# Patient Record
Sex: FEMALE | Race: OTHER | HISPANIC OR LATINO
[De-identification: names, ages, dates, MRNs, and addresses within clinical notes are randomized per-mention and may not be internally consistent; named-entity substitution may affect disease eponyms.]

---

## 2017-01-12 ENCOUNTER — OTHER (OUTPATIENT)
Age: 82
End: 2017-01-12

## 2017-01-26 ENCOUNTER — APPOINTMENT (OUTPATIENT)
Dept: NEPHROLOGY | Facility: CLINIC | Age: 82
End: 2017-01-26

## 2017-01-26 VITALS
BODY MASS INDEX: 26.16 KG/M2 | SYSTOLIC BLOOD PRESSURE: 130 MMHG | TEMPERATURE: 98 F | WEIGHT: 167 LBS | DIASTOLIC BLOOD PRESSURE: 60 MMHG

## 2017-02-13 ENCOUNTER — APPOINTMENT (OUTPATIENT)
Dept: PULMONOLOGY | Facility: CLINIC | Age: 82
End: 2017-02-13

## 2017-03-09 ENCOUNTER — APPOINTMENT (OUTPATIENT)
Dept: NEPHROLOGY | Facility: CLINIC | Age: 82
End: 2017-03-09

## 2017-03-09 VITALS
BODY MASS INDEX: 25.84 KG/M2 | DIASTOLIC BLOOD PRESSURE: 65 MMHG | TEMPERATURE: 98 F | SYSTOLIC BLOOD PRESSURE: 120 MMHG | WEIGHT: 165 LBS

## 2017-03-27 ENCOUNTER — APPOINTMENT (OUTPATIENT)
Dept: INTERNAL MEDICINE | Facility: CLINIC | Age: 82
End: 2017-03-27

## 2017-04-05 ENCOUNTER — RX RENEWAL (OUTPATIENT)
Age: 82
End: 2017-04-05

## 2017-04-24 ENCOUNTER — APPOINTMENT (OUTPATIENT)
Dept: INTERNAL MEDICINE | Facility: CLINIC | Age: 82
End: 2017-04-24

## 2017-04-24 VITALS
HEIGHT: 67 IN | SYSTOLIC BLOOD PRESSURE: 133 MMHG | OXYGEN SATURATION: 98 % | TEMPERATURE: 98.1 F | RESPIRATION RATE: 16 BRPM | DIASTOLIC BLOOD PRESSURE: 75 MMHG | HEART RATE: 70 BPM

## 2017-05-22 ENCOUNTER — FORM ENCOUNTER (OUTPATIENT)
Age: 82
End: 2017-05-22

## 2017-05-23 ENCOUNTER — OUTPATIENT (OUTPATIENT)
Dept: OUTPATIENT SERVICES | Facility: HOSPITAL | Age: 82
LOS: 1 days | End: 2017-05-23
Payer: MEDICARE

## 2017-05-23 DIAGNOSIS — R06.02 SHORTNESS OF BREATH: ICD-10-CM

## 2017-05-23 PROCEDURE — 93306 TTE W/DOPPLER COMPLETE: CPT | Mod: 26

## 2017-05-23 PROCEDURE — 93306 TTE W/DOPPLER COMPLETE: CPT

## 2017-06-05 ENCOUNTER — OTHER (OUTPATIENT)
Age: 82
End: 2017-06-05

## 2017-06-05 ENCOUNTER — RX RENEWAL (OUTPATIENT)
Age: 82
End: 2017-06-05

## 2017-06-13 ENCOUNTER — FORM ENCOUNTER (OUTPATIENT)
Age: 82
End: 2017-06-13

## 2017-06-14 ENCOUNTER — OUTPATIENT (OUTPATIENT)
Dept: OUTPATIENT SERVICES | Facility: HOSPITAL | Age: 82
LOS: 1 days | End: 2017-06-14
Payer: MEDICARE

## 2017-06-14 PROCEDURE — 71046 X-RAY EXAM CHEST 2 VIEWS: CPT

## 2017-06-14 PROCEDURE — 71020: CPT | Mod: 26

## 2017-06-19 ENCOUNTER — APPOINTMENT (OUTPATIENT)
Dept: NEPHROLOGY | Facility: CLINIC | Age: 82
End: 2017-06-19

## 2017-06-19 VITALS
RESPIRATION RATE: 16 BRPM | BODY MASS INDEX: 2.51 KG/M2 | DIASTOLIC BLOOD PRESSURE: 60 MMHG | HEART RATE: 80 BPM | TEMPERATURE: 98 F | SYSTOLIC BLOOD PRESSURE: 120 MMHG | WEIGHT: 16 LBS

## 2017-06-27 ENCOUNTER — OTHER (OUTPATIENT)
Age: 82
End: 2017-06-27

## 2017-08-01 ENCOUNTER — OTHER (OUTPATIENT)
Age: 82
End: 2017-08-01

## 2017-08-03 ENCOUNTER — OTHER (OUTPATIENT)
Age: 82
End: 2017-08-03

## 2017-08-08 ENCOUNTER — EMERGENCY (EMERGENCY)
Facility: HOSPITAL | Age: 82
LOS: 1 days | Discharge: HOME | End: 2017-08-08
Admitting: EMERGENCY MEDICINE
Payer: MEDICARE

## 2017-08-08 VITALS
WEIGHT: 160.06 LBS | HEART RATE: 70 BPM | SYSTOLIC BLOOD PRESSURE: 107 MMHG | DIASTOLIC BLOOD PRESSURE: 70 MMHG | RESPIRATION RATE: 17 BRPM | TEMPERATURE: 209 F | OXYGEN SATURATION: 98 %

## 2017-08-08 DIAGNOSIS — Z79.2 LONG TERM (CURRENT) USE OF ANTIBIOTICS: ICD-10-CM

## 2017-08-08 DIAGNOSIS — J44.9 CHRONIC OBSTRUCTIVE PULMONARY DISEASE, UNSPECIFIED: ICD-10-CM
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
## 2017-08-08 DIAGNOSIS — Z79.899 OTHER LONG TERM (CURRENT) DRUG THERAPY: ICD-10-CM

## 2017-08-08 DIAGNOSIS — S70.361A INSECT BITE (NONVENOMOUS), RIGHT THIGH, INITIAL ENCOUNTER: ICD-10-CM
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
## 2017-08-08 DIAGNOSIS — I10 ESSENTIAL (PRIMARY) HYPERTENSION: ICD-10-CM
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
## 2017-08-08 PROCEDURE — 99283 EMERGENCY DEPT VISIT LOW MDM: CPT

## 2017-08-08 RX ORDER — CEPHALEXIN 500 MG
1 CAPSULE ORAL
Qty: 21 | Refills: 0 | OUTPATIENT
Start: 2017-08-08 | End: 2017-08-15

## 2017-08-08 NOTE — ED PROVIDER NOTE - PHYSICAL EXAMINATION
CONSTITUTIONAL: WD,WN. NAD.    SKIN: Normal color and turgor. Nickel-sized unroofed vesicle to posterior right thigh; rim of very mild surrounding erythema about 6 cm in diameter.  No mass or fluctuence.  No streaking up leg. No inguinal INO.  HEAD: NC/AT.  EYES: Conjunctiva clear. EOMI. PERRL.    ENT: Airway patent, OP clear. Nasal mucosa clear, no rhinorrhea.   RESPIRATORY:  Breathing non-labored. No retractions or accessory muscle use.  Lungs CTA bilat.  CARDIOVASCULAR:  RRR, S1S2. No M/R/G.      GI:  Abdomen soft, nontender.    MSK: Neck supple with painless ROM.  No extremity edema or tenderness.  No joint swelling or ROM limitation.  NEURO: Alert and oriented; HAUSER with normal strength.  Normal speech and gait.

## 2017-08-08 NOTE — ED ADULT TRIAGE NOTE - OTHER COMPLAINTS
pt c.o being bit by an unknown animal on sunday. presents with quarter sized bite to back of R thigh. reports fever yesterday with discharge/pus.

## 2017-08-08 NOTE — ED PROVIDER NOTE - PMH
Chronic obstructive pulmonary disease  COPD (chronic obstructive pulmonary disease)  Essential hypertension  HTN (hypertension)  Glaucoma  Glaucoma  History of pneumonia

## 2017-08-08 NOTE — ED PROVIDER NOTE - OBJECTIVE STATEMENT
pt was walking her dog 2 days ago when she felt an insect bite the back of her right thigh.  she looked down and swatted a large black flying insect away.  it is painful and developed a blister at the bite site yesterday.  daughter says she felt warm yesterday and had an oral temp 101.  today no longer feels warm and not having fever; they came to the ER to have the bite evaluated.  Last tetanus shot less than 10 yrs ago.

## 2017-08-08 NOTE — ED ADULT NURSE NOTE - CHPI ED SYMPTOMS NEG
no vomiting/no fever/no chills/no numbness/no tingling/no dizziness/no nausea/no decreased eating/drinking/no weakness

## 2017-08-08 NOTE — ED PROVIDER NOTE - MEDICAL DECISION MAKING DETAILS
Insect bite with possible early, mild localized infection.  AVSS appears well.  No other s/s to suggest another source of fever yesterday.   Will start abx and have pt get wound check in 48 hrs.

## 2017-08-08 NOTE — ED PROVIDER NOTE - NS ED ROS FT
CONST:  fever yesterday  NEURO: no headache or dizziness, no weakness  CARDIO: no chest pain  PULM: no dyspnea   GI: no abdominal pain, nausea or vomiting  : no urinary frequency, dysuria, or hematuria  ENDO: no hx of diabetes

## 2017-08-15 ENCOUNTER — OTHER (OUTPATIENT)
Age: 82
End: 2017-08-15

## 2017-08-24 ENCOUNTER — OTHER (OUTPATIENT)
Age: 82
End: 2017-08-24

## 2017-09-01 ENCOUNTER — APPOINTMENT (OUTPATIENT)
Dept: PULMONOLOGY | Facility: CLINIC | Age: 82
End: 2017-09-01
Payer: MEDICARE

## 2017-09-01 PROCEDURE — 99214 OFFICE O/P EST MOD 30 MIN: CPT

## 2017-09-07 ENCOUNTER — APPOINTMENT (OUTPATIENT)
Dept: NEPHROLOGY | Facility: CLINIC | Age: 82
End: 2017-09-07
Payer: MEDICARE

## 2017-09-07 VITALS
SYSTOLIC BLOOD PRESSURE: 120 MMHG | WEIGHT: 156 LBS | TEMPERATURE: 98 F | HEART RATE: 82 BPM | BODY MASS INDEX: 24.43 KG/M2 | DIASTOLIC BLOOD PRESSURE: 70 MMHG

## 2017-09-07 PROCEDURE — 99214 OFFICE O/P EST MOD 30 MIN: CPT

## 2017-09-07 RX ORDER — IBUPROFEN 400 MG/1
400 TABLET, FILM COATED ORAL
Qty: 20 | Refills: 0 | Status: DISCONTINUED | COMMUNITY
Start: 2017-01-12 | End: 2017-09-07

## 2017-09-20 ENCOUNTER — OTHER (OUTPATIENT)
Age: 82
End: 2017-09-20

## 2017-09-27 ENCOUNTER — APPOINTMENT (OUTPATIENT)
Dept: INTERNAL MEDICINE | Facility: CLINIC | Age: 82
End: 2017-09-27
Payer: MEDICARE

## 2017-09-27 VITALS
HEIGHT: 67 IN | WEIGHT: 165 LBS | OXYGEN SATURATION: 99 % | TEMPERATURE: 98 F | DIASTOLIC BLOOD PRESSURE: 82 MMHG | SYSTOLIC BLOOD PRESSURE: 140 MMHG | HEART RATE: 71 BPM | BODY MASS INDEX: 25.9 KG/M2

## 2017-09-27 PROCEDURE — G0008: CPT

## 2017-09-27 PROCEDURE — 90662 IIV NO PRSV INCREASED AG IM: CPT

## 2017-09-27 PROCEDURE — 99214 OFFICE O/P EST MOD 30 MIN: CPT | Mod: 25

## 2017-10-30 ENCOUNTER — OTHER (OUTPATIENT)
Age: 82
End: 2017-10-30

## 2017-12-11 ENCOUNTER — APPOINTMENT (OUTPATIENT)
Dept: NEPHROLOGY | Facility: CLINIC | Age: 82
End: 2017-12-11
Payer: MEDICARE

## 2017-12-11 VITALS
SYSTOLIC BLOOD PRESSURE: 130 MMHG | DIASTOLIC BLOOD PRESSURE: 70 MMHG | WEIGHT: 168 LBS | TEMPERATURE: 98 F | BODY MASS INDEX: 26.31 KG/M2

## 2017-12-11 PROCEDURE — 99214 OFFICE O/P EST MOD 30 MIN: CPT

## 2017-12-21 ENCOUNTER — RX RENEWAL (OUTPATIENT)
Age: 82
End: 2017-12-21

## 2018-01-05 ENCOUNTER — OTHER (OUTPATIENT)
Age: 83
End: 2018-01-05

## 2018-02-06 ENCOUNTER — OTHER (OUTPATIENT)
Age: 83
End: 2018-02-06

## 2018-03-01 ENCOUNTER — APPOINTMENT (OUTPATIENT)
Dept: PULMONOLOGY | Facility: CLINIC | Age: 83
End: 2018-03-01
Payer: MEDICARE

## 2018-03-01 PROCEDURE — 99214 OFFICE O/P EST MOD 30 MIN: CPT | Mod: 25

## 2018-03-12 ENCOUNTER — APPOINTMENT (OUTPATIENT)
Dept: NEPHROLOGY | Facility: CLINIC | Age: 83
End: 2018-03-12
Payer: MEDICARE

## 2018-03-12 ENCOUNTER — EMERGENCY (EMERGENCY)
Facility: HOSPITAL | Age: 83
LOS: 1 days | Discharge: HOME | End: 2018-03-12
Attending: EMERGENCY MEDICINE
Payer: MEDICARE

## 2018-03-12 VITALS
DIASTOLIC BLOOD PRESSURE: 60 MMHG | BODY MASS INDEX: 25.53 KG/M2 | WEIGHT: 163 LBS | HEART RATE: 76 BPM | RESPIRATION RATE: 18 BRPM | SYSTOLIC BLOOD PRESSURE: 100 MMHG

## 2018-03-12 VITALS
HEART RATE: 72 BPM | OXYGEN SATURATION: 98 % | DIASTOLIC BLOOD PRESSURE: 76 MMHG | RESPIRATION RATE: 18 BRPM | SYSTOLIC BLOOD PRESSURE: 118 MMHG

## 2018-03-12 VITALS
DIASTOLIC BLOOD PRESSURE: 69 MMHG | HEART RATE: 73 BPM | SYSTOLIC BLOOD PRESSURE: 119 MMHG | TEMPERATURE: 98 F | OXYGEN SATURATION: 98 % | RESPIRATION RATE: 18 BRPM

## 2018-03-12 DIAGNOSIS — J45.909 UNSPECIFIED ASTHMA, UNCOMPLICATED: ICD-10-CM

## 2018-03-12 DIAGNOSIS — Z95.0 PRESENCE OF CARDIAC PACEMAKER: Chronic | ICD-10-CM

## 2018-03-12 DIAGNOSIS — I12.9 HYPERTENSIVE CHRONIC KIDNEY DISEASE WITH STAGE 1 THROUGH STAGE 4 CHRONIC KIDNEY DISEASE, OR UNSPECIFIED CHRONIC KIDNEY DISEASE: ICD-10-CM

## 2018-03-12 DIAGNOSIS — Z98.890 OTHER SPECIFIED POSTPROCEDURAL STATES: Chronic | ICD-10-CM

## 2018-03-12 DIAGNOSIS — Z79.899 OTHER LONG TERM (CURRENT) DRUG THERAPY: ICD-10-CM

## 2018-03-12 DIAGNOSIS — N18.9 CHRONIC KIDNEY DISEASE, UNSPECIFIED: ICD-10-CM

## 2018-03-12 DIAGNOSIS — Z79.2 LONG TERM (CURRENT) USE OF ANTIBIOTICS: ICD-10-CM

## 2018-03-12 DIAGNOSIS — R42 DIZZINESS AND GIDDINESS: ICD-10-CM

## 2018-03-12 DIAGNOSIS — R41.82 ALTERED MENTAL STATUS, UNSPECIFIED: ICD-10-CM

## 2018-03-12 DIAGNOSIS — E78.5 HYPERLIPIDEMIA, UNSPECIFIED: ICD-10-CM

## 2018-03-12 LAB
APPEARANCE UR: CLEAR — SIGNIFICANT CHANGE UP
BILIRUB UR-MCNC: NEGATIVE — SIGNIFICANT CHANGE UP
COLOR SPEC: YELLOW — SIGNIFICANT CHANGE UP
DIFF PNL FLD: NEGATIVE — SIGNIFICANT CHANGE UP
GLUCOSE UR QL: NEGATIVE — SIGNIFICANT CHANGE UP
KETONES UR-MCNC: NEGATIVE — SIGNIFICANT CHANGE UP
LEUKOCYTE ESTERASE UR-ACNC: NEGATIVE — SIGNIFICANT CHANGE UP
MAGNESIUM SERPL-MCNC: 2.2 MG/DL — SIGNIFICANT CHANGE UP (ref 1.6–2.6)
NITRITE UR-MCNC: NEGATIVE — SIGNIFICANT CHANGE UP
PH UR: 6 — SIGNIFICANT CHANGE UP (ref 5–8)
PROT UR-MCNC: NEGATIVE MG/DL — SIGNIFICANT CHANGE UP
SP GR SPEC: <=1.005 — SIGNIFICANT CHANGE UP (ref 1–1.03)
TSH SERPL-MCNC: 1.07 UIU/ML — SIGNIFICANT CHANGE UP (ref 0.35–4.94)
UROBILINOGEN FLD QL: 0.2 E.U./DL — SIGNIFICANT CHANGE UP

## 2018-03-12 PROCEDURE — 99284 EMERGENCY DEPT VISIT MOD MDM: CPT | Mod: 25,GC

## 2018-03-12 PROCEDURE — 71045 X-RAY EXAM CHEST 1 VIEW: CPT

## 2018-03-12 PROCEDURE — 93010 ELECTROCARDIOGRAM REPORT: CPT

## 2018-03-12 PROCEDURE — 86593 SYPHILIS TEST NON-TREP QUANT: CPT

## 2018-03-12 PROCEDURE — 70450 CT HEAD/BRAIN W/O DYE: CPT

## 2018-03-12 PROCEDURE — 70450 CT HEAD/BRAIN W/O DYE: CPT | Mod: 26

## 2018-03-12 PROCEDURE — 36415 COLL VENOUS BLD VENIPUNCTURE: CPT

## 2018-03-12 PROCEDURE — 83735 ASSAY OF MAGNESIUM: CPT

## 2018-03-12 PROCEDURE — 85025 COMPLETE CBC W/AUTO DIFF WBC: CPT

## 2018-03-12 PROCEDURE — 82607 VITAMIN B-12: CPT

## 2018-03-12 PROCEDURE — 71045 X-RAY EXAM CHEST 1 VIEW: CPT | Mod: 26

## 2018-03-12 PROCEDURE — 84443 ASSAY THYROID STIM HORMONE: CPT

## 2018-03-12 PROCEDURE — 86780 TREPONEMA PALLIDUM: CPT

## 2018-03-12 PROCEDURE — 80053 COMPREHEN METABOLIC PANEL: CPT

## 2018-03-12 PROCEDURE — 86592 SYPHILIS TEST NON-TREP QUAL: CPT

## 2018-03-12 PROCEDURE — 99214 OFFICE O/P EST MOD 30 MIN: CPT

## 2018-03-12 PROCEDURE — 81003 URINALYSIS AUTO W/O SCOPE: CPT

## 2018-03-12 PROCEDURE — 93005 ELECTROCARDIOGRAM TRACING: CPT

## 2018-03-12 PROCEDURE — 99284 EMERGENCY DEPT VISIT MOD MDM: CPT | Mod: 25

## 2018-03-12 NOTE — ED PROVIDER NOTE - LAB INTERPRETATION
Elevated BUN/Cr; unremarkable u/a; TSH WNL; hypercalcemia; no other significant electrolyte abnormalities

## 2018-03-12 NOTE — ED PROVIDER NOTE - ATTENDING CONTRIBUTION TO CARE
CKD, HTN, HLD -- uptrending Cr, feeling globally fatigued, lightheaded, and mentally slowed for past several days, sent to ED by PMD for workup. No URI sx, pain of any kind, SOB, n/v/c/d or urinary sx. No documented fevers. -- r/o occult infection, missed CVA, electrolyte disturbance, anemia.

## 2018-03-12 NOTE — ED PROVIDER NOTE - PMH
Chronic obstructive pulmonary disease  COPD (chronic obstructive pulmonary disease)  Essential hypertension  HTN (hypertension)  Glaucoma  Glaucoma  History of pneumonia Asthma, unspecified asthma severity, unspecified whether complicated, unspecified whether persistent    Cerebrovascular accident (CVA), unspecified mechanism    Chronic kidney disease, unspecified CKD stage    Chronic obstructive pulmonary disease  COPD (chronic obstructive pulmonary disease)  Coronary artery disease, angina presence unspecified, unspecified vessel or lesion type, unspecified whether native or transplanted heart    Dizziness    Essential hypertension  HTN (hypertension)  Glaucoma  Glaucoma  History of pneumonia    Hypercalcemia    Hyperlipidemia, unspecified hyperlipidemia type    Malignant neoplasm of lung, unspecified laterality, unspecified part of lung    Pacemaker

## 2018-03-12 NOTE — ED PROVIDER NOTE - MEDICAL DECISION MAKING DETAILS
Patient is an 81yo F w/ PMH CVA, COPD/asthma, lung CA, CAD, CKD, HTN, HLD, hypercalcemia, dizziness (mild BPPV as per Allscripts), s/p PPM presenting w/ slowed responsiveness and decreased PO for 1wk. CT w/o e/o acute/subacute infarct or hemorrhage; no ischemic changes on ECG; CXR clear; no significant electrolyte/metabolic derangements. U/a Patient is an 81yo F w/ PMH CVA, COPD/asthma, lung CA, CAD, CKD, HTN, HLD, hypercalcemia, dizziness (mild BPPV as per Allscripts), s/p PPM presenting w/ slowed responsiveness and decreased PO for 1wk. CT w/o e/o acute/subacute infarct or hemorrhage; no ischemic changes on ECG; CXR clear; no significant electrolyte/metabolic derangements. U/a unremarkable. Patient advised to continue to f/u w/ nephrologist and outpatient PCP for further care.

## 2018-03-12 NOTE — ED PROVIDER NOTE - OBJECTIVE STATEMENT
Patient is an 81yo F w/ PMH CVA, COPD/asthma, lung CA, CAD, CKD, HTN, HLD, hypercalcemia, dizziness (mild BPPV as per [employer]scripts), s/p PPM presenting w/ slowed responsiveness and decreased PO for 1wk. Patient went for f/u renal appointment in the AM (Dr. Haines) where her sBP was noted to be in the low 100's and patient noted to have slowed responsiveness. Yancy recommended pt to not take PM lasix and norvasc and to present to the ED for further evaluation. Patient reports feeling feverish for the past few nights, 4 episodes of diarrhea last Friday (none since), chronic L knee pain; denies dysuria, dyspnea, CP, ab pain, n/v/d/c,  confusion, HA, head trauma, slurred speech, immobility, fall, recent travel, sick contacts.

## 2018-03-12 NOTE — ED ADULT TRIAGE NOTE - CHIEF COMPLAINT QUOTE
Pt directed to ED from outpatient visit for [facility].  HHA states "She's been Dizzy and Her blood pressure was low so he sent us here."  PT denies N/V/D, SOB, Fevers and CP.

## 2018-03-12 NOTE — ED PROVIDER NOTE - CARE PLAN
Principal Discharge DX:	Altered mental status, unspecified altered mental status type  Goal:	Improved  Assessment and plan of treatment:	CT w/o e/o acute/subacute infarct or hemorrhage; no ischemic changes on ECG; CXR clear; no significant electrolyte/metabolic derangements

## 2018-03-13 LAB
T PALLIDUM AB TITR SER: POSITIVE
VIT B12 SERPL-MCNC: 1645 PG/ML — HIGH (ref 232–1245)

## 2018-03-14 ENCOUNTER — EMERGENCY (EMERGENCY)
Facility: HOSPITAL | Age: 83
LOS: 1 days | Discharge: HOME | End: 2018-03-14
Admitting: EMERGENCY MEDICINE
Payer: MEDICARE

## 2018-03-14 VITALS
TEMPERATURE: 98 F | RESPIRATION RATE: 18 BRPM | SYSTOLIC BLOOD PRESSURE: 131 MMHG | OXYGEN SATURATION: 98 % | DIASTOLIC BLOOD PRESSURE: 74 MMHG | HEART RATE: 74 BPM

## 2018-03-14 VITALS
TEMPERATURE: 98 F | RESPIRATION RATE: 18 BRPM | SYSTOLIC BLOOD PRESSURE: 124 MMHG | HEART RATE: 68 BPM | DIASTOLIC BLOOD PRESSURE: 75 MMHG | OXYGEN SATURATION: 97 %

## 2018-03-14 DIAGNOSIS — Z79.899 OTHER LONG TERM (CURRENT) DRUG THERAPY: ICD-10-CM

## 2018-03-14 DIAGNOSIS — Z98.890 OTHER SPECIFIED POSTPROCEDURAL STATES: Chronic | ICD-10-CM

## 2018-03-14 DIAGNOSIS — Z95.0 PRESENCE OF CARDIAC PACEMAKER: Chronic | ICD-10-CM

## 2018-03-14 DIAGNOSIS — A53.9 SYPHILIS, UNSPECIFIED: ICD-10-CM

## 2018-03-14 PROCEDURE — 99283 EMERGENCY DEPT VISIT LOW MDM: CPT | Mod: 25

## 2018-03-14 PROCEDURE — 99283 EMERGENCY DEPT VISIT LOW MDM: CPT

## 2018-03-14 PROCEDURE — 96372 THER/PROPH/DIAG INJ SC/IM: CPT

## 2018-03-14 RX ORDER — PENICILLIN G BENZATHINE 1200000 [IU]/2ML
2.4 INJECTION, SUSPENSION INTRAMUSCULAR ONCE
Qty: 0 | Refills: 0 | Status: DISCONTINUED | OUTPATIENT
Start: 2018-03-14 | End: 2018-03-14

## 2018-03-14 RX ORDER — PENICILLIN G BENZATHINE 1200000 [IU]/2ML
2.4 INJECTION, SUSPENSION INTRAMUSCULAR ONCE
Qty: 0 | Refills: 0 | Status: COMPLETED | OUTPATIENT
Start: 2018-03-14 | End: 2018-03-14

## 2018-03-14 RX ADMIN — PENICILLIN G BENZATHINE 2.4 MILLION UNIT(S): 1200000 INJECTION, SUSPENSION INTRAMUSCULAR at 12:41

## 2018-03-14 NOTE — ED PROVIDER NOTE - MEDICAL DECISION MAKING DETAILS
81 y/o female was in the ED two days ago for weakness. Post-dc results with + syphilis. Pt here for 1st treatment. Will return next week for 2nd treatment and 3rd treatment will be with Dr. Croft. Earliest appointment available is March 27th at 11 am, therefore 2nd treatment in ED as well. Pt currently asymptomatic. Understands she needs 3 injections. Aid is present with pt. Will continue to evaluate any post injection reaction. 81 y/o female was in the ED two days ago for weakness. Post-dc results with + syphilis. Pt here for 1st treatment. Will return next week for 2nd treatment due to Dr. Rodríguez not available and 3rd treatment will be with Dr. Croft. Earliest appointment available is March 27th at 11 am, therefore 2nd treatment in ED as well. Pt currently asymptomatic. Understands she needs 3 injections. Aid is present with pt. Discussed treatment plan with daughter of pt. Will continue to evaluate any post injection reaction. Advised strict return precautions. pt understands diagnosis and will return as advised.

## 2018-03-14 NOTE — ED ADULT NURSE NOTE - OBJECTIVE STATEMENT
Pt walked into Ed, called back from the ED in regards to positive syphilis results. She reports lower back pain only and a fever/dizziness only on monday. The dizziness and fever have resolved ever since monday. Pt denies present fever, urinary s/s, hematuria, visual changes, numbness, tingling, HA, confusion. Pt is currently being observed for her kidney with hx of HTN. Has one female partner and denies sexual intercourse.

## 2018-03-14 NOTE — ED ADULT TRIAGE NOTE - OTHER COMPLAINTS
Daughter called and states that patient tested positive for syphilis and needs to be treated. Pt was here two days ago and states there are no new symptoms at this time.

## 2018-03-14 NOTE — ED ADULT NURSE NOTE - CHPI ED SYMPTOMS NEG
no chills/no nausea/no fever/no hematuria/no vomiting/no blood in stool/no diarrhea/no abdominal distension/no burning urination/no dysuria

## 2018-03-14 NOTE — ED PROVIDER NOTE - OBJECTIVE STATEMENT
81 y/o female present due to abnormal labs. Pt was positive for syphilis. Currently pt is asymptomatic with no complaints. Denies the following: fever, chills, rash, urinary symptoms.

## 2018-03-21 ENCOUNTER — OTHER (OUTPATIENT)
Age: 83
End: 2018-03-21

## 2018-03-21 ENCOUNTER — RX RENEWAL (OUTPATIENT)
Age: 83
End: 2018-03-21

## 2018-03-21 ENCOUNTER — EMERGENCY (EMERGENCY)
Facility: HOSPITAL | Age: 83
LOS: 1 days | Discharge: HOME | End: 2018-03-21
Admitting: EMERGENCY MEDICINE
Payer: MEDICARE

## 2018-03-21 VITALS
SYSTOLIC BLOOD PRESSURE: 127 MMHG | TEMPERATURE: 98 F | DIASTOLIC BLOOD PRESSURE: 77 MMHG | WEIGHT: 162.04 LBS | OXYGEN SATURATION: 98 % | RESPIRATION RATE: 16 BRPM | HEART RATE: 79 BPM

## 2018-03-21 DIAGNOSIS — Z98.890 OTHER SPECIFIED POSTPROCEDURAL STATES: Chronic | ICD-10-CM

## 2018-03-21 DIAGNOSIS — Z79.2 LONG TERM (CURRENT) USE OF ANTIBIOTICS: ICD-10-CM

## 2018-03-21 DIAGNOSIS — Z95.0 PRESENCE OF CARDIAC PACEMAKER: Chronic | ICD-10-CM

## 2018-03-21 DIAGNOSIS — A53.9 SYPHILIS, UNSPECIFIED: ICD-10-CM

## 2018-03-21 DIAGNOSIS — I12.9 HYPERTENSIVE CHRONIC KIDNEY DISEASE WITH STAGE 1 THROUGH STAGE 4 CHRONIC KIDNEY DISEASE, OR UNSPECIFIED CHRONIC KIDNEY DISEASE: ICD-10-CM

## 2018-03-21 DIAGNOSIS — Z79.899 OTHER LONG TERM (CURRENT) DRUG THERAPY: ICD-10-CM

## 2018-03-21 DIAGNOSIS — E78.5 HYPERLIPIDEMIA, UNSPECIFIED: ICD-10-CM

## 2018-03-21 DIAGNOSIS — N18.9 CHRONIC KIDNEY DISEASE, UNSPECIFIED: ICD-10-CM

## 2018-03-21 DIAGNOSIS — J44.9 CHRONIC OBSTRUCTIVE PULMONARY DISEASE, UNSPECIFIED: ICD-10-CM

## 2018-03-21 PROCEDURE — 96372 THER/PROPH/DIAG INJ SC/IM: CPT

## 2018-03-21 PROCEDURE — 99283 EMERGENCY DEPT VISIT LOW MDM: CPT

## 2018-03-21 PROCEDURE — 99283 EMERGENCY DEPT VISIT LOW MDM: CPT | Mod: 25

## 2018-03-21 RX ORDER — PENICILLIN G BENZATHINE 1200000 [IU]/2ML
2.4 INJECTION, SUSPENSION INTRAMUSCULAR ONCE
Qty: 0 | Refills: 0 | Status: COMPLETED | OUTPATIENT
Start: 2018-03-21 | End: 2018-03-21

## 2018-03-21 RX ADMIN — PENICILLIN G BENZATHINE 2.4 MILLION UNIT(S): 1200000 INJECTION, SUSPENSION INTRAMUSCULAR at 11:04

## 2018-03-21 NOTE — ED PROVIDER NOTE - PMH
Asthma, unspecified asthma severity, unspecified whether complicated, unspecified whether persistent    Cerebrovascular accident (CVA), unspecified mechanism    Chronic kidney disease, unspecified CKD stage    Chronic obstructive pulmonary disease  COPD (chronic obstructive pulmonary disease)  Coronary artery disease, angina presence unspecified, unspecified vessel or lesion type, unspecified whether native or transplanted heart    Dizziness    Essential hypertension  HTN (hypertension)  Glaucoma  Glaucoma  History of pneumonia    Hypercalcemia    Hyperlipidemia, unspecified hyperlipidemia type    Malignant neoplasm of lung, unspecified laterality, unspecified part of lung    Pacemaker

## 2018-03-21 NOTE — ED ADULT NURSE NOTE - OBJECTIVE STATEMENT
presents to ED for continuing treatment of asymptomatic syphilis. pt was seen in ED recently and received first dose of Bicillin. Reports no adverse reaction.  denies fever, chills.

## 2018-03-21 NOTE — ED PROVIDER NOTE - OBJECTIVE STATEMENT
81 y/o female present for 2nd syphilis injection. Pt reports no reactions to last treatment. She denies the following: fever, chills, rash, pain.

## 2018-03-26 ENCOUNTER — APPOINTMENT (OUTPATIENT)
Dept: INTERNAL MEDICINE | Facility: CLINIC | Age: 83
End: 2018-03-26
Payer: MEDICARE

## 2018-03-26 VITALS
SYSTOLIC BLOOD PRESSURE: 136 MMHG | HEIGHT: 67 IN | OXYGEN SATURATION: 97 % | DIASTOLIC BLOOD PRESSURE: 80 MMHG | WEIGHT: 163 LBS | TEMPERATURE: 98.4 F | BODY MASS INDEX: 25.58 KG/M2 | HEART RATE: 69 BPM

## 2018-03-26 PROCEDURE — 99214 OFFICE O/P EST MOD 30 MIN: CPT

## 2018-03-27 ENCOUNTER — APPOINTMENT (OUTPATIENT)
Dept: INFECTIOUS DISEASE | Facility: CLINIC | Age: 83
End: 2018-03-27
Payer: MEDICARE

## 2018-03-27 ENCOUNTER — OUTPATIENT (OUTPATIENT)
Dept: OUTPATIENT SERVICES | Facility: HOSPITAL | Age: 83
LOS: 1 days | End: 2018-03-27

## 2018-03-27 VITALS — SYSTOLIC BLOOD PRESSURE: 130 MMHG | DIASTOLIC BLOOD PRESSURE: 80 MMHG | RESPIRATION RATE: 12 BRPM | HEART RATE: 80 BPM

## 2018-03-27 DIAGNOSIS — Z98.890 OTHER SPECIFIED POSTPROCEDURAL STATES: Chronic | ICD-10-CM

## 2018-03-27 DIAGNOSIS — Z95.0 PRESENCE OF CARDIAC PACEMAKER: Chronic | ICD-10-CM

## 2018-03-27 LAB
ALBUMIN SERPL ELPH-MCNC: 4 G/DL — SIGNIFICANT CHANGE UP (ref 3.3–5)
ALP SERPL-CCNC: 120 U/L — SIGNIFICANT CHANGE UP (ref 40–120)
ALT FLD-CCNC: 16 U/L — SIGNIFICANT CHANGE UP (ref 10–45)
ANION GAP SERPL CALC-SCNC: 12 MMOL/L — SIGNIFICANT CHANGE UP (ref 5–17)
AST SERPL-CCNC: 23 U/L — SIGNIFICANT CHANGE UP (ref 10–40)
BILIRUB SERPL-MCNC: 0.5 MG/DL — SIGNIFICANT CHANGE UP (ref 0.2–1.2)
BUN SERPL-MCNC: 31 MG/DL — HIGH (ref 7–23)
CALCIUM SERPL-MCNC: 10.4 MG/DL — SIGNIFICANT CHANGE UP (ref 8.4–10.5)
CHLORIDE SERPL-SCNC: 102 MMOL/L — SIGNIFICANT CHANGE UP (ref 96–108)
CO2 SERPL-SCNC: 26 MMOL/L — SIGNIFICANT CHANGE UP (ref 22–31)
CREAT SERPL-MCNC: 1.08 MG/DL — SIGNIFICANT CHANGE UP (ref 0.5–1.3)
GLUCOSE SERPL-MCNC: 91 MG/DL — SIGNIFICANT CHANGE UP (ref 70–99)
POTASSIUM SERPL-MCNC: 4.5 MMOL/L — SIGNIFICANT CHANGE UP (ref 3.5–5.3)
POTASSIUM SERPL-SCNC: 4.5 MMOL/L — SIGNIFICANT CHANGE UP (ref 3.5–5.3)
PROT SERPL-MCNC: 7.6 G/DL — SIGNIFICANT CHANGE UP (ref 6–8.3)
SODIUM SERPL-SCNC: 140 MMOL/L — SIGNIFICANT CHANGE UP (ref 135–145)

## 2018-03-27 PROCEDURE — 99214 OFFICE O/P EST MOD 30 MIN: CPT | Mod: GC

## 2018-03-28 ENCOUNTER — RX RENEWAL (OUTPATIENT)
Age: 83
End: 2018-03-28

## 2018-03-28 LAB
CONTROL LINE: PRESENT
HIV 1+2 AB+HIV1P24 AG SERPLBLD IA.RAPID: NEGATIVE
HIV-1 / HIV-2 ANTIBODY: NORMAL
HIV1 P24 AG SERPL IA-MCNC: NORMAL

## 2018-04-09 DIAGNOSIS — Z86.19 PERSONAL HISTORY OF OTHER INFECTIOUS AND PARASITIC DISEASES: ICD-10-CM

## 2018-04-09 DIAGNOSIS — Z11.4 ENCOUNTER FOR SCREENING FOR HUMAN IMMUNODEFICIENCY VIRUS [HIV]: ICD-10-CM

## 2018-05-07 ENCOUNTER — RX RENEWAL (OUTPATIENT)
Age: 83
End: 2018-05-07

## 2018-05-07 ENCOUNTER — OTHER (OUTPATIENT)
Age: 83
End: 2018-05-07

## 2018-06-01 ENCOUNTER — OTHER (OUTPATIENT)
Age: 83
End: 2018-06-01

## 2018-06-04 ENCOUNTER — APPOINTMENT (OUTPATIENT)
Dept: INTERNAL MEDICINE | Facility: CLINIC | Age: 83
End: 2018-06-04
Payer: MEDICARE

## 2018-06-04 VITALS
WEIGHT: 169.13 LBS | TEMPERATURE: 98.6 F | OXYGEN SATURATION: 96 % | BODY MASS INDEX: 26.55 KG/M2 | SYSTOLIC BLOOD PRESSURE: 143 MMHG | DIASTOLIC BLOOD PRESSURE: 81 MMHG | HEART RATE: 70 BPM | HEIGHT: 67 IN

## 2018-06-04 PROCEDURE — 36415 COLL VENOUS BLD VENIPUNCTURE: CPT

## 2018-06-04 PROCEDURE — 99214 OFFICE O/P EST MOD 30 MIN: CPT | Mod: 25

## 2018-06-07 LAB
ALBUMIN SERPL ELPH-MCNC: 4.2 G/DL
ALP BLD-CCNC: 120 U/L
ALT SERPL-CCNC: 15 U/L
ANION GAP SERPL CALC-SCNC: 12 MMOL/L
AST SERPL-CCNC: 22 U/L
BILIRUB SERPL-MCNC: 0.4 MG/DL
BUN SERPL-MCNC: 20 MG/DL
CALCIUM SERPL-MCNC: 10.3 MG/DL
CHLORIDE SERPL-SCNC: 106 MMOL/L
CO2 SERPL-SCNC: 25 MMOL/L
CREAT SERPL-MCNC: 1.04 MG/DL
GLUCOSE SERPL-MCNC: 95 MG/DL
HBA1C MFR BLD HPLC: 5.8 %
POTASSIUM SERPL-SCNC: 5.2 MMOL/L
PROT SERPL-MCNC: 7.1 G/DL
SODIUM SERPL-SCNC: 143 MMOL/L

## 2018-06-27 ENCOUNTER — APPOINTMENT (OUTPATIENT)
Dept: NEPHROLOGY | Facility: CLINIC | Age: 83
End: 2018-06-27

## 2018-07-09 ENCOUNTER — RX RENEWAL (OUTPATIENT)
Age: 83
End: 2018-07-09

## 2018-07-09 ENCOUNTER — OTHER (OUTPATIENT)
Age: 83
End: 2018-07-09

## 2018-07-16 ENCOUNTER — APPOINTMENT (OUTPATIENT)
Dept: NEPHROLOGY | Facility: CLINIC | Age: 83
End: 2018-07-16
Payer: MEDICARE

## 2018-07-16 VITALS — HEART RATE: 64 BPM | SYSTOLIC BLOOD PRESSURE: 136 MMHG | OXYGEN SATURATION: 99 % | DIASTOLIC BLOOD PRESSURE: 75 MMHG

## 2018-07-16 VITALS — WEIGHT: 173 LBS | BODY MASS INDEX: 27.1 KG/M2

## 2018-07-16 PROCEDURE — 99214 OFFICE O/P EST MOD 30 MIN: CPT

## 2018-07-25 ENCOUNTER — APPOINTMENT (OUTPATIENT)
Dept: PULMONOLOGY | Facility: CLINIC | Age: 83
End: 2018-07-25
Payer: MEDICARE

## 2018-07-25 VITALS
HEIGHT: 68.4 IN | OXYGEN SATURATION: 96 % | BODY MASS INDEX: 25.62 KG/M2 | WEIGHT: 171 LBS | TEMPERATURE: 98.5 F | DIASTOLIC BLOOD PRESSURE: 90 MMHG | SYSTOLIC BLOOD PRESSURE: 150 MMHG | HEART RATE: 72 BPM

## 2018-07-25 PROCEDURE — 99214 OFFICE O/P EST MOD 30 MIN: CPT

## 2018-07-26 ENCOUNTER — OTHER (OUTPATIENT)
Age: 83
End: 2018-07-26

## 2018-07-26 PROBLEM — J45.909 UNSPECIFIED ASTHMA, UNCOMPLICATED: Chronic | Status: ACTIVE | Noted: 2018-03-12

## 2018-07-26 PROBLEM — E78.5 HYPERLIPIDEMIA, UNSPECIFIED: Chronic | Status: ACTIVE | Noted: 2018-03-12

## 2018-07-31 NOTE — ED PROVIDER NOTE - PLAN OF CARE
[facility]:  New patients can reach our intake coordinators at [phone]. Patient Education        Childhood Depression: Care Instructions  Your Care Instructions  Depression is a mood disorder that causes a child or teen to feel sad or irritable for a long period of time. A young person who is depressed may not enjoy school, play, or friends. He or she may also sleep more or less than usual, lose or gain weight, and be withdrawn. Depression may run in families. It is linked to a chemical problem in the brain. The chemical problem can be caused by medicines, illness, or stress. Events that cause great stress, such as moving or the loss of a loved one, can trigger it. Depression can last for a long time. It may come in cycles of feeling down and feeling normal. It is important to know that all forms of depression can be treated. Follow-up care is a key part of your child's treatment and safety. Be sure to make and go to all appointments, and call your doctor if your child is having problems. It's also a good idea to know your child's test results and keep a list of the medicines your child takes. How can you care for your child at home? · Offer your child support and understanding. This is one of the most important things you can do to help your child cope with being depressed. · Be safe with medicines. Have your child take medicines exactly as prescribed. Call your doctor if your child has any problems with his or her medicine. It is important for your child to keep taking medicine for depression even after symptoms go away, so that it does not come back. Your child may need to try several medicines before finding the one that works best. Many side effects of the medicines go away after a while. Talk to your doctor about any side effects or other concerns. · Make sure your child gets enough sleep. There are things you can do if he or she has problems sleeping.   ¨ Have your child go to bed Improved CT w/o e/o acute/subacute infarct or hemorrhage; no ischemic changes on ECG; CXR clear; no significant electrolyte/metabolic derangements

## 2018-08-01 ENCOUNTER — OUTPATIENT (OUTPATIENT)
Dept: OUTPATIENT SERVICES | Facility: HOSPITAL | Age: 83
LOS: 1 days | End: 2018-08-01
Payer: MEDICARE

## 2018-08-01 ENCOUNTER — APPOINTMENT (OUTPATIENT)
Dept: CT IMAGING | Facility: HOSPITAL | Age: 83
End: 2018-08-01
Payer: MEDICARE

## 2018-08-01 DIAGNOSIS — Z95.0 PRESENCE OF CARDIAC PACEMAKER: Chronic | ICD-10-CM

## 2018-08-01 DIAGNOSIS — Z98.890 OTHER SPECIFIED POSTPROCEDURAL STATES: Chronic | ICD-10-CM

## 2018-08-01 PROCEDURE — 71250 CT THORAX DX C-: CPT | Mod: 26

## 2018-08-01 PROCEDURE — 71250 CT THORAX DX C-: CPT

## 2018-08-27 ENCOUNTER — RX RENEWAL (OUTPATIENT)
Age: 83
End: 2018-08-27

## 2018-09-08 ENCOUNTER — EMERGENCY (EMERGENCY)
Facility: HOSPITAL | Age: 83
LOS: 1 days | Discharge: HOME | End: 2018-09-08
Attending: EMERGENCY MEDICINE | Admitting: EMERGENCY MEDICINE
Payer: MEDICARE

## 2018-09-08 VITALS
SYSTOLIC BLOOD PRESSURE: 192 MMHG | DIASTOLIC BLOOD PRESSURE: 103 MMHG | WEIGHT: 190.04 LBS | RESPIRATION RATE: 16 BRPM | OXYGEN SATURATION: 96 % | TEMPERATURE: 99 F | HEART RATE: 93 BPM | HEIGHT: 62 IN

## 2018-09-08 VITALS
HEART RATE: 86 BPM | DIASTOLIC BLOOD PRESSURE: 64 MMHG | SYSTOLIC BLOOD PRESSURE: 145 MMHG | OXYGEN SATURATION: 96 % | TEMPERATURE: 98 F | RESPIRATION RATE: 18 BRPM

## 2018-09-08 DIAGNOSIS — Z95.0 PRESENCE OF CARDIAC PACEMAKER: Chronic | ICD-10-CM

## 2018-09-08 DIAGNOSIS — Z98.890 OTHER SPECIFIED POSTPROCEDURAL STATES: Chronic | ICD-10-CM

## 2018-09-08 LAB
ALBUMIN SERPL ELPH-MCNC: 4.5 G/DL — SIGNIFICANT CHANGE UP (ref 3.3–5)
ALP SERPL-CCNC: 136 U/L — HIGH (ref 40–120)
ALT FLD-CCNC: 19 U/L — SIGNIFICANT CHANGE UP (ref 10–45)
ANION GAP SERPL CALC-SCNC: 13 MMOL/L — SIGNIFICANT CHANGE UP (ref 5–17)
APTT BLD: 26.1 SEC — LOW (ref 27.5–37.4)
AST SERPL-CCNC: 26 U/L — SIGNIFICANT CHANGE UP (ref 10–40)
BASOPHILS NFR BLD AUTO: 0.2 % — SIGNIFICANT CHANGE UP (ref 0–2)
BILIRUB SERPL-MCNC: 0.6 MG/DL — SIGNIFICANT CHANGE UP (ref 0.2–1.2)
BUN SERPL-MCNC: 18 MG/DL — SIGNIFICANT CHANGE UP (ref 7–23)
CALCIUM SERPL-MCNC: 10.4 MG/DL — SIGNIFICANT CHANGE UP (ref 8.4–10.5)
CHLORIDE SERPL-SCNC: 103 MMOL/L — SIGNIFICANT CHANGE UP (ref 96–108)
CO2 SERPL-SCNC: 27 MMOL/L — SIGNIFICANT CHANGE UP (ref 22–31)
CREAT SERPL-MCNC: 0.93 MG/DL — SIGNIFICANT CHANGE UP (ref 0.5–1.3)
EOSINOPHIL NFR BLD AUTO: 1.2 % — SIGNIFICANT CHANGE UP (ref 0–6)
GLUCOSE SERPL-MCNC: 112 MG/DL — HIGH (ref 70–99)
HCT VFR BLD CALC: 41.8 % — SIGNIFICANT CHANGE UP (ref 34.5–45)
HGB BLD-MCNC: 13.9 G/DL — SIGNIFICANT CHANGE UP (ref 11.5–15.5)
INR BLD: 1.11 — SIGNIFICANT CHANGE UP (ref 0.88–1.16)
LYMPHOCYTES # BLD AUTO: 21.5 % — SIGNIFICANT CHANGE UP (ref 13–44)
MAGNESIUM SERPL-MCNC: 2.2 MG/DL — SIGNIFICANT CHANGE UP (ref 1.6–2.6)
MCHC RBC-ENTMCNC: 31 PG — SIGNIFICANT CHANGE UP (ref 27–34)
MCHC RBC-ENTMCNC: 33.3 G/DL — SIGNIFICANT CHANGE UP (ref 32–36)
MCV RBC AUTO: 93.3 FL — SIGNIFICANT CHANGE UP (ref 80–100)
MONOCYTES NFR BLD AUTO: 7.1 % — SIGNIFICANT CHANGE UP (ref 2–14)
NEUTROPHILS NFR BLD AUTO: 70 % — SIGNIFICANT CHANGE UP (ref 43–77)
NT-PROBNP SERPL-SCNC: 269 PG/ML — SIGNIFICANT CHANGE UP (ref 0–300)
PLATELET # BLD AUTO: 207 K/UL — SIGNIFICANT CHANGE UP (ref 150–400)
POTASSIUM SERPL-MCNC: 4.1 MMOL/L — SIGNIFICANT CHANGE UP (ref 3.5–5.3)
POTASSIUM SERPL-SCNC: 4.1 MMOL/L — SIGNIFICANT CHANGE UP (ref 3.5–5.3)
PROT SERPL-MCNC: 7.9 G/DL — SIGNIFICANT CHANGE UP (ref 6–8.3)
PROTHROM AB SERPL-ACNC: 12.3 SEC — SIGNIFICANT CHANGE UP (ref 9.8–12.7)
RBC # BLD: 4.48 M/UL — SIGNIFICANT CHANGE UP (ref 3.8–5.2)
RBC # FLD: 14.5 % — SIGNIFICANT CHANGE UP (ref 10.3–16.9)
SODIUM SERPL-SCNC: 143 MMOL/L — SIGNIFICANT CHANGE UP (ref 135–145)
TROPONIN T SERPL-MCNC: <0.01 NG/ML — SIGNIFICANT CHANGE UP (ref 0–0.01)
WBC # BLD: 5.2 K/UL — SIGNIFICANT CHANGE UP (ref 3.8–10.5)
WBC # FLD AUTO: 5.2 K/UL — SIGNIFICANT CHANGE UP (ref 3.8–10.5)

## 2018-09-08 PROCEDURE — 85730 THROMBOPLASTIN TIME PARTIAL: CPT

## 2018-09-08 PROCEDURE — 85610 PROTHROMBIN TIME: CPT

## 2018-09-08 PROCEDURE — 99284 EMERGENCY DEPT VISIT MOD MDM: CPT | Mod: 25

## 2018-09-08 PROCEDURE — 71045 X-RAY EXAM CHEST 1 VIEW: CPT | Mod: 26

## 2018-09-08 PROCEDURE — 71275 CT ANGIOGRAPHY CHEST: CPT

## 2018-09-08 PROCEDURE — 83880 ASSAY OF NATRIURETIC PEPTIDE: CPT

## 2018-09-08 PROCEDURE — 71275 CT ANGIOGRAPHY CHEST: CPT | Mod: 26

## 2018-09-08 PROCEDURE — 99285 EMERGENCY DEPT VISIT HI MDM: CPT

## 2018-09-08 PROCEDURE — 85025 COMPLETE CBC W/AUTO DIFF WBC: CPT

## 2018-09-08 PROCEDURE — 84484 ASSAY OF TROPONIN QUANT: CPT

## 2018-09-08 PROCEDURE — 80053 COMPREHEN METABOLIC PANEL: CPT

## 2018-09-08 PROCEDURE — 83735 ASSAY OF MAGNESIUM: CPT

## 2018-09-08 PROCEDURE — 71045 X-RAY EXAM CHEST 1 VIEW: CPT

## 2018-09-08 RX ORDER — CYCLOBENZAPRINE HYDROCHLORIDE 10 MG/1
10 TABLET, FILM COATED ORAL ONCE
Qty: 0 | Refills: 0 | Status: COMPLETED | OUTPATIENT
Start: 2018-09-08 | End: 2018-09-08

## 2018-09-08 RX ORDER — NICARDIPINE HYDROCHLORIDE 30 MG/1
30 CAPSULE, EXTENDED RELEASE ORAL ONCE
Qty: 0 | Refills: 0 | Status: COMPLETED | OUTPATIENT
Start: 2018-09-08 | End: 2018-09-08

## 2018-09-08 RX ORDER — CYCLOBENZAPRINE HYDROCHLORIDE 10 MG/1
1 TABLET, FILM COATED ORAL
Qty: 15 | Refills: 0 | OUTPATIENT
Start: 2018-09-08 | End: 2018-09-12

## 2018-09-08 RX ORDER — IBUPROFEN 200 MG
1 TABLET ORAL
Qty: 20 | Refills: 0 | OUTPATIENT
Start: 2018-09-08 | End: 2018-09-12

## 2018-09-08 RX ORDER — ACETAMINOPHEN 500 MG
2 TABLET ORAL
Qty: 60 | Refills: 0 | OUTPATIENT
Start: 2018-09-08 | End: 2018-09-12

## 2018-09-08 RX ORDER — ASPIRIN/CALCIUM CARB/MAGNESIUM 324 MG
325 TABLET ORAL ONCE
Qty: 0 | Refills: 0 | Status: COMPLETED | OUTPATIENT
Start: 2018-09-08 | End: 2018-09-08

## 2018-09-08 RX ORDER — ACETAMINOPHEN 500 MG
975 TABLET ORAL ONCE
Qty: 0 | Refills: 0 | Status: COMPLETED | OUTPATIENT
Start: 2018-09-08 | End: 2018-09-08

## 2018-09-08 RX ADMIN — NICARDIPINE HYDROCHLORIDE 30 MILLIGRAM(S): 30 CAPSULE, EXTENDED RELEASE ORAL at 11:50

## 2018-09-08 RX ADMIN — Medication 975 MILLIGRAM(S): at 11:49

## 2018-09-08 RX ADMIN — CYCLOBENZAPRINE HYDROCHLORIDE 10 MILLIGRAM(S): 10 TABLET, FILM COATED ORAL at 11:50

## 2018-09-08 RX ADMIN — Medication 325 MILLIGRAM(S): at 11:50

## 2018-09-08 NOTE — ED PROVIDER NOTE - PHYSICAL EXAMINATION
VITAL SIGNS: I have reviewed nursing notes and confirm.  CONSTITUTIONAL: Well-developed; well-nourished; in no acute distress.  SKIN: Agree with RN documentation regarding decubitus evaluation. Remainder of skin exam is warm and dry, no acute rash.  HEAD: Normocephalic; atraumatic.  EYES: PERRL, EOM intact; conjunctiva and sclera clear.  ENT: No nasal discharge; airway clear.  NECK: Supple; non tender.  CARD: S1, S2 normal; no murmurs, gallops, or rubs. Regular rate and rhythm.  RESP: No wheezes, rales or rhonchi.  ABD: Normal bowel sounds; soft; non-distended; non-tender  BACK: + thoracic L-sided paraspinal TTP w/out midline TTP or overlying skin changes/swelling/erythema/breakdown/crepitus  EXT: Normal ROM. No clubbing, cyanosis or edema.  LYMPH: No acute cervical adenopathy.  NEURO: Alert, oriented. Grossly unremarkable. stable gait with cane (baseline)  PSYCH: Cooperative, appropriate.

## 2018-09-08 NOTE — ED ADULT NURSE NOTE - NSIMPLEMENTINTERV_GEN_ALL_ED
Implemented All Universal Safety Interventions:  [place] to call system. Call bell, personal items and telephone within reach. Instruct patient to call for assistance. Room bathroom lighting operational. Non-slip footwear when patient is off stretcher. Physically safe environment: no spills, clutter or unnecessary equipment. Stretcher in lowest position, wheels locked, appropriate side rails in place.

## 2018-09-08 NOTE — ED ADULT NURSE NOTE - OBJECTIVE STATEMENT
82 y/o pt arrived to the ED c/o having left shoulder pain x 3 days. Pt denies suffering any falls, chest pain, and S.O.B and states that it started all of a sudden. Pt's daughter states that she walks her dog everyday.

## 2018-09-08 NOTE — ED PROVIDER NOTE - MEDICAL DECISION MAKING DETAILS
Negative workup for ACS, TAA, harrison pathology. Stable lung abnormalities w/out SOB/cough. Pain well controlled in ED, likely MSK. Feeling well and safe for discharge. Discussed return precautions and anticipatory guidance. c/w tylenol/motrin/flexaril.

## 2018-09-08 NOTE — ED ADULT TRIAGE NOTE - OTHER COMPLAINTS
pt c.o cp with radiation to L neck and upper back x 3 days. denies recent fall or injury. no sob. hx pacemaker. ekg in progress.

## 2018-09-08 NOTE — ED PROVIDER NOTE - OBJECTIVE STATEMENT
84 y/o F w/ PMH CVA, COPD/asthma, lung CA (in remission), CAD, CKD, HTN, HLD, hypercalcemia, dizziness (mild BPPV as per Allscripts), s/p PPM 84 y/o F w/ PMH CVA, COPD/asthma, lung CA (in remission), CAD, CKD, HTN, HLD, cardiomyopathy w/PPM, vertigo, p/w L upper back pain for 2 days radiating to front, + positional (worse with bending forward or manipulating L shoulder) w/out hx trauma. No numbness/tingling/weakness 84 y/o F w/ PMH CVA, COPD/asthma, lung CA (in remission), CAD, CKD, HTN, HLD, cardiomyopathy w/PPM, vertigo, p/w L upper back pain for 2 days radiating to front, + positional (worse with bending forward or manipulating L shoulder) w/out hx trauma. No numbness/tingling/weakness in any ext. No SOB or palpitations. No neck pain or HA. No n/v or abd pain. Denies fever/chills/recent illness. No LE pain/swelling.

## 2018-09-12 DIAGNOSIS — Z79.899 OTHER LONG TERM (CURRENT) DRUG THERAPY: ICD-10-CM

## 2018-09-12 DIAGNOSIS — J44.9 CHRONIC OBSTRUCTIVE PULMONARY DISEASE, UNSPECIFIED: ICD-10-CM

## 2018-09-12 DIAGNOSIS — I12.0 HYPERTENSIVE CHRONIC KIDNEY DISEASE WITH STAGE 5 CHRONIC KIDNEY DISEASE OR END STAGE RENAL DISEASE: ICD-10-CM

## 2018-09-12 DIAGNOSIS — N18.9 CHRONIC KIDNEY DISEASE, UNSPECIFIED: ICD-10-CM

## 2018-09-12 DIAGNOSIS — E78.5 HYPERLIPIDEMIA, UNSPECIFIED: ICD-10-CM

## 2018-09-12 DIAGNOSIS — Z79.2 LONG TERM (CURRENT) USE OF ANTIBIOTICS: ICD-10-CM

## 2018-09-12 DIAGNOSIS — M54.6 PAIN IN THORACIC SPINE: ICD-10-CM

## 2018-09-12 DIAGNOSIS — I25.10 ATHEROSCLEROTIC HEART DISEASE OF NATIVE CORONARY ARTERY WITHOUT ANGINA PECTORIS: ICD-10-CM

## 2018-09-12 DIAGNOSIS — R07.9 CHEST PAIN, UNSPECIFIED: ICD-10-CM

## 2018-09-24 ENCOUNTER — APPOINTMENT (OUTPATIENT)
Dept: INTERNAL MEDICINE | Facility: CLINIC | Age: 83
End: 2018-09-24
Payer: MEDICARE

## 2018-09-24 VITALS
HEART RATE: 71 BPM | SYSTOLIC BLOOD PRESSURE: 170 MMHG | OXYGEN SATURATION: 99 % | HEIGHT: 68.4 IN | DIASTOLIC BLOOD PRESSURE: 90 MMHG | WEIGHT: 172 LBS | BODY MASS INDEX: 25.77 KG/M2 | TEMPERATURE: 98.6 F

## 2018-09-24 VITALS — SYSTOLIC BLOOD PRESSURE: 152 MMHG | DIASTOLIC BLOOD PRESSURE: 88 MMHG

## 2018-09-24 PROCEDURE — 99214 OFFICE O/P EST MOD 30 MIN: CPT

## 2018-09-25 ENCOUNTER — RX RENEWAL (OUTPATIENT)
Age: 83
End: 2018-09-25

## 2018-09-28 ENCOUNTER — RX RENEWAL (OUTPATIENT)
Age: 83
End: 2018-09-28

## 2018-10-02 ENCOUNTER — RX RENEWAL (OUTPATIENT)
Age: 83
End: 2018-10-02

## 2018-10-21 ENCOUNTER — FORM ENCOUNTER (OUTPATIENT)
Age: 83
End: 2018-10-21

## 2018-10-22 ENCOUNTER — OUTPATIENT (OUTPATIENT)
Dept: OUTPATIENT SERVICES | Facility: HOSPITAL | Age: 83
LOS: 1 days | End: 2018-10-22
Payer: MEDICARE

## 2018-10-22 DIAGNOSIS — Z95.0 PRESENCE OF CARDIAC PACEMAKER: Chronic | ICD-10-CM

## 2018-10-22 DIAGNOSIS — Z98.890 OTHER SPECIFIED POSTPROCEDURAL STATES: Chronic | ICD-10-CM

## 2018-10-22 PROCEDURE — 73562 X-RAY EXAM OF KNEE 3: CPT

## 2018-10-22 PROCEDURE — 73562 X-RAY EXAM OF KNEE 3: CPT | Mod: 26,LT

## 2018-10-29 ENCOUNTER — OTHER (OUTPATIENT)
Age: 83
End: 2018-10-29

## 2018-12-06 ENCOUNTER — RX RENEWAL (OUTPATIENT)
Age: 83
End: 2018-12-06

## 2018-12-17 ENCOUNTER — APPOINTMENT (OUTPATIENT)
Dept: INTERNAL MEDICINE | Facility: CLINIC | Age: 83
End: 2018-12-17
Payer: MEDICARE

## 2018-12-17 VITALS
TEMPERATURE: 98 F | HEART RATE: 72 BPM | OXYGEN SATURATION: 98 % | WEIGHT: 170 LBS | HEIGHT: 68.4 IN | DIASTOLIC BLOOD PRESSURE: 81 MMHG | BODY MASS INDEX: 25.47 KG/M2 | SYSTOLIC BLOOD PRESSURE: 159 MMHG

## 2018-12-17 VITALS — SYSTOLIC BLOOD PRESSURE: 142 MMHG | DIASTOLIC BLOOD PRESSURE: 80 MMHG

## 2018-12-17 DIAGNOSIS — R73.09 OTHER ABNORMAL GLUCOSE: ICD-10-CM

## 2018-12-17 PROCEDURE — 99214 OFFICE O/P EST MOD 30 MIN: CPT | Mod: 25

## 2018-12-17 PROCEDURE — 36415 COLL VENOUS BLD VENIPUNCTURE: CPT

## 2018-12-18 LAB
ALBUMIN SERPL ELPH-MCNC: 4.4 G/DL
ALP BLD-CCNC: 124 U/L
ALT SERPL-CCNC: 20 U/L
ANION GAP SERPL CALC-SCNC: 13 MMOL/L
AST SERPL-CCNC: 26 U/L
BILIRUB SERPL-MCNC: 0.5 MG/DL
BUN SERPL-MCNC: 19 MG/DL
CALCIUM SERPL-MCNC: 10.4 MG/DL
CHLORIDE SERPL-SCNC: 105 MMOL/L
CHOLEST SERPL-MCNC: 183 MG/DL
CHOLEST/HDLC SERPL: 2.1 RATIO
CO2 SERPL-SCNC: 27 MMOL/L
CREAT SERPL-MCNC: 1 MG/DL
GLUCOSE SERPL-MCNC: 89 MG/DL
HBA1C MFR BLD HPLC: 6.1 %
HDLC SERPL-MCNC: 87 MG/DL
LDLC SERPL CALC-MCNC: 86 MG/DL
POTASSIUM SERPL-SCNC: 5.1 MMOL/L
PROT SERPL-MCNC: 7.6 G/DL
SODIUM SERPL-SCNC: 145 MMOL/L
TRIGL SERPL-MCNC: 52 MG/DL

## 2018-12-31 ENCOUNTER — EMERGENCY (EMERGENCY)
Facility: HOSPITAL | Age: 83
LOS: 1 days | Discharge: HOME | End: 2018-12-31
Attending: EMERGENCY MEDICINE | Admitting: EMERGENCY MEDICINE
Payer: MEDICARE

## 2018-12-31 VITALS
OXYGEN SATURATION: 96 % | TEMPERATURE: 98 F | SYSTOLIC BLOOD PRESSURE: 158 MMHG | DIASTOLIC BLOOD PRESSURE: 93 MMHG | HEART RATE: 81 BPM | RESPIRATION RATE: 18 BRPM

## 2018-12-31 VITALS
HEART RATE: 80 BPM | DIASTOLIC BLOOD PRESSURE: 80 MMHG | SYSTOLIC BLOOD PRESSURE: 161 MMHG | RESPIRATION RATE: 18 BRPM | TEMPERATURE: 99 F

## 2018-12-31 DIAGNOSIS — E78.5 HYPERLIPIDEMIA, UNSPECIFIED: ICD-10-CM

## 2018-12-31 DIAGNOSIS — Z79.899 OTHER LONG TERM (CURRENT) DRUG THERAPY: ICD-10-CM

## 2018-12-31 DIAGNOSIS — R05 COUGH: ICD-10-CM

## 2018-12-31 DIAGNOSIS — Z79.2 LONG TERM (CURRENT) USE OF ANTIBIOTICS: ICD-10-CM

## 2018-12-31 DIAGNOSIS — B97.4 RESPIRATORY SYNCYTIAL VIRUS AS THE CAUSE OF DISEASES CLASSIFIED ELSEWHERE: ICD-10-CM

## 2018-12-31 DIAGNOSIS — Z95.0 PRESENCE OF CARDIAC PACEMAKER: Chronic | ICD-10-CM

## 2018-12-31 DIAGNOSIS — I12.9 HYPERTENSIVE CHRONIC KIDNEY DISEASE WITH STAGE 1 THROUGH STAGE 4 CHRONIC KIDNEY DISEASE, OR UNSPECIFIED CHRONIC KIDNEY DISEASE: ICD-10-CM

## 2018-12-31 DIAGNOSIS — I25.10 ATHEROSCLEROTIC HEART DISEASE OF NATIVE CORONARY ARTERY WITHOUT ANGINA PECTORIS: ICD-10-CM

## 2018-12-31 DIAGNOSIS — Z98.890 OTHER SPECIFIED POSTPROCEDURAL STATES: Chronic | ICD-10-CM

## 2018-12-31 DIAGNOSIS — N18.9 CHRONIC KIDNEY DISEASE, UNSPECIFIED: ICD-10-CM

## 2018-12-31 DIAGNOSIS — Z87.891 PERSONAL HISTORY OF NICOTINE DEPENDENCE: ICD-10-CM

## 2018-12-31 DIAGNOSIS — Z79.1 LONG TERM (CURRENT) USE OF NON-STEROIDAL ANTI-INFLAMMATORIES (NSAID): ICD-10-CM

## 2018-12-31 DIAGNOSIS — J44.0 CHRONIC OBSTRUCTIVE PULMONARY DISEASE WITH (ACUTE) LOWER RESPIRATORY INFECTION: ICD-10-CM

## 2018-12-31 LAB
ALBUMIN SERPL ELPH-MCNC: 4.2 G/DL — SIGNIFICANT CHANGE UP (ref 3.3–5)
ALP SERPL-CCNC: SIGNIFICANT CHANGE UP U/L (ref 40–120)
ALT FLD-CCNC: SIGNIFICANT CHANGE UP U/L (ref 10–45)
ANION GAP SERPL CALC-SCNC: 11 MMOL/L — SIGNIFICANT CHANGE UP (ref 5–17)
AST SERPL-CCNC: SIGNIFICANT CHANGE UP U/L (ref 10–40)
BASOPHILS NFR BLD AUTO: 0.4 % — SIGNIFICANT CHANGE UP (ref 0–2)
BILIRUB SERPL-MCNC: 0.4 MG/DL — SIGNIFICANT CHANGE UP (ref 0.2–1.2)
BUN SERPL-MCNC: 20 MG/DL — SIGNIFICANT CHANGE UP (ref 7–23)
CALCIUM SERPL-MCNC: 10.2 MG/DL — SIGNIFICANT CHANGE UP (ref 8.4–10.5)
CHLORIDE SERPL-SCNC: 102 MMOL/L — SIGNIFICANT CHANGE UP (ref 96–108)
CK MB CFR SERPL CALC: 2.2 NG/ML — SIGNIFICANT CHANGE UP (ref 0–6.7)
CK SERPL-CCNC: 159 U/L — SIGNIFICANT CHANGE UP (ref 25–170)
CO2 SERPL-SCNC: 27 MMOL/L — SIGNIFICANT CHANGE UP (ref 22–31)
CREAT SERPL-MCNC: 1 MG/DL — SIGNIFICANT CHANGE UP (ref 0.5–1.3)
EOSINOPHIL NFR BLD AUTO: 1.9 % — SIGNIFICANT CHANGE UP (ref 0–6)
EXTRA BLUE TOP TUBE: SIGNIFICANT CHANGE UP
GLUCOSE SERPL-MCNC: 104 MG/DL — HIGH (ref 70–99)
HCT VFR BLD CALC: 39.7 % — SIGNIFICANT CHANGE UP (ref 34.5–45)
HGB BLD-MCNC: 12.9 G/DL — SIGNIFICANT CHANGE UP (ref 11.5–15.5)
LYMPHOCYTES # BLD AUTO: 23.5 % — SIGNIFICANT CHANGE UP (ref 13–44)
MCHC RBC-ENTMCNC: 30.4 PG — SIGNIFICANT CHANGE UP (ref 27–34)
MCHC RBC-ENTMCNC: 32.5 G/DL — SIGNIFICANT CHANGE UP (ref 32–36)
MCV RBC AUTO: 93.4 FL — SIGNIFICANT CHANGE UP (ref 80–100)
MONOCYTES NFR BLD AUTO: 14.1 % — HIGH (ref 2–14)
NEUTROPHILS NFR BLD AUTO: 60.1 % — SIGNIFICANT CHANGE UP (ref 43–77)
PLATELET # BLD AUTO: 210 K/UL — SIGNIFICANT CHANGE UP (ref 150–400)
POTASSIUM SERPL-MCNC: SIGNIFICANT CHANGE UP MMOL/L (ref 3.5–5.3)
POTASSIUM SERPL-SCNC: SIGNIFICANT CHANGE UP MMOL/L (ref 3.5–5.3)
PROT SERPL-MCNC: 8.3 G/DL — SIGNIFICANT CHANGE UP (ref 6–8.3)
RAPID RVP RESULT: DETECTED
RBC # BLD: 4.25 M/UL — SIGNIFICANT CHANGE UP (ref 3.8–5.2)
RBC # FLD: 14.6 % — SIGNIFICANT CHANGE UP (ref 10.3–16.9)
RSV RNA SPEC QL NAA+PROBE: DETECTED
SODIUM SERPL-SCNC: 140 MMOL/L — SIGNIFICANT CHANGE UP (ref 135–145)
TROPONIN T SERPL-MCNC: <0.01 NG/ML — SIGNIFICANT CHANGE UP (ref 0–0.01)
WBC # BLD: 4.7 K/UL — SIGNIFICANT CHANGE UP (ref 3.8–10.5)
WBC # FLD AUTO: 4.7 K/UL — SIGNIFICANT CHANGE UP (ref 3.8–10.5)

## 2018-12-31 PROCEDURE — 36415 COLL VENOUS BLD VENIPUNCTURE: CPT

## 2018-12-31 PROCEDURE — 82550 ASSAY OF CK (CPK): CPT

## 2018-12-31 PROCEDURE — 87633 RESP VIRUS 12-25 TARGETS: CPT

## 2018-12-31 PROCEDURE — 99285 EMERGENCY DEPT VISIT HI MDM: CPT | Mod: 25

## 2018-12-31 PROCEDURE — 87486 CHLMYD PNEUM DNA AMP PROBE: CPT

## 2018-12-31 PROCEDURE — 94640 AIRWAY INHALATION TREATMENT: CPT

## 2018-12-31 PROCEDURE — 93010 ELECTROCARDIOGRAM REPORT: CPT

## 2018-12-31 PROCEDURE — 87581 M.PNEUMON DNA AMP PROBE: CPT

## 2018-12-31 PROCEDURE — 80053 COMPREHEN METABOLIC PANEL: CPT

## 2018-12-31 PROCEDURE — 71046 X-RAY EXAM CHEST 2 VIEWS: CPT

## 2018-12-31 PROCEDURE — 87798 DETECT AGENT NOS DNA AMP: CPT

## 2018-12-31 PROCEDURE — 85025 COMPLETE CBC W/AUTO DIFF WBC: CPT

## 2018-12-31 PROCEDURE — 71046 X-RAY EXAM CHEST 2 VIEWS: CPT | Mod: 26

## 2018-12-31 PROCEDURE — 93005 ELECTROCARDIOGRAM TRACING: CPT

## 2018-12-31 PROCEDURE — 82553 CREATINE MB FRACTION: CPT

## 2018-12-31 PROCEDURE — 84484 ASSAY OF TROPONIN QUANT: CPT

## 2018-12-31 PROCEDURE — 99283 EMERGENCY DEPT VISIT LOW MDM: CPT | Mod: 25

## 2018-12-31 RX ORDER — ALBUTEROL 90 UG/1
2 AEROSOL, METERED ORAL
Qty: 1 | Refills: 0 | OUTPATIENT
Start: 2018-12-31 | End: 2019-01-06

## 2018-12-31 RX ORDER — ALBUTEROL 90 UG/1
2 AEROSOL, METERED ORAL
Qty: 1 | Refills: 0
Start: 2018-12-31 | End: 2019-01-06

## 2018-12-31 RX ORDER — IPRATROPIUM/ALBUTEROL SULFATE 18-103MCG
3 AEROSOL WITH ADAPTER (GRAM) INHALATION ONCE
Qty: 0 | Refills: 0 | Status: COMPLETED | OUTPATIENT
Start: 2018-12-31 | End: 2018-12-31

## 2018-12-31 RX ADMIN — Medication 3 MILLILITER(S): at 16:20

## 2018-12-31 RX ADMIN — Medication 60 MILLIGRAM(S): at 18:03

## 2018-12-31 NOTE — ED ADULT NURSE NOTE - NSIMPLEMENTINTERV_GEN_ALL_ED
Implemented All Fall Risk Interventions:  [place] to call system. Call bell, personal items and telephone within reach. Instruct patient to call for assistance. Room bathroom lighting operational. Non-slip footwear when patient is off stretcher. Physically safe environment: no spills, clutter or unnecessary equipment. Stretcher in lowest position, wheels locked, appropriate side rails in place. Provide visual cue, wrist band, yellow gown, etc. Monitor gait and stability. Monitor for mental status changes and reorient to person, place, and time. Review medications for side effects contributing to fall risk. Reinforce activity limits and safety measures with patient and family.

## 2018-12-31 NOTE — ED PROVIDER NOTE - OBJECTIVE STATEMENT
2 days of cough with yellow sputum.  some int cp.  felt feverish  has been using her inhaler  got flu vaccine this year  no sick contacts 2 days of cough with yellow sputum.  some int cp.  felt feverish  has been using her inhaler  got flu vaccine this year  no sick contacts  pt [place] speaking but would like her daughter and other relatives to translate

## 2018-12-31 NOTE — ED ADULT NURSE NOTE - OBJECTIVE STATEMENT
84 y/o female c/o productive cough, intermittent chest pain, dizziness, for 4 days. NAD, VSS, EKG completed and mask applied in triage. denies fever/chills, n/v/d.

## 2018-12-31 NOTE — ED ADULT NURSE NOTE - CHIEF COMPLAINT QUOTE
Patient complaining of productive cough with yellow sputum, dizziness and CP for four days.  Patient denies any SOB, fevers, chills, N/V/D, abdominal pain or any other complaints at this time.  EKG in progress.

## 2018-12-31 NOTE — ED PROVIDER NOTE - CARE PLAN
Principal Discharge DX:	Chronic obstructive pulmonary disease with acute lower resp infection  Secondary Diagnosis:	RSV (respiratory syncytial virus infection)

## 2019-01-03 NOTE — ED POST DISCHARGE NOTE - DETAILS
pt diagnosed with rsv and asthma exacerbation, pt states she is feeling better and coughing less. No fevers. Pt will go to her pmd for repeat cxr

## 2019-01-08 ENCOUNTER — APPOINTMENT (OUTPATIENT)
Dept: INTERNAL MEDICINE | Facility: CLINIC | Age: 84
End: 2019-01-08
Payer: MEDICARE

## 2019-01-08 VITALS
SYSTOLIC BLOOD PRESSURE: 158 MMHG | OXYGEN SATURATION: 97 % | DIASTOLIC BLOOD PRESSURE: 80 MMHG | TEMPERATURE: 98 F | BODY MASS INDEX: 25.81 KG/M2 | WEIGHT: 172.25 LBS | HEIGHT: 68.4 IN | HEART RATE: 76 BPM

## 2019-01-08 VITALS — SYSTOLIC BLOOD PRESSURE: 144 MMHG | DIASTOLIC BLOOD PRESSURE: 86 MMHG

## 2019-01-08 DIAGNOSIS — B97.4 RESPIRATORY SYNCYTIAL VIRUS AS THE CAUSE OF DISEASES CLASSIFIED ELSEWHERE: ICD-10-CM

## 2019-01-08 PROCEDURE — 99214 OFFICE O/P EST MOD 30 MIN: CPT

## 2019-01-08 NOTE — DATA REVIEWED
[FreeTextEntry1] : 12/31/18 hospital ER results\par cbc - normal, WBCs 4.7\par RPV with + RSV\par CXR - patchy b/l lower lobe opacities, r/o PNA

## 2019-01-08 NOTE — ASSESSMENT
[FreeTextEntry1] : 82 yo female here for ER f/u\par \par 1) RSV - now clinically much improved.  Advised decrease prednisone to 10mg x 4 days then D/C.  counseled c/w supportive care.  contact me if symptoms return.  reviewed/communicated results labs with pt and HHA - + RPV, normal WBCs. reviewed CXR - now clinically much improved, no indication to repeat imaging at this time.\par \par 2) L knee pain - chronic, stable, 2/2 OA. counseled pt to see ortho and PT.\par \par 3) HTN -c hronic, stable, improved on repeat. c/w current regimen.

## 2019-01-08 NOTE — PHYSICAL EXAM
[No Acute Distress] : no acute distress [Well-Appearing] : well-appearing [Normal Sclera/Conjunctiva] : normal sclera/conjunctiva [EOMI] : extraocular movements intact [Normal Outer Ear/Nose] : the outer ears and nose were normal in appearance [No JVD] : no jugular venous distention [Supple] : supple [No Respiratory Distress] : no respiratory distress  [Clear to Auscultation] : lungs were clear to auscultation bilaterally [No Accessory Muscle Use] : no accessory muscle use [Normal Rate] : normal rate  [Regular Rhythm] : with a regular rhythm [Normal S1, S2] : normal S1 and S2 [Soft] : abdomen soft [Non Tender] : non-tender [Coordination Grossly Intact] : coordination grossly intact [No Focal Deficits] : no focal deficits [Normal Affect] : the affect was normal [Alert and Oriented x3] : oriented to person, place, and time [Normal Insight/Judgement] : insight and judgment were intact

## 2019-01-08 NOTE — REVIEW OF SYSTEMS
[Negative] : Constitutional [Fever] : no fever [Chest Pain] : no chest pain [Shortness Of Breath] : no shortness of breath [Cough] : no cough

## 2019-01-08 NOTE — HISTORY OF PRESENT ILLNESS
[de-identified] : 82 yo female here for hospital DC f/u for cough, dx RSV.  Reports symptoms have now resolved with course prednisone.  States she was given 30 tabs prednisone 20mg and told to take 1 per day, however bottle reads 2 per day.  Pt has been taking only 1 per day for past 10 days.  Denies cough or SOB. Denies fevers, sore throat, congestion.\par \par States she continues to have L knee pain with movement and walking, no acute changes.  has not yet seen ortho.

## 2019-01-14 ENCOUNTER — APPOINTMENT (OUTPATIENT)
Dept: INTERNAL MEDICINE | Facility: CLINIC | Age: 84
End: 2019-01-14

## 2019-01-17 ENCOUNTER — OTHER (OUTPATIENT)
Age: 84
End: 2019-01-17

## 2019-01-17 ENCOUNTER — RX RENEWAL (OUTPATIENT)
Age: 84
End: 2019-01-17

## 2019-01-24 ENCOUNTER — APPOINTMENT (OUTPATIENT)
Dept: NEPHROLOGY | Facility: CLINIC | Age: 84
End: 2019-01-24
Payer: MEDICARE

## 2019-01-24 VITALS — RESPIRATION RATE: 14 BRPM | SYSTOLIC BLOOD PRESSURE: 130 MMHG | HEART RATE: 70 BPM | DIASTOLIC BLOOD PRESSURE: 72 MMHG

## 2019-01-24 PROCEDURE — 99214 OFFICE O/P EST MOD 30 MIN: CPT

## 2019-01-24 RX ORDER — AZITHROMYCIN 250 MG/1
250 TABLET, FILM COATED ORAL
Qty: 1 | Refills: 0 | Status: DISCONTINUED | COMMUNITY
Start: 2018-07-25 | End: 2019-01-24

## 2019-01-24 NOTE — HISTORY OF PRESENT ILLNESS
[FreeTextEntry1] : 82yo F accompanied by home aid with CKD, HTN, preDM, hypercalcemia and hyperkalemia here for f/u, prior pt of Dr. Haines:\par \par Lives by herself in her home w home aid.\par Stable SHANKS. Gained a few pounds from improved appetite per aid. No orthopnea. Occ LE edema. \par Still taking prednisone even though she was supposed to stop >1 week ago, she doesn't have a good explanation for why. Aide had told her to stop. \par SOB/cough have completely resolved after hospitalization for RSV 3 weeks ago. \par No LE swelling. No fevers.\par Chronic R knee pain persists, takes one ibuprofen daily for the pain. \par Weight/appetite are stable.\par \par All other ROS negative

## 2019-01-24 NOTE — PHYSICAL EXAM
[General Appearance - Alert] : alert [General Appearance - In No Acute Distress] : in no acute distress [Sclera] : the sclera and conjunctiva were normal [PERRL With Normal Accommodation] : pupils were equal in size, round, and reactive to light [Extraocular Movements] : extraocular movements were intact [Outer Ear] : the ears and nose were normal in appearance [Oropharynx] : the oropharynx was normal [Neck Appearance] : the appearance of the neck was normal [Jugular Venous Distention Increased] : there was no jugular-venous distention [Heart Rate And Rhythm] : heart rate was normal and rhythm regular [Heart Sounds] : normal S1 and S2 [Full Pulse] : the pedal pulses are present [Bowel Sounds] : normal bowel sounds [Abdomen Soft] : soft [Abdomen Tenderness] : non-tender [Involuntary Movements] : no involuntary movements were seen [Musculoskeletal - Swelling] : no joint swelling seen [Skin Color & Pigmentation] : normal skin color and pigmentation [Skin Turgor] : normal skin turgor [] : no rash [Cranial Nerves] : cranial nerves 2-12 were intact [No Focal Deficits] : no focal deficits [Oriented To Time, Place, And Person] : oriented to person, place, and time [Respiration, Rhythm And Depth] : normal respiratory rhythm and effort [Exaggerated Use Of Accessory Muscles For Inspiration] : no accessory muscle use [FreeTextEntry1] : flat affect

## 2019-01-24 NOTE — ASSESSMENT
[FreeTextEntry1] : 82yo F accompanied by home aid with CKD, HTN, preDM, hypercalcemia and hyperkalemia here for f/u, prior pt of Dr. Haines:\par \par # CKD II/III - Cr stable, advised to minimize NSAID use and control carb/sugar intake as diabetes has further effects on renal fx, Cr 1\par \par # HTN - well controlled\par \par # LE edema - trace, stable on lasix 20mg daily\par \par # Hyperkalemia - high-normal and controlled\par \par # Hypercalcemia - resolved\par \par RTC in 6 months for f/u

## 2019-01-24 NOTE — REVIEW OF SYSTEMS
[SOB on Exertion] : shortness of breath during exertion [Negative] : Heme/Lymph [As Noted in HPI] : as noted in HPI [Joint Pain] : joint pain

## 2019-01-29 ENCOUNTER — APPOINTMENT (OUTPATIENT)
Dept: PULMONOLOGY | Facility: CLINIC | Age: 84
End: 2019-01-29
Payer: MEDICARE

## 2019-01-29 VITALS
WEIGHT: 170 LBS | OXYGEN SATURATION: 97 % | TEMPERATURE: 97.8 F | HEART RATE: 72 BPM | HEIGHT: 68.4 IN | BODY MASS INDEX: 25.47 KG/M2 | DIASTOLIC BLOOD PRESSURE: 70 MMHG | SYSTOLIC BLOOD PRESSURE: 130 MMHG

## 2019-01-29 PROCEDURE — 99214 OFFICE O/P EST MOD 30 MIN: CPT

## 2019-01-29 NOTE — PHYSICAL EXAM
[General Appearance - Well Developed] : well developed [Normal Appearance] : normal appearance [Well Groomed] : well groomed [General Appearance - Well Nourished] : well nourished [No Deformities] : no deformities [General Appearance - In No Acute Distress] : no acute distress [Normal Conjunctiva] : the conjunctiva exhibited no abnormalities [Eyelids - No Xanthelasma] : the eyelids demonstrated no xanthelasmas [Normal Oropharynx] : normal oropharynx [Neck Appearance] : the appearance of the neck was normal [Neck Cervical Mass (___cm)] : no neck mass was observed [Jugular Venous Distention Increased] : there was no jugular-venous distention [Thyroid Diffuse Enlargement] : the thyroid was not enlarged [Thyroid Nodule] : there were no palpable thyroid nodules [Heart Rate And Rhythm] : heart rate and rhythm were normal [Heart Sounds] : normal S1 and S2 [Murmurs] : no murmurs present [Respiration, Rhythm And Depth] : normal respiratory rhythm and effort [Exaggerated Use Of Accessory Muscles For Inspiration] : no accessory muscle use [Auscultation Breath Sounds / Voice Sounds] : lungs were clear to auscultation bilaterally [Abdomen Soft] : soft [Abdomen Tenderness] : non-tender [Abdomen Mass (___ Cm)] : no abdominal mass palpated [Abnormal Walk] : normal gait [Gait - Sufficient For Exercise Testing] : the gait was sufficient for exercise testing [Skin Color & Pigmentation] : normal skin color and pigmentation [] : no rash [No Venous Stasis] : no venous stasis [Skin Lesions] : no skin lesions [No Skin Ulcers] : no skin ulcer [No Xanthoma] : no  xanthoma was observed [Deep Tendon Reflexes (DTR)] : deep tendon reflexes were 2+ and symmetric [Sensation] : the sensory exam was normal to light touch and pinprick [No Focal Deficits] : no focal deficits [Oriented To Time, Place, And Person] : oriented to person, place, and time [Impaired Insight] : insight and judgment were intact [Affect] : the affect was normal

## 2019-01-29 NOTE — ASSESSMENT
[FreeTextEntry1] : Bronchial asthma\par \par The asthma is controlled. She did not required any rescue dose short acting beta agonists nor steroids.  She was in the ER once and was given course of steroids.   She is to continue on the Advair. 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 No clinical evidence of metastatic disease.  I will follow on repeat CT scan to follow on the new 5 mm RML nodule\par \par Bronchiectasis\par \par The exam revealed right lower lobe respiratory arousal improved with coughing. I started patient on azithromycin. Patient had repeated episode of pneumonia.

## 2019-01-29 NOTE — HISTORY OF PRESENT ILLNESS
[Stable] : are stable [Difficulty Breathing During Exertion] : improved dyspnea on exertion [Feelings Of Weakness On Exertion] : improved exercise intolerance [Cough] : improved coughing [Wheezing] : denies wheezing [Regional Soft Tissue Swelling Both Lower Extremities] : denies lower extremity edema [Chest Pain Or Discomfort] : denies chest pain [Fever] : denies fever [2  -  Slight] : 2, slight [Class II - Mild Symptoms and Slight Limitations] : II [___ Times a Week] : [unfilled] time(s) a week [None] : None [Adherent] : the patient is adherent with ~his/her~ medication regimen [Wt Gain ___ Lbs] : no recent weight gain [Wt Loss ___ Lbs] : no recent weight loss [Oxygen] : the patient uses no supplemental oxygen [Side Effects] : ~He/She~ denies medication side effects [FreeTextEntry1] : The patient is doing very well. She is not coughing. She was in ER in December and was given prednsione but now she is OK.

## 2019-02-26 ENCOUNTER — INPATIENT (INPATIENT)
Facility: HOSPITAL | Age: 84
LOS: 0 days | Discharge: HOME | DRG: 202 | End: 2019-02-27
Payer: COMMERCIAL

## 2019-02-26 VITALS
TEMPERATURE: 99 F | DIASTOLIC BLOOD PRESSURE: 83 MMHG | OXYGEN SATURATION: 96 % | HEART RATE: 92 BPM | WEIGHT: 171.52 LBS | SYSTOLIC BLOOD PRESSURE: 145 MMHG | RESPIRATION RATE: 18 BRPM

## 2019-02-26 DIAGNOSIS — Z29.9 ENCOUNTER FOR PROPHYLACTIC MEASURES, UNSPECIFIED: ICD-10-CM

## 2019-02-26 DIAGNOSIS — Z98.890 OTHER SPECIFIED POSTPROCEDURAL STATES: Chronic | ICD-10-CM

## 2019-02-26 DIAGNOSIS — I10 ESSENTIAL (PRIMARY) HYPERTENSION: ICD-10-CM

## 2019-02-26 DIAGNOSIS — J47.0 BRONCHIECTASIS WITH ACUTE LOWER RESPIRATORY INFECTION: ICD-10-CM

## 2019-02-26 DIAGNOSIS — J44.1 CHRONIC OBSTRUCTIVE PULMONARY DISEASE WITH (ACUTE) EXACERBATION: ICD-10-CM

## 2019-02-26 DIAGNOSIS — Z95.0 PRESENCE OF CARDIAC PACEMAKER: Chronic | ICD-10-CM

## 2019-02-26 DIAGNOSIS — C34.90 MALIGNANT NEOPLASM OF UNSPECIFIED PART OF UNSPECIFIED BRONCHUS OR LUNG: ICD-10-CM

## 2019-02-26 DIAGNOSIS — H40.9 UNSPECIFIED GLAUCOMA: ICD-10-CM

## 2019-02-26 DIAGNOSIS — R63.8 OTHER SYMPTOMS AND SIGNS CONCERNING FOOD AND FLUID INTAKE: ICD-10-CM

## 2019-02-26 DIAGNOSIS — Z87.442 PERSONAL HISTORY OF URINARY CALCULI: Chronic | ICD-10-CM

## 2019-02-26 DIAGNOSIS — I63.9 CEREBRAL INFARCTION, UNSPECIFIED: ICD-10-CM

## 2019-02-26 DIAGNOSIS — I25.10 ATHEROSCLEROTIC HEART DISEASE OF NATIVE CORONARY ARTERY WITHOUT ANGINA PECTORIS: ICD-10-CM

## 2019-02-26 DIAGNOSIS — B34.8 OTHER VIRAL INFECTIONS OF UNSPECIFIED SITE: ICD-10-CM

## 2019-02-26 DIAGNOSIS — Z91.89 OTHER SPECIFIED PERSONAL RISK FACTORS, NOT ELSEWHERE CLASSIFIED: ICD-10-CM

## 2019-02-26 DIAGNOSIS — J98.11 ATELECTASIS: ICD-10-CM

## 2019-02-26 DIAGNOSIS — C34.90 MALIGNANT NEOPLASM OF UNSPECIFIED PART OF UNSPECIFIED BRONCHUS OR LUNG: Chronic | ICD-10-CM

## 2019-02-26 DIAGNOSIS — J45.41 MODERATE PERSISTENT ASTHMA WITH (ACUTE) EXACERBATION: ICD-10-CM

## 2019-02-26 DIAGNOSIS — H26.9 UNSPECIFIED CATARACT: Chronic | ICD-10-CM

## 2019-02-26 DIAGNOSIS — J45.909 UNSPECIFIED ASTHMA, UNCOMPLICATED: ICD-10-CM

## 2019-02-26 LAB
ALBUMIN SERPL ELPH-MCNC: 4.5 G/DL — SIGNIFICANT CHANGE UP (ref 3.3–5)
ALP SERPL-CCNC: 134 U/L — HIGH (ref 40–120)
ALT FLD-CCNC: 14 U/L — SIGNIFICANT CHANGE UP (ref 10–45)
ANION GAP SERPL CALC-SCNC: 13 MMOL/L — SIGNIFICANT CHANGE UP (ref 5–17)
APTT BLD: 30.6 SEC — SIGNIFICANT CHANGE UP (ref 27.5–36.3)
AST SERPL-CCNC: 20 U/L — SIGNIFICANT CHANGE UP (ref 10–40)
BASOPHILS # BLD AUTO: 0.03 K/UL — SIGNIFICANT CHANGE UP (ref 0–0.2)
BASOPHILS NFR BLD AUTO: 0.3 % — SIGNIFICANT CHANGE UP (ref 0–2)
BILIRUB SERPL-MCNC: 0.7 MG/DL — SIGNIFICANT CHANGE UP (ref 0.2–1.2)
BUN SERPL-MCNC: 19 MG/DL — SIGNIFICANT CHANGE UP (ref 7–23)
CALCIUM SERPL-MCNC: 10.4 MG/DL — SIGNIFICANT CHANGE UP (ref 8.4–10.5)
CHLORIDE SERPL-SCNC: 104 MMOL/L — SIGNIFICANT CHANGE UP (ref 96–108)
CK MB CFR SERPL CALC: 1.6 NG/ML — SIGNIFICANT CHANGE UP (ref 0–6.7)
CO2 SERPL-SCNC: 23 MMOL/L — SIGNIFICANT CHANGE UP (ref 22–31)
CREAT SERPL-MCNC: 1.05 MG/DL — SIGNIFICANT CHANGE UP (ref 0.5–1.3)
EOSINOPHIL # BLD AUTO: 0.02 K/UL — SIGNIFICANT CHANGE UP (ref 0–0.5)
EOSINOPHIL NFR BLD AUTO: 0.2 % — SIGNIFICANT CHANGE UP (ref 0–6)
FLU A RESULT: SIGNIFICANT CHANGE UP
FLU A RESULT: SIGNIFICANT CHANGE UP
FLUAV AG NPH QL: SIGNIFICANT CHANGE UP
FLUBV AG NPH QL: SIGNIFICANT CHANGE UP
GLUCOSE SERPL-MCNC: 115 MG/DL — HIGH (ref 70–99)
HCT VFR BLD CALC: 43.9 % — SIGNIFICANT CHANGE UP (ref 34.5–45)
HGB BLD-MCNC: 14 G/DL — SIGNIFICANT CHANGE UP (ref 11.5–15.5)
IMM GRANULOCYTES NFR BLD AUTO: 0.3 % — SIGNIFICANT CHANGE UP (ref 0–1.5)
INR BLD: 1.23 — HIGH (ref 0.88–1.16)
LYMPHOCYTES # BLD AUTO: 1.17 K/UL — SIGNIFICANT CHANGE UP (ref 1–3.3)
LYMPHOCYTES # BLD AUTO: 10.6 % — LOW (ref 13–44)
MCHC RBC-ENTMCNC: 30.6 PG — SIGNIFICANT CHANGE UP (ref 27–34)
MCHC RBC-ENTMCNC: 31.9 GM/DL — LOW (ref 32–36)
MCV RBC AUTO: 95.9 FL — SIGNIFICANT CHANGE UP (ref 80–100)
MONOCYTES # BLD AUTO: 0.91 K/UL — HIGH (ref 0–0.9)
MONOCYTES NFR BLD AUTO: 8.3 % — SIGNIFICANT CHANGE UP (ref 2–14)
NEUTROPHILS # BLD AUTO: 8.83 K/UL — HIGH (ref 1.8–7.4)
NEUTROPHILS NFR BLD AUTO: 80.3 % — HIGH (ref 43–77)
NRBC # BLD: 0 /100 WBCS — SIGNIFICANT CHANGE UP (ref 0–0)
NT-PROBNP SERPL-SCNC: 400 PG/ML — HIGH (ref 0–300)
PLATELET # BLD AUTO: 231 K/UL — SIGNIFICANT CHANGE UP (ref 150–400)
POTASSIUM SERPL-MCNC: 4.4 MMOL/L — SIGNIFICANT CHANGE UP (ref 3.5–5.3)
POTASSIUM SERPL-SCNC: 4.4 MMOL/L — SIGNIFICANT CHANGE UP (ref 3.5–5.3)
PROT SERPL-MCNC: 8.2 G/DL — SIGNIFICANT CHANGE UP (ref 6–8.3)
PROTHROM AB SERPL-ACNC: 14 SEC — HIGH (ref 10–12.9)
RAPID RVP RESULT: DETECTED
RBC # BLD: 4.58 M/UL — SIGNIFICANT CHANGE UP (ref 3.8–5.2)
RBC # FLD: 14.7 % — HIGH (ref 10.3–14.5)
RSV RESULT: SIGNIFICANT CHANGE UP
RSV RNA RESP QL NAA+PROBE: SIGNIFICANT CHANGE UP
RV+EV RNA SPEC QL NAA+PROBE: DETECTED
SODIUM SERPL-SCNC: 140 MMOL/L — SIGNIFICANT CHANGE UP (ref 135–145)
TROPONIN T SERPL-MCNC: <0.01 NG/ML — SIGNIFICANT CHANGE UP (ref 0–0.01)
WBC # BLD: 10.99 K/UL — HIGH (ref 3.8–10.5)
WBC # FLD AUTO: 10.99 K/UL — HIGH (ref 3.8–10.5)

## 2019-02-26 PROCEDURE — 71046 X-RAY EXAM CHEST 2 VIEWS: CPT | Mod: 26

## 2019-02-26 PROCEDURE — 99223 1ST HOSP IP/OBS HIGH 75: CPT | Mod: GC

## 2019-02-26 PROCEDURE — 99285 EMERGENCY DEPT VISIT HI MDM: CPT | Mod: 25

## 2019-02-26 RX ORDER — AMLODIPINE BESYLATE 2.5 MG/1
5 TABLET ORAL DAILY
Qty: 0 | Refills: 0 | Status: DISCONTINUED | OUTPATIENT
Start: 2019-02-26 | End: 2019-02-27

## 2019-02-26 RX ORDER — ATENOLOL 25 MG/1
25 TABLET ORAL DAILY
Qty: 0 | Refills: 0 | Status: DISCONTINUED | OUTPATIENT
Start: 2019-02-26 | End: 2019-02-27

## 2019-02-26 RX ORDER — LATANOPROST 0.05 MG/ML
1 SOLUTION/ DROPS OPHTHALMIC; TOPICAL
Qty: 0 | Refills: 0 | Status: DISCONTINUED | OUTPATIENT
Start: 2019-02-26 | End: 2019-02-27

## 2019-02-26 RX ORDER — AZITHROMYCIN 500 MG/1
500 TABLET, FILM COATED ORAL ONCE
Qty: 0 | Refills: 0 | Status: COMPLETED | OUTPATIENT
Start: 2019-02-26 | End: 2019-02-26

## 2019-02-26 RX ORDER — FUROSEMIDE 40 MG
20 TABLET ORAL DAILY
Qty: 0 | Refills: 0 | Status: DISCONTINUED | OUTPATIENT
Start: 2019-02-26 | End: 2019-02-27

## 2019-02-26 RX ORDER — IPRATROPIUM/ALBUTEROL SULFATE 18-103MCG
3 AEROSOL WITH ADAPTER (GRAM) INHALATION
Qty: 0 | Refills: 0 | Status: COMPLETED | OUTPATIENT
Start: 2019-02-26 | End: 2019-02-26

## 2019-02-26 RX ORDER — HEPARIN SODIUM 5000 [USP'U]/ML
5000 INJECTION INTRAVENOUS; SUBCUTANEOUS EVERY 8 HOURS
Qty: 0 | Refills: 0 | Status: DISCONTINUED | OUTPATIENT
Start: 2019-02-26 | End: 2019-02-27

## 2019-02-26 RX ORDER — CEFTRIAXONE 500 MG/1
1 INJECTION, POWDER, FOR SOLUTION INTRAMUSCULAR; INTRAVENOUS ONCE
Qty: 0 | Refills: 0 | Status: COMPLETED | OUTPATIENT
Start: 2019-02-26 | End: 2019-02-26

## 2019-02-26 RX ORDER — IPRATROPIUM/ALBUTEROL SULFATE 18-103MCG
3 AEROSOL WITH ADAPTER (GRAM) INHALATION EVERY 6 HOURS
Qty: 0 | Refills: 0 | Status: DISCONTINUED | OUTPATIENT
Start: 2019-02-26 | End: 2019-02-27

## 2019-02-26 RX ORDER — DORZOLAMIDE HYDROCHLORIDE 20 MG/ML
1 SOLUTION/ DROPS OPHTHALMIC THREE TIMES A DAY
Qty: 0 | Refills: 0 | Status: DISCONTINUED | OUTPATIENT
Start: 2019-02-26 | End: 2019-02-27

## 2019-02-26 RX ORDER — BRIMONIDINE TARTRATE 2 MG/MG
1 SOLUTION/ DROPS OPHTHALMIC
Qty: 0 | Refills: 0 | Status: DISCONTINUED | OUTPATIENT
Start: 2019-02-26 | End: 2019-02-27

## 2019-02-26 RX ORDER — ALBUTEROL 90 UG/1
2 AEROSOL, METERED ORAL EVERY 6 HOURS
Qty: 0 | Refills: 0 | Status: DISCONTINUED | OUTPATIENT
Start: 2019-02-26 | End: 2019-02-27

## 2019-02-26 RX ORDER — ATORVASTATIN CALCIUM 80 MG/1
20 TABLET, FILM COATED ORAL AT BEDTIME
Qty: 0 | Refills: 0 | Status: DISCONTINUED | OUTPATIENT
Start: 2019-02-26 | End: 2019-02-27

## 2019-02-26 RX ADMIN — Medication 40 MILLIGRAM(S): at 18:20

## 2019-02-26 RX ADMIN — Medication 3 MILLILITER(S): at 11:50

## 2019-02-26 RX ADMIN — Medication 125 MILLIGRAM(S): at 11:40

## 2019-02-26 RX ADMIN — Medication 3 MILLILITER(S): at 11:27

## 2019-02-26 RX ADMIN — LATANOPROST 1 DROP(S): 0.05 SOLUTION/ DROPS OPHTHALMIC; TOPICAL at 22:24

## 2019-02-26 RX ADMIN — HEPARIN SODIUM 5000 UNIT(S): 5000 INJECTION INTRAVENOUS; SUBCUTANEOUS at 22:24

## 2019-02-26 RX ADMIN — BRIMONIDINE TARTRATE 1 DROP(S): 2 SOLUTION/ DROPS OPHTHALMIC at 19:25

## 2019-02-26 RX ADMIN — Medication 3 MILLILITER(S): at 11:33

## 2019-02-26 RX ADMIN — CEFTRIAXONE 100 GRAM(S): 500 INJECTION, POWDER, FOR SOLUTION INTRAMUSCULAR; INTRAVENOUS at 12:12

## 2019-02-26 RX ADMIN — AZITHROMYCIN 255 MILLIGRAM(S): 500 TABLET, FILM COATED ORAL at 12:37

## 2019-02-26 RX ADMIN — Medication 3 MILLILITER(S): at 22:23

## 2019-02-26 RX ADMIN — Medication 3 MILLILITER(S): at 16:40

## 2019-02-26 RX ADMIN — DORZOLAMIDE HYDROCHLORIDE 1 DROP(S): 20 SOLUTION/ DROPS OPHTHALMIC at 22:24

## 2019-02-26 RX ADMIN — ATORVASTATIN CALCIUM 20 MILLIGRAM(S): 80 TABLET, FILM COATED ORAL at 22:23

## 2019-02-26 NOTE — H&P ADULT - PROBLEM SELECTOR PLAN 1
Pt with a hx of COPD (not on home O2). Pt with cough and yellow sputum production, SOB with ability to only walk 3 blocks (down from 5 blocks). +wheezing on exam. No notable leukocytosis, fevers on admission, or infiltrate on CXR. Unlikely infectious etiology however possible viral cause of exacerbation (flu, RSV negative, pending RVP). s/p cef/azithro, duoneb, and solumedrol 125mg in ED  -f/u RVP, urine legionella, sputum cx, blood cx  -prednisone 40mg BID  -duoneb q6 hr  -f/u AM ECHO  -albuterol PRN

## 2019-02-26 NOTE — ED ADULT NURSE NOTE - CHPI ED NUR SYMPTOMS NEG
no diaphoresis/no edema/no headache/no hemoptysis/no body aches/no wheezing/no shortness of breath/no chills/no fever

## 2019-02-26 NOTE — CONSULT NOTE ADULT - PROBLEM SELECTOR RECOMMENDATION 3
Patient has bronchiectasis involving the right middle lobe seen on previous CT scan's as a result of his repeated right middle lobe infection

## 2019-02-26 NOTE — ED PROVIDER NOTE - PHYSICAL EXAMINATION
CONSTITUTIONAL: Well-appearing; well-nourished; in no apparent distress.   HEAD: Normocephalic; atraumatic.   EYES: PERRL; EOM intact; conjunctiva and sclera clear  ENT: normal nose; no rhinorrhea; normal pharynx with no erythema or lesions.   NECK: Supple; non-tender; no LAD  CARDIOVASCULAR: Normal S1, S2; no murmurs, rubs, or gallops. Regular rate and rhythm.   RESPIRATORY: Breathing easily; + wheezing/rhonchi at R base   GI: Soft; non-distended; non-tender; no palpable organomegaly.   MSK: FROM at all extremities, normal tone   EXT: No cyanosis or edema; N/V intact  SKIN: Normal for age and race; warm; dry; good turgor; no apparent lesions or rash.   NEURO: A & O x 3; face symmetric; grossly unremarkable.   PSYCHOLOGICAL: The patient’s mood and manner are appropriate.

## 2019-02-26 NOTE — ED PROVIDER NOTE - OBJECTIVE STATEMENT
84 yo F with pmh of asthma, COPD, CAD, HTN, HLD, CVA, CKD, lung ca (in remission), vertigo, cardiomyopathy s/p PPM c/o subjective fever since last night. Associated with cough, sob and L sided chest burning x 2 days. Pain is intermittent. Pt used her pump this morning with some relief. Denies sore throat, bodyaches, n/v, sick contacts, recent travel or smoking.

## 2019-02-26 NOTE — H&P ADULT - NSHPSOCIALHISTORY_GEN_ALL_CORE
Pt lives on her own, has HHA 5 days a week for 5 hours each day. Is able to ambulate on her own.  Denies tobacco, alcohol, or recreational drug use.

## 2019-02-26 NOTE — H&P ADULT - PROBLEM SELECTOR PLAN 3
Pt with a hx of HTN, on amlodipine 5mg, lasix 20mg QD and atenolol 25mg at home  -c/w home amlodipine, lasix and atenolol    #HLD  pt with a hx of HLD, on lipitor 20 qhs at home  -c/w lipitor.

## 2019-02-26 NOTE — CONSULT NOTE ADULT - PROBLEM SELECTOR RECOMMENDATION 9
The patient has history bronchial asthma. Patient was recently seen in my office in January 29. The exacerbation is most likely related to her viral infection. Patient is to continue on systemic steroids and bronchodilators.

## 2019-02-26 NOTE — ED ADULT NURSE NOTE - CHIEF COMPLAINT QUOTE
Brought in by aide, patient Nepali speaking, as per aide patient has been having fever for two days, with epigastric and chest pain

## 2019-02-26 NOTE — ED PROVIDER NOTE - DIAGNOSTIC INTERPRETATION
ER PA: Tessy Arroyo, PAC  CHEST XRAY INTERPRETATION: +RLL infiltrate, heart shadow normal, bony structures intact

## 2019-02-26 NOTE — ED ADULT TRIAGE NOTE - CHIEF COMPLAINT QUOTE
Brought in by aide, patient Occitan speaking, as per aide patient has been having fever for two days, with epigastric and chest pain

## 2019-02-26 NOTE — H&P ADULT - PSH
Cardiac pacemaker    H/O abdominal hysterectomy    History of cholecystectomy Cardiac pacemaker    Cataract of right eye    H/O abdominal hysterectomy    History of cholecystectomy    Lung cancer  s/p resection Cardiac pacemaker    Cataract of right eye    H/O abdominal hysterectomy    History of cholecystectomy    History of nephrolithiasis  s/p removal  Lung cancer  s/p resection

## 2019-02-26 NOTE — CONSULT NOTE ADULT - PROBLEM SELECTOR RECOMMENDATION 5
Patient has no evidence of metastatic disease on the CT scan's. The patient status post lung resection

## 2019-02-26 NOTE — H&P ADULT - PROBLEM SELECTOR PLAN 7
Pt with a hx of CVA, no obvious residual deficits, CTM.    #Vertigo  pt with a hx of vertigo, NTD, CTM    #syphilis  Pt with a hx of syphilis, s/p treatment, NTD.

## 2019-02-26 NOTE — H&P ADULT - PMH
Asthma, unspecified asthma severity, unspecified whether complicated, unspecified whether persistent    Cerebrovascular accident (CVA), unspecified mechanism    Chronic kidney disease, unspecified CKD stage    Chronic obstructive pulmonary disease  COPD (chronic obstructive pulmonary disease)  Coronary artery disease, angina presence unspecified, unspecified vessel or lesion type, unspecified whether native or transplanted heart    Dizziness    Essential hypertension  HTN (hypertension)  Glaucoma  Glaucoma  History of pneumonia    Hypercalcemia    Hyperlipidemia, unspecified hyperlipidemia type    Malignant neoplasm of lung, unspecified laterality, unspecified part of lung    Pacemaker    Syphilis

## 2019-02-26 NOTE — H&P ADULT - PROBLEM SELECTOR PLAN 10
Transition of Care   1) PCP Contacted on Admission: (Y/N) --> Name & Phone #: Dr. Naidu PCP [phone], Dr. Sinclair (pulmonlogist) [phone]. YES  2) Date of Contact with PCP: 2/26/19  3) PCP Contacted at Discharge: (Y/N)  4) Summary of Handoff Given to PCP:   5) Post-Discharge Appointment Date and Location:

## 2019-02-26 NOTE — CONSULT NOTE ADULT - ATTENDING COMMENTS
Patient seen and examined with house-staff during bedside rounds.  Resident note read, including vitals, physical findings, laboratory data, and radiological reports.   Revisions included below.  Direct personal management at bed side and extensive interpretation of the data.  Plan was outlined and discussed in details with the housestaff.  Decision making of high complexity  Action taken for acute disease activity to reflect the level of care provided:  - medication reconciliation  - review laboratory data    PATIENT HAS HO HISTORY OF COPD AND THIS IS NOT ACUTE COPD EXACERBATION

## 2019-02-26 NOTE — H&P ADULT - PROBLEM SELECTOR PLAN 4
Pt with a hx of glaucoma, takes methazolamide 25 BID, as well as xalatan/brimonidine/dorzolamide TID OU.  -c/w home gtts  -no methazolamide available at [facility]. Will need to request that pt brings it from home.    #CKD   Pt with a hx of CKD, stage 3  -avoid nephrotoxic meds

## 2019-02-26 NOTE — H&P ADULT - HISTORY OF PRESENT ILLNESS
Pt is  84 y/o F w/ PMH CVA, COPD/asthma, lung CA (in remission), CAD, CKD, HTN, HLD, cardiomyopathy w/PPM, vertigo, syphilis who presents with 2 days of fever. Pt felt subjective fevers at home, Tmax unchecked, associated with SOB, pleuritic chest pain, cough productive of yellow sputum, sweats, dizziness, headache, and myalgias. Pt took tylenol with minimal relief. Denies any recent travel or sick contacts. Also denies chills, nausea, vomiting, abdominal pain, hematuria, dysuria, hematochezia. Pt is  82 y/o F w/ PMH CVA, COPD/asthma, lung CA (in remission s/p resection), CAD, CKD, HTN, HLD, cardiomyopathy w/PPM, vertigo, syphilis who presents with 2 days of fever. Pt felt subjective fevers at home, Tmax unchecked, associated with SOB, pleuritic chest pain, cough productive of yellow sputum, sweats, dizziness, headache, and myalgias. Pt took tylenol with minimal relief. Denies any recent travel or sick contacts. Also denies chills, nausea, vomiting, abdominal pain, hematuria, dysuria, hematochezia.     Vital Signs Last 24 Hrs  T(F): 98.5 - 98.7  HR: 88 - 92  BP: 134/71 - 145/83  RR: 18   SpO2: 96% - 97% on room air    Labs notable for WBC 10.99 with neutrophilic predominance, alk phos 134, BNP. Cardiac enzymes negative, Flu and RSV negative, CXR without infiltrate.     Pt given 1 dose CTX/azithromycin, duoneb x1, and solumedrol 125mg IVP. Admitted to [facility] for further workup and management. Pt is  84 y/o F w/ PMH CVA, COPD/asthma, lung CA (in remission, s/p ressection), CAD, CKD, HTN, HLD, cardiomyopathy w/PPM, vertigo, syphilis, and glaucoma who presents with 2 days of subjective fever. Pt felt subjective fevers at home, Tmax unchecked, associated with SOB (previously could walk 5 blocks, now only 3 blocks), pleuritic chest pain, cough productive of yellow sputum, sweats, dizziness, headache, and myalgias. Pt took tylenol with minimal relief. Denies any recent travel or sick contacts. Also denies chills, nausea, vomiting, abdominal pain, hematuria, dysuria, hematochezia.     Vital Signs Last 24 Hrs  T(F): 98.5 - 98.7  HR: 88 - 92  BP: 134/71 - 145/83  RR: 18   SpO2: 96% - 97% on room air    Labs notable for WBC 10.99 with neutrophilic predominance, alk phos 134, BNP. Cardiac enzymes negative, Flu and RSV negative, CXR without infiltrate.     Pt given 1 dose CTX/azithromycin, duoneb x1, and solumedrol 125mg IVP. Admitted to [facility] for further workup and management.

## 2019-02-26 NOTE — H&P ADULT - NSHPPHYSICALEXAM_GEN_ALL_CORE
Constitutional: elderly female, WDWN resting comfortably in bed; NAD  Head: NC/AT  Eyes: clear conjunctiva  ENT: no nasal discharge; no oropharyngeal erythema or exudates; MMM  Respiratory: R basilar inspiratory wheezing, no retractions  Cardiac: +S1/S2; RRR; no M/R/G  Gastrointestinal: soft, NT/ND; no rebound or guarding; normoactive BS  Back: spine midline  Extremities: WWP, no clubbing or cyanosis; no peripheral edema  Dermatologic: skin warm, dry and intact; no rashes, wounds, or scars  Psychiatric: affect and characteristics of appearance, verbalizations, behaviors are appropriate

## 2019-02-26 NOTE — H&P ADULT - FAMILY HISTORY
No pertinent family history in first degree relatives Father  Still living? Unknown  Family history of prostate cancer, Age at diagnosis: Age Unknown     Mother  Still living? Unknown  Family history of varicose veins, Age at diagnosis: Age Unknown

## 2019-02-26 NOTE — ED PROVIDER NOTE - CLINICAL SUMMARY MEDICAL DECISION MAKING FREE TEXT BOX
84 yo F with pmh of asthma, COPD, CAD, HTN, HLD, CVA, CKD, lung ca (in remission), vertigo, cardiomyopathy s/p PPM c/o subjective fever since last night. Associated with cough, sob and L sided chest burning x 2 days. + wheezing/rhonchi at R base. r/o pna r/o influenza r/o copd/asthma exacerbation

## 2019-02-26 NOTE — H&P ADULT - NSHPLABSRESULTS_GEN_ALL_CORE
14.0   10.99 )-----------( 231      ( 26 Feb 2019 11:30 )             43.9     02-26    140  |  104  |  19  ----------------------------<  115<H>  4.4   |  23  |  1.05    Ca    10.4      26 Feb 2019 11:30    TPro  8.2  /  Alb  4.5  /  TBili  0.7  /  DBili  x   /  AST  20  /  ALT  14  /  AlkPhos  134<H>  02-26    PT/INR - ( 26 Feb 2019 11:30 )   PT: 14.0 sec;   INR: 1.23          PTT - ( 26 Feb 2019 11:30 )  PTT:30.6 sec    CARDIAC MARKERS ( 26 Feb 2019 11:30 )  x     / <0.01 ng/mL / 109 U/L / x     / 1.6 ng/mL      Serum Pro-Brain Natriuretic Peptide: 400 pg/mL (02-26 @ 11:30)        RADIOLOGY, EKG & ADDITIONAL TESTS: Reviewed.

## 2019-02-26 NOTE — ED ADULT NURSE NOTE - NSIMPLEMENTINTERV_GEN_ALL_ED
Implemented All Universal Safety Interventions:  [place] to call system. Call bell, personal items and telephone within reach. Instruct patient to call for assistance. Room bathroom lighting operational. Non-slip footwear when patient is off stretcher. Physically safe environment: no spills, clutter or unnecessary equipment. Stretcher in lowest position, wheels locked, appropriate side rails in place.

## 2019-02-26 NOTE — ED PROVIDER NOTE - ATTENDING CONTRIBUTION TO CARE
82 yo f w pmhx RAD, HTN, HLD, CAD presents to ED w concern for cough, subjective fevers, SOB and chest wall discomfort.  No chills, no n/v.  Notes wheezing.  On my face to face ED eval, patient is non toxic in appearance.  HRRR, lungs with exp wheezes.  No peripheral edema.  Abd soft.  CXR concerning for LLL PNA.  Given patient's symptoms and these findings, will plan for admission with CAP coverage and close monitoring.  Patient also given nebs in ED.  Will admit at this time.

## 2019-02-26 NOTE — H&P ADULT - ASSESSMENT
Pt is  82 y/o F w/ PMH CVA, COPD/asthma, lung CA (in remission, s/p ressection), CAD, CKD, HTN, HLD, cardiomyopathy w/PPM, vertigo, syphilis, and glaucoma who presents with 2 days of subjective fever, found to be in COPD exacerbation, and will be admitted to [facility] for further management.

## 2019-02-26 NOTE — CONSULT NOTE ADULT - SUBJECTIVE AND OBJECTIVE BOX
Patient is a 83y old  Female who presents with a chief complaint of subjective fevers (26 Feb 2019 14:39)      HPI:  Pt is  82 y/o F w/ PMH CVA, COPD/asthma, lung CA (in remission, s/p ressection), CAD, CKD, HTN, HLD, cardiomyopathy w/PPM, vertigo, syphilis, and glaucoma who presents with 2 days of subjective fever. Pt felt subjective fevers at home, Tmax unchecked, associated with SOB (previously could walk 5 blocks, now only 3 blocks), pleuritic chest pain, cough productive of yellow sputum, sweats, dizziness, headache, and myalgias. Pt took tylenol with minimal relief. Denies any recent travel or sick contacts. Also denies chills, nausea, vomiting, abdominal pain, hematuria, dysuria, hematochezia.     Vital Signs Last 24 Hrs  T(F): 98.5 - 98.7  HR: 88 - 92  BP: 134/71 - 145/83  RR: 18   SpO2: 96% - 97% on room air    Labs notable for WBC 10.99 with neutrophilic predominance, alk phos 134, BNP. Cardiac enzymes negative, Flu and RSV negative, CXR without infiltrate.     Pt given 1 dose CTX/azithromycin, duoneb x1, and solumedrol 125mg IVP. Admitted to [facility] for further workup and management. (26 Feb 2019 14:39)      PAST MEDICAL & SURGICAL HISTORY:  Syphilis  Pacemaker  Malignant neoplasm of lung, unspecified laterality, unspecified part of lung  Hyperlipidemia, unspecified hyperlipidemia type  Hypercalcemia  Chronic kidney disease, unspecified CKD stage  Cerebrovascular accident (CVA), unspecified mechanism  Coronary artery disease, angina presence unspecified, unspecified vessel or lesion type, unspecified whether native or transplanted heart  Dizziness  Asthma, unspecified asthma severity, unspecified whether complicated, unspecified whether persistent  History of pneumonia  Chronic obstructive pulmonary disease: COPD (chronic obstructive pulmonary disease)  Essential hypertension: HTN (hypertension)  Glaucoma: Glaucoma  History of nephrolithiasis: s/p removal  Cataract of right eye  Lung cancer: s/p resection  H/O abdominal hysterectomy  Cardiac pacemaker  History of cholecystectomy      FAMILY HISTORY:  Family history of varicose veins (Mother)  Family history of prostate cancer (Father)      SOCIAL HISTORY:  Smoking Status: [ ] Current, [ ] Former, [ ] Never  Pack Years:    MEDICATIONS:  Pulmonary:  ALBUTerol    90 MICROgram(s) HFA Inhaler 2 Puff(s) Inhalation every 6 hours PRN  ALBUTerol/ipratropium for Nebulization 3 milliLiter(s) Nebulizer every 6 hours    Antimicrobials:    Anticoagulants:  heparin  Injectable 5000 Unit(s) SubCutaneous every 8 hours    Onc:    GI/:    Endocrine:  atorvastatin 20 milliGRAM(s) Oral at bedtime  predniSONE   Tablet 40 milliGRAM(s) Oral every 12 hours    Cardiac:  amLODIPine   Tablet 5 milliGRAM(s) Oral daily  ATENolol  Tablet 25 milliGRAM(s) Oral daily  furosemide    Tablet 20 milliGRAM(s) Oral daily    Other Medications:  brimonidine 0.2% Ophthalmic Solution 1 Drop(s) Both EYES two times a day  dorzolamide 2% Ophthalmic Solution 1 Drop(s) Both EYES three times a day  latanoprost 0.005% Ophthalmic Solution 1 Drop(s) Both EYES <User Schedule>      Allergies    No Known Allergies    Intolerances        Vital Signs Last 24 Hrs  T(C): 36.6 (26 Feb 2019 20:30), Max: 37.1 (26 Feb 2019 10:44)  T(F): 97.9 (26 Feb 2019 20:30), Max: 98.7 (26 Feb 2019 10:44)  HR: 91 (26 Feb 2019 20:30) (88 - 95)  BP: 113/73 (26 Feb 2019 20:30) (113/73 - 145/83)  BP(mean): --  RR: 20 (26 Feb 2019 20:30) (18 - 20)  SpO2: 96% (26 Feb 2019 20:30) (96% - 97%)        LABS:      CBC Full  -  ( 26 Feb 2019 11:30 )  WBC Count : 10.99 K/uL  Hemoglobin : 14.0 g/dL  Hematocrit : 43.9 %  Platelet Count - Automated : 231 K/uL  Mean Cell Volume : 95.9 fl  Mean Cell Hemoglobin : 30.6 pg  Mean Cell Hemoglobin Concentration : 31.9 gm/dL  Auto Neutrophil # : 8.83 K/uL  Auto Lymphocyte # : 1.17 K/uL  Auto Monocyte # : 0.91 K/uL  Auto Eosinophil # : 0.02 K/uL  Auto Basophil # : 0.03 K/uL  Auto Neutrophil % : 80.3 %  Auto Lymphocyte % : 10.6 %  Auto Monocyte % : 8.3 %  Auto Eosinophil % : 0.2 %  Auto Basophil % : 0.3 %    02-26    140  |  104  |  19  ----------------------------<  115<H>  4.4   |  23  |  1.05    Ca    10.4      26 Feb 2019 11:30    TPro  8.2  /  Alb  4.5  /  TBili  0.7  /  DBili  x   /  AST  20  /  ALT  14  /  AlkPhos  134<H>  02-26    PT/INR - ( 26 Feb 2019 11:30 )   PT: 14.0 sec;   INR: 1.23          PTT - ( 26 Feb 2019 11:30 )  PTT:30.6 sec    CXR RmL atelectasis                RADIOLOGY & ADDITIONAL STUDIES (The following images were personally reviewed):

## 2019-02-26 NOTE — ED ADULT NURSE NOTE - OBJECTIVE STATEMENT
Pt w/ PMH of asthma, COPD, CAD w/ pacemaker in place and lung CA in remission presents to ED today in company of HHA c/o x2 days of 4/10 epigastric pain and cough w/o fever.  Pt denies radiation of pain, dizziness, fever/chills, N/V/D or LOC.  Pt is on CCM pending CXR and labs.  Will continue to monitor.  ECG shows paced NSR.

## 2019-02-27 ENCOUNTER — TRANSCRIPTION ENCOUNTER (OUTPATIENT)
Age: 84
End: 2019-02-27

## 2019-02-27 VITALS
SYSTOLIC BLOOD PRESSURE: 133 MMHG | HEART RATE: 63 BPM | OXYGEN SATURATION: 96 % | RESPIRATION RATE: 18 BRPM | TEMPERATURE: 98 F | DIASTOLIC BLOOD PRESSURE: 80 MMHG

## 2019-02-27 DIAGNOSIS — J45.901 UNSPECIFIED ASTHMA WITH (ACUTE) EXACERBATION: ICD-10-CM

## 2019-02-27 DIAGNOSIS — J45.41 MODERATE PERSISTENT ASTHMA WITH (ACUTE) EXACERBATION: ICD-10-CM

## 2019-02-27 LAB
ANION GAP SERPL CALC-SCNC: 13 MMOL/L — SIGNIFICANT CHANGE UP (ref 5–17)
BUN SERPL-MCNC: 25 MG/DL — HIGH (ref 7–23)
CALCIUM SERPL-MCNC: 9.9 MG/DL — SIGNIFICANT CHANGE UP (ref 8.4–10.5)
CHLORIDE SERPL-SCNC: 106 MMOL/L — SIGNIFICANT CHANGE UP (ref 96–108)
CO2 SERPL-SCNC: 20 MMOL/L — LOW (ref 22–31)
CREAT SERPL-MCNC: 0.96 MG/DL — SIGNIFICANT CHANGE UP (ref 0.5–1.3)
GLUCOSE BLDC GLUCOMTR-MCNC: 109 MG/DL — HIGH (ref 70–99)
GLUCOSE SERPL-MCNC: 156 MG/DL — HIGH (ref 70–99)
HCT VFR BLD CALC: 39.9 % — SIGNIFICANT CHANGE UP (ref 34.5–45)
HGB BLD-MCNC: 13.1 G/DL — SIGNIFICANT CHANGE UP (ref 11.5–15.5)
LEGIONELLA AG UR QL: NEGATIVE — SIGNIFICANT CHANGE UP
MAGNESIUM SERPL-MCNC: 2.2 MG/DL — SIGNIFICANT CHANGE UP (ref 1.6–2.6)
MCHC RBC-ENTMCNC: 30.9 PG — SIGNIFICANT CHANGE UP (ref 27–34)
MCHC RBC-ENTMCNC: 32.8 GM/DL — SIGNIFICANT CHANGE UP (ref 32–36)
MCV RBC AUTO: 94.1 FL — SIGNIFICANT CHANGE UP (ref 80–100)
NRBC # BLD: 0 /100 WBCS — SIGNIFICANT CHANGE UP (ref 0–0)
PLATELET # BLD AUTO: 226 K/UL — SIGNIFICANT CHANGE UP (ref 150–400)
POTASSIUM SERPL-MCNC: 4 MMOL/L — SIGNIFICANT CHANGE UP (ref 3.5–5.3)
POTASSIUM SERPL-SCNC: 4 MMOL/L — SIGNIFICANT CHANGE UP (ref 3.5–5.3)
RBC # BLD: 4.24 M/UL — SIGNIFICANT CHANGE UP (ref 3.8–5.2)
RBC # FLD: 14.5 % — SIGNIFICANT CHANGE UP (ref 10.3–14.5)
SODIUM SERPL-SCNC: 139 MMOL/L — SIGNIFICANT CHANGE UP (ref 135–145)
WBC # BLD: 11.63 K/UL — HIGH (ref 3.8–10.5)
WBC # FLD AUTO: 11.63 K/UL — HIGH (ref 3.8–10.5)

## 2019-02-27 PROCEDURE — 71046 X-RAY EXAM CHEST 2 VIEWS: CPT

## 2019-02-27 PROCEDURE — 36415 COLL VENOUS BLD VENIPUNCTURE: CPT

## 2019-02-27 PROCEDURE — 87798 DETECT AGENT NOS DNA AMP: CPT

## 2019-02-27 PROCEDURE — 96375 TX/PRO/DX INJ NEW DRUG ADDON: CPT

## 2019-02-27 PROCEDURE — 87633 RESP VIRUS 12-25 TARGETS: CPT

## 2019-02-27 PROCEDURE — 85730 THROMBOPLASTIN TIME PARTIAL: CPT

## 2019-02-27 PROCEDURE — 80048 BASIC METABOLIC PNL TOTAL CA: CPT

## 2019-02-27 PROCEDURE — 85610 PROTHROMBIN TIME: CPT

## 2019-02-27 PROCEDURE — 87040 BLOOD CULTURE FOR BACTERIA: CPT

## 2019-02-27 PROCEDURE — 94640 AIRWAY INHALATION TREATMENT: CPT

## 2019-02-27 PROCEDURE — 82962 GLUCOSE BLOOD TEST: CPT

## 2019-02-27 PROCEDURE — 96374 THER/PROPH/DIAG INJ IV PUSH: CPT

## 2019-02-27 PROCEDURE — 82553 CREATINE MB FRACTION: CPT

## 2019-02-27 PROCEDURE — 83880 ASSAY OF NATRIURETIC PEPTIDE: CPT

## 2019-02-27 PROCEDURE — 93306 TTE W/DOPPLER COMPLETE: CPT

## 2019-02-27 PROCEDURE — 84484 ASSAY OF TROPONIN QUANT: CPT

## 2019-02-27 PROCEDURE — 99239 HOSP IP/OBS DSCHRG MGMT >30: CPT

## 2019-02-27 PROCEDURE — 87631 RESP VIRUS 3-5 TARGETS: CPT

## 2019-02-27 PROCEDURE — 93306 TTE W/DOPPLER COMPLETE: CPT | Mod: 26

## 2019-02-27 PROCEDURE — 87486 CHLMYD PNEUM DNA AMP PROBE: CPT

## 2019-02-27 PROCEDURE — 82550 ASSAY OF CK (CPK): CPT

## 2019-02-27 PROCEDURE — 85025 COMPLETE CBC W/AUTO DIFF WBC: CPT

## 2019-02-27 PROCEDURE — 83735 ASSAY OF MAGNESIUM: CPT

## 2019-02-27 PROCEDURE — 87581 M.PNEUMON DNA AMP PROBE: CPT

## 2019-02-27 PROCEDURE — 80053 COMPREHEN METABOLIC PANEL: CPT

## 2019-02-27 PROCEDURE — 85027 COMPLETE CBC AUTOMATED: CPT

## 2019-02-27 PROCEDURE — 87449 NOS EACH ORGANISM AG IA: CPT

## 2019-02-27 PROCEDURE — 99285 EMERGENCY DEPT VISIT HI MDM: CPT | Mod: 25

## 2019-02-27 RX ORDER — IBUPROFEN 200 MG
1 TABLET ORAL
Qty: 0 | Refills: 0 | COMMUNITY

## 2019-02-27 RX ADMIN — Medication 3 MILLILITER(S): at 16:45

## 2019-02-27 RX ADMIN — HEPARIN SODIUM 5000 UNIT(S): 5000 INJECTION INTRAVENOUS; SUBCUTANEOUS at 13:17

## 2019-02-27 RX ADMIN — AMLODIPINE BESYLATE 5 MILLIGRAM(S): 2.5 TABLET ORAL at 06:52

## 2019-02-27 RX ADMIN — Medication 40 MILLIGRAM(S): at 06:51

## 2019-02-27 RX ADMIN — ATENOLOL 25 MILLIGRAM(S): 25 TABLET ORAL at 06:51

## 2019-02-27 RX ADMIN — BRIMONIDINE TARTRATE 1 DROP(S): 2 SOLUTION/ DROPS OPHTHALMIC at 06:50

## 2019-02-27 RX ADMIN — DORZOLAMIDE HYDROCHLORIDE 1 DROP(S): 20 SOLUTION/ DROPS OPHTHALMIC at 13:17

## 2019-02-27 RX ADMIN — BRIMONIDINE TARTRATE 1 DROP(S): 2 SOLUTION/ DROPS OPHTHALMIC at 18:45

## 2019-02-27 RX ADMIN — HEPARIN SODIUM 5000 UNIT(S): 5000 INJECTION INTRAVENOUS; SUBCUTANEOUS at 06:52

## 2019-02-27 RX ADMIN — Medication 3 MILLILITER(S): at 10:53

## 2019-02-27 RX ADMIN — Medication 20 MILLIGRAM(S): at 06:51

## 2019-02-27 RX ADMIN — DORZOLAMIDE HYDROCHLORIDE 1 DROP(S): 20 SOLUTION/ DROPS OPHTHALMIC at 06:50

## 2019-02-27 NOTE — PROGRESS NOTE ADULT - PROBLEM SELECTOR PLAN 9
Transition of Care   1) PCP Contacted on Admission: (Y/N) --> Name & Phone #: Dr. Naidu PCP [phone], Dr. Sinclair (pulmonlogist) [phone]. YES  2) Date of Contact with PCP: 2/26/19  3) PCP Contacted at Discharge: (Y/N)  4) Summary of Handoff Given to PCP:   5) Post-Discharge Appointment Date and Location:

## 2019-02-27 NOTE — PROGRESS NOTE ADULT - PROBLEM SELECTOR PLAN 3
Pt with a hx of glaucoma, takes methazolamide 25 BID, as well as xalatan/brimonidine/dorzolamide TID OU.  -c/w home gtts  -no methazolamide available at [facility]. Will need to request that pt brings it from home.    #CKD   Pt with a hx of CKD, stage 3  -avoid nephrotoxic meds

## 2019-02-27 NOTE — DISCHARGE NOTE NURSING/CASE MANAGEMENT/SOCIAL WORK - NSDCDPATPORTLINK_GEN_ALL_CORE
You can access the [email or web address] Patient Portal, offered by [facility], by registering with the following website: [email or web address]

## 2019-02-27 NOTE — DISCHARGE NOTE PROVIDER - CARE PROVIDERS DIRECT ADDRESSES
[email or web address]

## 2019-02-27 NOTE — DISCHARGE NOTE NURSING/CASE MANAGEMENT/SOCIAL WORK - NSDCPEPTSTRK_GEN_ALL_CORE
Need for follow up after discharge/Prescribed medications/Stroke education booklet/Call 911 for stroke/Risk factors for stroke/Stroke support groups for patients, families, and friends/Stroke warning signs and symptoms/Signs and symptoms of stroke

## 2019-02-27 NOTE — PROGRESS NOTE ADULT - SUBJECTIVE AND OBJECTIVE BOX
OVERNIGHT EVENTS: FORTUNATO     SUBJECTIVE / INTERVAL HPI: Patient seen and examined at bedside. She denies any fever, chills, chest tightness or SOB this morning. She is currently not bringing up any sputum.  VITAL SIGNS:  Vital Signs Last 24 Hrs  T(C): 36.6 (27 Feb 2019 09:29), Max: 37 (26 Feb 2019 16:23)  T(F): 97.8 (27 Feb 2019 09:29), Max: 98.6 (26 Feb 2019 16:23)  HR: 72 (27 Feb 2019 09:29) (72 - 95)  BP: 149/76 (27 Feb 2019 09:29) (113/73 - 149/76)  BP(mean): --  RR: 18 (27 Feb 2019 09:29) (18 - 20)  SpO2: 97% (27 Feb 2019 09:29) (96% - 97%)      PHYSICAL EXAM:    Constitutional: elderly female, WDWN resting comfortably in bed; NAD  	Head: NC/AT  	Eyes: clear conjunctiva  	ENT: no nasal discharge; no oropharyngeal erythema or exudates; MMM  	Respiratory:End expiratory wheezing with good air movement   	Cardiac: +S1/S2; RRR; no M/R/G  	Gastrointestinal: soft, NT/ND; no rebound or guarding; normoactive BS  	Back: spine midline  	Extremities: WWP, no clubbing or cyanosis; no peripheral edema  	Dermatologic: skin warm, dry and intact; no rashes, wounds, or scars  Psychiatric: affect and characteristics of appearance, verbalizations, behaviors are appropriate    MEDICATIONS:  MEDICATIONS  (STANDING):  ALBUTerol/ipratropium for Nebulization 3 milliLiter(s) Nebulizer every 6 hours  amLODIPine   Tablet 5 milliGRAM(s) Oral daily  ATENolol  Tablet 25 milliGRAM(s) Oral daily  atorvastatin 20 milliGRAM(s) Oral at bedtime  brimonidine 0.2% Ophthalmic Solution 1 Drop(s) Both EYES two times a day  dorzolamide 2% Ophthalmic Solution 1 Drop(s) Both EYES three times a day  furosemide    Tablet 20 milliGRAM(s) Oral daily  heparin  Injectable 5000 Unit(s) SubCutaneous every 8 hours  latanoprost 0.005% Ophthalmic Solution 1 Drop(s) Both EYES <User Schedule>  predniSONE   Tablet 40 milliGRAM(s) Oral every 12 hours    MEDICATIONS  (PRN):  ALBUTerol    90 MICROgram(s) HFA Inhaler 2 Puff(s) Inhalation every 6 hours PRN Shortness of Breath and/or Wheezing      ALLERGIES:  Allergies    No Known Allergies    Intolerances        LABS:                        13.1   11.63 )-----------( 226      ( 27 Feb 2019 06:33 )             39.9     02-27    139  |  106  |  25<H>  ----------------------------<  156<H>  4.0   |  20<L>  |  0.96    Ca    9.9      27 Feb 2019 06:32  Mg     2.2     02-27    TPro  8.2  /  Alb  4.5  /  TBili  0.7  /  DBili  x   /  AST  20  /  ALT  14  /  AlkPhos  134<H>  02-26    PT/INR - ( 26 Feb 2019 11:30 )   PT: 14.0 sec;   INR: 1.23          PTT - ( 26 Feb 2019 11:30 )  PTT:30.6 sec    CAPILLARY BLOOD GLUCOSE    < from: Echocardiogram (02.27.19 @ 09:01) >  There is mild concentric left ventricular hypertrophy.The left   ventricular wall   motion is normal.The left ventricular ejection fraction is estimated to   be   50-55%The left atrial size is normal.Right atrial size isnormal.The   right   ventricle is normal in size and function.Structurally normal aortic   valve.No   aortic regurgitation noted.Structurally normal mitral valve.There is   trace to   mild mitral regurgitation.Structurally normal tricuspid valve.There is no   echocardiographic evidence for pulmonary hypertension.Structurally normal   pulmonic valve.No aortic root dilatation.There is no pericardial   effusion.    < end of copied text >

## 2019-02-27 NOTE — PROGRESS NOTE ADULT - PROBLEM SELECTOR PLAN 1
Pt with a hx of asthma who presented with cough and yellow sputum production, SOB with ability to only walk 3 blocks (down from 5 blocks). +wheezing on exam. No notable leukocytosis, fevers on admission, or infiltrate on CXR. Echo unremarkable. Legionella negative. Positive for Entero/rhinovirus   -Continue prednisone 40mg qd. Medrol dos delia on discharge   -duoneb q6 hr  -albuterol PRN

## 2019-02-27 NOTE — DISCHARGE NOTE PROVIDER - CARE PROVIDER_API CALL
Florida Sinclair)  Critical Care Medicine; Pulmonary Disease  [address] [place] [address]  Phone: [phone]  Fax: [phone]  Follow Up Time:

## 2019-02-27 NOTE — DISCHARGE NOTE NURSING/CASE MANAGEMENT/SOCIAL WORK - NURSING SECTION COMPLETE
yes/mom has been supplementing with formula, advised pumping 15-20min if supplementing to ensure full supply
Patient/Caregiver provided printed discharge information.

## 2019-02-27 NOTE — DISCHARGE NOTE PROVIDER - HOSPITAL COURSE
This is a 82 y/o F w/ PMH CVA, COPD/asthma, lung CA (in remission, s/p resection), CAD, CKD, HTN, HLD, cardiomyopathy w/PPM, vertigo, syphilis, and glaucoma who presented with 2 days of subjective fevers, found to be in COPD/ asthma exacerbation. She was started on prednisone and bronchodilators for exacerbation. She had an echo done that showed EF 55%. She is now medically cleared for discharge to home. This is a 84 y/o F w/ PMH CVA, COPD/asthma, lung CA (in remission, s/p resection), CAD, CKD, HTN, HLD, cardiomyopathy w/PPM, vertigo, syphilis, and glaucoma who presented with 2 days of subjective fevers, found to be in asthma exacerbation. She was started on prednisone and bronchodilators for exacerbation. She had an echo done that showed EF 55%. She is now medically cleared for discharge to home. This is a 84 y/o F w/ PMH CVA, asthma, lung CA (in remission, s/p resection), CAD, CKD, HTN, HLD, cardiomyopathy w/PPM, vertigo, syphilis, and glaucoma who presented with 2 days of subjective fevers, found to be in asthma exacerbation. She was started on prednisone and bronchodilators for exacerbation. She had an echo done that showed EF 55%. She is now medically cleared for discharge to home.

## 2019-02-27 NOTE — DISCHARGE NOTE PROVIDER - NSDCCPCAREPLAN_GEN_ALL_CORE_FT
PRINCIPAL DISCHARGE DIAGNOSIS  Problem: COPD exacerbation  Assessment and Plan of Treatment: You came to the hospital for cough and fevers. You were found to have a viral infection that worsened the COPD you have. You will need to take prednisone at home. You do not need any antibiotics at this time since this is coming from a viral infection. Please follow up with Dr. Sinclair after discharge. PRINCIPAL DISCHARGE DIAGNOSIS  Problem: Acute asthma exacerbation  Assessment and Plan of Treatment: You came to the hospital for cough and fevers. You were found to have a viral infection that worsened the asthma you have. You will need to take Medrol dose delia at home. You do not need any antibiotics at this time since this is coming from a viral infection. Please follow up with Dr. Sinclair after discharge.

## 2019-02-27 NOTE — DISCHARGE NOTE NURSING/CASE MANAGEMENT/SOCIAL WORK - NSDCFUADDAPPT_GEN_ALL_CORE_FT
You have an appointment with Dr. Sinclair on April 30th at 1:15 PM  His office will call you for an appointment sooner

## 2019-02-27 NOTE — PROGRESS NOTE ADULT - ATTENDING COMMENTS
Patient seen and examined with house-staff during bedside rounds.  Resident note read, including vitals, physical findings, laboratory data, and radiological reports.   Revisions included below.  Direct personal management at bed side and extensive interpretation of the data.  Plan was outlined and discussed in details with the housestaff.  Decision making of high complexity  Action taken for acute disease activity to reflect the level of care provided:  - medication reconciliation  - review laboratory data  Acute asthma exacerbations secondary to viral a illness. The patient is clinically improving. Patient is to be changed on bronchodilators and PO prednisone. Patient is stable to be discharge. No evidence of pneumonia. No evidence of recurrence of malignancy

## 2019-02-27 NOTE — DISCHARGE NOTE PROVIDER - NSDCFUADDAPPT_GEN_ALL_CORE_FT
Please follow up with Dr. Sinclair on You have an appointment with Dr. Sinclair on April 30th at 1:15 PM  His office will call you for an appointment sooner

## 2019-03-03 LAB
CULTURE RESULTS: SIGNIFICANT CHANGE UP
CULTURE RESULTS: SIGNIFICANT CHANGE UP
SPECIMEN SOURCE: SIGNIFICANT CHANGE UP
SPECIMEN SOURCE: SIGNIFICANT CHANGE UP

## 2019-03-05 ENCOUNTER — APPOINTMENT (OUTPATIENT)
Dept: INTERNAL MEDICINE | Facility: CLINIC | Age: 84
End: 2019-03-05

## 2019-03-06 DIAGNOSIS — E78.5 HYPERLIPIDEMIA, UNSPECIFIED: ICD-10-CM

## 2019-03-06 DIAGNOSIS — B97.89 OTHER VIRAL AGENTS AS THE CAUSE OF DISEASES CLASSIFIED ELSEWHERE: ICD-10-CM

## 2019-03-06 DIAGNOSIS — Z95.0 PRESENCE OF CARDIAC PACEMAKER: ICD-10-CM

## 2019-03-06 DIAGNOSIS — J44.9 CHRONIC OBSTRUCTIVE PULMONARY DISEASE, UNSPECIFIED: ICD-10-CM

## 2019-03-06 DIAGNOSIS — I25.10 ATHEROSCLEROTIC HEART DISEASE OF NATIVE CORONARY ARTERY WITHOUT ANGINA PECTORIS: ICD-10-CM

## 2019-03-06 DIAGNOSIS — J45.909 UNSPECIFIED ASTHMA, UNCOMPLICATED: ICD-10-CM

## 2019-03-06 DIAGNOSIS — J47.0 BRONCHIECTASIS WITH ACUTE LOWER RESPIRATORY INFECTION: ICD-10-CM

## 2019-03-06 DIAGNOSIS — H40.9 UNSPECIFIED GLAUCOMA: ICD-10-CM

## 2019-03-06 DIAGNOSIS — N18.3 CHRONIC KIDNEY DISEASE, STAGE 3 (MODERATE): ICD-10-CM

## 2019-03-06 DIAGNOSIS — Z86.73 PERSONAL HISTORY OF TRANSIENT ISCHEMIC ATTACK (TIA), AND CEREBRAL INFARCTION WITHOUT RESIDUAL DEFICITS: ICD-10-CM

## 2019-03-06 DIAGNOSIS — Z85.118 PERSONAL HISTORY OF OTHER MALIGNANT NEOPLASM OF BRONCHUS AND LUNG: ICD-10-CM

## 2019-03-06 DIAGNOSIS — I42.9 CARDIOMYOPATHY, UNSPECIFIED: ICD-10-CM

## 2019-03-06 DIAGNOSIS — I12.9 HYPERTENSIVE CHRONIC KIDNEY DISEASE WITH STAGE 1 THROUGH STAGE 4 CHRONIC KIDNEY DISEASE, OR UNSPECIFIED CHRONIC KIDNEY DISEASE: ICD-10-CM

## 2019-03-06 DIAGNOSIS — J98.11 ATELECTASIS: ICD-10-CM

## 2019-03-06 DIAGNOSIS — R42 DIZZINESS AND GIDDINESS: ICD-10-CM

## 2019-03-06 DIAGNOSIS — R07.9 CHEST PAIN, UNSPECIFIED: ICD-10-CM

## 2019-03-06 DIAGNOSIS — J45.41 MODERATE PERSISTENT ASTHMA WITH (ACUTE) EXACERBATION: ICD-10-CM

## 2019-03-25 ENCOUNTER — EMERGENCY (EMERGENCY)
Facility: HOSPITAL | Age: 84
LOS: 1 days | Discharge: HOME | End: 2019-03-25
Attending: EMERGENCY MEDICINE | Admitting: EMERGENCY MEDICINE
Payer: MEDICARE

## 2019-03-25 VITALS
SYSTOLIC BLOOD PRESSURE: 162 MMHG | HEART RATE: 83 BPM | OXYGEN SATURATION: 98 % | WEIGHT: 173.94 LBS | TEMPERATURE: 98 F | RESPIRATION RATE: 18 BRPM | DIASTOLIC BLOOD PRESSURE: 84 MMHG

## 2019-03-25 DIAGNOSIS — Z79.899 OTHER LONG TERM (CURRENT) DRUG THERAPY: ICD-10-CM

## 2019-03-25 DIAGNOSIS — R11.2 NAUSEA WITH VOMITING, UNSPECIFIED: ICD-10-CM

## 2019-03-25 DIAGNOSIS — Z87.891 PERSONAL HISTORY OF NICOTINE DEPENDENCE: ICD-10-CM

## 2019-03-25 DIAGNOSIS — I12.0 HYPERTENSIVE CHRONIC KIDNEY DISEASE WITH STAGE 5 CHRONIC KIDNEY DISEASE OR END STAGE RENAL DISEASE: ICD-10-CM

## 2019-03-25 DIAGNOSIS — Z98.890 OTHER SPECIFIED POSTPROCEDURAL STATES: Chronic | ICD-10-CM

## 2019-03-25 DIAGNOSIS — Z87.442 PERSONAL HISTORY OF URINARY CALCULI: Chronic | ICD-10-CM

## 2019-03-25 DIAGNOSIS — Z95.0 PRESENCE OF CARDIAC PACEMAKER: Chronic | ICD-10-CM

## 2019-03-25 DIAGNOSIS — R10.9 UNSPECIFIED ABDOMINAL PAIN: ICD-10-CM

## 2019-03-25 DIAGNOSIS — R10.84 GENERALIZED ABDOMINAL PAIN: ICD-10-CM

## 2019-03-25 DIAGNOSIS — N18.3 CHRONIC KIDNEY DISEASE, STAGE 3 (MODERATE): ICD-10-CM

## 2019-03-25 DIAGNOSIS — C34.90 MALIGNANT NEOPLASM OF UNSPECIFIED PART OF UNSPECIFIED BRONCHUS OR LUNG: Chronic | ICD-10-CM

## 2019-03-25 DIAGNOSIS — H26.9 UNSPECIFIED CATARACT: Chronic | ICD-10-CM

## 2019-03-25 DIAGNOSIS — J45.909 UNSPECIFIED ASTHMA, UNCOMPLICATED: ICD-10-CM

## 2019-03-25 DIAGNOSIS — Z79.52 LONG TERM (CURRENT) USE OF SYSTEMIC STEROIDS: ICD-10-CM

## 2019-03-25 PROCEDURE — 99285 EMERGENCY DEPT VISIT HI MDM: CPT | Mod: 25

## 2019-03-25 PROCEDURE — 93010 ELECTROCARDIOGRAM REPORT: CPT

## 2019-03-25 NOTE — ED ADULT NURSE NOTE - OBJECTIVE STATEMENT
82 y/o female with hx of CVA, HTN, asthma was BIBA to [facility] ER for abdominal pain accompanied with SOB, nausea, vomiting, and diarrhea with onset of symptoms since last Wednesday. Upon assessment, abdomen soft, lung fields WNL, breathing is equal and unlabored, pulses palpable, no visible acute injuries, pacemaker noted to left upper chest. Family verbalized that patient has been reporting feeling weakness. EKG performed. Care in progress.

## 2019-03-25 NOTE — ED ADULT NURSE NOTE - NSIMPLEMENTINTERV_GEN_ALL_ED
Implemented All Fall Risk Interventions:  Circle to call system. Call bell, personal items and telephone within reach. Instruct patient to call for assistance. Room bathroom lighting operational. Non-slip footwear when patient is off stretcher. Physically safe environment: no spills, clutter or unnecessary equipment. Stretcher in lowest position, wheels locked, appropriate side rails in place. Provide visual cue, wrist band, yellow gown, etc. Monitor gait and stability. Monitor for mental status changes and reorient to person, place, and time. Review medications for side effects contributing to fall risk. Reinforce activity limits and safety measures with patient and family.

## 2019-03-26 VITALS
HEART RATE: 87 BPM | OXYGEN SATURATION: 97 % | SYSTOLIC BLOOD PRESSURE: 151 MMHG | TEMPERATURE: 98 F | DIASTOLIC BLOOD PRESSURE: 85 MMHG | RESPIRATION RATE: 18 BRPM

## 2019-03-26 LAB — LACTATE SERPL-SCNC: 1 MMOL/L — SIGNIFICANT CHANGE UP (ref 0.5–2)

## 2019-03-26 PROCEDURE — 87086 URINE CULTURE/COLONY COUNT: CPT

## 2019-03-26 PROCEDURE — 74177 CT ABD & PELVIS W/CONTRAST: CPT | Mod: 26

## 2019-03-26 PROCEDURE — 82550 ASSAY OF CK (CPK): CPT

## 2019-03-26 PROCEDURE — 84484 ASSAY OF TROPONIN QUANT: CPT

## 2019-03-26 PROCEDURE — 80053 COMPREHEN METABOLIC PANEL: CPT

## 2019-03-26 PROCEDURE — 71045 X-RAY EXAM CHEST 1 VIEW: CPT | Mod: 26

## 2019-03-26 PROCEDURE — 99284 EMERGENCY DEPT VISIT MOD MDM: CPT | Mod: 25

## 2019-03-26 PROCEDURE — 81003 URINALYSIS AUTO W/O SCOPE: CPT

## 2019-03-26 PROCEDURE — 83735 ASSAY OF MAGNESIUM: CPT

## 2019-03-26 PROCEDURE — 71045 X-RAY EXAM CHEST 1 VIEW: CPT

## 2019-03-26 PROCEDURE — 74177 CT ABD & PELVIS W/CONTRAST: CPT

## 2019-03-26 PROCEDURE — 93005 ELECTROCARDIOGRAM TRACING: CPT

## 2019-03-26 PROCEDURE — 83605 ASSAY OF LACTIC ACID: CPT

## 2019-03-26 PROCEDURE — 83690 ASSAY OF LIPASE: CPT

## 2019-03-26 PROCEDURE — 96374 THER/PROPH/DIAG INJ IV PUSH: CPT | Mod: XU

## 2019-03-26 PROCEDURE — 85025 COMPLETE CBC W/AUTO DIFF WBC: CPT

## 2019-03-26 PROCEDURE — 36415 COLL VENOUS BLD VENIPUNCTURE: CPT

## 2019-03-26 PROCEDURE — 82553 CREATINE MB FRACTION: CPT

## 2019-03-26 RX ORDER — SODIUM CHLORIDE 9 MG/ML
500 INJECTION INTRAMUSCULAR; INTRAVENOUS; SUBCUTANEOUS ONCE
Qty: 0 | Refills: 0 | Status: COMPLETED | OUTPATIENT
Start: 2019-03-26 | End: 2019-03-26

## 2019-03-26 RX ORDER — PANTOPRAZOLE SODIUM 20 MG/1
40 TABLET, DELAYED RELEASE ORAL ONCE
Qty: 0 | Refills: 0 | Status: COMPLETED | OUTPATIENT
Start: 2019-03-26 | End: 2019-03-26

## 2019-03-26 RX ADMIN — SODIUM CHLORIDE 1000 MILLILITER(S): 9 INJECTION INTRAMUSCULAR; INTRAVENOUS; SUBCUTANEOUS at 01:31

## 2019-03-26 RX ADMIN — PANTOPRAZOLE SODIUM 40 MILLIGRAM(S): 20 TABLET, DELAYED RELEASE ORAL at 00:40

## 2019-03-26 RX ADMIN — SODIUM CHLORIDE 1000 MILLILITER(S): 9 INJECTION INTRAMUSCULAR; INTRAVENOUS; SUBCUTANEOUS at 00:38

## 2019-03-26 NOTE — ED PROVIDER NOTE - ATTENDING CONTRIBUTION TO CARE
Attending Statement: I have personally performed a face to face diagnostic evaluation on this patient. I have reviewed the resident note and agree with the history, exam and plan of care, except as noted.     Attending Contribution to Care:  Patient with PMH of glaucoma, asthma , former smoker, asthma, Left lower lobe cancer , hx of s bradycardia s/p PPM, Hx of cad ? cva?, CKD stage 3, follows dr Burns, hx of syphilis in past, recent admission for asthma exacerbation discharged on steroids and bronchodilators presenting with complaints of abdominal pain, nausea vomiting and loose stools since last Wednesday.  Workup reveals LABS consistent with AL, showing no other gross abnormalities  CT abdomen showing no gross abnormalities, extensive diverticulosis noted in colon however no evidence of colitis. pt was not able to produce a sample for stool studies. She feels much better, tolerated PO, ambulatory, no neuro deficits. The patient is stable for DC and is feeling much better.  Symptoms have improved and after discussion regarding discharge, patient feels comfortable going home.  Answers to all questions provided.  Patient feeling satisfactory with care and follow up plan discussed. They were advised to call their PMD for prompt outpatient follow up. Return precautions were discussed. The patient was advised to return to the ER for any concerning or worsening symptoms.

## 2019-03-26 NOTE — ED PROVIDER NOTE - CARE PROVIDER_API CALL
Caryl Burns)  Nephrology  [address] [place] [address]  Phone: [phone]  Fax: [phone]  Follow Up Time:     Suzy Naidu (DO)  Internal Medicine  [address] [address]  Phone: [phone]  Fax: [phone]  Follow Up Time:     Florida Sinclair)  Critical Care Medicine; Pulmonary Disease  [address] [place] [address], [address] [place] [address]  Phone: [phone]  Fax: [phone]  Follow Up Time:

## 2019-03-26 NOTE — ED PROVIDER NOTE - PSH
Cardiac pacemaker    Cataract of right eye    H/O abdominal hysterectomy    History of cholecystectomy    History of nephrolithiasis  s/p removal  Lung cancer  s/p resection

## 2019-03-26 NOTE — ED PROVIDER NOTE - CLINICAL SUMMARY MEDICAL DECISION MAKING FREE TEXT BOX
Patient with PMH of glaucoma, asthma , former smoker, asthma, Left lower lobe cancer , hx of s bradycardia s/p PPM, Hx of cad ? cva?, CKD stage 3, follows dr Burns, hx of syphilis in past, recent admission for asthma exacerbation discharged on steroids and bronchodilators presenting with complaints of abdominal pain, nausea vomiting and loose stools since last Wednesday. Pt denies fever chills rigors, sick contacts, denies recent abx use. Pt says she developed vague abdominal pain starting last Wednesday. It is worsened on eating and drinking. Associated with nausea and vomiting up to 5 times a day primarily consisting of food contents. Pt also says her stool is watery since last Wednesday, passes stool 2 times a day . Denies all other ros  Vitals on admission: 162/84, HR 83, afebrile, satting well RA  Plan: CBC, CMP, TROPONIN, CXR, ABDOMINAL CT SCAN WITH AND WITHOUT CONTRAST, GI PCR, C DIFF TOXIN STOOL, EKG, LACTATE , UA  ED MANAGEMENT : 500 CC NACL BOLUS, IV ZOFRAN, IV PROTONIX Patient with PMH of glaucoma, asthma , former smoker, asthma, Left lower lobe cancer , hx of s bradycardia s/p PPM, Hx of cad ? cva?, CKD stage 3, follows dr Burns, hx of syphilis in past, recent admission for asthma exacerbation discharged on steroids and bronchodilators presenting with complaints of abdominal pain, nausea vomiting and loose stools since last Wednesday. Pt denies fever chills rigors, sick contacts, denies recent abx use. Pt says she developed vague abdominal pain starting last Wednesday. It is worsened on eating and drinking. Associated with nausea and vomiting up to 5 times a day primarily consisting of food contents. Pt also says her stool is watery since last Wednesday, passes stool 2 times a day . Denies all other ros  Vitals on admission: 162/84, HR 83, afebrile, satting well RA  Plan: CBC, CMP, TROPONIN, CXR, ABDOMINAL CT SCAN WITH AND WITHOUT CONTRAST, GI PCR, C DIFF TOXIN STOOL, EKG, LACTATE , UA  ED MANAGEMENT : 500 CC NACL BOLUS, IV ZOFRAN, IV PROTONIX  LABS consistent with AL, showing no other gross abnormalities  CT abdomen showing no gross abnormalities  Pt given additional 500 cc bolus in ED-Total 1L  Pt  instructed to improve po intake and stay hydrated. Patient with PMH of glaucoma, asthma , former smoker, asthma, Left lower lobe cancer , hx of s bradycardia s/p PPM, Hx of cad ? cva?, CKD stage 3, follows dr Burns, hx of syphilis in past, recent admission for asthma exacerbation discharged on steroids and bronchodilators presenting with complaints of abdominal pain, nausea vomiting and loose stools since last Wednesday. Pt denies fever chills rigors, sick contacts, denies recent abx use. Pt says she developed vague abdominal pain starting last Wednesday. It is worsened on eating and drinking. Associated with nausea and vomiting up to 5 times a day primarily consisting of food contents. Pt also says her stool is watery since last Wednesday, passes stool 2 times a day . Denies all other ros  Vitals on admission: 162/84, HR 83, afebrile, satting well RA  Plan: CBC, CMP, TROPONIN, CXR, ABDOMINAL CT SCAN WITH AND WITHOUT CONTRAST, GI PCR, C DIFF TOXIN STOOL, EKG, LACTATE , UA  ED MANAGEMENT : 500 CC NACL BOLUS, IV ZOFRAN, IV PROTONIX  LABS consistent with AL, showing no other gross abnormalities  CT abdomen showing no gross abnormalities, extensive diverticulosis noted in colon however no evidence of colitis   Pt given additional 500 cc bolus in ED-Total 1L   Can discharge with instructions to f/u with PMD as patient feels better after iv hydration with labs showing AL and imaging showing no gross abnormalities. Pt  instructed to improve po intake and stay hydrated. Patient with PMH of glaucoma, asthma , former smoker, asthma, Left lower lobe cancer , hx of s bradycardia s/p PPM, Hx of cad ? cva?, CKD stage 3, follows dr Burns, hx of syphilis in past, recent admission for asthma exacerbation discharged on steroids and bronchodilators presenting with complaints of abdominal pain, nausea vomiting and loose stools since last Wednesday. Pt denies fever chills rigors, sick contacts, denies recent abx use. Pt says she developed vague abdominal pain starting last Wednesday. It is worsened on eating and drinking. Associated with nausea and vomiting up to 5 times a day primarily consisting of food contents. Pt also says her stool is watery since last Wednesday, passes stool 2 times a day . Denies all other ros  Vitals on admission: 162/84, HR 83, afebrile, satting well RA  Plan: CBC, CMP, TROPONIN, CXR, ABDOMINAL CT SCAN WITH AND WITHOUT CONTRAST, GI PCR, C DIFF TOXIN STOOL, EKG, LACTATE , UA  ED MANAGEMENT : 500 CC NACL BOLUS, IV ZOFRAN, IV PROTONIX  LABS consistent with AL, showing no other gross abnormalities  CT abdomen showing no gross abnormalities, extensive diverticulosis noted in colon however no evidence of colitis   Pt given additional 500 cc bolus in ED-Total 1L   Can discharge with instructions to f/u with PMD as patient feels better after iv hydration , no longer complaining of abdominal pain /nausea or weakness.  labs showing AL and imaging showing no gross abnormalities. Pt  instructed to improve po intake and stay hydrated.

## 2019-03-26 NOTE — ED PROVIDER NOTE - PROVIDER TOKENS
PROVIDER:[TOKEN:[[id number]]],PROVIDER:[TOKEN:[[id number]]],PROVIDER:[TOKEN:[[id number]]]

## 2019-03-26 NOTE — ED PROVIDER NOTE - PHYSICAL EXAMINATION
Constitutional: patient appears comfortable   Head: NC/AT  Eyes: PERRL, EOMI, anicteric sclera  ENT: dry mucous membranes   Neck: supple; no JVD or thyromegaly  Respiratory :no wheezing no crackles   Cardiac: +S1/S2;   Gastrointestinal: abdomen soft ,no guarding, diffuse mild tenderness in all quadrants  Back: spine midline, no bony tenderness or step-offs; no CVAT B/L  Extremities: WWP, no clubbing or cyanosis; no peripheral edema  Musculoskeletal: NROM x4; no joint swelling, tenderness or erythema  Vascular: 2+ radial, femoral, DP/PT pulses B/L  Neurologic: AAOx3; CNII-XII grossly intact; no focal deficits

## 2019-03-26 NOTE — ED PROVIDER NOTE - CARE PROVIDERS DIRECT ADDRESSES
[email or web address]

## 2019-03-26 NOTE — ED PROVIDER NOTE - FAMILY HISTORY
Father  Still living? Unknown  Family history of prostate cancer, Age at diagnosis: Age Unknown     Mother  Still living? Unknown  Family history of varicose veins, Age at diagnosis: Age Unknown

## 2019-03-27 ENCOUNTER — INBOUND DOCUMENT (OUTPATIENT)
Age: 84
End: 2019-03-27

## 2019-04-15 ENCOUNTER — APPOINTMENT (OUTPATIENT)
Dept: INTERNAL MEDICINE | Facility: CLINIC | Age: 84
End: 2019-04-15

## 2019-04-19 ENCOUNTER — RX RENEWAL (OUTPATIENT)
Age: 84
End: 2019-04-19

## 2019-04-29 ENCOUNTER — APPOINTMENT (OUTPATIENT)
Dept: INTERNAL MEDICINE | Facility: CLINIC | Age: 84
End: 2019-04-29

## 2019-04-30 ENCOUNTER — APPOINTMENT (OUTPATIENT)
Dept: PULMONOLOGY | Facility: CLINIC | Age: 84
End: 2019-04-30

## 2019-05-08 ENCOUNTER — EMERGENCY (EMERGENCY)
Facility: HOSPITAL | Age: 84
LOS: 1 days | Discharge: HOME | End: 2019-05-08
Attending: EMERGENCY MEDICINE | Admitting: EMERGENCY MEDICINE
Payer: MEDICARE

## 2019-05-08 VITALS
OXYGEN SATURATION: 98 % | TEMPERATURE: 98 F | DIASTOLIC BLOOD PRESSURE: 74 MMHG | RESPIRATION RATE: 18 BRPM | HEART RATE: 76 BPM | SYSTOLIC BLOOD PRESSURE: 148 MMHG

## 2019-05-08 VITALS
SYSTOLIC BLOOD PRESSURE: 161 MMHG | WEIGHT: 173.94 LBS | TEMPERATURE: 98 F | OXYGEN SATURATION: 97 % | HEART RATE: 83 BPM | HEIGHT: 66 IN | DIASTOLIC BLOOD PRESSURE: 90 MMHG | RESPIRATION RATE: 18 BRPM

## 2019-05-08 DIAGNOSIS — Z95.0 PRESENCE OF CARDIAC PACEMAKER: Chronic | ICD-10-CM

## 2019-05-08 DIAGNOSIS — I25.10 ATHEROSCLEROTIC HEART DISEASE OF NATIVE CORONARY ARTERY WITHOUT ANGINA PECTORIS: ICD-10-CM

## 2019-05-08 DIAGNOSIS — C34.90 MALIGNANT NEOPLASM OF UNSPECIFIED PART OF UNSPECIFIED BRONCHUS OR LUNG: Chronic | ICD-10-CM

## 2019-05-08 DIAGNOSIS — Z98.890 OTHER SPECIFIED POSTPROCEDURAL STATES: Chronic | ICD-10-CM

## 2019-05-08 DIAGNOSIS — Z90.710 ACQUIRED ABSENCE OF BOTH CERVIX AND UTERUS: ICD-10-CM

## 2019-05-08 DIAGNOSIS — E78.5 HYPERLIPIDEMIA, UNSPECIFIED: ICD-10-CM

## 2019-05-08 DIAGNOSIS — Z79.01 LONG TERM (CURRENT) USE OF ANTICOAGULANTS: ICD-10-CM

## 2019-05-08 DIAGNOSIS — J44.9 CHRONIC OBSTRUCTIVE PULMONARY DISEASE, UNSPECIFIED: ICD-10-CM

## 2019-05-08 DIAGNOSIS — Z79.51 LONG TERM (CURRENT) USE OF INHALED STEROIDS: ICD-10-CM

## 2019-05-08 DIAGNOSIS — A09 INFECTIOUS GASTROENTERITIS AND COLITIS, UNSPECIFIED: ICD-10-CM

## 2019-05-08 DIAGNOSIS — I12.9 HYPERTENSIVE CHRONIC KIDNEY DISEASE WITH STAGE 1 THROUGH STAGE 4 CHRONIC KIDNEY DISEASE, OR UNSPECIFIED CHRONIC KIDNEY DISEASE: ICD-10-CM

## 2019-05-08 DIAGNOSIS — R50.9 FEVER, UNSPECIFIED: ICD-10-CM

## 2019-05-08 DIAGNOSIS — N18.9 CHRONIC KIDNEY DISEASE, UNSPECIFIED: ICD-10-CM

## 2019-05-08 DIAGNOSIS — Z87.442 PERSONAL HISTORY OF URINARY CALCULI: Chronic | ICD-10-CM

## 2019-05-08 DIAGNOSIS — H26.9 UNSPECIFIED CATARACT: Chronic | ICD-10-CM

## 2019-05-08 DIAGNOSIS — Z90.49 ACQUIRED ABSENCE OF OTHER SPECIFIED PARTS OF DIGESTIVE TRACT: ICD-10-CM

## 2019-05-08 DIAGNOSIS — Z79.899 OTHER LONG TERM (CURRENT) DRUG THERAPY: ICD-10-CM

## 2019-05-08 LAB
ALBUMIN SERPL ELPH-MCNC: 4 G/DL — SIGNIFICANT CHANGE UP (ref 3.3–5)
ALP SERPL-CCNC: 110 U/L — SIGNIFICANT CHANGE UP (ref 40–120)
ALT FLD-CCNC: 13 U/L — SIGNIFICANT CHANGE UP (ref 10–45)
ANION GAP SERPL CALC-SCNC: 10 MMOL/L — SIGNIFICANT CHANGE UP (ref 5–17)
APPEARANCE UR: CLEAR — SIGNIFICANT CHANGE UP
AST SERPL-CCNC: 21 U/L — SIGNIFICANT CHANGE UP (ref 10–40)
BASOPHILS # BLD AUTO: 0.02 K/UL — SIGNIFICANT CHANGE UP (ref 0–0.2)
BASOPHILS NFR BLD AUTO: 0.3 % — SIGNIFICANT CHANGE UP (ref 0–2)
BILIRUB SERPL-MCNC: 0.5 MG/DL — SIGNIFICANT CHANGE UP (ref 0.2–1.2)
BILIRUB UR-MCNC: NEGATIVE — SIGNIFICANT CHANGE UP
BUN SERPL-MCNC: 22 MG/DL — SIGNIFICANT CHANGE UP (ref 7–23)
CALCIUM SERPL-MCNC: 10.2 MG/DL — SIGNIFICANT CHANGE UP (ref 8.4–10.5)
CHLORIDE SERPL-SCNC: 107 MMOL/L — SIGNIFICANT CHANGE UP (ref 96–108)
CO2 SERPL-SCNC: 24 MMOL/L — SIGNIFICANT CHANGE UP (ref 22–31)
COLOR SPEC: YELLOW — SIGNIFICANT CHANGE UP
CREAT SERPL-MCNC: 1.24 MG/DL — SIGNIFICANT CHANGE UP (ref 0.5–1.3)
DIFF PNL FLD: NEGATIVE — SIGNIFICANT CHANGE UP
EOSINOPHIL # BLD AUTO: 0.11 K/UL — SIGNIFICANT CHANGE UP (ref 0–0.5)
EOSINOPHIL NFR BLD AUTO: 1.4 % — SIGNIFICANT CHANGE UP (ref 0–6)
GLUCOSE SERPL-MCNC: 100 MG/DL — HIGH (ref 70–99)
GLUCOSE UR QL: NEGATIVE — SIGNIFICANT CHANGE UP
HCT VFR BLD CALC: 41.1 % — SIGNIFICANT CHANGE UP (ref 34.5–45)
HGB BLD-MCNC: 12.8 G/DL — SIGNIFICANT CHANGE UP (ref 11.5–15.5)
IMM GRANULOCYTES NFR BLD AUTO: 0.3 % — SIGNIFICANT CHANGE UP (ref 0–1.5)
KETONES UR-MCNC: NEGATIVE — SIGNIFICANT CHANGE UP
LEUKOCYTE ESTERASE UR-ACNC: ABNORMAL
LIDOCAIN IGE QN: 30 U/L — SIGNIFICANT CHANGE UP (ref 7–60)
LYMPHOCYTES # BLD AUTO: 1.58 K/UL — SIGNIFICANT CHANGE UP (ref 1–3.3)
LYMPHOCYTES # BLD AUTO: 20.4 % — SIGNIFICANT CHANGE UP (ref 13–44)
MCHC RBC-ENTMCNC: 29.8 PG — SIGNIFICANT CHANGE UP (ref 27–34)
MCHC RBC-ENTMCNC: 31.1 GM/DL — LOW (ref 32–36)
MCV RBC AUTO: 95.6 FL — SIGNIFICANT CHANGE UP (ref 80–100)
MONOCYTES # BLD AUTO: 0.72 K/UL — SIGNIFICANT CHANGE UP (ref 0–0.9)
MONOCYTES NFR BLD AUTO: 9.3 % — SIGNIFICANT CHANGE UP (ref 2–14)
NEUTROPHILS # BLD AUTO: 5.28 K/UL — SIGNIFICANT CHANGE UP (ref 1.8–7.4)
NEUTROPHILS NFR BLD AUTO: 68.3 % — SIGNIFICANT CHANGE UP (ref 43–77)
NITRITE UR-MCNC: NEGATIVE — SIGNIFICANT CHANGE UP
NRBC # BLD: 0 /100 WBCS — SIGNIFICANT CHANGE UP (ref 0–0)
PH UR: 6 — SIGNIFICANT CHANGE UP (ref 5–8)
PLATELET # BLD AUTO: 251 K/UL — SIGNIFICANT CHANGE UP (ref 150–400)
POTASSIUM SERPL-MCNC: 4.6 MMOL/L — SIGNIFICANT CHANGE UP (ref 3.5–5.3)
POTASSIUM SERPL-SCNC: 4.6 MMOL/L — SIGNIFICANT CHANGE UP (ref 3.5–5.3)
PROT SERPL-MCNC: 7.9 G/DL — SIGNIFICANT CHANGE UP (ref 6–8.3)
PROT UR-MCNC: NEGATIVE MG/DL — SIGNIFICANT CHANGE UP
RBC # BLD: 4.3 M/UL — SIGNIFICANT CHANGE UP (ref 3.8–5.2)
RBC # FLD: 14.7 % — HIGH (ref 10.3–14.5)
SODIUM SERPL-SCNC: 141 MMOL/L — SIGNIFICANT CHANGE UP (ref 135–145)
SP GR SPEC: 1.01 — SIGNIFICANT CHANGE UP (ref 1–1.03)
UROBILINOGEN FLD QL: 0.2 E.U./DL — SIGNIFICANT CHANGE UP
WBC # BLD: 7.73 K/UL — SIGNIFICANT CHANGE UP (ref 3.8–10.5)
WBC # FLD AUTO: 7.73 K/UL — SIGNIFICANT CHANGE UP (ref 3.8–10.5)

## 2019-05-08 PROCEDURE — 74177 CT ABD & PELVIS W/CONTRAST: CPT

## 2019-05-08 PROCEDURE — 99284 EMERGENCY DEPT VISIT MOD MDM: CPT | Mod: 25

## 2019-05-08 PROCEDURE — 80053 COMPREHEN METABOLIC PANEL: CPT

## 2019-05-08 PROCEDURE — 99284 EMERGENCY DEPT VISIT MOD MDM: CPT

## 2019-05-08 PROCEDURE — 74177 CT ABD & PELVIS W/CONTRAST: CPT | Mod: 26

## 2019-05-08 PROCEDURE — 36415 COLL VENOUS BLD VENIPUNCTURE: CPT

## 2019-05-08 PROCEDURE — 85025 COMPLETE CBC W/AUTO DIFF WBC: CPT

## 2019-05-08 PROCEDURE — 81001 URINALYSIS AUTO W/SCOPE: CPT

## 2019-05-08 PROCEDURE — 83690 ASSAY OF LIPASE: CPT

## 2019-05-08 PROCEDURE — 87086 URINE CULTURE/COLONY COUNT: CPT

## 2019-05-08 PROCEDURE — 96360 HYDRATION IV INFUSION INIT: CPT | Mod: XU

## 2019-05-08 RX ORDER — IOHEXOL 300 MG/ML
30 INJECTION, SOLUTION INTRAVENOUS ONCE
Qty: 0 | Refills: 0 | Status: COMPLETED | OUTPATIENT
Start: 2019-05-08 | End: 2019-05-08

## 2019-05-08 RX ORDER — SODIUM CHLORIDE 9 MG/ML
1000 INJECTION INTRAMUSCULAR; INTRAVENOUS; SUBCUTANEOUS ONCE
Qty: 0 | Refills: 0 | Status: COMPLETED | OUTPATIENT
Start: 2019-05-08 | End: 2019-05-08

## 2019-05-08 RX ORDER — AZITHROMYCIN 500 MG/1
500 TABLET, FILM COATED ORAL ONCE
Qty: 0 | Refills: 0 | Status: COMPLETED | OUTPATIENT
Start: 2019-05-08 | End: 2019-05-08

## 2019-05-08 RX ORDER — AZITHROMYCIN 500 MG/1
1 TABLET, FILM COATED ORAL
Qty: 3 | Refills: 0
Start: 2019-05-08 | End: 2019-05-10

## 2019-05-08 RX ADMIN — SODIUM CHLORIDE 1000 MILLILITER(S): 9 INJECTION INTRAMUSCULAR; INTRAVENOUS; SUBCUTANEOUS at 18:27

## 2019-05-08 RX ADMIN — AZITHROMYCIN 500 MILLIGRAM(S): 500 TABLET, FILM COATED ORAL at 21:35

## 2019-05-08 RX ADMIN — IOHEXOL 30 MILLILITER(S): 300 INJECTION, SOLUTION INTRAVENOUS at 18:27

## 2019-05-08 RX ADMIN — SODIUM CHLORIDE 1000 MILLILITER(S): 9 INJECTION INTRAMUSCULAR; INTRAVENOUS; SUBCUTANEOUS at 19:30

## 2019-05-08 NOTE — ED PROVIDER NOTE - NSFOLLOWUPINSTRUCTIONS_ED_ALL_ED_FT
Colitis    WHAT YOU NEED TO KNOW:    Colitis is swelling and irritation of your colon. Colitis may be caused by ulcers or a problem with your immune system. Bacteria, a virus, or a parasite may also cause colitis. The cause may not be known. You may have diarrhea, abdominal pain, fever, or blood or mucus in your bowel movement.    DISCHARGE INSTRUCTIONS:    Return to the emergency department if:     You have sudden trouble breathing.       Your bowel movements are black or have blood in them.      You have blood in your vomit.      You have severe abdominal pain or your abdomen is swollen and feels hard.      You have any of the following signs of dehydration:   Dizziness or weakness      Dry mouth, cracked lips, or severe thirst      Fast heartbeat or breathing      Urinating very little or not at all    Contact your healthcare provider if:     Your symptoms get worse or do not go away.      You have a fever, chills, cough, or feel weak and achy.      You suddenly lose weight without trying.       You have questions or concerns about your condition or care.     Medicines:     Medicines may be given to decrease inflammation in your colon and treat diarrhea.      Take your medicine as directed. Contact your healthcare provider if you think your medicine is not helping or if you have side effects. Tell him of her if you are allergic to any medicine. Keep a list of the medicines, vitamins, and herbs you take. Include the amounts, and when and why you take them. Bring the list or the pill bottles to follow-up visits. Carry your medicine list with you in case of an emergency.    Manage your symptoms:     Drink liquids as directed to help prevent dehydration. Good liquids to drink include water, juice, and broth. Ask how much liquid to drink each day. You may need to drink an oral rehydration solution (ORS). An ORS contains a balance of water, salt, and sugar to replace body fluids lost during diarrhea.       Eat a variety of healthy foods. Healthy foods include fruits, vegetables, whole-grain breads, beans, low-fat dairy products, lean meats, and fish. You may need to eat several small meals throughout the day instead of large meals. Avoid spicy foods, caffeine, chocolate, and foods high in fat.      Talk to your healthcare provider before you take NSAIDs. NSAIDs can cause worsen your symptoms if ulcers are causing your colitis.       Start to exercise when you feel better. Regular exercise helps your bowels work normally. Ask about the best exercise plan for you.    Follow up with your healthcare provider as directed: You may need to return for a colonoscopy or other tests. Write down how often you have a bowel movements and what they look like. Bring this to your follow-up visits. Write down your questions so you remember to ask them during your visits.       © Copyright [employer] 2019 All illustrations and images included in CareNotes are the copyrighted property of [employer]. or [employer].      back to top                      © Copyright [employer] 2019

## 2019-05-08 NOTE — ED ADULT NURSE NOTE - CHPI ED NUR DURATION
Dr Estrella to bedside and to assess patient.  Patient continues to maintain airway and oxygenation.  HOB.  Sleeps between cares.  OK to transfer to 6B.  Right radial art line removed, pressure held for 10 minutes, hemostasis achieved, tegaderm dressing applied.  Report has been called to 6B and notified of meds due and need to restart blood thinners due to stents.  Will transfer on monitor.    3/day(s)

## 2019-05-08 NOTE — ED PROVIDER NOTE - CARE PROVIDER_API CALL
Florida Sinclair)  Critical Care Medicine; Pulmonary Disease  [address] [place] [address]  Phone: [phone]  Fax: [phone]  Follow Up Time: 1-3 Days

## 2019-05-08 NOTE — ED ADULT NURSE NOTE - NSIMPLEMENTINTERV_GEN_ALL_ED
Implemented All Fall Risk Interventions:  Sheldon to call system. Call bell, personal items and telephone within reach. Instruct patient to call for assistance. Room bathroom lighting operational. Non-slip footwear when patient is off stretcher. Physically safe environment: no spills, clutter or unnecessary equipment. Stretcher in lowest position, wheels locked, appropriate side rails in place. Provide visual cue, wrist band, yellow gown, etc. Monitor gait and stability. Monitor for mental status changes and reorient to person, place, and time. Review medications for side effects contributing to fall risk. Reinforce activity limits and safety measures with patient and family.

## 2019-05-08 NOTE — ED ADULT NURSE REASSESSMENT NOTE - NS ED NURSE REASSESS COMMENT FT1
nad, family remains at bedside, awaiting final CT results, pt. and family aware, with understanding verbalized

## 2019-05-08 NOTE — ED ADULT NURSE NOTE - CHPI ED NUR SYMPTOMS NEG
no blood in stool/no burning urination/no vomiting/no abdominal distension/no dysuria/no diarrhea/no hematuria

## 2019-05-08 NOTE — ED PROVIDER NOTE - OBJECTIVE STATEMENT
fever to 103 since yesterday and left sided abd pain, diarrhea  no vomiting  had something similar a few months ago, ct abd reviewed, no acute pathology found and pt was dc  pmd DR Sinclair

## 2019-05-08 NOTE — ED PROVIDER NOTE - CLINICAL SUMMARY MEDICAL DECISION MAKING FREE TEXT BOX
82 yo female with fever, diarrhea and left sided abd pain since yesterday.  vitals normal here.  will check labs, ua, ct abd call Dr Sinclair 84 yo female with fever, diarrhea and left sided abd pain since yesterday.  vitals normal here.  will check labs, ua, ct abd call Dr Sinclair.  Dr Sinclair saw pt in dept.  if ct scan and labs normal can dc and he will fu.  ct shows mild colitis.  pt marilee po, no pain here, well appearing.  will dc with abx

## 2019-05-08 NOTE — ED ADULT TRIAGE NOTE - CHIEF COMPLAINT QUOTE
pt c/o LLQ pain, diarrhea and fever for 3 days. per daughter, fever was 103 at home last night. pt last took tylenol today at 1pm.

## 2019-05-08 NOTE — ED ADULT NURSE NOTE - OBJECTIVE STATEMENT
Pt presents with LLQ abd pain, fever and nausea x 3 days. Per daughter, fever was 103 last night. Pt afebrile in triage- took tylenol today at 1pm. Pt denies any CP, SOB, vomiting/diarrhea, cough or urinary symptoms.

## 2019-05-10 LAB
CULTURE RESULTS: SIGNIFICANT CHANGE UP
SPECIMEN SOURCE: SIGNIFICANT CHANGE UP

## 2019-05-16 ENCOUNTER — APPOINTMENT (OUTPATIENT)
Dept: INTERNAL MEDICINE | Facility: CLINIC | Age: 84
End: 2019-05-16
Payer: MEDICARE

## 2019-05-16 VITALS
DIASTOLIC BLOOD PRESSURE: 78 MMHG | TEMPERATURE: 98.6 F | HEART RATE: 82 BPM | OXYGEN SATURATION: 97 % | HEIGHT: 68.4 IN | SYSTOLIC BLOOD PRESSURE: 151 MMHG | WEIGHT: 170 LBS | BODY MASS INDEX: 25.47 KG/M2

## 2019-05-16 DIAGNOSIS — Z95.0 PRESENCE OF CARDIAC PACEMAKER: ICD-10-CM

## 2019-05-16 PROCEDURE — G0439: CPT

## 2019-05-16 NOTE — ASSESSMENT
[FreeTextEntry1] : Appears to have stable examination; will have patient see Dr. Rivera regarding knees; Not a good candidate for knee replacement with multiple co morbidities \par

## 2019-05-16 NOTE — HISTORY OF PRESENT ILLNESS
[FreeTextEntry1] : All notes reviewed; Severe pain in knees;  No replacement;  [de-identified] : In addition at visit with home attendant. Has the attendant 5 days per week. Recent ED visit stool positive for C Diff. Improved now;  Atorvastatin stopped;

## 2019-05-16 NOTE — HEALTH RISK ASSESSMENT
[No falls in past year] : Patient reported no falls in the past year [0] : 1) Little interest or pleasure doing things: Not at all (0) [Patient declined mammogram] : Patient declined mammogram [Patient declined PAP Smear] : Patient declined PAP Smear [Patient declined bone density test] : Patient declined bone density test [Patient declined colonoscopy] : Patient declined colonoscopy [] : No [[id number]] : 0

## 2019-05-16 NOTE — PHYSICAL EXAM
[No Acute Distress] : no acute distress [Well Nourished] : well nourished [Well Developed] : well developed [Well-Appearing] : well-appearing [Normal Sclera/Conjunctiva] : normal sclera/conjunctiva [PERRL] : pupils equal round and reactive to light [EOMI] : extraocular movements intact [Normal Outer Ear/Nose] : the outer ears and nose were normal in appearance [No JVD] : no jugular venous distention [Normal Oropharynx] : the oropharynx was normal [Supple] : supple [Thyroid Normal, No Nodules] : the thyroid was normal and there were no nodules present [No Lymphadenopathy] : no lymphadenopathy [No Respiratory Distress] : no respiratory distress  [No Accessory Muscle Use] : no accessory muscle use [Clear to Auscultation] : lungs were clear to auscultation bilaterally [Normal S1, S2] : normal S1 and S2 [Normal Rate] : normal rate  [Regular Rhythm] : with a regular rhythm [No Murmur] : no murmur heard [No Abdominal Bruit] : a ~M bruit was not heard ~T in the abdomen [No Varicosities] : no varicosities [No Carotid Bruits] : no carotid bruits [No Edema] : there was no peripheral edema [Pedal Pulses Present] : the pedal pulses are present [No Extremity Clubbing/Cyanosis] : no extremity clubbing/cyanosis [No Palpable Aorta] : no palpable aorta [Non Tender] : non-tender [Soft] : abdomen soft [No Masses] : no abdominal mass palpated [No HSM] : no HSM [Non-distended] : non-distended [Normal Posterior Cervical Nodes] : no posterior cervical lymphadenopathy [Normal Bowel Sounds] : normal bowel sounds [Normal Anterior Cervical Nodes] : no anterior cervical lymphadenopathy [No CVA Tenderness] : no CVA  tenderness [No Spinal Tenderness] : no spinal tenderness [No Joint Swelling] : no joint swelling [Grossly Normal Strength/Tone] : grossly normal strength/tone [No Focal Deficits] : no focal deficits [Coordination Grossly Intact] : coordination grossly intact [No Rash] : no rash [Normal Gait] : normal gait [Deep Tendon Reflexes (DTR)] : deep tendon reflexes were 2+ and symmetric [Normal Affect] : the affect was normal [Normal Insight/Judgement] : insight and judgment were intact

## 2019-05-24 ENCOUNTER — APPOINTMENT (OUTPATIENT)
Dept: RHEUMATOLOGY | Facility: CLINIC | Age: 84
End: 2019-05-24
Payer: MEDICARE

## 2019-05-24 VITALS
OXYGEN SATURATION: 99 % | HEART RATE: 84 BPM | WEIGHT: 170 LBS | TEMPERATURE: 98.81 F | HEIGHT: 68.4 IN | BODY MASS INDEX: 25.47 KG/M2

## 2019-05-24 PROCEDURE — 99205 OFFICE O/P NEW HI 60 MIN: CPT | Mod: 25

## 2019-05-24 PROCEDURE — 20610 DRAIN/INJ JOINT/BURSA W/O US: CPT

## 2019-05-29 RX ORDER — LIDOCAINE HYDROCHLORIDE 10 MG/ML
1 INJECTION, SOLUTION INFILTRATION; PERINEURAL
Qty: 0 | Refills: 0 | Status: COMPLETED | OUTPATIENT
Start: 2019-05-29

## 2019-05-29 RX ADMIN — METHYLPREDNISOLONE ACETATE MG/ML: 40 INJECTION, SUSPENSION INTRA-ARTICULAR; INTRALESIONAL; INTRAMUSCULAR; SOFT TISSUE at 00:00

## 2019-05-29 RX ADMIN — LIDOCAINE HYDROCHLORIDE %: 10 INJECTION, SOLUTION INFILTRATION; PERINEURAL at 00:00

## 2019-05-29 NOTE — ASSESSMENT
[FreeTextEntry1] : 83 year old female with L knee pain presents for evaluation. \par L knee OA on exam today. Attempted to drain this without any return of fluid, mostly mild soft tissue swelling and no palpable effusion. \par See procedure note. \par Voltaren gel and PT.

## 2019-05-29 NOTE — PROCEDURE
[Soft Tissue Injection] : soft tissue injection was performed [Today's Date:] : Date: [unfilled] [Arthrocentesis] : arthrocentesis was performed [Risks] : risks [Patient] : the patient [Benefits] : benefits [Therapeutic] : therapeutic [#1 Site: ______] : #1 site identified in the [unfilled] [Alternatives] : alternatives [Without Epi] : without epinephrine [___ ml Inj] : [unfilled] ~Uml [Alcohol] : alcohol [22 gauge 1.5 inch] : A 22 gauge 1.5 inch needle was used [Chlorhexidine] : chlorhexidine [Tolerated Well] : the patient tolerated the procedure well [Depomedrol ___ mg] : Depomedrol [unfilled] mg [Reports Improvement in Symptoms] : reports improvement in symptoms [No Complications] : there were no complications

## 2019-05-29 NOTE — HISTORY OF PRESENT ILLNESS
[FreeTextEntry1] : 83 year old female with L knee pain presents for evaluation. 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 XRs reviewed. \par Sometimes swells but rarely. \par Takes Tylonel and Ibuprofen for pain which usually helps \par Walks with a cane \par \par  [Anorexia] : no anorexia [Weight Loss] : no weight loss [Fever] : no fever [Malaise] : no malaise [Fatigue] : no fatigue [Chills] : no chills [Depression] : no depression [Malar Facial Rash] : no malar facial rash [Skin Lesions] : no lesions [Skin Nodules] : no skin nodules [Oral Ulcers] : no oral ulcers [Shortness of Breath] : no shortness of breath [Dry Mouth] : no dry mouth [Chest Pain] : no chest pain [Falls] : no falls [Difficulty Standing] : no difficulty standing [Myalgias] : no myalgias [Difficulty Walking] : no difficulty walking [Muscle Weakness] : no muscle weakness [Muscle Cramping] : no muscle cramping [Muscle Spasms] : no muscle spasms [Eye Redness] : no eye redness [Visual Changes] : no visual changes [Eye Pain] : no eye pain [Dry Eyes] : no dry eyes

## 2019-05-29 NOTE — PHYSICAL EXAM
[General Appearance - Alert] : alert [General Appearance - In No Acute Distress] : in no acute distress [General Appearance - Well Developed] : well developed [General Appearance - Well Nourished] : well nourished [General Appearance - Well-Appearing] : healthy appearing [Sclera] : the sclera and conjunctiva were normal [Outer Ear] : the ears and nose were normal in appearance [Examination Of The Oral Cavity] : the lips and gums were normal [Nasal Cavity] : the nasal mucosa and septum were normal [Respiration, Rhythm And Depth] : normal respiratory rhythm and effort [Auscultation Breath Sounds / Voice Sounds] : lungs were clear to auscultation bilaterally [Heart Sounds] : normal S1 and S2 [Heart Rate And Rhythm] : heart rate was normal and rhythm regular [Edema] : there was no peripheral edema [Abdomen Soft] : soft [Abdomen Tenderness] : non-tender [Bowel Sounds] : normal bowel sounds [Abnormal Walk] : normal gait [No Spinal Tenderness] : no spinal tenderness [Musculoskeletal - Swelling] : no joint swelling seen [Motor Tone] : muscle strength and tone were normal [] : no rash [Oriented To Time, Place, And Person] : oriented to person, place, and time [FreeTextEntry1] : bilateral knee crepitus L>R. L knee with limitation in flexion > extension.

## 2019-05-30 RX ORDER — METHYLPRED ACET/NACL,ISO-OS/PF 80 MG/ML
80 VIAL (ML) INJECTION
Qty: 1 | Refills: 0 | Status: COMPLETED | OUTPATIENT
Start: 2019-05-29

## 2019-06-10 ENCOUNTER — RX RENEWAL (OUTPATIENT)
Age: 84
End: 2019-06-10

## 2019-06-17 ENCOUNTER — RX RENEWAL (OUTPATIENT)
Age: 84
End: 2019-06-17

## 2019-07-18 ENCOUNTER — APPOINTMENT (OUTPATIENT)
Dept: INTERNAL MEDICINE | Facility: CLINIC | Age: 84
End: 2019-07-18

## 2019-07-22 ENCOUNTER — APPOINTMENT (OUTPATIENT)
Dept: NEPHROLOGY | Facility: CLINIC | Age: 84
End: 2019-07-22

## 2019-07-31 ENCOUNTER — APPOINTMENT (OUTPATIENT)
Dept: INTERNAL MEDICINE | Facility: CLINIC | Age: 84
End: 2019-07-31
Payer: MEDICARE

## 2019-07-31 VITALS
HEIGHT: 68.4 IN | BODY MASS INDEX: 23.82 KG/M2 | SYSTOLIC BLOOD PRESSURE: 147 MMHG | WEIGHT: 159 LBS | DIASTOLIC BLOOD PRESSURE: 80 MMHG | HEART RATE: 72 BPM | TEMPERATURE: 98.7 F | OXYGEN SATURATION: 98 %

## 2019-07-31 PROCEDURE — 99213 OFFICE O/P EST LOW 20 MIN: CPT | Mod: 25

## 2019-07-31 PROCEDURE — 36415 COLL VENOUS BLD VENIPUNCTURE: CPT

## 2019-07-31 NOTE — HEALTH RISK ASSESSMENT
[No] : No [No falls in past year] : Patient reported no falls in the past year [0] : 2) Feeling down, depressed, or hopeless: Not at all (0) [] : No [[id number]] : 0

## 2019-07-31 NOTE — HISTORY OF PRESENT ILLNESS
[FreeTextEntry1] : Upset because of death in family; Not eating well;  Knee improved; Getting physical therapy [de-identified] : Secondary to above not eating; Had diarrhea;  Taking same medication;  At visit with home attendant; Translating also for her:\par Getting physical therapy;

## 2019-07-31 NOTE — ASSESSMENT
[FreeTextEntry1] : Appears to have a stable examination; Will repeat labs;  Will need repeat CT Scan of Chest as outlined by Dr. Sinclair\par RTC to 2 months

## 2019-07-31 NOTE — PHYSICAL EXAM
[No Acute Distress] : no acute distress [Well Nourished] : well nourished [Well Developed] : well developed [Well-Appearing] : well-appearing [Normal Sclera/Conjunctiva] : normal sclera/conjunctiva [PERRL] : pupils equal round and reactive to light [EOMI] : extraocular movements intact [Normal Outer Ear/Nose] : the outer ears and nose were normal in appearance [Normal Oropharynx] : the oropharynx was normal [No JVD] : no jugular venous distention [Supple] : supple [No Lymphadenopathy] : no lymphadenopathy [Thyroid Normal, No Nodules] : the thyroid was normal and there were no nodules present [No Respiratory Distress] : no respiratory distress  [No Accessory Muscle Use] : no accessory muscle use [Clear to Auscultation] : lungs were clear to auscultation bilaterally [Normal Rate] : normal rate  [Regular Rhythm] : with a regular rhythm [Normal S1, S2] : normal S1 and S2 [No Murmur] : no murmur heard [No Carotid Bruits] : no carotid bruits [No Abdominal Bruit] : a ~M bruit was not heard ~T in the abdomen [No Varicosities] : no varicosities [Pedal Pulses Present] : the pedal pulses are present [No Edema] : there was no peripheral edema [No Palpable Aorta] : no palpable aorta [No Extremity Clubbing/Cyanosis] : no extremity clubbing/cyanosis [Soft] : abdomen soft [Non Tender] : non-tender [Non-distended] : non-distended [No Masses] : no abdominal mass palpated [No HSM] : no HSM [Normal Posterior Cervical Nodes] : no posterior cervical lymphadenopathy [Normal Bowel Sounds] : normal bowel sounds [Normal Anterior Cervical Nodes] : no anterior cervical lymphadenopathy [No CVA Tenderness] : no CVA  tenderness [No Spinal Tenderness] : no spinal tenderness [No Joint Swelling] : no joint swelling [Grossly Normal Strength/Tone] : grossly normal strength/tone [No Rash] : no rash [Coordination Grossly Intact] : coordination grossly intact [Normal Gait] : normal gait [No Focal Deficits] : no focal deficits [Deep Tendon Reflexes (DTR)] : deep tendon reflexes were 2+ and symmetric [Normal Affect] : the affect was normal [Normal Insight/Judgement] : insight and judgment were intact [de-identified] : Has alopecia for years

## 2019-08-01 LAB
ALBUMIN SERPL ELPH-MCNC: 4.3 G/DL
ALP BLD-CCNC: 128 U/L
ALT SERPL-CCNC: 14 U/L
ANION GAP SERPL CALC-SCNC: 13 MMOL/L
AST SERPL-CCNC: 25 U/L
BASOPHILS # BLD AUTO: 0.02 K/UL
BASOPHILS NFR BLD AUTO: 0.4 %
BILIRUB SERPL-MCNC: 0.5 MG/DL
BUN SERPL-MCNC: 24 MG/DL
CALCIUM SERPL-MCNC: 10.5 MG/DL
CHLORIDE SERPL-SCNC: 105 MMOL/L
CHOLEST SERPL-MCNC: 168 MG/DL
CHOLEST/HDLC SERPL: 2.1 RATIO
CO2 SERPL-SCNC: 26 MMOL/L
CREAT SERPL-MCNC: 1.1 MG/DL
EOSINOPHIL # BLD AUTO: 0.07 K/UL
EOSINOPHIL NFR BLD AUTO: 1.2 %
GLUCOSE SERPL-MCNC: 98 MG/DL
HCT VFR BLD CALC: 42 %
HDLC SERPL-MCNC: 81 MG/DL
HGB BLD-MCNC: 12.8 G/DL
IMM GRANULOCYTES NFR BLD AUTO: 0.2 %
LDLC SERPL CALC-MCNC: 77 MG/DL
LYMPHOCYTES # BLD AUTO: 1.49 K/UL
LYMPHOCYTES NFR BLD AUTO: 26.5 %
MAN DIFF?: NORMAL
MCHC RBC-ENTMCNC: 29.8 PG
MCHC RBC-ENTMCNC: 30.5 GM/DL
MCV RBC AUTO: 97.9 FL
MONOCYTES # BLD AUTO: 0.43 K/UL
MONOCYTES NFR BLD AUTO: 7.6 %
NEUTROPHILS # BLD AUTO: 3.61 K/UL
NEUTROPHILS NFR BLD AUTO: 64.1 %
PLATELET # BLD AUTO: 231 K/UL
POTASSIUM SERPL-SCNC: 4.8 MMOL/L
PROT SERPL-MCNC: 7.4 G/DL
RBC # BLD: 4.29 M/UL
RBC # FLD: 15 %
SODIUM SERPL-SCNC: 143 MMOL/L
TRIGL SERPL-MCNC: 51 MG/DL
WBC # FLD AUTO: 5.63 K/UL

## 2019-08-02 LAB
ESTIMATED AVERAGE GLUCOSE: 134 MG/DL
HBA1C MFR BLD HPLC: 6.3 %

## 2019-08-15 NOTE — ED PROVIDER NOTE - ENMT, MLM
156.9 Airway patent, Nasal mucosa clear. Mouth with normal mucosa. Throat has no vesicles, no oropharyngeal exudates and uvula is midline.

## 2019-08-26 ENCOUNTER — APPOINTMENT (OUTPATIENT)
Dept: RHEUMATOLOGY | Facility: CLINIC | Age: 84
End: 2019-08-26
Payer: MEDICARE

## 2019-08-26 VITALS
OXYGEN SATURATION: 98 % | HEIGHT: 68.4 IN | HEART RATE: 72 BPM | DIASTOLIC BLOOD PRESSURE: 76 MMHG | TEMPERATURE: 98.7 F | WEIGHT: 164 LBS | SYSTOLIC BLOOD PRESSURE: 135 MMHG | BODY MASS INDEX: 24.57 KG/M2

## 2019-08-26 PROCEDURE — 99214 OFFICE O/P EST MOD 30 MIN: CPT

## 2019-08-27 ENCOUNTER — APPOINTMENT (OUTPATIENT)
Dept: PULMONOLOGY | Facility: CLINIC | Age: 84
End: 2019-08-27
Payer: MEDICARE

## 2019-08-27 VITALS
WEIGHT: 165 LBS | DIASTOLIC BLOOD PRESSURE: 70 MMHG | OXYGEN SATURATION: 98 % | TEMPERATURE: 99.3 F | HEART RATE: 69 BPM | BODY MASS INDEX: 25.01 KG/M2 | RESPIRATION RATE: 12 BRPM | HEIGHT: 68 IN | SYSTOLIC BLOOD PRESSURE: 120 MMHG

## 2019-08-27 PROCEDURE — 99214 OFFICE O/P EST MOD 30 MIN: CPT

## 2019-08-27 NOTE — HISTORY OF PRESENT ILLNESS
[Stable] : are stable [Difficulty Breathing During Exertion] : improved dyspnea on exertion [Feelings Of Weakness On Exertion] : improved exercise intolerance [Cough] : improved coughing [Wheezing] : denies wheezing [Regional Soft Tissue Swelling Both Lower Extremities] : denies lower extremity edema [Chest Pain Or Discomfort] : denies chest pain [Fever] : denies fever [2  -  Slight] : 2, slight [Class II - Mild Symptoms and Slight Limitations] : II [___ Times a Week] : [unfilled] time(s) a week [None] : None [Adherent] : the patient is adherent with ~his/her~ medication regimen [Wt Gain ___ Lbs] : no recent weight gain [Wt Loss ___ Lbs] : no recent weight loss [Oxygen] : the patient uses no supplemental oxygen [Side Effects] : ~He/She~ denies medication side effects [FreeTextEntry1] : 84 yr old female with PMH of lung CA, bronchiectasis, sarcoidosis and asthma \par \par [id number] \par Pt presents today without any changed in her breathing and doing well.  She has complaints of SOB after 4 block which is the same since last visit.  She has occasional coughing with yellow sputum, which does not prevent her from sleeping.   With occasional wheezing. Her appetite and not loosing any weight.  She denies coughing up any blood. \par

## 2019-08-27 NOTE — ASSESSMENT
[FreeTextEntry1] : 83 year old female with L knee pain presents for evaluation. \par Doing well, improvement of L knee pain and ROM. Gait improved also with PT. \par Continue PT and PRN Voltaren gel. \par

## 2019-08-27 NOTE — PHYSICAL EXAM
[Normal Appearance] : normal appearance [General Appearance - Well Developed] : well developed [Well Groomed] : well groomed [General Appearance - Well Nourished] : well nourished [No Deformities] : no deformities [General Appearance - In No Acute Distress] : no acute distress [Normal Conjunctiva] : the conjunctiva exhibited no abnormalities [Normal Oropharynx] : normal oropharynx [Eyelids - No Xanthelasma] : the eyelids demonstrated no xanthelasmas [Neck Appearance] : the appearance of the neck was normal [Neck Cervical Mass (___cm)] : no neck mass was observed [Jugular Venous Distention Increased] : there was no jugular-venous distention [Thyroid Diffuse Enlargement] : the thyroid was not enlarged [Thyroid Nodule] : there were no palpable thyroid nodules [Heart Rate And Rhythm] : heart rate and rhythm were normal [Heart Sounds] : normal S1 and S2 [Murmurs] : no murmurs present [Respiration, Rhythm And Depth] : normal respiratory rhythm and effort [Exaggerated Use Of Accessory Muscles For Inspiration] : no accessory muscle use [Auscultation Breath Sounds / Voice Sounds] : lungs were clear to auscultation bilaterally [Gait - Sufficient For Exercise Testing] : the gait was sufficient for exercise testing [Abnormal Walk] : normal gait [Nail Clubbing] : no clubbing of the fingernails [Cyanosis, Localized] : no localized cyanosis [Petechial Hemorrhages (___cm)] : no petechial hemorrhages [] : no ischemic changes [Deep Tendon Reflexes (DTR)] : deep tendon reflexes were 2+ and symmetric [Sensation] : the sensory exam was normal to light touch and pinprick [No Focal Deficits] : no focal deficits

## 2019-08-27 NOTE — ASSESSMENT
[FreeTextEntry1] : Bronchial asthma\par \par The asthma is controlled. She did not required any rescue dose short acting beta agonists nor steroids   She is to continue on the Advair. Baseline oxygen saturation was normal.\par \par - Pt is currently taking Advair once a day with Singulair.  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 No clinical evidence of metastatic disease.  I will follow on repeat CT scan to follow on the new 5 mm RML nodule.  Ordered repeat CT scan and script given to pt health aid.\par \par Bronchiectasis\par \par Stable.  Exam unremarkable and without any complaints of worsening condition.

## 2019-08-27 NOTE — PHYSICAL EXAM
[General Appearance - Alert] : alert [General Appearance - In No Acute Distress] : in no acute distress [General Appearance - Well Nourished] : well nourished [General Appearance - Well Developed] : well developed [General Appearance - Well-Appearing] : healthy appearing [Sclera] : the sclera and conjunctiva were normal [Outer Ear] : the ears and nose were normal in appearance [Examination Of The Oral Cavity] : the lips and gums were normal [Nasal Cavity] : the nasal mucosa and septum were normal [Respiration, Rhythm And Depth] : normal respiratory rhythm and effort [Auscultation Breath Sounds / Voice Sounds] : lungs were clear to auscultation bilaterally [Heart Rate And Rhythm] : heart rate was normal and rhythm regular [Heart Sounds] : normal S1 and S2 [Edema] : there was no peripheral edema [Bowel Sounds] : normal bowel sounds [Abdomen Soft] : soft [Abdomen Tenderness] : non-tender [No Spinal Tenderness] : no spinal tenderness [Abnormal Walk] : normal gait [Musculoskeletal - Swelling] : no joint swelling seen [Motor Tone] : muscle strength and tone were normal [] : no rash [Oriented To Time, Place, And Person] : oriented to person, place, and time [FreeTextEntry1] : bilateral knee crepitus L>R. improved ROM of L knee.

## 2019-08-27 NOTE — DISCUSSION/SUMMARY
[FreeTextEntry1] : EXAM: CT ANGIO CHEST (W)AW IC \par \par PROCEDURE DATE: 09/08/2018 \par \par \par \par INTERPRETATION: CLINICAL INDICATION: 83-year-old with chest pain for 3 days \par radiating to the left arm; history of hypertension. \par \par \par \par FINDINGS: CT angiography of the chest was performed with the administration \par of intravenous contrast.. Multiplanar and maximum intensity projection 3D \par reconstructions were performed in the sagittal and coronal planes. \par Comparison is made to prior studies dated 8/1/2018, 7/11/2011 and 11/2/2010. \par \par Evaluation of the chest demonstrates that the visualized thyroid gland is \par normal in appearance. There is no pleural or pericardial effusion. 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 \par Evaluation of the lung parenchyma demonstrates interval development of \par moderate focal bronchiolitis and patchy groundglass opacity within the right \par upper lobe, right middle lobe and right lower lobe with unchanged \par consolidation/atelectasis within the right lower lobe with intervening \par bronchiectasis, stable since 7/2011. There is parenchymal bandlike within \par the left lower lobe and lingula. There is no ramon central endobronchial \par lesion. Evaluation of the upper abdomen is unremarkable. Evaluation of the \par heart and vasculature demonstrates that heart is moderately enlarged with \par enlargement of the left ventricle and left atrium. Negative for intraluminal \par thrombus within the left atrial appendage. Negative for mitral annular \par calcification. Mild left ventricular hypertrophy. Mild aortic valvular \par calcification, which can correlate with degree of aortic stenosis. Negative \par for stenosis of the proximal aspects of the great vessels. The superior \par mesenteric artery, celiac artery and bilateral renal arteries are widely \par patent despite ostial calcification. There is enlargement of main pulmonary \par artery measuring 3.2 cm, consistent with a component of pulmonary arterial \par hypertension. There is no ramon central or segmental pulmonary embolism. The \par ascending thoracic aorta measures maximally 3.3 cm just distal to the \par sinuses of Valsalva, which is within the range of normal. Negative for \par thoracic aortic aneurysm, dissection or stenosis. Evaluation of the osseous \par structures demonstrates no destructive lesion. \par \par \par \par \par IMPRESSION: \par \par Negative for thoracic aortic aneurysm, dissection or stenosis. \par \par Interval development of early infectious/inflammatory pneumonitis and \par bronchiolitis in the right upper lobe, right middle lobe and right lower \par lobe with stable fibrosis and bronchiectasis within the right lower lobe. \par \par \par \par

## 2019-08-27 NOTE — HISTORY OF PRESENT ILLNESS
[FreeTextEntry1] : Initial Visit: \par Has known knee OA of L knee - tricompartmental. XRs reviewed. \par Sometimes swells but rarely. \par Takes Tylonel and Ibuprofen for pain which usually helps \par Walks with a cane \par Plan: L knee OA on exam today. Attempted to drain this without any return of fluid, mostly mild soft tissue swelling and no palpable effusion. \par See procedure note. \par Voltaren gel and PT. \par  [Anorexia] : no anorexia [Weight Loss] : no weight loss [Malaise] : no malaise [Fever] : no fever [Chills] : no chills [Fatigue] : no fatigue [Depression] : no depression [Malar Facial Rash] : no malar facial rash [Skin Lesions] : no lesions [Skin Nodules] : no skin nodules [Oral Ulcers] : no oral ulcers [Dry Mouth] : no dry mouth [Shortness of Breath] : no shortness of breath [Chest Pain] : no chest pain [Falls] : no falls [Difficulty Standing] : no difficulty standing [Difficulty Walking] : no difficulty walking [Myalgias] : no myalgias [Muscle Weakness] : no muscle weakness [Muscle Spasms] : no muscle spasms [Muscle Cramping] : no muscle cramping [Visual Changes] : no visual changes [Eye Pain] : no eye pain [Eye Redness] : no eye redness [Dry Eyes] : no dry eyes

## 2019-09-10 ENCOUNTER — FORM ENCOUNTER (OUTPATIENT)
Age: 84
End: 2019-09-10

## 2019-09-11 ENCOUNTER — OUTPATIENT (OUTPATIENT)
Dept: OUTPATIENT SERVICES | Facility: HOSPITAL | Age: 84
LOS: 1 days | End: 2019-09-11
Payer: MEDICARE

## 2019-09-11 ENCOUNTER — RX RENEWAL (OUTPATIENT)
Age: 84
End: 2019-09-11

## 2019-09-11 ENCOUNTER — APPOINTMENT (OUTPATIENT)
Dept: CT IMAGING | Facility: HOSPITAL | Age: 84
End: 2019-09-11
Payer: MEDICARE

## 2019-09-11 DIAGNOSIS — Z98.890 OTHER SPECIFIED POSTPROCEDURAL STATES: Chronic | ICD-10-CM

## 2019-09-11 DIAGNOSIS — Z87.442 PERSONAL HISTORY OF URINARY CALCULI: Chronic | ICD-10-CM

## 2019-09-11 DIAGNOSIS — Z95.0 PRESENCE OF CARDIAC PACEMAKER: Chronic | ICD-10-CM

## 2019-09-11 DIAGNOSIS — C34.90 MALIGNANT NEOPLASM OF UNSPECIFIED PART OF UNSPECIFIED BRONCHUS OR LUNG: Chronic | ICD-10-CM

## 2019-09-11 DIAGNOSIS — H26.9 UNSPECIFIED CATARACT: Chronic | ICD-10-CM

## 2019-09-11 PROCEDURE — 71250 CT THORAX DX C-: CPT | Mod: 26

## 2019-09-11 PROCEDURE — 71250 CT THORAX DX C-: CPT

## 2019-09-25 ENCOUNTER — APPOINTMENT (OUTPATIENT)
Dept: INTERNAL MEDICINE | Facility: CLINIC | Age: 84
End: 2019-09-25
Payer: MEDICARE

## 2019-09-25 ENCOUNTER — APPOINTMENT (OUTPATIENT)
Dept: PULMONOLOGY | Facility: CLINIC | Age: 84
End: 2019-09-25
Payer: MEDICARE

## 2019-09-25 VITALS
BODY MASS INDEX: 24.55 KG/M2 | DIASTOLIC BLOOD PRESSURE: 72 MMHG | TEMPERATURE: 98.8 F | HEIGHT: 68 IN | SYSTOLIC BLOOD PRESSURE: 128 MMHG | OXYGEN SATURATION: 100 % | WEIGHT: 162 LBS | HEART RATE: 73 BPM

## 2019-09-25 VITALS
BODY MASS INDEX: 25.01 KG/M2 | OXYGEN SATURATION: 97 % | WEIGHT: 165 LBS | SYSTOLIC BLOOD PRESSURE: 140 MMHG | TEMPERATURE: 98.2 F | DIASTOLIC BLOOD PRESSURE: 80 MMHG | HEIGHT: 68 IN | HEART RATE: 74 BPM

## 2019-09-25 PROCEDURE — G0008: CPT

## 2019-09-25 PROCEDURE — 36415 COLL VENOUS BLD VENIPUNCTURE: CPT

## 2019-09-25 PROCEDURE — 90662 IIV NO PRSV INCREASED AG IM: CPT

## 2019-09-25 PROCEDURE — 99213 OFFICE O/P EST LOW 20 MIN: CPT | Mod: 25

## 2019-09-25 PROCEDURE — 99214 OFFICE O/P EST MOD 30 MIN: CPT

## 2019-09-25 NOTE — ASSESSMENT
[FreeTextEntry1] : Bronchial asthma\par \par The asthma is controlled. She did not required any rescue dose short acting beta agonists nor steroids   She is to continue on the Advair. Baseline oxygen saturation was normal.\par \par - Pt is currently taking Advair once a day with Singulair.  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 No clinical evidence of metastatic disease. Reviewed CT scan \par interval resolution of 6 mm nodule in the RUL. However waking and waning tree-in-bud micronodule.  There is some progression in the RLL consolidation/atelectasis as well as traction bronchiectasis.  Differential dx includes aspiration PNA vs pulmonary JOSE.  Discussed our option of bronchoscopy vs treatment with AntibiOtic and repeat CT scan in 2 months.  After discussion consensus was to follow with treatment with AntibiOtic and repeat CT scan in 2 months.  Started on Levaquin. \par \par

## 2019-09-25 NOTE — HEALTH RISK ASSESSMENT
[No] : No [No falls in past year] : Patient reported no falls in the past year [0] : 2) Feeling down, depressed, or hopeless: Not at all (0) [] : No [[id number]] : 0

## 2019-09-25 NOTE — HISTORY OF PRESENT ILLNESS
[Stable] : are stable [Difficulty Breathing During Exertion] : improved dyspnea on exertion [Feelings Of Weakness On Exertion] : improved exercise intolerance [Cough] : improved coughing [Wheezing] : denies wheezing [Regional Soft Tissue Swelling Both Lower Extremities] : denies lower extremity edema [Chest Pain Or Discomfort] : denies chest pain [Fever] : denies fever [2  -  Slight] : 2, slight [Class II - Mild Symptoms and Slight Limitations] : II [___ Times a Week] : [unfilled] time(s) a week [None] : None [Adherent] : the patient is adherent with ~his/her~ medication regimen [Wt Gain ___ Lbs] : no recent weight gain [Oxygen] : the patient uses no supplemental oxygen [Wt Loss ___ Lbs] : no recent weight loss [Side Effects] : ~He/She~ denies medication side effects [FreeTextEntry1] : 84 yr old female with PMH of lung CA, bronchiectasis, sarcoidosis and asthma \par \par  [id number]\par \par Pt presenting today doing well.  She has a little bit of coughing with clear sputum.  Without any worsening SOB, wheezing, fever or hemoptysis.  Denies any aspiration or coughing with food. She has a recent CT scan and here for review.  \par \par

## 2019-09-25 NOTE — HISTORY OF PRESENT ILLNESS
[FreeTextEntry1] : Recently seen by [facility] and medicine given; Eliquis given and patient stopped it because she had side affect;  [de-identified] : In addition otherwise without chief complaint\par Need to get report for the cardiologist; Patient likely has atrial fib

## 2019-09-25 NOTE — PHYSICAL EXAM
[General Appearance - Well Developed] : well developed [Well Groomed] : well groomed [Normal Appearance] : normal appearance [General Appearance - Well Nourished] : well nourished [No Deformities] : no deformities [General Appearance - In No Acute Distress] : no acute distress [Normal Conjunctiva] : the conjunctiva exhibited no abnormalities [Eyelids - No Xanthelasma] : the eyelids demonstrated no xanthelasmas [Normal Oropharynx] : normal oropharynx [Neck Appearance] : the appearance of the neck was normal [Neck Cervical Mass (___cm)] : no neck mass was observed [Jugular Venous Distention Increased] : there was no jugular-venous distention [Thyroid Diffuse Enlargement] : the thyroid was not enlarged [Thyroid Nodule] : there were no palpable thyroid nodules [Heart Rate And Rhythm] : heart rate and rhythm were normal [Heart Sounds] : normal S1 and S2 [Murmurs] : no murmurs present [Respiration, Rhythm And Depth] : normal respiratory rhythm and effort [Exaggerated Use Of Accessory Muscles For Inspiration] : no accessory muscle use [Abnormal Walk] : normal gait [Nail Clubbing] : no clubbing of the fingernails [Gait - Sufficient For Exercise Testing] : the gait was sufficient for exercise testing [] : no ischemic changes [Petechial Hemorrhages (___cm)] : no petechial hemorrhages [Cyanosis, Localized] : no localized cyanosis [Sensation] : the sensory exam was normal to light touch and pinprick [Deep Tendon Reflexes (DTR)] : deep tendon reflexes were 2+ and symmetric [No Focal Deficits] : no focal deficits [FreeTextEntry1] : Bilateral crackles at the bases

## 2019-09-27 ENCOUNTER — RX RENEWAL (OUTPATIENT)
Age: 84
End: 2019-09-27

## 2019-10-08 ENCOUNTER — RX RENEWAL (OUTPATIENT)
Age: 84
End: 2019-10-08

## 2019-10-21 ENCOUNTER — APPOINTMENT (OUTPATIENT)
Dept: NEPHROLOGY | Facility: CLINIC | Age: 84
End: 2019-10-21

## 2019-10-22 ENCOUNTER — RX RENEWAL (OUTPATIENT)
Age: 84
End: 2019-10-22

## 2019-10-31 ENCOUNTER — EMERGENCY (EMERGENCY)
Facility: HOSPITAL | Age: 84
LOS: 1 days | Discharge: HOME | End: 2019-10-31
Attending: EMERGENCY MEDICINE | Admitting: EMERGENCY MEDICINE
Payer: MEDICARE

## 2019-10-31 VITALS
OXYGEN SATURATION: 97 % | HEART RATE: 84 BPM | DIASTOLIC BLOOD PRESSURE: 87 MMHG | RESPIRATION RATE: 16 BRPM | SYSTOLIC BLOOD PRESSURE: 167 MMHG | TEMPERATURE: 98 F

## 2019-10-31 VITALS
TEMPERATURE: 98 F | HEART RATE: 70 BPM | RESPIRATION RATE: 16 BRPM | WEIGHT: 162.04 LBS | SYSTOLIC BLOOD PRESSURE: 167 MMHG | OXYGEN SATURATION: 98 % | HEIGHT: 64 IN | DIASTOLIC BLOOD PRESSURE: 90 MMHG

## 2019-10-31 DIAGNOSIS — Z98.890 OTHER SPECIFIED POSTPROCEDURAL STATES: Chronic | ICD-10-CM

## 2019-10-31 DIAGNOSIS — Z79.899 OTHER LONG TERM (CURRENT) DRUG THERAPY: ICD-10-CM

## 2019-10-31 DIAGNOSIS — C34.90 MALIGNANT NEOPLASM OF UNSPECIFIED PART OF UNSPECIFIED BRONCHUS OR LUNG: Chronic | ICD-10-CM

## 2019-10-31 DIAGNOSIS — Z79.01 LONG TERM (CURRENT) USE OF ANTICOAGULANTS: ICD-10-CM

## 2019-10-31 DIAGNOSIS — Z95.0 PRESENCE OF CARDIAC PACEMAKER: Chronic | ICD-10-CM

## 2019-10-31 DIAGNOSIS — I25.10 ATHEROSCLEROTIC HEART DISEASE OF NATIVE CORONARY ARTERY WITHOUT ANGINA PECTORIS: ICD-10-CM

## 2019-10-31 DIAGNOSIS — R07.9 CHEST PAIN, UNSPECIFIED: ICD-10-CM

## 2019-10-31 DIAGNOSIS — I12.9 HYPERTENSIVE CHRONIC KIDNEY DISEASE WITH STAGE 1 THROUGH STAGE 4 CHRONIC KIDNEY DISEASE, OR UNSPECIFIED CHRONIC KIDNEY DISEASE: ICD-10-CM

## 2019-10-31 DIAGNOSIS — H26.9 UNSPECIFIED CATARACT: Chronic | ICD-10-CM

## 2019-10-31 DIAGNOSIS — J44.9 CHRONIC OBSTRUCTIVE PULMONARY DISEASE, UNSPECIFIED: ICD-10-CM

## 2019-10-31 DIAGNOSIS — R07.82 INTERCOSTAL PAIN: ICD-10-CM

## 2019-10-31 DIAGNOSIS — N18.9 CHRONIC KIDNEY DISEASE, UNSPECIFIED: ICD-10-CM

## 2019-10-31 DIAGNOSIS — Z87.442 PERSONAL HISTORY OF URINARY CALCULI: Chronic | ICD-10-CM

## 2019-10-31 LAB
ALBUMIN SERPL ELPH-MCNC: 4.3 G/DL — SIGNIFICANT CHANGE UP (ref 3.3–5)
ALP SERPL-CCNC: 146 U/L — HIGH (ref 40–120)
ALT FLD-CCNC: 25 U/L — SIGNIFICANT CHANGE UP (ref 10–45)
ANION GAP SERPL CALC-SCNC: 8 MMOL/L — SIGNIFICANT CHANGE UP (ref 5–17)
APTT BLD: 31.4 SEC — SIGNIFICANT CHANGE UP (ref 27.5–36.3)
AST SERPL-CCNC: 32 U/L — SIGNIFICANT CHANGE UP (ref 10–40)
BASOPHILS # BLD AUTO: 0.03 K/UL — SIGNIFICANT CHANGE UP (ref 0–0.2)
BASOPHILS NFR BLD AUTO: 0.6 % — SIGNIFICANT CHANGE UP (ref 0–2)
BILIRUB SERPL-MCNC: 0.6 MG/DL — SIGNIFICANT CHANGE UP (ref 0.2–1.2)
BUN SERPL-MCNC: 18 MG/DL — SIGNIFICANT CHANGE UP (ref 7–23)
CALCIUM SERPL-MCNC: 10.8 MG/DL — HIGH (ref 8.4–10.5)
CHLORIDE SERPL-SCNC: 102 MMOL/L — SIGNIFICANT CHANGE UP (ref 96–108)
CK MB CFR SERPL CALC: 1.8 NG/ML — SIGNIFICANT CHANGE UP (ref 0–6.7)
CO2 SERPL-SCNC: 30 MMOL/L — SIGNIFICANT CHANGE UP (ref 22–31)
CREAT SERPL-MCNC: 0.98 MG/DL — SIGNIFICANT CHANGE UP (ref 0.5–1.3)
EOSINOPHIL # BLD AUTO: 0.03 K/UL — SIGNIFICANT CHANGE UP (ref 0–0.5)
EOSINOPHIL NFR BLD AUTO: 0.6 % — SIGNIFICANT CHANGE UP (ref 0–6)
GLUCOSE SERPL-MCNC: 102 MG/DL — HIGH (ref 70–99)
HCT VFR BLD CALC: 43.1 % — SIGNIFICANT CHANGE UP (ref 34.5–45)
HGB BLD-MCNC: 13.1 G/DL — SIGNIFICANT CHANGE UP (ref 11.5–15.5)
IMM GRANULOCYTES NFR BLD AUTO: 0.2 % — SIGNIFICANT CHANGE UP (ref 0–1.5)
INR BLD: 1.15 — SIGNIFICANT CHANGE UP (ref 0.88–1.16)
LYMPHOCYTES # BLD AUTO: 1.54 K/UL — SIGNIFICANT CHANGE UP (ref 1–3.3)
LYMPHOCYTES # BLD AUTO: 28.6 % — SIGNIFICANT CHANGE UP (ref 13–44)
MCHC RBC-ENTMCNC: 29.4 PG — SIGNIFICANT CHANGE UP (ref 27–34)
MCHC RBC-ENTMCNC: 30.4 GM/DL — LOW (ref 32–36)
MCV RBC AUTO: 96.6 FL — SIGNIFICANT CHANGE UP (ref 80–100)
MONOCYTES # BLD AUTO: 0.57 K/UL — SIGNIFICANT CHANGE UP (ref 0–0.9)
MONOCYTES NFR BLD AUTO: 10.6 % — SIGNIFICANT CHANGE UP (ref 2–14)
NEUTROPHILS # BLD AUTO: 3.21 K/UL — SIGNIFICANT CHANGE UP (ref 1.8–7.4)
NEUTROPHILS NFR BLD AUTO: 59.4 % — SIGNIFICANT CHANGE UP (ref 43–77)
NRBC # BLD: 0 /100 WBCS — SIGNIFICANT CHANGE UP (ref 0–0)
NT-PROBNP SERPL-SCNC: 178 PG/ML — SIGNIFICANT CHANGE UP (ref 0–300)
PLATELET # BLD AUTO: 238 K/UL — SIGNIFICANT CHANGE UP (ref 150–400)
POTASSIUM SERPL-MCNC: 5.6 MMOL/L — HIGH (ref 3.5–5.3)
POTASSIUM SERPL-SCNC: 5.6 MMOL/L — HIGH (ref 3.5–5.3)
PROT SERPL-MCNC: 8.6 G/DL — HIGH (ref 6–8.3)
PROTHROM AB SERPL-ACNC: 13 SEC — HIGH (ref 10–12.9)
RBC # BLD: 4.46 M/UL — SIGNIFICANT CHANGE UP (ref 3.8–5.2)
RBC # FLD: 14.4 % — SIGNIFICANT CHANGE UP (ref 10.3–14.5)
SODIUM SERPL-SCNC: 140 MMOL/L — SIGNIFICANT CHANGE UP (ref 135–145)
TROPONIN T SERPL-MCNC: <0.01 NG/ML — SIGNIFICANT CHANGE UP (ref 0–0.01)
WBC # BLD: 5.39 K/UL — SIGNIFICANT CHANGE UP (ref 3.8–10.5)
WBC # FLD AUTO: 5.39 K/UL — SIGNIFICANT CHANGE UP (ref 3.8–10.5)

## 2019-10-31 PROCEDURE — 99285 EMERGENCY DEPT VISIT HI MDM: CPT

## 2019-10-31 PROCEDURE — 80053 COMPREHEN METABOLIC PANEL: CPT

## 2019-10-31 PROCEDURE — 36415 COLL VENOUS BLD VENIPUNCTURE: CPT

## 2019-10-31 PROCEDURE — 82550 ASSAY OF CK (CPK): CPT

## 2019-10-31 PROCEDURE — 85730 THROMBOPLASTIN TIME PARTIAL: CPT

## 2019-10-31 PROCEDURE — 96374 THER/PROPH/DIAG INJ IV PUSH: CPT

## 2019-10-31 PROCEDURE — 85610 PROTHROMBIN TIME: CPT

## 2019-10-31 PROCEDURE — 99284 EMERGENCY DEPT VISIT MOD MDM: CPT | Mod: 25

## 2019-10-31 PROCEDURE — 85025 COMPLETE CBC W/AUTO DIFF WBC: CPT

## 2019-10-31 PROCEDURE — 84484 ASSAY OF TROPONIN QUANT: CPT

## 2019-10-31 PROCEDURE — 71046 X-RAY EXAM CHEST 2 VIEWS: CPT

## 2019-10-31 PROCEDURE — 83880 ASSAY OF NATRIURETIC PEPTIDE: CPT

## 2019-10-31 PROCEDURE — 94640 AIRWAY INHALATION TREATMENT: CPT

## 2019-10-31 PROCEDURE — 96375 TX/PRO/DX INJ NEW DRUG ADDON: CPT

## 2019-10-31 PROCEDURE — 82553 CREATINE MB FRACTION: CPT

## 2019-10-31 PROCEDURE — 71046 X-RAY EXAM CHEST 2 VIEWS: CPT | Mod: 26

## 2019-10-31 PROCEDURE — 82962 GLUCOSE BLOOD TEST: CPT

## 2019-10-31 RX ORDER — DEXTROSE 50 % IN WATER 50 %
50 SYRINGE (ML) INTRAVENOUS ONCE
Refills: 0 | Status: COMPLETED | OUTPATIENT
Start: 2019-10-31 | End: 2019-10-31

## 2019-10-31 RX ORDER — DEXAMETHASONE 0.5 MG/5ML
10 ELIXIR ORAL ONCE
Refills: 0 | Status: DISCONTINUED | OUTPATIENT
Start: 2019-10-31 | End: 2019-10-31

## 2019-10-31 RX ORDER — ALBUTEROL 90 UG/1
5 AEROSOL, METERED ORAL ONCE
Refills: 0 | Status: COMPLETED | OUTPATIENT
Start: 2019-10-31 | End: 2019-10-31

## 2019-10-31 RX ORDER — INSULIN HUMAN 100 [IU]/ML
7 INJECTION, SOLUTION SUBCUTANEOUS ONCE
Refills: 0 | Status: COMPLETED | OUTPATIENT
Start: 2019-10-31 | End: 2019-10-31

## 2019-10-31 RX ORDER — IPRATROPIUM/ALBUTEROL SULFATE 18-103MCG
3 AEROSOL WITH ADAPTER (GRAM) INHALATION
Refills: 0 | Status: DISCONTINUED | OUTPATIENT
Start: 2019-10-31 | End: 2019-10-31

## 2019-10-31 RX ADMIN — ALBUTEROL 5 MILLIGRAM(S): 90 AEROSOL, METERED ORAL at 15:50

## 2019-10-31 RX ADMIN — INSULIN HUMAN 7 UNIT(S): 100 INJECTION, SOLUTION SUBCUTANEOUS at 16:19

## 2019-10-31 RX ADMIN — Medication 50 MILLILITER(S): at 16:19

## 2019-10-31 NOTE — ED PROVIDER NOTE - OBJECTIVE STATEMENT
85 y/o F with PMHx COPD, pacemaker presents to the ED with L sided chest pain radiating into her shoulder after walking her dog 2 days ago. Pt was sent in by Dr. Sinclair for further evaluation. 85 y/o F with PMHx COPD, pacemaker presents to the ED with L sided chest pain radiating into her shoulder after walking her dog 2 days ago. Pt was sent in by Dr. Sinclair for further evaluation. Denies nausea, vomiting, diarrhea, headache or any other acute symptoms at this time. 85 y/o F with PMHx COPD, pacemaker presents to the ED with L sided chest pain radiating into her shoulder after walking her dog 2 days ago and her L hand was pulled too much by the dog on leash. Pt was sent in by Dr. Sinclair for further evaluation. Denies nausea, vomiting, diarrhea, headache or any other acute symptoms at this time. 83 y/o F with PMHx COPD, pacemaker presents to the ED with L sided chest pain radiating into her shoulder after walking her dog 2 days ago and her L hand was pulled too much by the dog on leash. Pt was sent in by Dr. Sinclair for further evaluation. Denies any other trauma, falls, nausea, vomiting, diarrhea, headache or any other acute symptoms at this time.

## 2019-10-31 NOTE — ED PROVIDER NOTE - PATIENT PORTAL LINK FT
You can access the FollowMyHealth Patient Portal offered by [facility] by registering at the following website: [email or web address]. By joining [facility]’s FollowMyHealth portal, you will also be able to view your health information using other applications (apps) compatible with our system.

## 2019-10-31 NOTE — ED PROVIDER NOTE - CLINICAL SUMMARY MEDICAL DECISION MAKING FREE TEXT BOX
83 y/o F presents with L sided chest pain x 2 days, sent by Dr. Sinclair for evaluation. Plan for albuterol, decadron, CXR and labs to r/o MI. 83 y/o F presents with L sided chest pain x 2 days, sent by Dr. Sinclair for evaluation. ECG unchanged from prior and after discussion with Dr. Sinclair of patient's results, agreed with ED plan to discharge home after treating K of 5.6 Patient current asymptomatic and reports symptoms consistent with LUE pain after being dragged by dog on leash with no subsequent or other trauma.

## 2019-10-31 NOTE — ED ADULT NURSE NOTE - CHPI ED NUR SYMPTOMS NEG
no abrasion/no stiffness/no deformity/no weakness/no back pain/no numbness/no bruising/no difficulty bearing weight/no fever/no tingling

## 2019-10-31 NOTE — ED PROVIDER NOTE - NSFOLLOWUPINSTRUCTIONS_ED_ALL_ED_FT
Hyperkalemia  Hyperkalemia is when you have too much potassium in your blood. Potassium helps your body in many ways, but having too much can cause problems. If there is too much potassium in your blood, it can affect how your heart works.  Potassium is normally removed from your body by your kidneys. Many things can cause the amount in your blood to be high. Medicines and other treatments can be used to bring the amount to a normal level. Treatment may need to be done in the hospital.  Follow these instructions at home:  Image   Take over-the-counter and prescription medicines only as told by your doctor.Do not take any of the following unless your doctor says it is okay:  Supplements.Natural products.Herbs.Vitamins.Limit how much alcohol you drink as told by your doctor.Do not use drugs. If you need help quitting, ask your doctor.If you have kidney disease, you may need to follow a low-potassium diet. A  (dietitian) can help you.Keep all follow-up visits as told by your doctor. This is important.Contact a doctor if:  Your heartbeat is not regular or is very slow.You feel dizzy (light-headed).You feel weak.You feel sick to your stomach (nauseous).You have tingling in your hands or feet.You lose feeling (have numbness) in your hands or feet.Get help right away if:  You are short of breath.You have chest pain.You pass out (faint).You cannot move your muscles.Summary  Hyperkalemia is when you have too much potassium in your blood.Take over-the-counter and prescription medicines only as told by your doctor.Limit how much alcohol you drink as told by your doctor.Contact a doctor if your heartbeat is not regular.This information is not intended to replace advice given to you by your health care provider. Make sure you discuss any questions you have with your health care provider.    Document Released: 12/18/2006 Document Revised: 12/03/2018 Document Reviewed: 12/03/2018  [employer] Interactive Patient Education © 2019 [employer]

## 2019-10-31 NOTE — ED ADULT TRIAGE NOTE - OTHER COMPLAINTS
pt sent in by Dr Sinclair for eval of L side chest pain radiating to L shoulder, with associated SOB, x2 days, hx COPD; also reports cough and low grade temp at home

## 2019-10-31 NOTE — ED ADULT NURSE NOTE - NSIMPLEMENTINTERV_GEN_ALL_ED
Implemented All Fall Risk Interventions:  [place] to call system. Call bell, personal items and telephone within reach. Instruct patient to call for assistance. Room bathroom lighting operational. Non-slip footwear when patient is off stretcher. Physically safe environment: no spills, clutter or unnecessary equipment. Stretcher in lowest position, wheels locked, appropriate side rails in place. Provide visual cue, wrist band, yellow gown, etc. Monitor gait and stability. Monitor for mental status changes and reorient to person, place, and time. Review medications for side effects contributing to fall risk. Reinforce activity limits and safety measures with patient and family.

## 2019-10-31 NOTE — ED ADULT TRIAGE NOTE - INTERPRETER NAME
Plan of care goals met.  Home instructions reviewed with parents, follow up appointment on AVS.  Patient placed in car seat and carried by dad.  Infant discharged with parents and staff assist.    Marilu

## 2019-10-31 NOTE — ED ADULT NURSE NOTE - OBJECTIVE STATEMENT
Pt presents complaining of right shoulder pain. Pt reports  pain began after her dog pulled on its leash as she was walking it. Pt demonstrates full ROM to the right shoulder. Pt has hx of pacemaker, distant hx of CVA. PT reports no chest pain, no SOB, no lightheadedness.

## 2019-11-25 ENCOUNTER — APPOINTMENT (OUTPATIENT)
Dept: INTERNAL MEDICINE | Facility: CLINIC | Age: 84
End: 2019-11-25

## 2019-11-27 ENCOUNTER — INPATIENT (INPATIENT)
Facility: HOSPITAL | Age: 84
LOS: 2 days | Discharge: HOME HEALTH | DRG: 194 | End: 2019-11-30
Payer: MEDICARE

## 2019-11-27 VITALS
DIASTOLIC BLOOD PRESSURE: 82 MMHG | OXYGEN SATURATION: 94 % | TEMPERATURE: 99 F | HEART RATE: 89 BPM | HEIGHT: 65 IN | SYSTOLIC BLOOD PRESSURE: 145 MMHG | RESPIRATION RATE: 18 BRPM | WEIGHT: 163.14 LBS

## 2019-11-27 DIAGNOSIS — H26.9 UNSPECIFIED CATARACT: Chronic | ICD-10-CM

## 2019-11-27 DIAGNOSIS — Z98.890 OTHER SPECIFIED POSTPROCEDURAL STATES: Chronic | ICD-10-CM

## 2019-11-27 DIAGNOSIS — J44.9 CHRONIC OBSTRUCTIVE PULMONARY DISEASE, UNSPECIFIED: ICD-10-CM

## 2019-11-27 DIAGNOSIS — R19.7 DIARRHEA, UNSPECIFIED: ICD-10-CM

## 2019-11-27 DIAGNOSIS — H40.9 UNSPECIFIED GLAUCOMA: ICD-10-CM

## 2019-11-27 DIAGNOSIS — Z95.0 PRESENCE OF CARDIAC PACEMAKER: ICD-10-CM

## 2019-11-27 DIAGNOSIS — Z91.89 OTHER SPECIFIED PERSONAL RISK FACTORS, NOT ELSEWHERE CLASSIFIED: ICD-10-CM

## 2019-11-27 DIAGNOSIS — C34.90 MALIGNANT NEOPLASM OF UNSPECIFIED PART OF UNSPECIFIED BRONCHUS OR LUNG: Chronic | ICD-10-CM

## 2019-11-27 DIAGNOSIS — J18.9 PNEUMONIA, UNSPECIFIED ORGANISM: ICD-10-CM

## 2019-11-27 DIAGNOSIS — R63.8 OTHER SYMPTOMS AND SIGNS CONCERNING FOOD AND FLUID INTAKE: ICD-10-CM

## 2019-11-27 DIAGNOSIS — Z87.442 PERSONAL HISTORY OF URINARY CALCULI: Chronic | ICD-10-CM

## 2019-11-27 DIAGNOSIS — I63.9 CEREBRAL INFARCTION, UNSPECIFIED: ICD-10-CM

## 2019-11-27 DIAGNOSIS — Z95.0 PRESENCE OF CARDIAC PACEMAKER: Chronic | ICD-10-CM

## 2019-11-27 LAB
ALBUMIN SERPL ELPH-MCNC: 3.9 G/DL — SIGNIFICANT CHANGE UP (ref 3.3–5)
ALP SERPL-CCNC: 116 U/L — SIGNIFICANT CHANGE UP (ref 40–120)
ALT FLD-CCNC: 17 U/L — SIGNIFICANT CHANGE UP (ref 10–45)
ANION GAP SERPL CALC-SCNC: 13 MMOL/L — SIGNIFICANT CHANGE UP (ref 5–17)
APPEARANCE UR: ABNORMAL
AST SERPL-CCNC: 25 U/L — SIGNIFICANT CHANGE UP (ref 10–40)
BACTERIA # UR AUTO: PRESENT /HPF
BASOPHILS # BLD AUTO: 0.04 K/UL — SIGNIFICANT CHANGE UP (ref 0–0.2)
BASOPHILS NFR BLD AUTO: 0.3 % — SIGNIFICANT CHANGE UP (ref 0–2)
BILIRUB SERPL-MCNC: 1.7 MG/DL — HIGH (ref 0.2–1.2)
BILIRUB UR-MCNC: NEGATIVE — SIGNIFICANT CHANGE UP
BUN SERPL-MCNC: 22 MG/DL — SIGNIFICANT CHANGE UP (ref 7–23)
C DIFF BY PCR RESULT: NEGATIVE — SIGNIFICANT CHANGE UP
C DIFF GDH STL QL: SIGNIFICANT CHANGE UP
C DIFF GDH STL QL: SIGNIFICANT CHANGE UP
C DIFF TOX GENS STL QL NAA+PROBE: SIGNIFICANT CHANGE UP
CALCIUM SERPL-MCNC: 10.4 MG/DL — SIGNIFICANT CHANGE UP (ref 8.4–10.5)
CHLORIDE SERPL-SCNC: 104 MMOL/L — SIGNIFICANT CHANGE UP (ref 96–108)
CO2 SERPL-SCNC: 24 MMOL/L — SIGNIFICANT CHANGE UP (ref 22–31)
COLOR SPEC: YELLOW — SIGNIFICANT CHANGE UP
CREAT SERPL-MCNC: 1.1 MG/DL — SIGNIFICANT CHANGE UP (ref 0.5–1.3)
DIFF PNL FLD: NEGATIVE — SIGNIFICANT CHANGE UP
EOSINOPHIL # BLD AUTO: 0.09 K/UL — SIGNIFICANT CHANGE UP (ref 0–0.5)
EOSINOPHIL NFR BLD AUTO: 0.6 % — SIGNIFICANT CHANGE UP (ref 0–6)
EPI CELLS # UR: SIGNIFICANT CHANGE UP /HPF (ref 0–5)
EXTRA BLUE TOP TUBE: SIGNIFICANT CHANGE UP
EXTRA SST TUBE: SIGNIFICANT CHANGE UP
GLUCOSE SERPL-MCNC: 110 MG/DL — HIGH (ref 70–99)
GLUCOSE UR QL: NEGATIVE — SIGNIFICANT CHANGE UP
HCT VFR BLD CALC: 38.9 % — SIGNIFICANT CHANGE UP (ref 34.5–45)
HGB BLD-MCNC: 12.4 G/DL — SIGNIFICANT CHANGE UP (ref 11.5–15.5)
IMM GRANULOCYTES NFR BLD AUTO: 0.7 % — SIGNIFICANT CHANGE UP (ref 0–1.5)
KETONES UR-MCNC: NEGATIVE — SIGNIFICANT CHANGE UP
LEUKOCYTE ESTERASE UR-ACNC: ABNORMAL
LIDOCAIN IGE QN: 17 U/L — SIGNIFICANT CHANGE UP (ref 7–60)
LYMPHOCYTES # BLD AUTO: 0.87 K/UL — LOW (ref 1–3.3)
LYMPHOCYTES # BLD AUTO: 5.7 % — LOW (ref 13–44)
MCHC RBC-ENTMCNC: 30.4 PG — SIGNIFICANT CHANGE UP (ref 27–34)
MCHC RBC-ENTMCNC: 31.9 GM/DL — LOW (ref 32–36)
MCV RBC AUTO: 95.3 FL — SIGNIFICANT CHANGE UP (ref 80–100)
MONOCYTES # BLD AUTO: 0.7 K/UL — SIGNIFICANT CHANGE UP (ref 0–0.9)
MONOCYTES NFR BLD AUTO: 4.6 % — SIGNIFICANT CHANGE UP (ref 2–14)
NEUTROPHILS # BLD AUTO: 13.57 K/UL — HIGH (ref 1.8–7.4)
NEUTROPHILS NFR BLD AUTO: 88.1 % — HIGH (ref 43–77)
NITRITE UR-MCNC: NEGATIVE — SIGNIFICANT CHANGE UP
NRBC # BLD: 0 /100 WBCS — SIGNIFICANT CHANGE UP (ref 0–0)
PH UR: 6 — SIGNIFICANT CHANGE UP (ref 5–8)
PLATELET # BLD AUTO: 187 K/UL — SIGNIFICANT CHANGE UP (ref 150–400)
POTASSIUM SERPL-MCNC: 4.2 MMOL/L — SIGNIFICANT CHANGE UP (ref 3.5–5.3)
POTASSIUM SERPL-SCNC: 4.2 MMOL/L — SIGNIFICANT CHANGE UP (ref 3.5–5.3)
PROT SERPL-MCNC: 7.5 G/DL — SIGNIFICANT CHANGE UP (ref 6–8.3)
PROT UR-MCNC: NEGATIVE MG/DL — SIGNIFICANT CHANGE UP
RAPID RVP RESULT: SIGNIFICANT CHANGE UP
RBC # BLD: 4.08 M/UL — SIGNIFICANT CHANGE UP (ref 3.8–5.2)
RBC # FLD: 15.1 % — HIGH (ref 10.3–14.5)
SODIUM SERPL-SCNC: 141 MMOL/L — SIGNIFICANT CHANGE UP (ref 135–145)
SP GR SPEC: 1.01 — SIGNIFICANT CHANGE UP (ref 1–1.03)
UROBILINOGEN FLD QL: 0.2 E.U./DL — SIGNIFICANT CHANGE UP
WBC # BLD: 15.37 K/UL — HIGH (ref 3.8–10.5)
WBC # FLD AUTO: 15.37 K/UL — HIGH (ref 3.8–10.5)
WBC UR QL: > 10 /HPF

## 2019-11-27 PROCEDURE — 74177 CT ABD & PELVIS W/CONTRAST: CPT | Mod: 26

## 2019-11-27 PROCEDURE — 99285 EMERGENCY DEPT VISIT HI MDM: CPT | Mod: 25

## 2019-11-27 PROCEDURE — 99223 1ST HOSP IP/OBS HIGH 75: CPT | Mod: GC

## 2019-11-27 PROCEDURE — 71046 X-RAY EXAM CHEST 2 VIEWS: CPT | Mod: 26

## 2019-11-27 RX ORDER — ACETAMINOPHEN 500 MG
650 TABLET ORAL EVERY 6 HOURS
Refills: 0 | Status: DISCONTINUED | OUTPATIENT
Start: 2019-11-27 | End: 2019-11-30

## 2019-11-27 RX ORDER — ASPIRIN/CALCIUM CARB/MAGNESIUM 324 MG
81 TABLET ORAL DAILY
Refills: 0 | Status: DISCONTINUED | OUTPATIENT
Start: 2019-11-27 | End: 2019-11-30

## 2019-11-27 RX ORDER — ATENOLOL 25 MG/1
25 TABLET ORAL DAILY
Refills: 0 | Status: DISCONTINUED | OUTPATIENT
Start: 2019-11-27 | End: 2019-11-30

## 2019-11-27 RX ORDER — BUDESONIDE AND FORMOTEROL FUMARATE DIHYDRATE 160; 4.5 UG/1; UG/1
2 AEROSOL RESPIRATORY (INHALATION)
Refills: 0 | Status: DISCONTINUED | OUTPATIENT
Start: 2019-11-27 | End: 2019-11-30

## 2019-11-27 RX ORDER — ATORVASTATIN CALCIUM 80 MG/1
20 TABLET, FILM COATED ORAL AT BEDTIME
Refills: 0 | Status: DISCONTINUED | OUTPATIENT
Start: 2019-11-27 | End: 2019-11-30

## 2019-11-27 RX ORDER — DORZOLAMIDE HYDROCHLORIDE 20 MG/ML
1 SOLUTION/ DROPS OPHTHALMIC EVERY 8 HOURS
Refills: 0 | Status: DISCONTINUED | OUTPATIENT
Start: 2019-11-27 | End: 2019-11-30

## 2019-11-27 RX ORDER — CEFTRIAXONE 500 MG/1
1000 INJECTION, POWDER, FOR SOLUTION INTRAMUSCULAR; INTRAVENOUS ONCE
Refills: 0 | Status: COMPLETED | OUTPATIENT
Start: 2019-11-27 | End: 2019-11-27

## 2019-11-27 RX ORDER — LATANOPROST 0.05 MG/ML
1 SOLUTION/ DROPS OPHTHALMIC; TOPICAL AT BEDTIME
Refills: 0 | Status: DISCONTINUED | OUTPATIENT
Start: 2019-11-27 | End: 2019-11-30

## 2019-11-27 RX ORDER — IOHEXOL 300 MG/ML
30 INJECTION, SOLUTION INTRAVENOUS ONCE
Refills: 0 | Status: COMPLETED | OUTPATIENT
Start: 2019-11-27 | End: 2019-11-27

## 2019-11-27 RX ORDER — AZITHROMYCIN 500 MG/1
500 TABLET, FILM COATED ORAL ONCE
Refills: 0 | Status: COMPLETED | OUTPATIENT
Start: 2019-11-27 | End: 2019-11-27

## 2019-11-27 RX ORDER — CEFTRIAXONE 500 MG/1
1000 INJECTION, POWDER, FOR SOLUTION INTRAMUSCULAR; INTRAVENOUS EVERY 24 HOURS
Refills: 0 | Status: DISCONTINUED | OUTPATIENT
Start: 2019-11-28 | End: 2019-11-30

## 2019-11-27 RX ORDER — SODIUM CHLORIDE 9 MG/ML
1000 INJECTION INTRAMUSCULAR; INTRAVENOUS; SUBCUTANEOUS
Refills: 0 | Status: DISCONTINUED | OUTPATIENT
Start: 2019-11-27 | End: 2019-11-28

## 2019-11-27 RX ORDER — APIXABAN 2.5 MG/1
0 TABLET, FILM COATED ORAL
Qty: 0 | Refills: 0 | DISCHARGE

## 2019-11-27 RX ORDER — ALBUTEROL 90 UG/1
2 AEROSOL, METERED ORAL EVERY 6 HOURS
Refills: 0 | Status: DISCONTINUED | OUTPATIENT
Start: 2019-11-27 | End: 2019-11-30

## 2019-11-27 RX ORDER — AZITHROMYCIN 500 MG/1
500 TABLET, FILM COATED ORAL DAILY
Refills: 0 | Status: DISCONTINUED | OUTPATIENT
Start: 2019-11-28 | End: 2019-11-30

## 2019-11-27 RX ORDER — ONDANSETRON 8 MG/1
4 TABLET, FILM COATED ORAL ONCE
Refills: 0 | Status: DISCONTINUED | OUTPATIENT
Start: 2019-11-27 | End: 2019-11-30

## 2019-11-27 RX ORDER — MONTELUKAST 4 MG/1
10 TABLET, CHEWABLE ORAL DAILY
Refills: 0 | Status: DISCONTINUED | OUTPATIENT
Start: 2019-11-27 | End: 2019-11-30

## 2019-11-27 RX ADMIN — SODIUM CHLORIDE 125 MILLILITER(S): 9 INJECTION INTRAMUSCULAR; INTRAVENOUS; SUBCUTANEOUS at 18:12

## 2019-11-27 RX ADMIN — AZITHROMYCIN 255 MILLIGRAM(S): 500 TABLET, FILM COATED ORAL at 18:11

## 2019-11-27 RX ADMIN — DORZOLAMIDE HYDROCHLORIDE 1 DROP(S): 20 SOLUTION/ DROPS OPHTHALMIC at 22:45

## 2019-11-27 RX ADMIN — BUDESONIDE AND FORMOTEROL FUMARATE DIHYDRATE 2 PUFF(S): 160; 4.5 AEROSOL RESPIRATORY (INHALATION) at 22:46

## 2019-11-27 RX ADMIN — LATANOPROST 1 DROP(S): 0.05 SOLUTION/ DROPS OPHTHALMIC; TOPICAL at 22:46

## 2019-11-27 RX ADMIN — CEFTRIAXONE 100 MILLIGRAM(S): 500 INJECTION, POWDER, FOR SOLUTION INTRAMUSCULAR; INTRAVENOUS at 19:15

## 2019-11-27 RX ADMIN — SODIUM CHLORIDE 125 MILLILITER(S): 9 INJECTION INTRAMUSCULAR; INTRAVENOUS; SUBCUTANEOUS at 14:22

## 2019-11-27 RX ADMIN — ATORVASTATIN CALCIUM 20 MILLIGRAM(S): 80 TABLET, FILM COATED ORAL at 21:40

## 2019-11-27 RX ADMIN — IOHEXOL 30 MILLILITER(S): 300 INJECTION, SOLUTION INTRAVENOUS at 14:22

## 2019-11-27 NOTE — H&P ADULT - PROBLEM SELECTOR PLAN 4
-currently not wheezing, not requiring oxygen at home  -proair Q6 prn sob or wheezing  -symbicort therapeutic interchange for advair

## 2019-11-27 NOTE — H&P ADULT - PROBLEM SELECTOR PLAN 2
-Pt w/ 3 d hx of diarrhea, CT abdomen w/ oral contrast shows resolving colitis and diverticulosis present  -c diff panel negative  -f/u GI stool PCR -Pt w/ 3 d hx of diarrhea, CT abdomen w/ oral contrast shows resolving colitis and diverticulosis present  -c diff panel negative    -f/u GI stool PCR

## 2019-11-27 NOTE — ED PROVIDER NOTE - PMH
Asthma, unspecified asthma severity, unspecified whether complicated, unspecified whether persistent    Bronchiectasis    Cerebrovascular accident (CVA), unspecified mechanism    Chronic kidney disease, unspecified CKD stage    Chronic obstructive pulmonary disease  COPD (chronic obstructive pulmonary disease)  Coronary artery disease, angina presence unspecified, unspecified vessel or lesion type, unspecified whether native or transplanted heart    Dizziness    Essential hypertension  HTN (hypertension)  Glaucoma  Glaucoma  History of pneumonia    Hypercalcemia    Hyperlipidemia, unspecified hyperlipidemia type    Malignant neoplasm of lung, unspecified laterality, unspecified part of lung    Pacemaker    Syphilis

## 2019-11-27 NOTE — H&P ADULT - NSHPPHYSICALEXAM_GEN_ALL_CORE
PHYSICAL EXAM:  GENERAL: NAD, well-developed  HEAD:  Atraumatic, Normocephalic  EYES: EOMI, PERRLA, conjunctiva and sclera clear  NECK: Supple, No JVD  CHEST/LUNG: decreased breath sounds on left lower lobe, mild rhonchi present  HEART: Regular rate and rhythm; No murmurs, rubs, or gallops  ABDOMEN: Soft, tender to palpate left upper and lower quadrant  EXTREMITIES:  2+ Peripheral Pulses, No clubbing, cyanosis, or edema  PSYCH: AAOx3  NEUROLOGY: non-focal  SKIN: No rashes or lesions

## 2019-11-27 NOTE — ED PROVIDER NOTE - OBJECTIVE STATEMENT
83 yo female h/o copd, cva, cri, cad, s/p pacer, htn, glaucoma c/o 85 yo female h/o copd, lung ca, bronchiectasis, cva, cri, cad, s/p pacer, htn, glaucoma c/o diarrhea x 5 (nonbloody), n/v x 1 and upper abd pain since last pm w/o dysuria.  No sick contacts, travel, recent abx.  Pt also notes some increased sob from baseline w slight cough w brown sputum.  + subjective fever.  No uri sx.  No cp, palpitations.

## 2019-11-27 NOTE — H&P ADULT - NSICDXPASTSURGICALHX_GEN_ALL_CORE_FT
PAST SURGICAL HISTORY:  Cardiac pacemaker     Cataract of right eye     H/O abdominal hysterectomy     History of cholecystectomy     History of nephrolithiasis s/p removal    Lung cancer s/p resection

## 2019-11-27 NOTE — H&P ADULT - ATTENDING COMMENTS
Lung cancer  no evidence of recurrence  she had multiple episodes of RML pneumoia  continue antibiotic  Patient seen and examined with house-staff during bedside rounds.  Resident note read, including vitals, physical findings, laboratory data, and radiological reports.   Revisions included below.  Direct personal management at bed side and extensive interpretation of the data.  Plan was outlined and discussed in details with the housestaff.  Decision making of high complexity  Action taken for acute disease activity to reflect the level of care provided:  - medication reconciliation  - review laboratory data

## 2019-11-27 NOTE — ED PROVIDER NOTE - DIAGNOSTIC INTERPRETATION
ER Physician:  Alida Director  CHEST XRAY INTERPRETATION: lungs lll infiltrate, heart shadow normal, bony structures intact

## 2019-11-27 NOTE — ED PROVIDER NOTE - CARE PLAN
Principal Discharge DX:	CAP (community acquired pneumonia)  Secondary Diagnosis:	UTI (urinary tract infection)

## 2019-11-27 NOTE — H&P ADULT - PROBLEM SELECTOR PLAN 1
-Patient with cough, and increased left lower lobe consolidation on CT imaging  -no recent antibiotic use, can continue with ceftriaxone and azithromycin  -f/u RVP -Patient with cough, and increased left lower lobe consolidation on CT imaging  -no recent antibiotic use, can continue with ceftriaxone and azithromycin  -f/u RVP  follow on sputum culture  RVP negative  CXR consistent with LLL pneumonia

## 2019-11-27 NOTE — H&P ADULT - HISTORY OF PRESENT ILLNESS
Patient is an 84 y/o F w/ cva, copd/asthma, lung ca (s/p resection in remission), CAD, CKD, HTN, HLD, cardiomyopathy w/ PPM, vertigo, syphilis post treatment, and glaucoma who presents due to 3 days of abdominal pain and diarrhea. She states having roughly 4 loose bowel movements per day. She has abdominal pain that is worse on the left sided. She has a chronic cough, but has been worse over the past 5 days. She states no fevers or chills. Denies any chest pain and nausea. She states no requirements in oxygen at home.     ED vitals were 99.8 F, , /69, 18 RR at 95% RA  s/p ceftriaxone and azithromycin  CT abd w/ more diverticulosis but resolving colitis since prior, and CT chest w/ left lower sided consolidation

## 2019-11-27 NOTE — ED PROVIDER NOTE - PROGRESS NOTE DETAILS
CT + LLL infiltrate, o/w no intra-abd pathology.  UA + uti.  Pt ordered for ceftriaxone, azithro to cover cap as well as uti.  Pt gave stool sample - results pending.  Pt discussed w Dr Yahir peterson to him.

## 2019-11-27 NOTE — H&P ADULT - PROBLEM SELECTOR PLAN 8
1) PCP Contacted on Admission: (Y/N) --> Name & Phone #: Dr. Sinclair is following patient currently.  2) Date of Contact with PCP:  3) PCP Contacted at Discharge: (Y/N, N/A)  4) Summary of Handoff Given to PCP:   5) Post-Discharge Appointment Date and Location:

## 2019-11-27 NOTE — H&P ADULT - PROBLEM SELECTOR PLAN 7
F-none  E-replete K<4 and Mag <2  N- DASH TLC diet  lovenox for DVT prophylaxis   dispo to [facility]  FULL code

## 2019-11-27 NOTE — H&P ADULT - NSICDXPASTMEDICALHX_GEN_ALL_CORE_FT
PAST MEDICAL HISTORY:  Asthma, unspecified asthma severity, unspecified whether complicated, unspecified whether persistent     Bronchiectasis     Cerebrovascular accident (CVA), unspecified mechanism     Chronic kidney disease, unspecified CKD stage     Chronic obstructive pulmonary disease COPD (chronic obstructive pulmonary disease)    Coronary artery disease, angina presence unspecified, unspecified vessel or lesion type, unspecified whether native or transplanted heart     Dizziness     Essential hypertension HTN (hypertension)    Glaucoma Glaucoma    History of pneumonia     Hypercalcemia     Hyperlipidemia, unspecified hyperlipidemia type     Malignant neoplasm of lung, unspecified laterality, unspecified part of lung     Pacemaker     Syphilis

## 2019-11-27 NOTE — H&P ADULT - PROBLEM SELECTOR PLAN 6
-on home dorzolamide, latanoprost, and brimonidine. c/w brimonidine and latanoprost therapeutic interchange

## 2019-11-27 NOTE — H&P ADULT - PROBLEM SELECTOR PLAN 3
-pt w/ hx of CAD and sick sinus syndrome  -c/w aspirin 81 mg   -c/w atenolol 25 mg     #HTN  -holding amlodipine 5 mg and lasix 20 mg, can resume once diarrhea improves or if patient is htn

## 2019-11-27 NOTE — H&P ADULT - ASSESSMENT
Patient is an 82 y/o F w/ cva, copd/asthma, lung ca (s/p resection in remission), CAD, CKD, HTN, HLD, cardiomyopathy w/ PPM, vertigo, syphilis post treatment, and glaucoma who presents due to 3 days of abdominal pain and diarrhea.

## 2019-11-27 NOTE — PATIENT PROFILE ADULT - NSPROREFERSVCHOMERESP_GEN_A_NUR
no LE edema, normal equal distal pulses.  abd: BS+, soft, +RLQ ttp, no rebound/guarding, no CVAT, no overlying skin changes
no

## 2019-11-27 NOTE — ED ADULT NURSE NOTE - OBJECTIVE STATEMENT
c/o generalized abdominal pain accompanied with n/v/d. Pt reported vomited x 1 and had diarrhea ~ 5/day. Denies dysuria or melena. Pt states is able tolerate PO and reported usually requires medicine to move her bowels. PHX cholecystectomy.

## 2019-11-27 NOTE — H&P ADULT - NSICDXFAMILYHX_GEN_ALL_CORE_FT
FAMILY HISTORY:  Father  Still living? Unknown  Family history of prostate cancer, Age at diagnosis: Age Unknown    Mother  Still living? Unknown  Family history of varicose veins, Age at diagnosis: Age Unknown

## 2019-11-27 NOTE — H&P ADULT - NSHPLABSRESULTS_GEN_ALL_CORE
LABS:                         12.4   15.37 )-----------( 187      ( 2019 14:01 )             38.9         141  |  104  |  22  ----------------------------<  110<H>  4.2   |  24  |  1.10    Ca    10.4      2019 14:01    TPro  7.5  /  Alb  3.9  /  TBili  1.7<H>  /  DBili  x   /  AST  25  /  ALT  17  /  AlkPhos  116        Urinalysis Basic - ( 2019 16:48 )    Color: Yellow / Appearance: SL Cloudy / S.010 / pH: x  Gluc: x / Ketone: NEGATIVE  / Bili: Negative / Urobili: 0.2 E.U./dL   Blood: x / Protein: NEGATIVE mg/dL / Nitrite: NEGATIVE   Leuk Esterase: Small / RBC: x / WBC > 10 /HPF   Sq Epi: x / Non Sq Epi: 0-5 /HPF / Bacteria: Present /HPF    < from: CT Abdomen and Pelvis w/ Oral Cont and w/ IV Cont (19 @ 16:26) >    Findings:     Lower chest: New large area of consolidation left lower lobe and smaller   areas of consolidation and tree-in-bud nodularity in the right lower   lobe, right middle lobe, and lingula. Pacemaker leads present right   atrium and right ventricle.     Liver:  Normal.    Gallbladder: No radiopaque stones gallbladder.    Spleen:  Normal.    Pancreas:  Normal.    Adrenal glands:  Normal.    Kidneys: Subcentimeter cyst left kidney. Right kidney normal.    Adenopathy:  No lymphadenopathy in abdomen or pelvis.    Ascites: None.    Gastrointestinal tract: Small hiatal hernia. Limited evaluation of   stomach due to lack of distention with contrast. Normal appendix. There   are again multiple diverticula in the cecum, ascending colon, and sigmoid   colon. Previously seen wall thickening of right colon has resolved.    Vessels: Small calcified plaque.    Pelvic organs: There is again a 4.5 cm uterine fibroid. 2.1 cm cystic   lesion again present right ovary. Left ovary unremarkable. Bladder   distended.    Soft tissues: Small fat-containing inguinal hernias bilaterally.    Bones: Mild degenerative changes of spine.    Impression: 1. Since 2019, there has been development of a new large   area of consolidation in the left lower lobe and smaller areas of   consolidation and tree-in-bud nodularity in the lung bases bilaterally.   These abnormalities could be due to infection and/or aspiration.    2. Previously seen colitis involving right colon has resolved.    3. There are again multiple colonic diverticula.

## 2019-11-28 ENCOUNTER — TRANSCRIPTION ENCOUNTER (OUTPATIENT)
Age: 84
End: 2019-11-28

## 2019-11-28 LAB
ANION GAP SERPL CALC-SCNC: 13 MMOL/L — SIGNIFICANT CHANGE UP (ref 5–17)
BUN SERPL-MCNC: 15 MG/DL — SIGNIFICANT CHANGE UP (ref 7–23)
CALCIUM SERPL-MCNC: 9.5 MG/DL — SIGNIFICANT CHANGE UP (ref 8.4–10.5)
CHLORIDE SERPL-SCNC: 107 MMOL/L — SIGNIFICANT CHANGE UP (ref 96–108)
CO2 SERPL-SCNC: 21 MMOL/L — LOW (ref 22–31)
CREAT SERPL-MCNC: 0.9 MG/DL — SIGNIFICANT CHANGE UP (ref 0.5–1.3)
CULTURE RESULTS: SIGNIFICANT CHANGE UP
GLUCOSE SERPL-MCNC: 95 MG/DL — SIGNIFICANT CHANGE UP (ref 70–99)
HCT VFR BLD CALC: 36.9 % — SIGNIFICANT CHANGE UP (ref 34.5–45)
HGB BLD-MCNC: 11.7 G/DL — SIGNIFICANT CHANGE UP (ref 11.5–15.5)
MAGNESIUM SERPL-MCNC: 2 MG/DL — SIGNIFICANT CHANGE UP (ref 1.6–2.6)
MCHC RBC-ENTMCNC: 30.2 PG — SIGNIFICANT CHANGE UP (ref 27–34)
MCHC RBC-ENTMCNC: 31.7 GM/DL — LOW (ref 32–36)
MCV RBC AUTO: 95.3 FL — SIGNIFICANT CHANGE UP (ref 80–100)
NRBC # BLD: 0 /100 WBCS — SIGNIFICANT CHANGE UP (ref 0–0)
PLATELET # BLD AUTO: 171 K/UL — SIGNIFICANT CHANGE UP (ref 150–400)
POTASSIUM SERPL-MCNC: 3.8 MMOL/L — SIGNIFICANT CHANGE UP (ref 3.5–5.3)
POTASSIUM SERPL-SCNC: 3.8 MMOL/L — SIGNIFICANT CHANGE UP (ref 3.5–5.3)
RBC # BLD: 3.87 M/UL — SIGNIFICANT CHANGE UP (ref 3.8–5.2)
RBC # FLD: 14.9 % — HIGH (ref 10.3–14.5)
SODIUM SERPL-SCNC: 141 MMOL/L — SIGNIFICANT CHANGE UP (ref 135–145)
SPECIMEN SOURCE: SIGNIFICANT CHANGE UP
WBC # BLD: 12.22 K/UL — HIGH (ref 3.8–10.5)
WBC # FLD AUTO: 12.22 K/UL — HIGH (ref 3.8–10.5)

## 2019-11-28 PROCEDURE — 99232 SBSQ HOSP IP/OBS MODERATE 35: CPT | Mod: GC

## 2019-11-28 RX ORDER — POTASSIUM CHLORIDE 20 MEQ
20 PACKET (EA) ORAL ONCE
Refills: 0 | Status: COMPLETED | OUTPATIENT
Start: 2019-11-28 | End: 2019-11-28

## 2019-11-28 RX ORDER — AMLODIPINE BESYLATE 2.5 MG/1
5 TABLET ORAL DAILY
Refills: 0 | Status: DISCONTINUED | OUTPATIENT
Start: 2019-11-28 | End: 2019-11-30

## 2019-11-28 RX ORDER — FUROSEMIDE 40 MG
20 TABLET ORAL DAILY
Refills: 0 | Status: DISCONTINUED | OUTPATIENT
Start: 2019-11-28 | End: 2019-11-30

## 2019-11-28 RX ORDER — ENOXAPARIN SODIUM 100 MG/ML
40 INJECTION SUBCUTANEOUS EVERY 24 HOURS
Refills: 0 | Status: DISCONTINUED | OUTPATIENT
Start: 2019-11-28 | End: 2019-11-30

## 2019-11-28 RX ADMIN — BUDESONIDE AND FORMOTEROL FUMARATE DIHYDRATE 2 PUFF(S): 160; 4.5 AEROSOL RESPIRATORY (INHALATION) at 09:20

## 2019-11-28 RX ADMIN — ATENOLOL 25 MILLIGRAM(S): 25 TABLET ORAL at 05:44

## 2019-11-28 RX ADMIN — DORZOLAMIDE HYDROCHLORIDE 1 DROP(S): 20 SOLUTION/ DROPS OPHTHALMIC at 14:07

## 2019-11-28 RX ADMIN — DORZOLAMIDE HYDROCHLORIDE 1 DROP(S): 20 SOLUTION/ DROPS OPHTHALMIC at 05:45

## 2019-11-28 RX ADMIN — Medication 20 MILLIEQUIVALENT(S): at 08:29

## 2019-11-28 RX ADMIN — DORZOLAMIDE HYDROCHLORIDE 1 DROP(S): 20 SOLUTION/ DROPS OPHTHALMIC at 21:11

## 2019-11-28 RX ADMIN — ATORVASTATIN CALCIUM 20 MILLIGRAM(S): 80 TABLET, FILM COATED ORAL at 21:11

## 2019-11-28 RX ADMIN — AMLODIPINE BESYLATE 5 MILLIGRAM(S): 2.5 TABLET ORAL at 07:02

## 2019-11-28 RX ADMIN — Medication 81 MILLIGRAM(S): at 13:15

## 2019-11-28 RX ADMIN — ENOXAPARIN SODIUM 40 MILLIGRAM(S): 100 INJECTION SUBCUTANEOUS at 09:20

## 2019-11-28 RX ADMIN — MONTELUKAST 10 MILLIGRAM(S): 4 TABLET, CHEWABLE ORAL at 13:15

## 2019-11-28 RX ADMIN — BUDESONIDE AND FORMOTEROL FUMARATE DIHYDRATE 2 PUFF(S): 160; 4.5 AEROSOL RESPIRATORY (INHALATION) at 21:11

## 2019-11-28 RX ADMIN — Medication 20 MILLIGRAM(S): at 09:20

## 2019-11-28 RX ADMIN — CEFTRIAXONE 100 MILLIGRAM(S): 500 INJECTION, POWDER, FOR SOLUTION INTRAMUSCULAR; INTRAVENOUS at 17:00

## 2019-11-28 RX ADMIN — Medication 650 MILLIGRAM(S): at 01:15

## 2019-11-28 RX ADMIN — Medication 650 MILLIGRAM(S): at 00:34

## 2019-11-28 RX ADMIN — LATANOPROST 1 DROP(S): 0.05 SOLUTION/ DROPS OPHTHALMIC; TOPICAL at 21:12

## 2019-11-28 NOTE — DISCHARGE NOTE PROVIDER - NSDCMRMEDTOKEN_GEN_ALL_CORE_FT
Advair Diskus 250 mcg-50 mcg inhalation powder: 1 puff(s) inhaled 2 times a day  amLODIPine 5 mg oral tablet: 1 tab(s) orally once a day  atenolol 25 mg oral tablet: 1 tab(s) orally once a day  atorvastatin 20 mg oral tablet: 1 tab(s) orally once a day (at bedtime)  brimonidine 0.15% ophthalmic solution: 1 drop(s) to each affected eye 3 times a day, OU  dorzolamide 2% ophthalmic solution: 1 drop(s) to each affected eye 3 times a day, OU  Lasix 20 mg oral tablet: 1 tab(s) orally once a day  montelukast 10 mg oral tablet: 1 tab(s) orally once a day  Proventil HFA 90 mcg/inh inhalation aerosol: 2 puff(s) inhaled every 6 hours - for shortness of breath and/or wheezing    Xalatan 0.005% ophthalmic solution: 1 drop(s) to each affected eye 3 times a day, OU Advair Diskus 250 mcg-50 mcg inhalation powder: 1 puff(s) inhaled 2 times a day  amLODIPine 5 mg oral tablet: 1 tab(s) orally once a day  aspirin 81 mg oral delayed release tablet: 1 tab(s) orally once a day  atenolol 25 mg oral tablet: 1 tab(s) orally once a day  atorvastatin 20 mg oral tablet: 1 tab(s) orally once a day (at bedtime)  azithromycin 500 mg oral tablet: 1 tab(s) orally once a day  brimonidine 0.15% ophthalmic solution: 1 drop(s) to each affected eye 3 times a day, OU  cefpodoxime 200 mg oral tablet: 1 tab(s) orally every 12 hours   dorzolamide 2% ophthalmic solution: 1 drop(s) to each affected eye 3 times a day, OU  Lasix 20 mg oral tablet: 1 tab(s) orally once a day  montelukast 10 mg oral tablet: 1 tab(s) orally once a day  Proventil HFA 90 mcg/inh inhalation aerosol: 2 puff(s) inhaled every 6 hours - for shortness of breath and/or wheezing    Xalatan 0.005% ophthalmic solution: 1 drop(s) to each affected eye 3 times a day, OU Advair Diskus 250 mcg-50 mcg inhalation powder: 1 puff(s) inhaled 2 times a day  amLODIPine 5 mg oral tablet: 1 tab(s) orally once a day  aspirin 81 mg oral delayed release tablet: 1 tab(s) orally once a day  atenolol 25 mg oral tablet: 1 tab(s) orally once a day  atorvastatin 20 mg oral tablet: 1 tab(s) orally once a day (at bedtime)  brimonidine 0.15% ophthalmic solution: 1 drop(s) to each affected eye 3 times a day, OU  dorzolamide 2% ophthalmic solution: 1 drop(s) to each affected eye 3 times a day, OU  Lasix 20 mg oral tablet: 1 tab(s) orally once a day  Levaquin 750 mg oral tablet: 1 tab(s) orally every 24 hours   montelukast 10 mg oral tablet: 1 tab(s) orally once a day  Proventil HFA 90 mcg/inh inhalation aerosol: 2 puff(s) inhaled every 6 hours - for shortness of breath and/or wheezing    Xalatan 0.005% ophthalmic solution: 1 drop(s) to each affected eye 3 times a day, OU

## 2019-11-28 NOTE — DISCHARGE NOTE PROVIDER - HOSPITAL COURSE
Patient is 83 y/o F with past medical history of COPD/asthma, lung CA (s/p resection in remission) CAD, cardiomyopathy w/ PPM, CKD, HTN, CVA, HLD, vertigo, syphilis, glaucoma    Presented with 3 days of abdominal pain and diarrhea, found to have LLL consolidation, being treated for CAP.    Problem List/Main Diagnoses (system-based):         #Community acquired pneumonia - CT imaging revealed LLL consolidation. RVP negative. Pt treated with IV ceftriaxone 1g x 5 day course, and azithromycin 500mg PO x 3days.        #Diarrhea - pt with a history of 3 days of diarrhea. CT abdomen with oral contrast showed resolving colitis and diverticulosis. C. diff panel negative, GI stool PCR was negative. Diarrhea resolved.        Inpatient treatment course: IV ceftriaxone, PO azithromycin    New medications: cefpodoxime 200mg BID, azithromycin 500mg PO qd    Labs to be followed outpatient: none    Exam to be followed outpatient: none Patient is 83 y/o F with past medical history of COPD/asthma, lung CA (s/p resection in remission) CAD, cardiomyopathy w/ PPM, CKD stage 3A, HTN, CVA, HLD, vertigo, syphilis, glaucoma    Presented with 3 days of abdominal pain and diarrhea, found to have LLL consolidation, being treated for CAP.    Problem List/Main Diagnoses (system-based):         #Community acquired pneumonia - CT imaging revealed LLL consolidation. RVP negative. Pt treated with IV ceftriaxone 1g x 5 day course, and azithromycin 500mg PO x 3days.        #Diarrhea - pt with a history of 3 days of diarrhea. CT abdomen with oral contrast showed resolving colitis and diverticulosis. C. diff panel negative, GI stool PCR was negative. Diarrhea resolved.        Inpatient treatment course: IV ceftriaxone, PO azithromycin    New medications: cefpodoxime 200mg BID x3 more days, azithromycin 500mg PO qd x1 more day    Labs to be followed outpatient: none    Exam to be followed outpatient: none Patient is 85 y/o F with past medical history of COPD/asthma, lung CA (s/p resection in remission) CAD, cardiomyopathy w/ PPM, CKD stage 3A, HTN, CVA, HLD, vertigo, syphilis, glaucoma    Presented with 3 days of abdominal pain and diarrhea, found to have LLL consolidation, being treated for CAP.    Problem List/Main Diagnoses (system-based):         #Community acquired pneumonia - CT imaging revealed LLL consolidation. RVP negative. Pt treated with IV ceftriaxone 1g x 5 day course, and azithromycin 500mg PO x 3days.        #Diarrhea - pt with a history of 3 days of diarrhea. CT abdomen with oral contrast showed resolving colitis and diverticulosis. C. diff panel negative, GI stool PCR was negative. Diarrhea resolved.        Inpatient treatment course: IV ceftriaxone, PO azithromycin    New medications: Levaquin 750mg PO x 3 more days    Labs to be followed outpatient: none    Exam to be followed outpatient: none Patient is 85 y/o F with past medical history of COPD/asthma, lung CA (s/p resection in remission) CAD, cardiomyopathy w/ PPM, CKD stage 3A, HTN, CVA, HLD, vertigo, syphilis, glaucoma    Presented with 3 days of abdominal pain and diarrhea, found to have LLL consolidation, being treated for CAP.    Problem List/Main Diagnoses (system-based):         #Community acquired pneumonia - CT imaging revealed LLL consolidation. RVP negative. Pt treated with IV ceftriaxone 1g and azithromycin 500mg.        #Diarrhea - pt with a history of 3 days of diarrhea. CT abdomen with oral contrast showed resolving colitis and diverticulosis. C. diff panel negative, GI stool PCR was negative. Diarrhea resolved.        Inpatient treatment course: IV ceftriaxone, PO azithromycin    New medications: Levaquin 750mg PO x 3 more days    Labs to be followed outpatient: none    Exam to be followed outpatient: none Patient is 85 y/o F with past medical history of COPD/asthma, lung CA (s/p resection in remission) CAD, cardiomyopathy w/ PPM, CKD stage 3A, HTN, CVA, HLD, vertigo, syphilis, glaucoma    Presented with 3 days of abdominal pain and diarrhea, found to have LLL consolidation, being treated for CAP.    Problem List/Main Diagnoses (system-based):         #Community acquired pneumonia - CT imaging revealed LLL consolidation. RVP negative. Pt treated with IV ceftriaxone 1g and azithromycin 500mg.        #Diarrhea - pt with a history of 3 days of diarrhea. CT abdomen with oral contrast showed resolving colitis and diverticulosis. C. diff panel negative, GI stool PCR was negative. Diarrhea resolved.        Inpatient treatment course: IV ceftriaxone, PO azithromycin    New medications: Levaquin 750mg PO x 2 more days    Labs to be followed outpatient: none    Exam to be followed outpatient: none

## 2019-11-28 NOTE — DISCHARGE NOTE PROVIDER - NSDCCPCAREPLAN_GEN_ALL_CORE_FT
PRINCIPAL DISCHARGE DIAGNOSIS  Diagnosis: CAP (community acquired pneumonia)  Assessment and Plan of Treatment: You were diagnosed with pneumonia, or an infection of the lungs during your admission to [facility]. This was noted based on your symptom profile and findings on CT imaging. You were treated with antibiotics throughout your hospital course.   Please continue to take:  - cefpodoxime 200mg two times a day for _____  - azithromycin 500mg once a day for _____  Please follow-up with your primary care physician when you leave the hospital. Should you experience symptoms such as but not limited to: worsening shortness of breath, wheezing, severe cough, coughing bloody sputum, unrelenting/high fever (>103 F or lasting >3 days), and chest pain, please return to the emergency department for interval evaluation. PRINCIPAL DISCHARGE DIAGNOSIS  Diagnosis: CAP (community acquired pneumonia)  Assessment and Plan of Treatment: You were diagnosed with pneumonia, or an infection of the lungs during your admission to [facility]. This was noted based on your symptom profile and findings on CT imaging. You were treated with antibiotics throughout your hospital course.   Please continue to take:  - levaquin 750mg oral pill once a day for 3 days  Please follow-up with your primary care physician when you leave the hospital. Should you experience symptoms such as but not limited to: worsening shortness of breath, wheezing, severe cough, coughing bloody sputum, unrelenting/high fever (>103 F or lasting >3 days), and chest pain, please return to the emergency department for interval evaluation. PRINCIPAL DISCHARGE DIAGNOSIS  Diagnosis: CAP (community acquired pneumonia)  Assessment and Plan of Treatment: You were diagnosed with pneumonia, or an infection of the lungs during your admission to [facility]. This was noted based on your symptom profile and findings on CT imaging. You were treated with antibiotics throughout your hospital course.   Please continue to take:  - levaquin 750mg oral pill once a day for 2 days  Please follow-up with your primary care physician when you leave the hospital. Should you experience symptoms such as but not limited to: worsening shortness of breath, wheezing, severe cough, coughing bloody sputum, unrelenting/high fever (>103 F or lasting >3 days), and chest pain, please return to the emergency department for interval evaluation.

## 2019-11-28 NOTE — PROGRESS NOTE ADULT - PROBLEM SELECTOR PLAN 2
Pt w/ 3 d hx of diarrhea, CT abdomen w/ oral contrast shows resolving colitis and diverticulosis present. C diff panel negative. GI stool PCR negative.  - Pt without diarrhea today

## 2019-11-28 NOTE — DISCHARGE NOTE PROVIDER - NSDCFUSCHEDAPPT_GEN_ALL_CORE_FT
[facility] ; 12/03/2019 ; NPP PulmMed [address] ; 01/31/2020 ; NPP Nephro [address] ; 02/24/2020 ; NPP Rheum [address] [facility] ; 12/03/2019 ; NPP PulmMed [address] ; 01/31/2020 ; NPP Nephro [address] ; 02/24/2020 ; NPP Rheum [address] [facility] ; 12/03/2019 ; NPP PulmMed [address] ; 01/31/2020 ; NPP Nephro [address] ; 02/24/2020 ; NPP Rheum [address] [facility] ; 12/03/2019 ; NPP PulmMed [address] ; 01/31/2020 ; NPP Nephro [address] ; 02/24/2020 ; NPP Rheum [address] [facility] ; 12/03/2019 ; NPP PulmMed [address] ; 01/31/2020 ; NPP Nephro [address] ; 02/24/2020 ; NPP Rheum [address] [facility] ; 12/03/2019 ; NPP PulmMed [address] ; 01/31/2020 ; NPP Nephro [address] ; 02/24/2020 ; NPP Rheum [address] [facility] ; 12/03/2019 ; NPP PulmMed [address] ; 01/31/2020 ; NPP Nephro [address] ; 02/24/2020 ; NPP Rheum [address]

## 2019-11-28 NOTE — DISCHARGE NOTE PROVIDER - NSDCCAREPROVSEEN_GEN_ALL_CORE_FT
[place] Sinclair [place]  follow on sputum culture  afebrile and wbc normal  dc on Levaquin  Increase activity  follow in office in one week

## 2019-11-28 NOTE — PROGRESS NOTE ADULT - PROBLEM SELECTOR PLAN 7
F-none  E-replete K<4 and Mag <2  N- DASH TLC diet  lovenox for DVT prophylaxis   dispo to [facility]  FULL code

## 2019-11-28 NOTE — PROGRESS NOTE ADULT - PROBLEM SELECTOR PLAN 3
-pt w/ hx of CAD and sick sinus syndrome  -c/w aspirin 81 mg qd  -c/w atenolol 25 mg qd    #HTN  - sBP 160s this morning  - resuming home amlodipine 5mg qd and lasix 20mg qd (pt with bibasilar crackles this morning s/p IV NS overnight)

## 2019-11-28 NOTE — PROGRESS NOTE ADULT - SUBJECTIVE AND OBJECTIVE BOX
SUBJECTIVE/INTERVAL HPI: Patient was seen and examined at bedside this morning. Denies any fever, chills, CP, SOB, abdominal pain, diarrhea.    VITALS  Vital Signs Last 24 Hrs  T(C): 37.5 (2019 05:01), Max: 37.8 (2019 14:09)  T(F): 99.5 (2019 05:01), Max: 100 (2019 14:09)  HR: 90 (2019 05:01) (89 - 101)  BP: 160/81 (2019 05:01) (136/76 - 160/81)  BP(mean): --  RR: 18 (2019 05:01) (18 - 18)  SpO2: 95% (2019 05:) (94% - 100%)    I&O's Summary      CAPILLARY BLOOD GLUCOSE          PHYSICAL EXAM  General: In NAD. Resting comfortably in bed. AAOx3  HEENT: PERRL/ EOMI, no scleral icterus, no ptosis, MMM, no JVD  Respiratory: Bibasilar crackles. No wheezing.  Cardiovascular: RRR, +S1/S2. No murmurs, rubs, or gallops.  Abdomen: Soft, NT, ND, normoactive bowel sounds, no suprapubic tenderness  Extremities: WWP. No lower extremity edema, pulses equal, no calf tenderness; no cyanosis  Skin: No rashes      MEDICATIONS  (STANDING):  amLODIPine   Tablet 5 milliGRAM(s) Oral daily  aspirin enteric coated 81 milliGRAM(s) Oral daily  ATENolol  Tablet 25 milliGRAM(s) Oral daily  atorvastatin 20 milliGRAM(s) Oral at bedtime  azithromycin   Tablet 500 milliGRAM(s) Oral daily  budesonide 160 MICROgram(s)/formoterol 4.5 MICROgram(s) Inhaler 2 Puff(s) Inhalation two times a day  cefTRIAXone   IVPB 1000 milliGRAM(s) IV Intermittent every 24 hours  dorzolamide 2% Ophthalmic Solution 1 Drop(s) Both EYES every 8 hours  latanoprost 0.005% Ophthalmic Solution 1 Drop(s) Both EYES at bedtime  montelukast 10 milliGRAM(s) Oral daily  potassium chloride    Tablet ER 20 milliEquivalent(s) Oral once  sodium chloride 0.9%. 1000 milliLiter(s) (125 mL/Hr) IV Continuous <Continuous>    MEDICATIONS  (PRN):  acetaminophen   Tablet .. 650 milliGRAM(s) Oral every 6 hours PRN Temp greater or equal to 38C (100.4F), Mild Pain (1 - 3)  ALBUTerol    90 MICROgram(s) HFA Inhaler 2 Puff(s) Inhalation every 6 hours PRN Shortness of Breath and/or Wheezing  ondansetron Injectable 4 milliGRAM(s) IV Push once PRN Nausea and/or Vomiting      LABS                        11.7   12.22 )-----------( 171      ( 2019 07:04 )             36.9         141  |  107  |  15  ----------------------------<  95  3.8   |  21<L>  |  0.90    Ca    9.5      2019 07:04  Mg     2.0         TPro  7.5  /  Alb  3.9  /  TBili  1.7<H>  /  DBili  x   /  AST  25  /  ALT  17  /  AlkPhos  116      LIVER FUNCTIONS - ( 2019 14:01 )  Alb: 3.9 g/dL / Pro: 7.5 g/dL / ALK PHOS: 116 U/L / ALT: 17 U/L / AST: 25 U/L / GGT: x             Urinalysis Basic - ( 2019 16:48 )    Color: Yellow / Appearance: SL Cloudy / S.010 / pH: x  Gluc: x / Ketone: NEGATIVE  / Bili: Negative / Urobili: 0.2 E.U./dL   Blood: x / Protein: NEGATIVE mg/dL / Nitrite: NEGATIVE   Leuk Esterase: Small / RBC: x / WBC > 10 /HPF   Sq Epi: x / Non Sq Epi: 0-5 /HPF / Bacteria: Present /HPF      Radiology and other tests: Reviewed

## 2019-11-28 NOTE — PHYSICAL THERAPY INITIAL EVALUATION ADULT - PERTINENT HX OF CURRENT PROBLEM, REHAB EVAL
Pt. is an 83 y.o. female admitted with 3 days of abdominal pain and diarrhea, C-diff work-up - negative; + work-up for CAP.

## 2019-11-28 NOTE — DISCHARGE NOTE PROVIDER - NSDCFUADDAPPT_GEN_ALL_CORE_FT
You have an appointment scheduled with pulmonologist on 12/3/2019.    Please continue to follow-up with your primary care physician after your discharge from the hospital regarding resolution of your pneumonia. You have an appointment scheduled with pulmonologist on 12/3/2019.  You have an appointment scheduled with nephrology on 1/31/2020.  You have an appointment scheduled with rheumatology on 2/24/2020.      Please continue to follow-up with your primary care physician after your discharge from the hospital regarding resolution of your pneumonia.

## 2019-11-28 NOTE — DISCHARGE NOTE PROVIDER - CARE PROVIDER_API CALL
Florida Sinclair)  Critical Care Medicine; Pulmonary Disease  [address] [place] [address]  Phone: [phone]  Fax: [phone]  Follow Up Time:

## 2019-11-28 NOTE — DISCHARGE NOTE PROVIDER - CARE PROVIDERS DIRECT ADDRESSES
[email or web address]

## 2019-11-29 ENCOUNTER — TRANSCRIPTION ENCOUNTER (OUTPATIENT)
Age: 84
End: 2019-11-29

## 2019-11-29 LAB
ANION GAP SERPL CALC-SCNC: 14 MMOL/L — SIGNIFICANT CHANGE UP (ref 5–17)
BUN SERPL-MCNC: 15 MG/DL — SIGNIFICANT CHANGE UP (ref 7–23)
CALCIUM SERPL-MCNC: 9.3 MG/DL — SIGNIFICANT CHANGE UP (ref 8.4–10.5)
CHLORIDE SERPL-SCNC: 105 MMOL/L — SIGNIFICANT CHANGE UP (ref 96–108)
CO2 SERPL-SCNC: 20 MMOL/L — LOW (ref 22–31)
CREAT SERPL-MCNC: 0.91 MG/DL — SIGNIFICANT CHANGE UP (ref 0.5–1.3)
GLUCOSE SERPL-MCNC: 87 MG/DL — SIGNIFICANT CHANGE UP (ref 70–99)
HCT VFR BLD CALC: 38.3 % — SIGNIFICANT CHANGE UP (ref 34.5–45)
HGB BLD-MCNC: 12.3 G/DL — SIGNIFICANT CHANGE UP (ref 11.5–15.5)
MCHC RBC-ENTMCNC: 30.6 PG — SIGNIFICANT CHANGE UP (ref 27–34)
MCHC RBC-ENTMCNC: 32.1 GM/DL — SIGNIFICANT CHANGE UP (ref 32–36)
MCV RBC AUTO: 95.3 FL — SIGNIFICANT CHANGE UP (ref 80–100)
NRBC # BLD: 0 /100 WBCS — SIGNIFICANT CHANGE UP (ref 0–0)
PLATELET # BLD AUTO: 204 K/UL — SIGNIFICANT CHANGE UP (ref 150–400)
POTASSIUM SERPL-MCNC: 3.6 MMOL/L — SIGNIFICANT CHANGE UP (ref 3.5–5.3)
POTASSIUM SERPL-SCNC: 3.6 MMOL/L — SIGNIFICANT CHANGE UP (ref 3.5–5.3)
RBC # BLD: 4.02 M/UL — SIGNIFICANT CHANGE UP (ref 3.8–5.2)
RBC # FLD: 14.7 % — HIGH (ref 10.3–14.5)
SODIUM SERPL-SCNC: 139 MMOL/L — SIGNIFICANT CHANGE UP (ref 135–145)
WBC # BLD: 10.3 K/UL — SIGNIFICANT CHANGE UP (ref 3.8–10.5)
WBC # FLD AUTO: 10.3 K/UL — SIGNIFICANT CHANGE UP (ref 3.8–10.5)

## 2019-11-29 PROCEDURE — 99232 SBSQ HOSP IP/OBS MODERATE 35: CPT | Mod: GC

## 2019-11-29 RX ORDER — ASPIRIN/CALCIUM CARB/MAGNESIUM 324 MG
1 TABLET ORAL
Qty: 30 | Refills: 0
Start: 2019-11-29 | End: 2019-12-28

## 2019-11-29 RX ORDER — CEFPODOXIME PROXETIL 100 MG
1 TABLET ORAL
Qty: 6 | Refills: 0
Start: 2019-11-29 | End: 2019-12-01

## 2019-11-29 RX ORDER — AZITHROMYCIN 500 MG/1
1 TABLET, FILM COATED ORAL
Qty: 1 | Refills: 0
Start: 2019-11-29 | End: 2019-11-29

## 2019-11-29 RX ORDER — POTASSIUM CHLORIDE 20 MEQ
40 PACKET (EA) ORAL ONCE
Refills: 0 | Status: COMPLETED | OUTPATIENT
Start: 2019-11-29 | End: 2019-11-29

## 2019-11-29 RX ADMIN — Medication 20 MILLIGRAM(S): at 06:14

## 2019-11-29 RX ADMIN — Medication 100 MILLIGRAM(S): at 10:20

## 2019-11-29 RX ADMIN — DORZOLAMIDE HYDROCHLORIDE 1 DROP(S): 20 SOLUTION/ DROPS OPHTHALMIC at 21:14

## 2019-11-29 RX ADMIN — CEFTRIAXONE 100 MILLIGRAM(S): 500 INJECTION, POWDER, FOR SOLUTION INTRAMUSCULAR; INTRAVENOUS at 17:22

## 2019-11-29 RX ADMIN — ATORVASTATIN CALCIUM 20 MILLIGRAM(S): 80 TABLET, FILM COATED ORAL at 21:14

## 2019-11-29 RX ADMIN — AMLODIPINE BESYLATE 5 MILLIGRAM(S): 2.5 TABLET ORAL at 06:14

## 2019-11-29 RX ADMIN — ATENOLOL 25 MILLIGRAM(S): 25 TABLET ORAL at 06:15

## 2019-11-29 RX ADMIN — BUDESONIDE AND FORMOTEROL FUMARATE DIHYDRATE 2 PUFF(S): 160; 4.5 AEROSOL RESPIRATORY (INHALATION) at 09:15

## 2019-11-29 RX ADMIN — BUDESONIDE AND FORMOTEROL FUMARATE DIHYDRATE 2 PUFF(S): 160; 4.5 AEROSOL RESPIRATORY (INHALATION) at 21:15

## 2019-11-29 RX ADMIN — Medication 40 MILLIEQUIVALENT(S): at 08:19

## 2019-11-29 RX ADMIN — DORZOLAMIDE HYDROCHLORIDE 1 DROP(S): 20 SOLUTION/ DROPS OPHTHALMIC at 06:14

## 2019-11-29 RX ADMIN — Medication 81 MILLIGRAM(S): at 12:04

## 2019-11-29 RX ADMIN — LATANOPROST 1 DROP(S): 0.05 SOLUTION/ DROPS OPHTHALMIC; TOPICAL at 21:14

## 2019-11-29 RX ADMIN — AZITHROMYCIN 500 MILLIGRAM(S): 500 TABLET, FILM COATED ORAL at 12:05

## 2019-11-29 RX ADMIN — DORZOLAMIDE HYDROCHLORIDE 1 DROP(S): 20 SOLUTION/ DROPS OPHTHALMIC at 14:07

## 2019-11-29 RX ADMIN — ENOXAPARIN SODIUM 40 MILLIGRAM(S): 100 INJECTION SUBCUTANEOUS at 09:15

## 2019-11-29 RX ADMIN — MONTELUKAST 10 MILLIGRAM(S): 4 TABLET, CHEWABLE ORAL at 12:04

## 2019-11-29 NOTE — DISCHARGE NOTE NURSING/CASE MANAGEMENT/SOCIAL WORK - NSDCFUADDAPPT_GEN_ALL_CORE_FT
You have an appointment scheduled with pulmonologist on 12/3/2019.  You have an appointment scheduled with nephrology on 1/31/2020.  You have an appointment scheduled with rheumatology on 2/24/2020.      Please continue to follow-up with your primary care physician after your discharge from the hospital regarding resolution of your pneumonia.

## 2019-11-29 NOTE — DISCHARGE NOTE NURSING/CASE MANAGEMENT/SOCIAL WORK - PATIENT PORTAL LINK FT
You can access the FollowMyHealth Patient Portal offered by [facility] by registering at the following website: [email or web address]. By joining [employer]s FollowMyHealth portal, you will also be able to view your health information using other applications (apps) compatible with our system.

## 2019-11-30 VITALS
DIASTOLIC BLOOD PRESSURE: 83 MMHG | OXYGEN SATURATION: 97 % | TEMPERATURE: 99 F | RESPIRATION RATE: 18 BRPM | SYSTOLIC BLOOD PRESSURE: 163 MMHG | HEART RATE: 75 BPM

## 2019-11-30 PROCEDURE — 81001 URINALYSIS AUTO W/SCOPE: CPT

## 2019-11-30 PROCEDURE — 85027 COMPLETE CBC AUTOMATED: CPT

## 2019-11-30 PROCEDURE — 83690 ASSAY OF LIPASE: CPT

## 2019-11-30 PROCEDURE — 94640 AIRWAY INHALATION TREATMENT: CPT

## 2019-11-30 PROCEDURE — 80053 COMPREHEN METABOLIC PANEL: CPT

## 2019-11-30 PROCEDURE — 99232 SBSQ HOSP IP/OBS MODERATE 35: CPT | Mod: GC

## 2019-11-30 PROCEDURE — 87581 M.PNEUMON DNA AMP PROBE: CPT

## 2019-11-30 PROCEDURE — 87798 DETECT AGENT NOS DNA AMP: CPT

## 2019-11-30 PROCEDURE — 87486 CHLMYD PNEUM DNA AMP PROBE: CPT

## 2019-11-30 PROCEDURE — 87493 C DIFF AMPLIFIED PROBE: CPT

## 2019-11-30 PROCEDURE — 83735 ASSAY OF MAGNESIUM: CPT

## 2019-11-30 PROCEDURE — 74177 CT ABD & PELVIS W/CONTRAST: CPT

## 2019-11-30 PROCEDURE — 87633 RESP VIRUS 12-25 TARGETS: CPT

## 2019-11-30 PROCEDURE — 87507 IADNA-DNA/RNA PROBE TQ 12-25: CPT

## 2019-11-30 PROCEDURE — 80048 BASIC METABOLIC PNL TOTAL CA: CPT

## 2019-11-30 PROCEDURE — 71046 X-RAY EXAM CHEST 2 VIEWS: CPT

## 2019-11-30 PROCEDURE — 99285 EMERGENCY DEPT VISIT HI MDM: CPT | Mod: 25

## 2019-11-30 PROCEDURE — 97161 PT EVAL LOW COMPLEX 20 MIN: CPT

## 2019-11-30 PROCEDURE — 87449 NOS EACH ORGANISM AG IA: CPT

## 2019-11-30 PROCEDURE — 36415 COLL VENOUS BLD VENIPUNCTURE: CPT

## 2019-11-30 PROCEDURE — 85025 COMPLETE CBC W/AUTO DIFF WBC: CPT

## 2019-11-30 PROCEDURE — 87324 CLOSTRIDIUM AG IA: CPT

## 2019-11-30 RX ADMIN — Medication 20 MILLIGRAM(S): at 06:21

## 2019-11-30 RX ADMIN — MONTELUKAST 10 MILLIGRAM(S): 4 TABLET, CHEWABLE ORAL at 12:35

## 2019-11-30 RX ADMIN — ENOXAPARIN SODIUM 40 MILLIGRAM(S): 100 INJECTION SUBCUTANEOUS at 12:34

## 2019-11-30 RX ADMIN — BUDESONIDE AND FORMOTEROL FUMARATE DIHYDRATE 2 PUFF(S): 160; 4.5 AEROSOL RESPIRATORY (INHALATION) at 12:39

## 2019-11-30 RX ADMIN — Medication 81 MILLIGRAM(S): at 12:34

## 2019-11-30 RX ADMIN — DORZOLAMIDE HYDROCHLORIDE 1 DROP(S): 20 SOLUTION/ DROPS OPHTHALMIC at 06:21

## 2019-11-30 RX ADMIN — ATENOLOL 25 MILLIGRAM(S): 25 TABLET ORAL at 06:21

## 2019-11-30 RX ADMIN — AMLODIPINE BESYLATE 5 MILLIGRAM(S): 2.5 TABLET ORAL at 06:21

## 2019-11-30 NOTE — PROGRESS NOTE ADULT - ASSESSMENT
Patient is an 84 y/o F w/ CVA, copd/asthma, lung ca (s/p resection in remission), CAD, CKD, HTN, HLD, cardiomyopathy w/ PPM, vertigo, syphilis post treatment, and glaucoma who presents due to 3 days of abdominal pain and diarrhea, found to have LLL consolidation, being treated for CAP.
Patient is an 84 y/o F w/ CVA, copd/asthma, lung ca (s/p resection in remission), CAD, CKD, HTN, HLD, cardiomyopathy w/ PPM, vertigo, syphilis post treatment, and glaucoma who presents due to 3 days of abdominal pain and diarrhea, found to have LLL consolidation, being treated for CAP.

## 2019-11-30 NOTE — PROGRESS NOTE ADULT - SUBJECTIVE AND OBJECTIVE BOX
Interval HPI/overnight events: Patient was for discharge on 11/29 however reported R sided pleuritic chest pain with coughing and stayed for monitoring overnight. No acute events reported overnight. Patient seen and examined at bedside, no acute complaints. Still reports cough but isn't bring up any sputum. Denies fever, chills, chest pain, palpitations, abdominal pain, nausea/vomiting, diarrhea, constipation.    VITAL SIGNS:  Vital Signs Last 24 Hrs  T(C): 37.4 (30 Nov 2019 05:26), Max: 37.4 (30 Nov 2019 05:26)  T(F): 99.4 (30 Nov 2019 05:26), Max: 99.4 (30 Nov 2019 05:26)  HR: 75 (30 Nov 2019 05:26) (75 - 78)  BP: 163/83 (30 Nov 2019 05:26) (138/77 - 163/83)  BP(mean): --  RR: 18 (30 Nov 2019 05:26) (16 - 18)  SpO2: 97% (30 Nov 2019 05:26) (95% - 97%)    PHYSICAL EXAM:    General: in NAD, lying comfortably in bed  HEENT: normocephalic, atraumatic; PERRL, anicteric sclera; MMM  Neck: supple, no JVD, no thyromegaly, no lymphadenopathy  Cardiovascular: +S1/S2, RRR, no M/G/R  Respiratory: clear to auscultation B/L; no wheezing, no rales, no rhonchi  Gastrointestinal: soft, NT/ND; +BSx4, no organomegaly  Extremities: WWP; no edema, clubbing or cyanosis  Vascular: 2+ radial, DP/PT pulses B/L  Neurological: AAOx3; no focal deficits    MEDICATIONS:  MEDICATIONS  (STANDING):  amLODIPine   Tablet 5 milliGRAM(s) Oral daily  aspirin enteric coated 81 milliGRAM(s) Oral daily  ATENolol  Tablet 25 milliGRAM(s) Oral daily  atorvastatin 20 milliGRAM(s) Oral at bedtime  budesonide 160 MICROgram(s)/formoterol 4.5 MICROgram(s) Inhaler 2 Puff(s) Inhalation two times a day  dorzolamide 2% Ophthalmic Solution 1 Drop(s) Both EYES every 8 hours  enoxaparin Injectable 40 milliGRAM(s) SubCutaneous every 24 hours  furosemide    Tablet 20 milliGRAM(s) Oral daily  latanoprost 0.005% Ophthalmic Solution 1 Drop(s) Both EYES at bedtime  levoFLOXacin  Tablet 750 milliGRAM(s) Oral once  montelukast 10 milliGRAM(s) Oral daily    MEDICATIONS  (PRN):  acetaminophen   Tablet .. 650 milliGRAM(s) Oral every 6 hours PRN Temp greater or equal to 38C (100.4F), Mild Pain (1 - 3)  ALBUTerol    90 MICROgram(s) HFA Inhaler 2 Puff(s) Inhalation every 6 hours PRN Shortness of Breath and/or Wheezing  guaiFENesin   Syrup  (Sugar-Free) 100 milliGRAM(s) Oral every 6 hours PRN Cough  ondansetron Injectable 4 milliGRAM(s) IV Push once PRN Nausea and/or Vomiting      ALLERGIES:  Allergies    No Known Allergies    Intolerances        LABS:                        12.3   10.30 )-----------( 204      ( 29 Nov 2019 06:54 )             38.3     11-29    139  |  105  |  15  ----------------------------<  87  3.6   |  20<L>  |  0.91    Ca    9.3      29 Nov 2019 06:54          CAPILLARY BLOOD GLUCOSE          RADIOLOGY & ADDITIONAL TESTS: Reviewed. Interval HPI/overnight events: Patient was for discharge on 11/29 however reported R sided pleuritic chest pain with coughing and stayed for monitoring overnight. No acute events reported overnight. Patient seen and examined at bedside, no acute complaints. Still reports cough but isn't bring up any sputum. Denies fever, chills, chest pain, palpitations, abdominal pain, nausea/vomiting, diarrhea, constipation.    VITAL SIGNS:  Vital Signs Last 24 Hrs  T(C): 37.4 (30 Nov 2019 05:26), Max: 37.4 (30 Nov 2019 05:26)  T(F): 99.4 (30 Nov 2019 05:26), Max: 99.4 (30 Nov 2019 05:26)  HR: 75 (30 Nov 2019 05:26) (75 - 78)  BP: 163/83 (30 Nov 2019 05:26) (138/77 - 163/83)  BP(mean): --  RR: 18 (30 Nov 2019 05:26) (16 - 18)  SpO2: 97% (30 Nov 2019 05:26) (95% - 97%)    PHYSICAL EXAM:  General: elderly female, in NAD  HEENT: normocephalic, atraumatic; PERRL, anicteric sclera; MMM  Neck: supple, no JVD, no thyromegaly, no lymphadenopathy  Cardiovascular: +S1/S2, RRR, no M/G/R  Respiratory: bibasilar crackles L>R; no wheezing, no rales, no rhonchi  Gastrointestinal: soft, NT/ND; +BSx4, no organomegaly  Extremities: WWP; no edema, clubbing or cyanosis  Vascular: 2+ radial, DP/PT pulses B/L  Neurological: AAOx3; no focal deficits; patient ambulating to the bathroom, no gait abnormalities    MEDICATIONS:  MEDICATIONS  (STANDING):  amLODIPine   Tablet 5 milliGRAM(s) Oral daily  aspirin enteric coated 81 milliGRAM(s) Oral daily  ATENolol  Tablet 25 milliGRAM(s) Oral daily  atorvastatin 20 milliGRAM(s) Oral at bedtime  budesonide 160 MICROgram(s)/formoterol 4.5 MICROgram(s) Inhaler 2 Puff(s) Inhalation two times a day  dorzolamide 2% Ophthalmic Solution 1 Drop(s) Both EYES every 8 hours  enoxaparin Injectable 40 milliGRAM(s) SubCutaneous every 24 hours  furosemide    Tablet 20 milliGRAM(s) Oral daily  latanoprost 0.005% Ophthalmic Solution 1 Drop(s) Both EYES at bedtime  levoFLOXacin  Tablet 750 milliGRAM(s) Oral once  montelukast 10 milliGRAM(s) Oral daily    MEDICATIONS  (PRN):  acetaminophen   Tablet .. 650 milliGRAM(s) Oral every 6 hours PRN Temp greater or equal to 38C (100.4F), Mild Pain (1 - 3)  ALBUTerol    90 MICROgram(s) HFA Inhaler 2 Puff(s) Inhalation every 6 hours PRN Shortness of Breath and/or Wheezing  guaiFENesin   Syrup  (Sugar-Free) 100 milliGRAM(s) Oral every 6 hours PRN Cough  ondansetron Injectable 4 milliGRAM(s) IV Push once PRN Nausea and/or Vomiting      ALLERGIES:  Allergies    No Known Allergies    Intolerances        LABS:                        12.3   10.30 )-----------( 204      ( 29 Nov 2019 06:54 )             38.3     11-29    139  |  105  |  15  ----------------------------<  87  3.6   |  20<L>  |  0.91    Ca    9.3      29 Nov 2019 06:54    CAPILLARY BLOOD GLUCOSE    RADIOLOGY & ADDITIONAL TESTS: Reviewed.

## 2019-11-30 NOTE — PROGRESS NOTE ADULT - PROBLEM SELECTOR PLAN 2
RESOLVED. Pt w/ 3 d hx of diarrhea, CT abdomen w/ oral contrast shows resolving colitis and diverticulosis present. C diff panel negative. GI stool PCR negative. RESOLVED. Pt w/ 3 d hx of diarrhea, CT abdomen w/ oral contrast shows resolving colitis and diverticulosis present.   -C diff panel negative  -GI stool PCR negative

## 2019-11-30 NOTE — PROGRESS NOTE ADULT - PROBLEM SELECTOR PLAN 1
Patient with cough, and increased left lower lobe consolidation on CT imaging. Pt without recent antibiotic use within last 90 days. RVP negative  - s/p ceftriaxone 1g  (11/27-11/29) and azithromycin 500mg qd PO x3 day (11/27-11/29)  - switched to Levaquin 750mg PO qd x 3 days  - incentive spirometry
Patient with cough, and increased left lower lobe consolidation on CT imaging. Pt without recent antibiotic use within last 90 days. RVP negative  - c/w IV ceftriaxone 1g x 5 day course (11/27-12/1) and azithromycin 500mg qd PO x3 day (11/27-11/29)

## 2019-11-30 NOTE — PROGRESS NOTE ADULT - PROBLEM SELECTOR PLAN 4
Currently not wheezing, not requiring oxygen at home  - c/w proair Q6 prn sob or wheezing  - c/w symbicort therapeutic interchange for advair
Currently not wheezing, not requiring oxygen at home  - c/w proair Q6 prn sob or wheezing  - c/w symbicort therapeutic interchange for advair

## 2019-11-30 NOTE — PROGRESS NOTE ADULT - PROBLEM SELECTOR PLAN 3
-pt w/ hx of CAD and sick sinus syndrome  -c/w aspirin 81 mg qd  -c/w atenolol 25 mg qd    #HTN  - sBP 160s this morning  - resuming home amlodipine 5mg qd and lasix 20mg qd (pt with bibasilar crackles this morning s/p IV NS overnight) Hx of CAD and sick sinus syndrome  -c/w aspirin 81 mg qd  -c/w atenolol 25 mg qd    #HTN  - c/w home amlodipine 5mg qd and lasix 20mg qd

## 2019-11-30 NOTE — PROGRESS NOTE ADULT - PROBLEM SELECTOR PLAN 8
1) PCP Contacted on Admission: (Y/N) --> Name & Phone #: Dr. iSnclair is following patient currently.  2) Date of Contact with PCP:  3) PCP Contacted at Discharge: (Y/N, N/A)  4) Summary of Handoff Given to PCP:   5) Post-Discharge Appointment Date and Location:
1) PCP Contacted on Admission: (Y/N) --> Name & Phone #: Dr. Sinclair is following patient currently.  2) Date of Contact with PCP:  3) PCP Contacted at Discharge: (Y/N, N/A)  4) Summary of Handoff Given to PCP:   5) Post-Discharge Appointment Date and Location:

## 2019-11-30 NOTE — PROGRESS NOTE ADULT - PROBLEM SELECTOR PLAN 5
-no residual deficits  -c/w lipitor 20 mg daily Hx of CVA, no residual deficits  -c/w ASA 81mg qd  -c/w lipitor 20 mg daily

## 2019-11-30 NOTE — PROGRESS NOTE ADULT - ATTENDING COMMENTS
Patient seen and examined with house-staff during bedside rounds.  Resident note read, including vitals, physical findings, laboratory data, and radiological reports.   Revisions included below.  Direct personal management at bed side and extensive interpretation of the data.  Plan was outlined and discussed in details with the housestaff.  Decision making of high complexity  Action taken for acute disease activity to reflect the level of care provided:  - medication reconciliation  - review laboratory data  she improved from yesterday  follow on urine legionella antigen  for CT scan abdomen  temperature curve decraesed  continue antibiotics
Patient seen and examined; Will be discharged today;  Stable

## 2019-11-30 NOTE — PROGRESS NOTE ADULT - PROBLEM SELECTOR PLAN 6
-on home dorzolamide, latanoprost, and brimonidine. c/w brimonidine and latanoprost therapeutic interchange -on home dorzolamide, latanoprost, and brimonidine.   -c/w brimonidine and latanoprost therapeutic interchange

## 2019-11-30 NOTE — PROGRESS NOTE ADULT - PROBLEM SELECTOR PLAN 7
F-none  E-replete K<4 and Mag <2  N- DASH TLC diet  lovenox for DVT prophylaxis   dispo to [facility]  FULL code F-none  E-replete K<4 and Mag <2  N- DASH/TLC diet  lovenox for DVT prophylaxis     Dispo: home  FULL code

## 2019-12-02 RX ORDER — FLUTICASONE PROPIONATE AND SALMETEROL 250; 50 UG/1; UG/1
250-50 POWDER RESPIRATORY (INHALATION)
Qty: 1 | Refills: 1 | Status: DISCONTINUED | COMMUNITY
Start: 2018-07-25 | End: 2019-12-02

## 2019-12-02 RX ORDER — DICLOFENAC SODIUM 10 MG/G
1 GEL TOPICAL DAILY
Qty: 1 | Refills: 2 | Status: DISCONTINUED | COMMUNITY
Start: 2019-05-24 | End: 2019-12-02

## 2019-12-02 RX ORDER — IBUPROFEN 400 MG/1
400 TABLET ORAL
Qty: 30 | Refills: 0 | Status: DISCONTINUED | COMMUNITY
Start: 2018-07-09 | End: 2019-12-02

## 2019-12-04 ENCOUNTER — APPOINTMENT (OUTPATIENT)
Dept: CARE COORDINATION | Facility: HOME HEALTH | Age: 84
End: 2019-12-04
Payer: MEDICARE

## 2019-12-04 VITALS
BODY MASS INDEX: 24.73 KG/M2 | WEIGHT: 163.14 LBS | OXYGEN SATURATION: 97 % | RESPIRATION RATE: 16 BRPM | HEART RATE: 78 BPM | SYSTOLIC BLOOD PRESSURE: 130 MMHG | DIASTOLIC BLOOD PRESSURE: 70 MMHG | HEIGHT: 68 IN

## 2019-12-04 DIAGNOSIS — J18.9 PNEUMONIA, UNSPECIFIED ORGANISM: ICD-10-CM

## 2019-12-04 DIAGNOSIS — Z85.118 PERSONAL HISTORY OF OTHER MALIGNANT NEOPLASM OF BRONCHUS AND LUNG: ICD-10-CM

## 2019-12-04 DIAGNOSIS — Z86.73 PERSONAL HISTORY OF TRANSIENT ISCHEMIC ATTACK (TIA), AND CEREBRAL INFARCTION WITHOUT RESIDUAL DEFICITS: ICD-10-CM

## 2019-12-04 DIAGNOSIS — Z95.0 PRESENCE OF CARDIAC PACEMAKER: ICD-10-CM

## 2019-12-04 DIAGNOSIS — N18.3 CHRONIC KIDNEY DISEASE, STAGE 3 (MODERATE): ICD-10-CM

## 2019-12-04 DIAGNOSIS — Z86.19 PERSONAL HISTORY OF OTHER INFECTIOUS AND PARASITIC DISEASES: ICD-10-CM

## 2019-12-04 DIAGNOSIS — H40.9 UNSPECIFIED GLAUCOMA: ICD-10-CM

## 2019-12-04 DIAGNOSIS — Z90.2 ACQUIRED ABSENCE OF LUNG [PART OF]: ICD-10-CM

## 2019-12-04 DIAGNOSIS — Z90.710 ACQUIRED ABSENCE OF BOTH CERVIX AND UTERUS: ICD-10-CM

## 2019-12-04 DIAGNOSIS — I49.5 SICK SINUS SYNDROME: ICD-10-CM

## 2019-12-04 DIAGNOSIS — I42.9 CARDIOMYOPATHY, UNSPECIFIED: ICD-10-CM

## 2019-12-04 DIAGNOSIS — J44.9 CHRONIC OBSTRUCTIVE PULMONARY DISEASE, UNSPECIFIED: ICD-10-CM

## 2019-12-04 DIAGNOSIS — I25.10 ATHEROSCLEROTIC HEART DISEASE OF NATIVE CORONARY ARTERY WITHOUT ANGINA PECTORIS: ICD-10-CM

## 2019-12-04 DIAGNOSIS — I12.9 HYPERTENSIVE CHRONIC KIDNEY DISEASE WITH STAGE 1 THROUGH STAGE 4 CHRONIC KIDNEY DISEASE, OR UNSPECIFIED CHRONIC KIDNEY DISEASE: ICD-10-CM

## 2019-12-04 DIAGNOSIS — K52.9 NONINFECTIVE GASTROENTERITIS AND COLITIS, UNSPECIFIED: ICD-10-CM

## 2019-12-04 DIAGNOSIS — E78.5 HYPERLIPIDEMIA, UNSPECIFIED: ICD-10-CM

## 2019-12-04 DIAGNOSIS — K57.90 DIVERTICULOSIS OF INTESTINE, PART UNSPECIFIED, WITHOUT PERFORATION OR ABSCESS WITHOUT BLEEDING: ICD-10-CM

## 2019-12-04 DIAGNOSIS — Z90.49 ACQUIRED ABSENCE OF OTHER SPECIFIED PARTS OF DIGESTIVE TRACT: ICD-10-CM

## 2019-12-04 PROCEDURE — 99349 HOME/RES VST EST MOD MDM 40: CPT

## 2019-12-04 NOTE — PHYSICAL EXAM
[Well Nourished] : well nourished [No Acute Distress] : no acute distress [Well Developed] : well developed [Normal Outer Ear/Nose] : the outer ears and nose were normal in appearance [EOMI] : extraocular movements intact [No JVD] : no jugular venous distention [Supple] : supple [No Respiratory Distress] : no respiratory distress  [Regular Rhythm] : with a regular rhythm [Clear to Auscultation] : lungs were clear to auscultation bilaterally [No Accessory Muscle Use] : no accessory muscle use [Normal Rate] : normal rate  [Normal S1, S2] : normal S1 and S2 [No Edema] : there was no peripheral edema [Non-distended] : non-distended [Soft] : abdomen soft [Non Tender] : non-tender [Normal Gait] : normal gait [No Rash] : no rash [No CVA Tenderness] : no CVA  tenderness [Normal Affect] : the affect was normal [No Focal Deficits] : no focal deficits [Normal Insight/Judgement] : insight and judgment were intact

## 2019-12-04 NOTE — ASSESSMENT
[FreeTextEntry1] : 83 year old female Malawian speaking w/ CVA, copd/asthma, lung ca (s/p resection in remission), CAD, CKD, HTN, HLD, cardiomyopathy w/ PPM, vertigo, syphilis post treatment, and glaucoma who presents due to 3 days of abdominal pain and diarrhea, found to have LLL consolidation, being treated for CAP.  The patient was discharged home in stable condition.\par \par

## 2019-12-04 NOTE — PLAN
[FreeTextEntry1] : CV and pulmonary status stable\par Adhere to all medications including demonstrating proper use of inhalers and nebulizers.\par Increase activity as tolerated and maintain optimal activity levels. Continue coughing and deep breathing exercises including use of Incentive Spirometry.\par Receive routine pneumococcal and influenza vaccinations.\par Maintain proper nutrition and adequate hydration.\par Notify NP for worsening symptoms including fever, chills, SOB, CP, increased cough and secretions.\par Follow up with Dr. Sinclair as scheduled\par Reviewed identifying signs and symptoms of CHF (hx of cardiomyopathy)\par Enforced with patient need for daily weights. \par Advised to call for an increase of greater than 2 lbs in a day or 5 pounds in a week. \par Adhere to low salt diet and educated patient on foods that should be avoided such as processed or fast food. Limit fluids to 1 liter a day which is 4-5 glasses. Continue cardiac medications as ordered.  \par Follow up with Cardiologist as scheduled.\par The daughter is requesting referral to [facility] Complex Care Management program\par Reminded patient about program, role of care navigator, clinical call center, yellow card with contact information.  Advised to call with any questions/concerns, verbalized understanding.\par \par  \par \par \par

## 2019-12-04 NOTE — HISTORY OF PRESENT ILLNESS
[Post-hospitalization from ___ Hospital] : Post-hospitalization from [unfilled] Hospital [Discharged on ___] : discharged on [unfilled] [Admitted on: ___] : The patient was admitted on [unfilled] [Discharge Summary] : discharge summary [Pertinent Labs] : pertinent labs [Radiology Findings] : radiology findings [Discharge Med List] : discharge medication list [Patient Contacted By: ____] : and contacted by [unfilled] [FreeTextEntry2] : This patient is Enrolled in the [employer] Program through [employer]\par \par Copied As per [facility] Discharge Summary\par 83 year old female Slovak speaking w/ CVA, copd/asthma, lung ca (s/p resection in remission), CAD, CKD, HTN, HLD, cardiomyopathy w/ PPM, vertigo, syphilis post treatment, and glaucoma who presents due to 3 days of abdominal pain and diarrhea, found to have LLL consolidation, being treated for CAP.  The patient was discharged home in stable condition.\par \par I visited the patient at her home today 12/4/2019 accompanied by her daughter Joan, PRABHA , cousin and dog [place].  The patient denies chest pain, shortness of breath, cough, hemoptysis, fever, palpitations, syncope.  She feels well with improved cough.  \par \par Of note: the patient and daughter decline . Patient prefer the daughter interpret for her. \par \par \par

## 2019-12-04 NOTE — COUNSELING
[Fall prevention counseling provided] : Fall prevention counseling provided [No throw rugs] : No throw rugs [Adequate lighting] : Adequate lighting [Use proper foot wear] : Use proper foot wear [Use recommended devices] : Use recommended devices [Behavioral health counseling provided] : Behavioral health counseling provided [Sleep ___ hours/day] : Sleep [unfilled] hours/day [Engage in a relaxing activity] : Engage in a relaxing activity [Plan in advance] : Plan in advance [None] : None [Good understanding] : Patient has a good understanding of lifestyle changes and steps needed to achieve self management goal

## 2019-12-04 NOTE — HEALTH RISK ASSESSMENT
[No] : No [Assistive Device] : Patient uses an assistive device [No falls in past year] : Patient reported no falls in the past year [0] : 2) Feeling down, depressed, or hopeless: Not at all (0) [General Adherence] : general adherence [Low Salt Diet] : low salt [None] : None [Feels Safe at Home] : Feels safe at home [Alone] : lives alone [Fully functional (using the telephone, shopping, preparing meals, housekeeping, doing laundry, using] : Fully functional and needs no help or supervision to perform IADLs (using the telephone, shopping, preparing meals, housekeeping, doing laundry, using transportation, managing medications and managing finances) [Fully functional (bathing, dressing, toileting, transferring, walking, feeding)] : Fully functional (bathing, dressing, toileting, transferring, walking, feeding) [] : No [de-identified] : Cane [Change in mental status noted] : No change in mental status noted [Handling Complex Tasks] : denies difficulty handling complex tasks [Behavior] : denies difficulty with behavior [Language] : denies difficulty with language [Reasoning] : denies difficulty with reasoning [de-identified] : HHA assists the patient [de-identified] : HHA assists the patient [FreeTextEntry3] : Daughter involved with her care - Joan

## 2019-12-12 ENCOUNTER — APPOINTMENT (OUTPATIENT)
Dept: PULMONOLOGY | Facility: CLINIC | Age: 84
End: 2019-12-12
Payer: MEDICARE

## 2019-12-12 VITALS
HEART RATE: 77 BPM | DIASTOLIC BLOOD PRESSURE: 70 MMHG | SYSTOLIC BLOOD PRESSURE: 110 MMHG | BODY MASS INDEX: 23.49 KG/M2 | HEIGHT: 68 IN | TEMPERATURE: 98.3 F | WEIGHT: 155 LBS | OXYGEN SATURATION: 97 %

## 2019-12-12 PROCEDURE — 99214 OFFICE O/P EST MOD 30 MIN: CPT

## 2019-12-12 NOTE — HISTORY OF PRESENT ILLNESS
[Difficulty Breathing During Exertion] : improved dyspnea on exertion [Feelings Of Weakness On Exertion] : improved exercise intolerance [Cough] : improved coughing [Wheezing] : denies wheezing [Regional Soft Tissue Swelling Both Lower Extremities] : denies lower extremity edema [Chest Pain Or Discomfort] : denies chest pain [Fever] : denies fever [2  -  Slight] : 2, slight [Class II - Mild Symptoms and Slight Limitations] : II [___ Times a Week] : [unfilled] time(s) a week [None] : None [Adherent] : the patient is adherent with ~his/her~ medication regimen [Former] : is a former smoker [Wt Gain ___ Lbs] : no recent weight gain [Oxygen] : the patient uses no supplemental oxygen [Wt Loss ___ Lbs] : no recent weight loss [Good Control] : peak flow has been poor [More Frequent Use Needed Recently] : Patient reports no recent increase in frequency of [FreeTextEntry1] : she is doing well after she left the hospital.  She has more energy, sleeping well, and has good appetite.  She is walking a little but not sob.  Little bit cough.  No sputum.  No fever.  She finished the antibiotic.  She is taking her inhaler.  [Side Effects] : ~He/She~ denies medication side effects

## 2019-12-12 NOTE — PHYSICAL EXAM
[General Appearance - Well Developed] : well developed [Normal Appearance] : normal appearance [Well Groomed] : well groomed [General Appearance - Well Nourished] : well nourished [No Deformities] : no deformities [Normal Conjunctiva] : the conjunctiva exhibited no abnormalities [General Appearance - In No Acute Distress] : no acute distress [Eyelids - No Xanthelasma] : the eyelids demonstrated no xanthelasmas [Normal Oropharynx] : normal oropharynx [Neck Appearance] : the appearance of the neck was normal [Neck Cervical Mass (___cm)] : no neck mass was observed [Jugular Venous Distention Increased] : there was no jugular-venous distention [Thyroid Diffuse Enlargement] : the thyroid was not enlarged [Thyroid Nodule] : there were no palpable thyroid nodules [Heart Rate And Rhythm] : heart rate and rhythm were normal [Heart Sounds] : normal S1 and S2 [Murmurs] : no murmurs present [Exaggerated Use Of Accessory Muscles For Inspiration] : no accessory muscle use [Respiration, Rhythm And Depth] : normal respiratory rhythm and effort [Abnormal Walk] : normal gait [Gait - Sufficient For Exercise Testing] : the gait was sufficient for exercise testing [Nail Clubbing] : no clubbing of the fingernails [Cyanosis, Localized] : no localized cyanosis [Petechial Hemorrhages (___cm)] : no petechial hemorrhages [] : no ischemic changes [Deep Tendon Reflexes (DTR)] : deep tendon reflexes were 2+ and symmetric [No Focal Deficits] : no focal deficits [Sensation] : the sensory exam was normal to light touch and pinprick [FreeTextEntry1] : Bilateral crackles at the bases

## 2019-12-12 NOTE — ASSESSMENT
[FreeTextEntry1] : Bronchial asthma\par \par The asthma is controlled. She did not required any rescue dose short acting beta agonists nor steroids   She is to continue on the Advair. Baseline oxygen saturation was normal.\par \par - Pt is currently taking Advair once a day with Singulair.  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 No clinical evidence of metastatic disease. Reviewed CT scan \par interval resolution of 6 mm nodule in the RUL. However waking and waning tree-in-bud micronodule.  There is some progression in the RLL consolidation/atelectasis as well as traction bronchiectasis.   For repeat CT scan after resolution of PNA\par \par Left lower lobe pneumonia\par \par Patient was recently admitted with left lower lobe pneumonia. The patient clinically improved. The patient will follow up chest x-ray in 6 weeks to confirm resolution of the infiltrate. Patient is off antibiotics\par

## 2019-12-15 LAB
ALBUMIN SERPL ELPH-MCNC: 4 G/DL
ALP BLD-CCNC: 104 U/L
ALT SERPL-CCNC: 20 U/L
ANION GAP SERPL CALC-SCNC: 12 MMOL/L
AST SERPL-CCNC: 26 U/L
BASOPHILS # BLD AUTO: 0.03 K/UL
BASOPHILS NFR BLD AUTO: 0.7 %
BILIRUB SERPL-MCNC: 0.4 MG/DL
BUN SERPL-MCNC: 16 MG/DL
CALCIUM SERPL-MCNC: 10.1 MG/DL
CHLORIDE SERPL-SCNC: 105 MMOL/L
CO2 SERPL-SCNC: 25 MMOL/L
CREAT SERPL-MCNC: 1.07 MG/DL
EOSINOPHIL # BLD AUTO: 0.01 K/UL
EOSINOPHIL NFR BLD AUTO: 0.2 %
GLUCOSE SERPL-MCNC: 101 MG/DL
HCT VFR BLD CALC: 40.3 %
HGB BLD-MCNC: 13 G/DL
IMM GRANULOCYTES NFR BLD AUTO: 0.2 %
LYMPHOCYTES # BLD AUTO: 1.03 K/UL
LYMPHOCYTES NFR BLD AUTO: 24.1 %
MAN DIFF?: NORMAL
MCHC RBC-ENTMCNC: 30 PG
MCHC RBC-ENTMCNC: 32.3 GM/DL
MCV RBC AUTO: 93.1 FL
MONOCYTES # BLD AUTO: 0.55 K/UL
MONOCYTES NFR BLD AUTO: 12.9 %
NEUTROPHILS # BLD AUTO: 2.65 K/UL
NEUTROPHILS NFR BLD AUTO: 61.9 %
PLATELET # BLD AUTO: 323 K/UL
POTASSIUM SERPL-SCNC: 4.3 MMOL/L
PROT SERPL-MCNC: 7.1 G/DL
RBC # BLD: 4.33 M/UL
RBC # FLD: 14.9 %
SODIUM SERPL-SCNC: 142 MMOL/L
WBC # FLD AUTO: 4.28 K/UL

## 2019-12-17 ENCOUNTER — RX RENEWAL (OUTPATIENT)
Age: 84
End: 2019-12-17

## 2019-12-20 ENCOUNTER — APPOINTMENT (OUTPATIENT)
Dept: PULMONOLOGY | Facility: CLINIC | Age: 84
End: 2019-12-20

## 2020-01-01 NOTE — ED PROVIDER NOTE - CPE EDP [facility] NORM
[FreeTextEntry1] : Slow weight gain. Feedings discussed at length. Reflux discussed. Follow up if spitting becomes worse. Increase feedings as tolerated. Return in 4 weeks. Sooner if any concerns. [] : The components of the vaccine(s) to be administered today are listed in the plan of care. The disease(s) for which the vaccine(s) are intended to prevent and the risks have been discussed with the caretaker.  The risks are also included in the appropriate vaccination information statements which have been provided to the patient's caregiver.  The caregiver has given consent to vaccinate. normal...

## 2020-01-10 ENCOUNTER — APPOINTMENT (OUTPATIENT)
Dept: NEPHROLOGY | Facility: CLINIC | Age: 85
End: 2020-01-10
Payer: MEDICARE

## 2020-01-10 VITALS — DIASTOLIC BLOOD PRESSURE: 60 MMHG | SYSTOLIC BLOOD PRESSURE: 100 MMHG | HEART RATE: 65 BPM | RESPIRATION RATE: 16 BRPM

## 2020-01-10 PROCEDURE — 99214 OFFICE O/P EST MOD 30 MIN: CPT

## 2020-01-10 NOTE — REVIEW OF SYSTEMS
[SOB on Exertion] : shortness of breath during exertion [As Noted in HPI] : as noted in HPI [Joint Pain] : joint pain [Negative] : Neurological

## 2020-01-13 NOTE — ASSESSMENT
[FreeTextEntry1] : 83yo Citizen of [place] speaking F accompanied by home aid with h/o CVA, copd/asthma, lung ca (s/p resection in remission), ischemic cardiomyopathy w/PPM, vertigo, syphilis post treatment, preDM, here for f/u of CKD, HTN, hypercalcemia and hyperkalemia:  \par \par # CKD II/III - Cr stable, advised to minimize NSAID use and control carb/sugar intake as diabetes has further effects on renal fx, Cr 1\par \par # HTN -  BP on low side today, no edema, 1 episode of light headedness on walking this past week after a bout of diarrhea. Advised to hold lasix and start checking BP daily at home, aide can do this, to 1/2 amlodipine as well if sbps persistently <120. Advised to watch her weight, Na intake and look out for edema, if signs of overload she's to restart lasix 20mg TIW instead of daily. \par \par # Hyperkalemia - has been normal most of the  year\par \par # Hypercalcemia - resolved\par \par RTC in 6 months for f/u

## 2020-01-13 NOTE — HISTORY OF PRESENT ILLNESS
[FreeTextEntry1] : 85yo Sudanese speaking F accompanied by home aid with h/o CVA, copd/asthma, lung ca (s/p resection in remission), ischemic cardiomyopathy w/PPM, vertigo, syphilis post treatment, preDM, here for f/u of CKD, HTN, hypercalcemia and hyperkalemia:  \par \par 1 episode of weakness and light headedness when walking with aide on Wed, this wa after several episode of diarrhea tuesday, all sx have resolved. Deneis fatigue, gevers, SOB, LE edema, cough, dysuria. \par \par Lives by herself in her home w home aid.\par

## 2020-01-13 NOTE — PHYSICAL EXAM
[General Appearance - Alert] : alert [Extraocular Movements] : extraocular movements were intact [Sclera] : the sclera and conjunctiva were normal [PERRL With Normal Accommodation] : pupils were equal in size, round, and reactive to light [General Appearance - In No Acute Distress] : in no acute distress [Neck Appearance] : the appearance of the neck was normal [Oropharynx] : the oropharynx was normal [Outer Ear] : the ears and nose were normal in appearance [Respiration, Rhythm And Depth] : normal respiratory rhythm and effort [Jugular Venous Distention Increased] : there was no jugular-venous distention [Heart Sounds] : normal S1 and S2 [Exaggerated Use Of Accessory Muscles For Inspiration] : no accessory muscle use [Heart Rate And Rhythm] : heart rate was normal and rhythm regular [Full Pulse] : the pedal pulses are present [Bowel Sounds] : normal bowel sounds [Abdomen Soft] : soft [Abdomen Tenderness] : non-tender [Involuntary Movements] : no involuntary movements were seen [Musculoskeletal - Swelling] : no joint swelling seen [Skin Color & Pigmentation] : normal skin color and pigmentation [Skin Turgor] : normal skin turgor [Cranial Nerves] : cranial nerves 2-12 were intact [] : no rash [No Focal Deficits] : no focal deficits [Oriented To Time, Place, And Person] : oriented to person, place, and time [FreeTextEntry1] : trace ankle edema [Impaired Insight] : insight and judgment were intact [Affect] : the affect was normal

## 2020-01-14 ENCOUNTER — APPOINTMENT (OUTPATIENT)
Dept: PULMONOLOGY | Facility: CLINIC | Age: 85
End: 2020-01-14

## 2020-01-23 ENCOUNTER — APPOINTMENT (OUTPATIENT)
Dept: INTERNAL MEDICINE | Facility: CLINIC | Age: 85
End: 2020-01-23
Payer: MEDICARE

## 2020-01-23 VITALS
DIASTOLIC BLOOD PRESSURE: 78 MMHG | OXYGEN SATURATION: 99 % | WEIGHT: 154 LBS | SYSTOLIC BLOOD PRESSURE: 131 MMHG | HEIGHT: 68 IN | HEART RATE: 72 BPM | TEMPERATURE: 97.8 F | BODY MASS INDEX: 23.34 KG/M2

## 2020-01-23 PROCEDURE — 99213 OFFICE O/P EST LOW 20 MIN: CPT | Mod: 25

## 2020-01-23 PROCEDURE — 36415 COLL VENOUS BLD VENIPUNCTURE: CPT

## 2020-01-23 NOTE — PHYSICAL EXAM
[No Acute Distress] : no acute distress [Well Nourished] : well nourished [Well Developed] : well developed [Well-Appearing] : well-appearing [Normal Sclera/Conjunctiva] : normal sclera/conjunctiva [PERRL] : pupils equal round and reactive to light [EOMI] : extraocular movements intact [Normal Outer Ear/Nose] : the outer ears and nose were normal in appearance [Normal Oropharynx] : the oropharynx was normal [No JVD] : no jugular venous distention [No Lymphadenopathy] : no lymphadenopathy [Supple] : supple [Thyroid Normal, No Nodules] : the thyroid was normal and there were no nodules present [No Respiratory Distress] : no respiratory distress  [No Accessory Muscle Use] : no accessory muscle use [Clear to Auscultation] : lungs were clear to auscultation bilaterally [Normal Rate] : normal rate  [Regular Rhythm] : with a regular rhythm [Normal S1, S2] : normal S1 and S2 [No Murmur] : no murmur heard [No Carotid Bruits] : no carotid bruits [No Abdominal Bruit] : a ~M bruit was not heard ~T in the abdomen [No Varicosities] : no varicosities [Pedal Pulses Present] : the pedal pulses are present [No Edema] : there was no peripheral edema [No Palpable Aorta] : no palpable aorta [No Extremity Clubbing/Cyanosis] : no extremity clubbing/cyanosis [Soft] : abdomen soft [Non Tender] : non-tender [Non-distended] : non-distended [No HSM] : no HSM [No Masses] : no abdominal mass palpated [Normal Bowel Sounds] : normal bowel sounds [Normal Posterior Cervical Nodes] : no posterior cervical lymphadenopathy [Normal Anterior Cervical Nodes] : no anterior cervical lymphadenopathy [No CVA Tenderness] : no CVA  tenderness [No Spinal Tenderness] : no spinal tenderness [No Joint Swelling] : no joint swelling [Grossly Normal Strength/Tone] : grossly normal strength/tone [No Rash] : no rash [Coordination Grossly Intact] : coordination grossly intact [No Focal Deficits] : no focal deficits [Deep Tendon Reflexes (DTR)] : deep tendon reflexes were 2+ and symmetric [Normal Gait] : normal gait [Normal Affect] : the affect was normal [Normal Insight/Judgement] : insight and judgment were intact

## 2020-01-23 NOTE — HISTORY OF PRESENT ILLNESS
[FreeTextEntry1] : No chief complaint; Patient Turkish speaking and at visit with A [de-identified] : Feels weak; Appetite not great

## 2020-01-23 NOTE — ASSESSMENT
[FreeTextEntry1] : Appears to have stable examination; Will get labs; No change in current medication;

## 2020-01-24 LAB
ALBUMIN SERPL ELPH-MCNC: 4 G/DL
ALP BLD-CCNC: 144 U/L
ALT SERPL-CCNC: 16 U/L
ANION GAP SERPL CALC-SCNC: 11 MMOL/L
AST SERPL-CCNC: 21 U/L
BASOPHILS # BLD AUTO: 0.02 K/UL
BASOPHILS NFR BLD AUTO: 0.5 %
BILIRUB SERPL-MCNC: 0.4 MG/DL
BUN SERPL-MCNC: 24 MG/DL
CALCIUM SERPL-MCNC: 10.4 MG/DL
CHLORIDE SERPL-SCNC: 104 MMOL/L
CO2 SERPL-SCNC: 26 MMOL/L
CREAT SERPL-MCNC: 1.18 MG/DL
EOSINOPHIL # BLD AUTO: 0.04 K/UL
EOSINOPHIL NFR BLD AUTO: 1 %
ESTIMATED AVERAGE GLUCOSE: 123 MG/DL
GLUCOSE SERPL-MCNC: 88 MG/DL
HBA1C MFR BLD HPLC: 5.9 %
HCT VFR BLD CALC: 40.1 %
HGB BLD-MCNC: 12.1 G/DL
IMM GRANULOCYTES NFR BLD AUTO: 0 %
LYMPHOCYTES # BLD AUTO: 1.29 K/UL
LYMPHOCYTES NFR BLD AUTO: 31.9 %
MAN DIFF?: NORMAL
MCHC RBC-ENTMCNC: 29.7 PG
MCHC RBC-ENTMCNC: 30.2 GM/DL
MCV RBC AUTO: 98.3 FL
MONOCYTES # BLD AUTO: 0.42 K/UL
MONOCYTES NFR BLD AUTO: 10.4 %
NEUTROPHILS # BLD AUTO: 2.28 K/UL
NEUTROPHILS NFR BLD AUTO: 56.2 %
PLATELET # BLD AUTO: 228 K/UL
POTASSIUM SERPL-SCNC: 4.7 MMOL/L
PROT SERPL-MCNC: 6.9 G/DL
RBC # BLD: 4.08 M/UL
RBC # FLD: 15 %
SODIUM SERPL-SCNC: 141 MMOL/L
T4 FREE SERPL-MCNC: 1.3 NG/DL
TSH SERPL-ACNC: 0.38 UIU/ML
WBC # FLD AUTO: 4.05 K/UL

## 2020-01-30 NOTE — PATIENT PROFILE ADULT - [facility]
Internal Medicine Discharge Summary  Note Author: Jovany Gutierrez M.D.       Name Gilberto Brown     1939   Age/Sex 80 y.o. male   MRN [id number]         Admit Date:  2020       Discharge Date:   2020    Service:   [facility] Internal Medicine [place] Team  Attending Physician(s):   Dr. Trammell       Senior Resident(s):   Dr. Gutierrez  Chente Resident(s):   Dr. Bernal  PCP: Christine Zamudio M.D.      Primary Diagnosis:   Community Acquired Pneumonia     Secondary Diagnoses:                Principal Problem:    Pneumonia POA: Yes      Overview: Hx of MAC and pseudomonas        Active Problems:    COPD exacerbation (HCC) POA: Yes    ESRD on dialysis (HCC) (Chronic) POA: Yes    Wegener's granulomatosis (HCC) (Chronic) POA: Yes    Elevated troponin POA: Unknown    Severe aortic stenosis POA: Unknown    Pseudomonas aeruginosa colonization (Chronic) POA: Yes  Resolved Problems:    * No resolved hospital problems. *      Hospital Summary (Brief Narrative):         Patient is a 80-year-old male with past medical history significant for A. 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 Sputum cultures positive for Pseudomonas for which he was treated with Zosyn/Azithromycin as recommended by infectious disease. Cultures ordered per Infectious recommendation(Pertussis, 1,3 beta D glucan, AFB, Legionella, Cryptococcal, histoplasmosis, urine strep pneumonia antigen). Complete culture results to be followed up by Primary Care physician. Transferred to floor in stable condition on 2020.     Patient underwent M,W,F dialysis as recommended by Dr. Corona from [facility].     Patient was started then discharged with Prednisone taper starting at 40 mg with recommendations from Rheumatology, Dr. Cintia Ariza  due to pneumonia in the setting of Wegners. He's been on 5 mg at home. He was also started on Midodrine for blood pressure support along with Famotidine for GI prophylaxis.     Patient was discharged in Hemodynamically stable condition with 3x's a week Home Health from [facility] for Physical and Occupational therapy. Patient required 2-3 Liters of Oxygen with ambulation which appears to baseline. He has oxygen at home. Advised to come back to Emergency room if oxygen requirement are above 4L or if any symptoms return.     Patient is to follow with  Ranjan regarding TAVR for Severe Aortic Stenosis.     Palliative was also consulted. DNR/DNI status was confirmed as per patient's wishes. Patient declined POLST at this time.     Home medications were restarted for chronic conditions.     Consultants:     Nephrology  Palliative  Intensivist      Imaging/ Testing:      EC-ECHOCARDIOGRAM COMPLETE W/O CONT   Final Result      DX-CHEST-PORTABLE (1 VIEW)   Final Result      1.  Patchy bibasilar infiltrate or atelectasis and probable small pleural effusions.   2.  Stable cardiomegaly.   3.  Evidence of old granulomatous disease.            Discharge Medications:         Medication Reconciliation: Completed       Medication List      START taking these medications      Instructions   famotidine 20 MG Tabs  Start taking on:  January 31, 2020  Commonly known as:  PEPCID   Take 0.5 Tabs by mouth every day.  Dose:  10 mg     midodrine 10 MG tablet  Commonly known as:  PROAMATINE   Take 1 Tab by mouth every 8 hours.  Dose:  10 mg        CHANGE how you take these medications      Instructions   * predniSONE 1 MG Tabs  What changed:  Another medication with the same name was added. Make sure you understand how and when to take each.  Commonly known as:  DELTASONE   Take 5 mg by mouth every day.  Dose:  5 mg     * predniSONE 10 MG Tabs  Start taking on:  January 30, 2020  What changed:  You were already taking a medication with the same  name, and this prescription was added. Make sure you understand how and when to take each.  Commonly known as:  DELTASONE   Take 3 Tabs by mouth every day for 3 days, THEN 2 Tabs every day for 3 days, THEN 1 Tab every day for 3 days, THEN 0.5 Tabs every day for 3 days.         * This list has 2 medication(s) that are the same as other medications prescribed for you. Read the directions carefully, and ask your doctor or other care provider to review them with you.            CONTINUE taking these medications      Instructions   albuterol 108 (90 Base) MCG/ACT Aers inhalation aerosol  Commonly known as:  ProAir HFA   Inhale 2 Puffs by mouth every 6 hours as needed for Shortness of Breath.  Dose:  2 Puff     Breo Ellipta 200-25 MCG/INH Aepb  Generic drug:  Fluticasone Furoate-Vilanterol   INHALE 1 PUFF BY MOUTH EVERY DAY. RINSE MOUTH AFTER USE  Dose:  1 Puff     CENTRUM SILVER PO   Take 1 Tab by mouth every day.  Dose:  1 Tab     DIALYVITE TABLET Tabs   Take 1 Tab by mouth every morning.  Dose:  1 Tab     epoetin lucina 73094 UNIT/ML Soln  Commonly known as:  EPOGEN/PROCRIT   Inject 0.5 mL as instructed every Monday, Wednesday, and Friday.  Dose:  5,000 Units     finasteride 5 MG Tabs  Commonly known as:  PROSCAR   Take 5 mg by mouth every morning.  Dose:  5 mg     Home Care Oxygen   Doctor's comments:  per medication reconciliation   Inhale 3 L/min by mouth Continuous.  Dose:  3 L/min     ipratropium-albuterol 0.5-2.5 (3) MG/3ML nebulizer solution  Commonly known as:  DUONEB   3 mL by Nebulization route every 6 hours as needed for Shortness of Breath.  Dose:  3 mL     metoprolol SR 25 MG Tb24  Commonly known as:  TOPROL XL   Take 25 mg by mouth 2 Times a Day.  Dose:  25 mg     rosuvastatin 20 MG Tabs  Commonly known as:  CRESTOR   Take 20 mg by mouth every day.  Dose:  20 mg     sevelamer 800 MG Tabs  Commonly known as:  RENAGEL   Take 800 mg by mouth 3 times a day.  Dose:  800 mg     tamsulosin 0.4 MG capsule  Commonly  known as:  FLOMAX   TAKE 1 CAPSULE BY MOUTH EVERY DAY              Disposition:   Home    Diet:   Healthy    Activity:   As tolerated    Instructions:      Please take medications as prescribed  Please follow up with Primary Care Physician  Please come back to Emergency room if symptoms return or oxygen requirement go up.      The patient was instructed to return to the ER in the event of worsening symptoms. I have counseled the patient on the importance of compliance and the patient has agreed to proceed with all medical recommendations and follow up plan indicated above.   The patient understands that all medications come with benefits and risks. Risks may include permanent injury or death and these risks can be minimized with close reassessment and monitoring.        Primary Care Provider:      Discharge summary faxed to primary care provider:  Completed  Copy of discharge summary given to the patient: Completed      Follow up appointment details :      Future Appointments   Date Time Provider Department Center   6/16/2020  2:30 PM DAVID Agustin None       Pending Studies:        Please follow up with Cultures ordered above    Time spent on discharge day patient visit, preparing discharge paperwork and arranging for patient follow up.    Summary of follow up issues:   Please follow up with Cultures ordered above    Discharge Time (Minutes) :    35 minutes  Hospital Course Type:  Inpatient Stay >2 midnights      Condition on Discharge  Stable  ______________________________________________________________________    Interval history/exam for day of discharge:    Pt was seen resting comfortably in bed  Denies any complaints at this time.     Review of Systems   Constitutional: Negative for chills and fever.   Respiratory: Negative for cough, hemoptysis and sputum production.    Cardiovascular: Negative for chest pain and palpitations.   Psychiatric/Behavioral: Negative for depression and suicidal  ideas.       Physical Exam  Constitutional:       Appearance: Normal appearance.   Cardiovascular:      Rate and Rhythm: Regular rhythm.      Pulses: Normal pulses.      Heart sounds: Normal heart sounds.   Pulmonary:      Effort: Pulmonary effort is normal. No respiratory distress.      Breath sounds: No wheezing.   Neurological:      Mental Status: He is alert and oriented to person, place, and time.      Cranial Nerves: No cranial nerve deficit.      Motor: No weakness.   Psychiatric:         Mood and Affect: Mood normal.         Thought Content: Thought content normal.         Most Recent Labs:    Lab Results   Component Value Date/Time    WBC 8.2 01/30/2020 04:11 AM    WBC 7.6 03/19/2012 12:00 AM    RBC 3.18 (L) 01/30/2020 04:11 AM    RBC 2.25 (LL) 03/19/2012 12:00 AM    HEMOGLOBIN 8.9 (L) 01/30/2020 04:11 AM    HEMATOCRIT 29.4 (L) 01/30/2020 04:11 AM    MCV 92.5 01/30/2020 04:11 AM     (H) 03/19/2012 12:00 AM    MCH 28.0 01/30/2020 04:11 AM    MCH 33.8 03/19/2012 12:00 AM    MCHC 30.3 (L) 01/30/2020 04:11 AM    MPV 9.3 01/30/2020 04:11 AM    NEUTSPOLYS 86.20 (H) 01/30/2020 04:11 AM    LYMPHOCYTES 4.90 (L) 01/30/2020 04:11 AM    MONOCYTES 6.70 01/30/2020 04:11 AM    EOSINOPHILS 0.50 01/30/2020 04:11 AM    BASOPHILS 0.20 01/30/2020 04:11 AM    HYPOCHROMIA 1+ 01/07/2020 11:40 AM    ANISOCYTOSIS 1+ 01/07/2020 11:40 AM      Lab Results   Component Value Date/Time    SODIUM 135 01/30/2020 04:11 AM    POTASSIUM 3.8 01/30/2020 04:11 AM    CHLORIDE 95 (L) 01/30/2020 04:11 AM    CO2 28 01/30/2020 04:11 AM    GLUCOSE 147 (H) 01/30/2020 04:11 AM    BUN 36 (H) 01/30/2020 04:11 AM    CREATININE 3.02 (H) 01/30/2020 04:11 AM    CREATININE 1.0 09/23/2008 09:36 AM      Lab Results   Component Value Date/Time    ALTSGPT 33 01/30/2020 04:11 AM    ASTSGOT 16 01/30/2020 04:11 AM    ALKPHOSPHAT 62 01/30/2020 04:11 AM    TBILIRUBIN 0.4 01/30/2020 04:11 AM    DBILIRUBIN <0.1 07/03/2017 05:15 PM    LIPASE 35 11/01/2012 08:36 AM     ALBUMIN 3.4 01/30/2020 04:11 AM    ALBUMIN 1.28 (L) 10/02/2011 04:30 AM    GLOBULIN 2.7 01/30/2020 04:11 AM    PREALBUMIN 19.7 01/15/2012 05:31 AM    INR 0.96 06/19/2017 06:08 PM    MACROCYTOSIS 1+ 01/07/2020 11:40 AM     Lab Results   Component Value Date/Time    PROTHROMBTM 13.1 06/19/2017 06:08 PM    INR 0.96 06/19/2017 06:08 PM         (4) no impairment

## 2020-02-24 ENCOUNTER — APPOINTMENT (OUTPATIENT)
Dept: RHEUMATOLOGY | Facility: CLINIC | Age: 85
End: 2020-02-24
Payer: MEDICARE

## 2020-02-24 ENCOUNTER — FORM ENCOUNTER (OUTPATIENT)
Age: 85
End: 2020-02-24

## 2020-02-24 VITALS
HEIGHT: 67 IN | OXYGEN SATURATION: 99 % | BODY MASS INDEX: 24.33 KG/M2 | HEART RATE: 77 BPM | WEIGHT: 155 LBS | SYSTOLIC BLOOD PRESSURE: 152 MMHG | DIASTOLIC BLOOD PRESSURE: 84 MMHG | TEMPERATURE: 97.9 F

## 2020-02-24 PROCEDURE — 99214 OFFICE O/P EST MOD 30 MIN: CPT | Mod: 25

## 2020-02-24 PROCEDURE — 20610 DRAIN/INJ JOINT/BURSA W/O US: CPT

## 2020-02-25 ENCOUNTER — APPOINTMENT (OUTPATIENT)
Dept: PULMONOLOGY | Facility: CLINIC | Age: 85
End: 2020-02-25
Payer: MEDICARE

## 2020-02-25 ENCOUNTER — OUTPATIENT (OUTPATIENT)
Dept: OUTPATIENT SERVICES | Facility: HOSPITAL | Age: 85
LOS: 1 days | End: 2020-02-25
Payer: MEDICARE

## 2020-02-25 VITALS
HEIGHT: 67 IN | WEIGHT: 155 LBS | DIASTOLIC BLOOD PRESSURE: 80 MMHG | BODY MASS INDEX: 24.33 KG/M2 | RESPIRATION RATE: 12 BRPM | HEART RATE: 68 BPM | TEMPERATURE: 99 F | SYSTOLIC BLOOD PRESSURE: 120 MMHG | OXYGEN SATURATION: 96 %

## 2020-02-25 DIAGNOSIS — Z98.890 OTHER SPECIFIED POSTPROCEDURAL STATES: Chronic | ICD-10-CM

## 2020-02-25 DIAGNOSIS — C34.90 MALIGNANT NEOPLASM OF UNSPECIFIED PART OF UNSPECIFIED BRONCHUS OR LUNG: Chronic | ICD-10-CM

## 2020-02-25 DIAGNOSIS — Z87.442 PERSONAL HISTORY OF URINARY CALCULI: Chronic | ICD-10-CM

## 2020-02-25 DIAGNOSIS — H26.9 UNSPECIFIED CATARACT: Chronic | ICD-10-CM

## 2020-02-25 DIAGNOSIS — Z95.0 PRESENCE OF CARDIAC PACEMAKER: Chronic | ICD-10-CM

## 2020-02-25 PROCEDURE — 71046 X-RAY EXAM CHEST 2 VIEWS: CPT | Mod: 26

## 2020-02-25 PROCEDURE — 71046 X-RAY EXAM CHEST 2 VIEWS: CPT

## 2020-02-25 PROCEDURE — 99214 OFFICE O/P EST MOD 30 MIN: CPT

## 2020-02-25 NOTE — REASON FOR VISIT
[Pulmonary Nodules] : pulmonary nodules [Sarcoidosis] : sarcoidosis [Follow-Up] : a follow-up visit [Asthma] : asthma [Lung Cancer] : lung cancer

## 2020-02-26 RX ADMIN — LIDOCAINE HYDROCHLORIDE %: 10 INJECTION, SOLUTION INFILTRATION; PERINEURAL at 00:00

## 2020-02-26 RX ADMIN — METHYLPREDNISOLONE ACETATE MG/ML: 40 INJECTION, SUSPENSION INTRA-ARTICULAR; INTRALESIONAL; INTRAMUSCULAR; SOFT TISSUE at 00:00

## 2020-02-26 NOTE — HISTORY OF PRESENT ILLNESS
[___ Month(s) Ago] : [unfilled] month(s) ago [FreeTextEntry1] : Office Visit 8/26/19:\par Doing well, notes improvement in her knee pain \par Only going once a week, home attendant says she is doing her exercises most days \par Using Voltaren gel \par Plan: Doing well, improvement of L knee pain and ROM. Gait improved also with PT. \par Continue PT and PRN Voltaren gel. \par \par Initial Visit: \par Has known knee OA of L knee - tricompartmental. XRs reviewed. \par Sometimes swells but rarely. \par Takes Tylonel and Ibuprofen for pain which usually helps \par Walks with a cane \par Plan: L knee OA on exam today. Attempted to drain this without any return of fluid, mostly mild soft tissue swelling and no palpable effusion. \par See procedure note. \par Voltaren gel and PT. \par  [Anorexia] : no anorexia [Weight Loss] : no weight loss [Malaise] : no malaise [Fever] : no fever [Chills] : no chills [Fatigue] : no fatigue [Depression] : no depression [Malar Facial Rash] : no malar facial rash [Skin Lesions] : no lesions [Skin Nodules] : no skin nodules [Oral Ulcers] : no oral ulcers [Shortness of Breath] : no shortness of breath [Dry Mouth] : no dry mouth [Falls] : no falls [Difficulty Standing] : no difficulty standing [Chest Pain] : no chest pain [Difficulty Walking] : no difficulty walking [Muscle Weakness] : no muscle weakness [Myalgias] : no myalgias [Visual Changes] : no visual changes [Muscle Cramping] : no muscle cramping [Muscle Spasms] : no muscle spasms [Dry Eyes] : no dry eyes [Eye Pain] : no eye pain [Eye Redness] : no eye redness

## 2020-02-26 NOTE — PHYSICAL EXAM
[General Appearance - Well Nourished] : well nourished [General Appearance - Alert] : alert [General Appearance - In No Acute Distress] : in no acute distress [General Appearance - Well Developed] : well developed [Sclera] : the sclera and conjunctiva were normal [General Appearance - Well-Appearing] : healthy appearing [Examination Of The Oral Cavity] : the lips and gums were normal [Outer Ear] : the ears and nose were normal in appearance [Respiration, Rhythm And Depth] : normal respiratory rhythm and effort [Nasal Cavity] : the nasal mucosa and septum were normal [Auscultation Breath Sounds / Voice Sounds] : lungs were clear to auscultation bilaterally [Heart Rate And Rhythm] : heart rate was normal and rhythm regular [Heart Sounds] : normal S1 and S2 [Bowel Sounds] : normal bowel sounds [Edema] : there was no peripheral edema [Abdomen Tenderness] : non-tender [Abdomen Soft] : soft [No Spinal Tenderness] : no spinal tenderness [Abnormal Walk] : normal gait [Motor Tone] : muscle strength and tone were normal [Musculoskeletal - Swelling] : no joint swelling seen [] : no rash [Oriented To Time, Place, And Person] : oriented to person, place, and time [FreeTextEntry1] : bilateral knee crepitus L>R. improved ROM of L knee.

## 2020-02-26 NOTE — PROCEDURE
[Today's Date:] : Date: [unfilled] [Soft Tissue Injection] : soft tissue injection was performed [Patient] : the patient [Risks] : risks [Benefits] : benefits [Alternatives] : alternatives [Therapeutic] : therapeutic [#1 Site: ______] : #1 site identified in the [unfilled] [___ ml Inj] : [unfilled] ~Uml [1%] : 1%  [Without Epi] : without epinephrine [Alcohol] : alcohol [22 gauge 1 inch] : A 22 gauge 1 inch needle was used [Chlorhexidine] : chlorhexidine [Depomedrol ___ mg] : Depomedrol [unfilled] mg [Reports Improvement in Symptoms] : reports improvement in symptoms [Tolerated Well] : the patient tolerated the procedure well [No Complications] : there were no complications

## 2020-02-27 NOTE — ASSESSMENT
[FreeTextEntry1] : # Pneumonia \par \par Patient with multiple recurrent pneumonias. Most recently in November with left lower lobe pneumonia treated with abx with improvement in her symptoms. \par Currently with cough, subjective fever and sputum production. this is concerning for ongoing process though she appears well , non toxic and with normal oxygen saturation. \par \par Plan:\par - Will obtain chest X-ray at this time. \par - if x-ray shows infiltrate, will treat with Abx and will conside swallow eval to rule out aspiration as cause of recurrent pneumonias. \par \par # History of left lower lobe adenoCa\par \par Plan:\par - Will obtain repeat CT scan during next visit  \par \par # Bronchial asthma\par \par The asthma is controlled. She did not required any rescue dose short acting beta agonists nor steroids   She is to continue on the Advair. Baseline oxygen saturation was normal.\par \par - Pt is currently taking Advair once a day with Singulair.  \par - Rarely using her TIMI and not on prednisone \par - May consider de-escalating asthma control to TIMI only given increased risk of pneumonia with ICS. \par \par # Sarcoidosis\par \par - Stable and no indication for systemic steroids at this point.\par \par \par

## 2020-02-27 NOTE — PHYSICAL EXAM
[No Acute Distress] : no acute distress [Normal Oropharynx] : normal oropharynx [Normal Appearance] : normal appearance [No Neck Mass] : no neck mass [Normal Rate/Rhythm] : normal rate/rhythm [No Murmurs] : no murmurs [Normal S1, S2] : normal s1, s2 [No Resp Distress] : no resp distress [No Abnormalities] : no abnormalities [Benign] : benign [Normal Gait] : normal gait [No Clubbing] : no clubbing [No Cyanosis] : no cyanosis [No Edema] : no edema [Normal Color/ Pigmentation] : normal color/ pigmentation [FROM] : FROM [Oriented x3] : oriented x3 [No Focal Deficits] : no focal deficits [Normal Affect] : normal affect [TextBox_[id number]] : Coarse Rhonchi on the Right lower lobe.

## 2020-02-27 NOTE — REVIEW OF SYSTEMS
[Fever] : fever [Cough] : cough [Sputum] : sputum [Negative] : Endocrine [Fatigue] : no fatigue [Chills] : no chills [Hemoptysis] : no hemoptysis [Poor Appetite] : no poor appetite [Frequent URIs] : no frequent URIs [Chest Tightness] : no chest tightness [Dyspnea] : no dyspnea [Pleuritic Pain] : no pleuritic pain [Wheezing] : no wheezing [A.M. Dry Mouth] : no a.m. dry mouth

## 2020-03-02 RX ORDER — LIDOCAINE HYDROCHLORIDE 10 MG/ML
1 INJECTION, SOLUTION INFILTRATION; PERINEURAL
Qty: 0 | Refills: 0 | Status: COMPLETED | OUTPATIENT
Start: 2020-02-26

## 2020-03-02 RX ORDER — METHYLPRED ACET/NACL,ISO-OS/PF 80 MG/ML
80 VIAL (ML) INJECTION
Qty: 1 | Refills: 0 | Status: COMPLETED | OUTPATIENT
Start: 2020-02-26

## 2020-03-04 NOTE — ED ADULT NURSE NOTE - NSFALLRSKINDICATORS_ED_ALL_ED
14.8   10.08 )-----------( 294      ( 16 Feb 2020 18:45 )             44.2   02-16    139  |  98  |  31.0<H>  ----------------------------<  177<H>  4.4   |  23.0  |  1.49<H>    Ca    9.2      16 Feb 2020 18:45    TPro  7.3  /  Alb  4.0  /  TBili  0.4  /  DBili  x   /  AST  24  /  ALT  15  /  AlkPhos  70  02-16 no

## 2020-04-12 NOTE — ED ADULT NURSE NOTE - CAS DISCH BELONGINGS RETURNED
Patient has some RUQ pain. Denies need for pain medication. Started IV protonix. EGD tomorrow is planned. Unsure of what time that will be. Patient aware and given handout.   Not applicable

## 2020-05-13 ENCOUNTER — APPOINTMENT (OUTPATIENT)
Dept: INTERNAL MEDICINE | Facility: CLINIC | Age: 85
End: 2020-05-13
Payer: MEDICARE

## 2020-05-13 VITALS
SYSTOLIC BLOOD PRESSURE: 130 MMHG | OXYGEN SATURATION: 98 % | HEIGHT: 67 IN | HEART RATE: 79 BPM | TEMPERATURE: 98.8 F | DIASTOLIC BLOOD PRESSURE: 79 MMHG | WEIGHT: 157 LBS | BODY MASS INDEX: 24.64 KG/M2

## 2020-05-13 DIAGNOSIS — R10.11 RIGHT UPPER QUADRANT PAIN: ICD-10-CM

## 2020-05-13 PROCEDURE — 99214 OFFICE O/P EST MOD 30 MIN: CPT | Mod: 25

## 2020-05-13 PROCEDURE — 36415 COLL VENOUS BLD VENIPUNCTURE: CPT

## 2020-05-13 NOTE — PHYSICAL EXAM
[No Acute Distress] : no acute distress [Well Nourished] : well nourished [Well Developed] : well developed [Normal Sclera/Conjunctiva] : normal sclera/conjunctiva [Well-Appearing] : well-appearing [EOMI] : extraocular movements intact [PERRL] : pupils equal round and reactive to light [Normal Outer Ear/Nose] : the outer ears and nose were normal in appearance [Normal Oropharynx] : the oropharynx was normal [No Lymphadenopathy] : no lymphadenopathy [No JVD] : no jugular venous distention [Supple] : supple [Thyroid Normal, No Nodules] : the thyroid was normal and there were no nodules present [No Respiratory Distress] : no respiratory distress  [Clear to Auscultation] : lungs were clear to auscultation bilaterally [No Accessory Muscle Use] : no accessory muscle use [Normal Rate] : normal rate  [No Murmur] : no murmur heard [Regular Rhythm] : with a regular rhythm [Normal S1, S2] : normal S1 and S2 [No Carotid Bruits] : no carotid bruits [No Abdominal Bruit] : a ~M bruit was not heard ~T in the abdomen [No Varicosities] : no varicosities [Pedal Pulses Present] : the pedal pulses are present [No Edema] : there was no peripheral edema [No Extremity Clubbing/Cyanosis] : no extremity clubbing/cyanosis [No Palpable Aorta] : no palpable aorta [Soft] : abdomen soft [Non-distended] : non-distended [No HSM] : no HSM [No Masses] : no abdominal mass palpated [Normal Posterior Cervical Nodes] : no posterior cervical lymphadenopathy [Normal Bowel Sounds] : normal bowel sounds [Normal Anterior Cervical Nodes] : no anterior cervical lymphadenopathy [No CVA Tenderness] : no CVA  tenderness [No Spinal Tenderness] : no spinal tenderness [No Joint Swelling] : no joint swelling [Grossly Normal Strength/Tone] : grossly normal strength/tone [Coordination Grossly Intact] : coordination grossly intact [No Rash] : no rash [Normal Gait] : normal gait [No Focal Deficits] : no focal deficits [Deep Tendon Reflexes (DTR)] : deep tendon reflexes were 2+ and symmetric [Normal Affect] : the affect was normal [de-identified] : RUQ pain [Normal Insight/Judgement] : insight and judgment were intact

## 2020-05-13 NOTE — ASSESSMENT
[FreeTextEntry1] : As above patient with RUQ pain; Unclear etiology; Her lungs sound clear; Has history of previous gallbladder surgery but will need CT of Abdomen and Pelvis to rule out retained stones or a kidney stone; To obtain labs also and urine

## 2020-05-13 NOTE — HISTORY OF PRESENT ILLNESS
[FreeTextEntry1] : Patient with chief complaint of RUQ pain for weeks [de-identified] : In addition has not had any nausea or vomiting; Has a history of previous Gallbladder surgery; Does not look toxix

## 2020-05-15 LAB
ALBUMIN SERPL ELPH-MCNC: 4.1 G/DL
ALP BLD-CCNC: 146 U/L
ALT SERPL-CCNC: 15 U/L
ANION GAP SERPL CALC-SCNC: 13 MMOL/L
APPEARANCE: CLEAR
AST SERPL-CCNC: 22 U/L
BASOPHILS # BLD AUTO: 0.02 K/UL
BASOPHILS NFR BLD AUTO: 0.4 %
BILIRUB SERPL-MCNC: 0.3 MG/DL
BILIRUBIN URINE: NEGATIVE
BLOOD URINE: NEGATIVE
BUN SERPL-MCNC: 14 MG/DL
CALCIUM SERPL-MCNC: 10.3 MG/DL
CHLORIDE SERPL-SCNC: 105 MMOL/L
CO2 SERPL-SCNC: 24 MMOL/L
COLOR: YELLOW
CREAT SERPL-MCNC: 1.04 MG/DL
EOSINOPHIL # BLD AUTO: 0.01 K/UL
EOSINOPHIL NFR BLD AUTO: 0.2 %
GLUCOSE QUALITATIVE U: NEGATIVE
GLUCOSE SERPL-MCNC: 130 MG/DL
HCT VFR BLD CALC: 41.9 %
HGB BLD-MCNC: 13.1 G/DL
IMM GRANULOCYTES NFR BLD AUTO: 0.2 %
KETONES URINE: NEGATIVE
LEUKOCYTE ESTERASE URINE: NEGATIVE
LYMPHOCYTES # BLD AUTO: 1.17 K/UL
LYMPHOCYTES NFR BLD AUTO: 24.4 %
MAN DIFF?: NORMAL
MCHC RBC-ENTMCNC: 31 PG
MCHC RBC-ENTMCNC: 31.3 GM/DL
MCV RBC AUTO: 99.1 FL
MONOCYTES # BLD AUTO: 0.55 K/UL
MONOCYTES NFR BLD AUTO: 11.5 %
NEUTROPHILS # BLD AUTO: 3.04 K/UL
NEUTROPHILS NFR BLD AUTO: 63.3 %
NITRITE URINE: NEGATIVE
PH URINE: 7.5
PLATELET # BLD AUTO: 224 K/UL
POTASSIUM SERPL-SCNC: 5.1 MMOL/L
PROT SERPL-MCNC: 7 G/DL
PROTEIN URINE: NEGATIVE
RBC # BLD: 4.23 M/UL
RBC # FLD: 14.6 %
SODIUM SERPL-SCNC: 142 MMOL/L
SPECIFIC GRAVITY URINE: 1.01
UROBILINOGEN URINE: NORMAL
WBC # FLD AUTO: 4.8 K/UL

## 2020-06-19 ENCOUNTER — APPOINTMENT (OUTPATIENT)
Dept: INTERNAL MEDICINE | Facility: CLINIC | Age: 85
End: 2020-06-19
Payer: MEDICARE

## 2020-06-19 VITALS
HEART RATE: 72 BPM | WEIGHT: 154 LBS | HEIGHT: 67 IN | DIASTOLIC BLOOD PRESSURE: 77 MMHG | BODY MASS INDEX: 24.17 KG/M2 | TEMPERATURE: 99.2 F | OXYGEN SATURATION: 100 % | SYSTOLIC BLOOD PRESSURE: 136 MMHG

## 2020-06-19 PROCEDURE — 99213 OFFICE O/P EST LOW 20 MIN: CPT | Mod: 25

## 2020-06-19 PROCEDURE — 36415 COLL VENOUS BLD VENIPUNCTURE: CPT

## 2020-06-19 NOTE — PHYSICAL EXAM
[No Acute Distress] : no acute distress [Well Nourished] : well nourished [Well-Appearing] : well-appearing [Well Developed] : well developed [Normal Sclera/Conjunctiva] : normal sclera/conjunctiva [EOMI] : extraocular movements intact [PERRL] : pupils equal round and reactive to light [Normal Oropharynx] : the oropharynx was normal [Normal Outer Ear/Nose] : the outer ears and nose were normal in appearance [No JVD] : no jugular venous distention [No Lymphadenopathy] : no lymphadenopathy [Thyroid Normal, No Nodules] : the thyroid was normal and there were no nodules present [No Respiratory Distress] : no respiratory distress  [Supple] : supple [Normal Rate] : normal rate  [No Accessory Muscle Use] : no accessory muscle use [Clear to Auscultation] : lungs were clear to auscultation bilaterally [Regular Rhythm] : with a regular rhythm [Normal S1, S2] : normal S1 and S2 [No Murmur] : no murmur heard [No Abdominal Bruit] : a ~M bruit was not heard ~T in the abdomen [No Carotid Bruits] : no carotid bruits [Pedal Pulses Present] : the pedal pulses are present [No Varicosities] : no varicosities [No Edema] : there was no peripheral edema [No Palpable Aorta] : no palpable aorta [Non Tender] : non-tender [No Extremity Clubbing/Cyanosis] : no extremity clubbing/cyanosis [Soft] : abdomen soft [No Masses] : no abdominal mass palpated [Non-distended] : non-distended [Normal Bowel Sounds] : normal bowel sounds [No HSM] : no HSM [Normal Posterior Cervical Nodes] : no posterior cervical lymphadenopathy [Normal Anterior Cervical Nodes] : no anterior cervical lymphadenopathy [No CVA Tenderness] : no CVA  tenderness [No Joint Swelling] : no joint swelling [No Spinal Tenderness] : no spinal tenderness [No Rash] : no rash [Grossly Normal Strength/Tone] : grossly normal strength/tone [No Focal Deficits] : no focal deficits [Coordination Grossly Intact] : coordination grossly intact [Normal Gait] : normal gait [Deep Tendon Reflexes (DTR)] : deep tendon reflexes were 2+ and symmetric [Normal Affect] : the affect was normal [Normal Insight/Judgement] : insight and judgment were intact

## 2020-06-19 NOTE — HISTORY OF PRESENT ILLNESS
[FreeTextEntry1] : All studies reviewed; CT of Abdomen was non diagnostic; However there was a question of something in the right \par Chest at base [de-identified] : As above and patient without respiratory complaints

## 2020-06-22 LAB
ALBUMIN SERPL ELPH-MCNC: 4.5 G/DL
ALP BLD-CCNC: 145 U/L
ALT SERPL-CCNC: 16 U/L
ANION GAP SERPL CALC-SCNC: 17 MMOL/L
AST SERPL-CCNC: 22 U/L
BASOPHILS # BLD AUTO: 0.02 K/UL
BASOPHILS NFR BLD AUTO: 0.4 %
BILIRUB SERPL-MCNC: 0.4 MG/DL
BUN SERPL-MCNC: 10 MG/DL
CALCIUM SERPL-MCNC: 11 MG/DL
CHLORIDE SERPL-SCNC: 107 MMOL/L
CO2 SERPL-SCNC: 22 MMOL/L
CREAT SERPL-MCNC: 1.08 MG/DL
EOSINOPHIL # BLD AUTO: 0.04 K/UL
EOSINOPHIL NFR BLD AUTO: 0.9 %
ESTIMATED AVERAGE GLUCOSE: 123 MG/DL
GLUCOSE SERPL-MCNC: 94 MG/DL
HBA1C MFR BLD HPLC: 5.9 %
HCT VFR BLD CALC: 40.7 %
HGB BLD-MCNC: 12.2 G/DL
IMM GRANULOCYTES NFR BLD AUTO: 0 %
LYMPHOCYTES # BLD AUTO: 1.15 K/UL
LYMPHOCYTES NFR BLD AUTO: 25.7 %
MAN DIFF?: NORMAL
MCHC RBC-ENTMCNC: 30 GM/DL
MCHC RBC-ENTMCNC: 30.6 PG
MCV RBC AUTO: 102 FL
MONOCYTES # BLD AUTO: 0.52 K/UL
MONOCYTES NFR BLD AUTO: 11.6 %
NEUTROPHILS # BLD AUTO: 2.74 K/UL
NEUTROPHILS NFR BLD AUTO: 61.4 %
PLATELET # BLD AUTO: 226 K/UL
POTASSIUM SERPL-SCNC: 5.9 MMOL/L
PROT SERPL-MCNC: 7 G/DL
RBC # BLD: 3.99 M/UL
RBC # FLD: 14.3 %
SODIUM SERPL-SCNC: 146 MMOL/L
WBC # FLD AUTO: 4.47 K/UL

## 2020-06-25 ENCOUNTER — OUTPATIENT (OUTPATIENT)
Dept: OUTPATIENT SERVICES | Facility: HOSPITAL | Age: 85
LOS: 1 days | End: 2020-06-25
Payer: MEDICARE

## 2020-06-25 ENCOUNTER — APPOINTMENT (OUTPATIENT)
Dept: CT IMAGING | Facility: HOSPITAL | Age: 85
End: 2020-06-25
Payer: MEDICARE

## 2020-06-25 DIAGNOSIS — Z98.890 OTHER SPECIFIED POSTPROCEDURAL STATES: Chronic | ICD-10-CM

## 2020-06-25 DIAGNOSIS — Z95.0 PRESENCE OF CARDIAC PACEMAKER: Chronic | ICD-10-CM

## 2020-06-25 DIAGNOSIS — Z87.442 PERSONAL HISTORY OF URINARY CALCULI: Chronic | ICD-10-CM

## 2020-06-25 DIAGNOSIS — C34.90 MALIGNANT NEOPLASM OF UNSPECIFIED PART OF UNSPECIFIED BRONCHUS OR LUNG: Chronic | ICD-10-CM

## 2020-06-25 DIAGNOSIS — H26.9 UNSPECIFIED CATARACT: Chronic | ICD-10-CM

## 2020-06-25 PROCEDURE — 71250 CT THORAX DX C-: CPT

## 2020-06-25 PROCEDURE — 71250 CT THORAX DX C-: CPT | Mod: 26

## 2020-07-14 ENCOUNTER — APPOINTMENT (OUTPATIENT)
Dept: NEPHROLOGY | Facility: CLINIC | Age: 85
End: 2020-07-14
Payer: MEDICARE

## 2020-07-14 VITALS — HEART RATE: 72 BPM | DIASTOLIC BLOOD PRESSURE: 70 MMHG | SYSTOLIC BLOOD PRESSURE: 133 MMHG | RESPIRATION RATE: 14 BRPM

## 2020-07-14 DIAGNOSIS — Z11.4 ENCOUNTER FOR SCREENING FOR HUMAN IMMUNODEFICIENCY VIRUS [HIV]: ICD-10-CM

## 2020-07-14 DIAGNOSIS — Z86.19 PERSONAL HISTORY OF OTHER INFECTIOUS AND PARASITIC DISEASES: ICD-10-CM

## 2020-07-14 DIAGNOSIS — J47.9 BRONCHIECTASIS, UNCOMPLICATED: ICD-10-CM

## 2020-07-14 PROCEDURE — 99214 OFFICE O/P EST MOD 30 MIN: CPT

## 2020-07-14 RX ORDER — IBUPROFEN 400 MG/1
400 TABLET, FILM COATED ORAL
Qty: 30 | Refills: 3 | Status: DISCONTINUED | COMMUNITY
Start: 2017-09-20 | End: 2020-07-14

## 2020-07-14 NOTE — REVIEW OF SYSTEMS
[SOB on Exertion] : shortness of breath during exertion [As Noted in HPI] : as noted in HPI [Joint Pain] : joint pain [Negative] : Heme/Lymph

## 2020-07-24 LAB
25(OH)D3 SERPL-MCNC: 46.6 NG/ML
ALBUMIN SERPL ELPH-MCNC: 4.6 G/DL
ANION GAP SERPL CALC-SCNC: 12 MMOL/L
BUN SERPL-MCNC: 14 MG/DL
CALCIUM SERPL-MCNC: 11 MG/DL
CALCIUM SERPL-MCNC: 11 MG/DL
CHLORIDE SERPL-SCNC: 107 MMOL/L
CO2 SERPL-SCNC: 24 MMOL/L
CREAT SERPL-MCNC: 1.15 MG/DL
GLUCOSE SERPL-MCNC: 105 MG/DL
PARATHYROID HORMONE INTACT: 41 PG/ML
PHOSPHATE SERPL-MCNC: 4 MG/DL
POTASSIUM SERPL-SCNC: 6 MMOL/L
SODIUM SERPL-SCNC: 143 MMOL/L
TSH SERPL-ACNC: 1.17 UIU/ML

## 2020-07-24 RX ORDER — LEVOFLOXACIN 750 MG/1
750 TABLET, FILM COATED ORAL DAILY
Qty: 7 | Refills: 0 | Status: DISCONTINUED | COMMUNITY
Start: 2019-09-25 | End: 2020-07-24

## 2020-07-27 LAB — 24R-OH-CALCIDIOL SERPL-MCNC: 63.2 PG/ML

## 2020-07-27 NOTE — HISTORY OF PRESENT ILLNESS
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 Martine Arrieta\par Cardiologist: pt doesn't remember name. \par \par Feeling very well lately, aid says she's at peace and enjoying her days. Only thing that bothers her is L knee pain, takes prn tylenol but also admits to taking ibuprofen as well.\par Denies fatigue, fevers, SOB, LE edema, cough, dysuria. \par Stable weight, appetite. No muscle cramping or neurologic weakness/tremors. \par \par Lives by herself in her home w home aid.\par

## 2020-07-27 NOTE — PHYSICAL EXAM
[General Appearance - Alert] : alert [Sclera] : the sclera and conjunctiva were normal [PERRL With Normal Accommodation] : pupils were equal in size, round, and reactive to light [General Appearance - In No Acute Distress] : in no acute distress [Extraocular Movements] : extraocular movements were intact [Oropharynx] : the oropharynx was normal [Outer Ear] : the ears and nose were normal in appearance [Respiration, Rhythm And Depth] : normal respiratory rhythm and effort [Neck Appearance] : the appearance of the neck was normal [Jugular Venous Distention Increased] : there was no jugular-venous distention [Heart Rate And Rhythm] : heart rate was normal and rhythm regular [Exaggerated Use Of Accessory Muscles For Inspiration] : no accessory muscle use [Heart Sounds] : normal S1 and S2 [Full Pulse] : the pedal pulses are present [Abdomen Soft] : soft [Bowel Sounds] : normal bowel sounds [Abdomen Tenderness] : non-tender [Musculoskeletal - Swelling] : no joint swelling seen [Involuntary Movements] : no involuntary movements were seen [Skin Color & Pigmentation] : normal skin color and pigmentation [Skin Turgor] : normal skin turgor [] : no rash [No Focal Deficits] : no focal deficits [Cranial Nerves] : cranial nerves 2-12 were intact [Impaired Insight] : insight and judgment were intact [Oriented To Time, Place, And Person] : oriented to person, place, and time [Affect] : the affect was normal [No CVA Tenderness] : no ~M costovertebral angle tenderness [FreeTextEntry1] : cane to ambulate

## 2020-07-27 NOTE — ASSESSMENT
[FreeTextEntry1] : 84yo Gu[place]ian speaking F accompanied by home aid with h/o CVA, copd/asthma, lung ca (s/p resection in remission), ischemic cardiomyopathy w/PPM EF 45%, vertigo, preDM, ?sarcoid here for f/u of CKD 3, HTN, hypercalcemia and hyperkalemia:  \par \par # CKD II/III - Cr stable, advised to minimize NSAID use and control carb/sugar intake as diabetes has further effects on renal fx, Cr 1-1.2 baseline with bland u/a. Likey CKD 2/2 age, HTN, ICM\par Hyperkalemia first time in >1 yr - lisinopril listed on her [facility] med list, asked aide to confirm if taking\par \par # Hypercalcemia\par - first time Ca elevated in >1 yr as well. Denies dehydration at time of blood draw (BUN low at that time as well). I see a ?dx of sarcoid in chart. Was started on Ca and Vit D supplements about 2 months ago per aide. Asked them to stop these, to confirm her meds, and recheck next week along with ph, pth, vit D, tsh, pthrp and to hydrate well. \par \par # HTN -  well controlled\par \par # ICM EF 45% with Afib/PPM\par - saw EP who increased her atenolol to BID in Feb for PAF however pt and aide think she's only taking once - they will go home and confirm w me all her meds/doses.\par \par RTC in 6 months for f/u\par \par Addendum: K 6, d/w pt, not following low K diet, aide and cousin via phone confirm eating a lot of high K containing foods, otherwise feeling well. Prescribed TID Lokelma for 2 days then once daily, if cost-prohibitive, to take 2 doses of kayexelate. Repeat K on Monday. Can still elevated, ipth wnl however should be suppressed. Perhaps some primary hyperparathyroidism vs secondary 2/2 CKD with underlying sarcoid-related elevated in Ca? Recommend endo and pulm f/u. Will d/w Dr. Sinclair

## 2020-08-11 LAB
ANION GAP SERPL CALC-SCNC: 14 MMOL/L
BUN SERPL-MCNC: 12 MG/DL
CALCIUM SERPL-MCNC: 10 MG/DL
CHLORIDE SERPL-SCNC: 103 MMOL/L
CO2 SERPL-SCNC: 27 MMOL/L
CREAT SERPL-MCNC: 0.99 MG/DL
GLUCOSE SERPL-MCNC: 152 MG/DL
POTASSIUM SERPL-SCNC: 3.2 MMOL/L
POTASSIUM SERPL-SCNC: 3.4 MMOL/L
PTH RELATED PROT SERPL-MCNC: <2 PMOL/L
SODIUM SERPL-SCNC: 144 MMOL/L

## 2020-08-13 ENCOUNTER — APPOINTMENT (OUTPATIENT)
Dept: INTERNAL MEDICINE | Facility: CLINIC | Age: 85
End: 2020-08-13
Payer: MEDICARE

## 2020-08-13 VITALS
DIASTOLIC BLOOD PRESSURE: 69 MMHG | OXYGEN SATURATION: 96 % | HEIGHT: 67 IN | TEMPERATURE: 99.1 F | WEIGHT: 154 LBS | SYSTOLIC BLOOD PRESSURE: 146 MMHG | HEART RATE: 76 BPM | BODY MASS INDEX: 24.17 KG/M2

## 2020-08-13 PROCEDURE — 93000 ELECTROCARDIOGRAM COMPLETE: CPT

## 2020-08-13 PROCEDURE — 99214 OFFICE O/P EST MOD 30 MIN: CPT | Mod: 25

## 2020-08-13 NOTE — HISTORY OF PRESENT ILLNESS
[FreeTextEntry1] : Using a interrater during visit; \par Other consultants follow up noted [de-identified] : Patient with chief complaint of difficulty breathing at night; Large amount of phlegm\par Will order a nebulizer machine;

## 2020-08-13 NOTE — ASSESSMENT
[FreeTextEntry1] : As stated above with help of  patient with chief complaint large amount of secretions at night that causes difficulty with her breathing . She feels like she is choking; \par She has tried using the MDI with no relief\par Will try nebulizer with Atrovent and Proventil.\par RV 6 weeks\par

## 2020-08-14 LAB
ANION GAP SERPL CALC-SCNC: 10 MMOL/L
BUN SERPL-MCNC: 14 MG/DL
CALCIUM SERPL-MCNC: 10.6 MG/DL
CHLORIDE SERPL-SCNC: 105 MMOL/L
CO2 SERPL-SCNC: 26 MMOL/L
CREAT SERPL-MCNC: 1.03 MG/DL
GLUCOSE SERPL-MCNC: 99 MG/DL
POTASSIUM SERPL-SCNC: 5.4 MMOL/L
SODIUM SERPL-SCNC: 141 MMOL/L

## 2020-08-23 ENCOUNTER — INPATIENT (INPATIENT)
Facility: HOSPITAL | Age: 85
LOS: 2 days | Discharge: HOME HEALTH | DRG: 871 | End: 2020-08-26
Payer: MEDICARE

## 2020-08-23 VITALS
RESPIRATION RATE: 19 BRPM | WEIGHT: 163.14 LBS | DIASTOLIC BLOOD PRESSURE: 89 MMHG | TEMPERATURE: 99 F | OXYGEN SATURATION: 96 % | HEART RATE: 98 BPM | SYSTOLIC BLOOD PRESSURE: 165 MMHG

## 2020-08-23 DIAGNOSIS — J18.9 PNEUMONIA, UNSPECIFIED ORGANISM: ICD-10-CM

## 2020-08-23 DIAGNOSIS — N18.9 CHRONIC KIDNEY DISEASE, UNSPECIFIED: ICD-10-CM

## 2020-08-23 DIAGNOSIS — I10 ESSENTIAL (PRIMARY) HYPERTENSION: ICD-10-CM

## 2020-08-23 DIAGNOSIS — J45.909 UNSPECIFIED ASTHMA, UNCOMPLICATED: ICD-10-CM

## 2020-08-23 DIAGNOSIS — Z98.890 OTHER SPECIFIED POSTPROCEDURAL STATES: Chronic | ICD-10-CM

## 2020-08-23 DIAGNOSIS — H40.9 UNSPECIFIED GLAUCOMA: ICD-10-CM

## 2020-08-23 DIAGNOSIS — H26.9 UNSPECIFIED CATARACT: Chronic | ICD-10-CM

## 2020-08-23 DIAGNOSIS — A41.9 SEPSIS, UNSPECIFIED ORGANISM: ICD-10-CM

## 2020-08-23 DIAGNOSIS — C34.90 MALIGNANT NEOPLASM OF UNSPECIFIED PART OF UNSPECIFIED BRONCHUS OR LUNG: Chronic | ICD-10-CM

## 2020-08-23 DIAGNOSIS — Z29.9 ENCOUNTER FOR PROPHYLACTIC MEASURES, UNSPECIFIED: ICD-10-CM

## 2020-08-23 DIAGNOSIS — E78.5 HYPERLIPIDEMIA, UNSPECIFIED: ICD-10-CM

## 2020-08-23 DIAGNOSIS — J44.9 CHRONIC OBSTRUCTIVE PULMONARY DISEASE, UNSPECIFIED: ICD-10-CM

## 2020-08-23 DIAGNOSIS — Z87.442 PERSONAL HISTORY OF URINARY CALCULI: Chronic | ICD-10-CM

## 2020-08-23 DIAGNOSIS — Z95.0 PRESENCE OF CARDIAC PACEMAKER: Chronic | ICD-10-CM

## 2020-08-23 DIAGNOSIS — I25.10 ATHEROSCLEROTIC HEART DISEASE OF NATIVE CORONARY ARTERY WITHOUT ANGINA PECTORIS: ICD-10-CM

## 2020-08-23 LAB
ALBUMIN SERPL ELPH-MCNC: 3.2 G/DL — LOW (ref 3.3–5)
ALP SERPL-CCNC: 121 U/L — HIGH (ref 40–120)
ALT FLD-CCNC: 16 U/L — SIGNIFICANT CHANGE UP (ref 10–45)
ANION GAP SERPL CALC-SCNC: 11 MMOL/L — SIGNIFICANT CHANGE UP (ref 5–17)
APPEARANCE UR: CLEAR — SIGNIFICANT CHANGE UP
APTT BLD: 38.9 SEC — HIGH (ref 27.5–35.5)
AST SERPL-CCNC: 25 U/L — SIGNIFICANT CHANGE UP (ref 10–40)
BASOPHILS # BLD AUTO: 0.02 K/UL — SIGNIFICANT CHANGE UP (ref 0–0.2)
BASOPHILS NFR BLD AUTO: 0.2 % — SIGNIFICANT CHANGE UP (ref 0–2)
BILIRUB SERPL-MCNC: 0.6 MG/DL — SIGNIFICANT CHANGE UP (ref 0.2–1.2)
BILIRUB UR-MCNC: NEGATIVE — SIGNIFICANT CHANGE UP
BUN SERPL-MCNC: 9 MG/DL — SIGNIFICANT CHANGE UP (ref 7–23)
CALCIUM SERPL-MCNC: 10 MG/DL — SIGNIFICANT CHANGE UP (ref 8.4–10.5)
CHLORIDE SERPL-SCNC: 103 MMOL/L — SIGNIFICANT CHANGE UP (ref 96–108)
CO2 SERPL-SCNC: 25 MMOL/L — SIGNIFICANT CHANGE UP (ref 22–31)
COLOR SPEC: YELLOW — SIGNIFICANT CHANGE UP
CREAT SERPL-MCNC: 0.9 MG/DL — SIGNIFICANT CHANGE UP (ref 0.5–1.3)
DIFF PNL FLD: NEGATIVE — SIGNIFICANT CHANGE UP
EOSINOPHIL # BLD AUTO: 0.01 K/UL — SIGNIFICANT CHANGE UP (ref 0–0.5)
EOSINOPHIL NFR BLD AUTO: 0.1 % — SIGNIFICANT CHANGE UP (ref 0–6)
FLU A RESULT: SIGNIFICANT CHANGE UP
FLU A RESULT: SIGNIFICANT CHANGE UP
FLUAV AG NPH QL: SIGNIFICANT CHANGE UP
FLUBV AG NPH QL: SIGNIFICANT CHANGE UP
GAS PNL BLDV: SIGNIFICANT CHANGE UP
GLUCOSE SERPL-MCNC: 111 MG/DL — HIGH (ref 70–99)
GLUCOSE UR QL: NEGATIVE — SIGNIFICANT CHANGE UP
HCT VFR BLD CALC: 36.8 % — SIGNIFICANT CHANGE UP (ref 34.5–45)
HGB BLD-MCNC: 11.9 G/DL — SIGNIFICANT CHANGE UP (ref 11.5–15.5)
IMM GRANULOCYTES NFR BLD AUTO: 0.2 % — SIGNIFICANT CHANGE UP (ref 0–1.5)
INR BLD: 2.49 — HIGH (ref 0.88–1.16)
KETONES UR-MCNC: NEGATIVE — SIGNIFICANT CHANGE UP
LACTATE SERPL-SCNC: 1.4 MMOL/L — SIGNIFICANT CHANGE UP (ref 0.5–2)
LEUKOCYTE ESTERASE UR-ACNC: NEGATIVE — SIGNIFICANT CHANGE UP
LYMPHOCYTES # BLD AUTO: 1.02 K/UL — SIGNIFICANT CHANGE UP (ref 1–3.3)
LYMPHOCYTES # BLD AUTO: 11.6 % — LOW (ref 13–44)
MCHC RBC-ENTMCNC: 29.8 PG — SIGNIFICANT CHANGE UP (ref 27–34)
MCHC RBC-ENTMCNC: 32.3 GM/DL — SIGNIFICANT CHANGE UP (ref 32–36)
MCV RBC AUTO: 92.2 FL — SIGNIFICANT CHANGE UP (ref 80–100)
MONOCYTES # BLD AUTO: 1.01 K/UL — HIGH (ref 0–0.9)
MONOCYTES NFR BLD AUTO: 11.5 % — SIGNIFICANT CHANGE UP (ref 2–14)
NEUTROPHILS # BLD AUTO: 6.68 K/UL — SIGNIFICANT CHANGE UP (ref 1.8–7.4)
NEUTROPHILS NFR BLD AUTO: 76.4 % — SIGNIFICANT CHANGE UP (ref 43–77)
NITRITE UR-MCNC: NEGATIVE — SIGNIFICANT CHANGE UP
NRBC # BLD: 0 /100 WBCS — SIGNIFICANT CHANGE UP (ref 0–0)
PH UR: 7 — SIGNIFICANT CHANGE UP (ref 5–8)
PLATELET # BLD AUTO: 300 K/UL — SIGNIFICANT CHANGE UP (ref 150–400)
POTASSIUM SERPL-MCNC: 4.3 MMOL/L — SIGNIFICANT CHANGE UP (ref 3.5–5.3)
POTASSIUM SERPL-SCNC: 4.3 MMOL/L — SIGNIFICANT CHANGE UP (ref 3.5–5.3)
PROT SERPL-MCNC: 7.5 G/DL — SIGNIFICANT CHANGE UP (ref 6–8.3)
PROT UR-MCNC: NEGATIVE MG/DL — SIGNIFICANT CHANGE UP
PROTHROM AB SERPL-ACNC: 28.6 SEC — HIGH (ref 10.6–13.6)
RBC # BLD: 3.99 M/UL — SIGNIFICANT CHANGE UP (ref 3.8–5.2)
RBC # FLD: 14 % — SIGNIFICANT CHANGE UP (ref 10.3–14.5)
RSV RESULT: SIGNIFICANT CHANGE UP
RSV RNA RESP QL NAA+PROBE: SIGNIFICANT CHANGE UP
SARS-COV-2 RNA SPEC QL NAA+PROBE: SIGNIFICANT CHANGE UP
SODIUM SERPL-SCNC: 139 MMOL/L — SIGNIFICANT CHANGE UP (ref 135–145)
SP GR SPEC: 1.01 — SIGNIFICANT CHANGE UP (ref 1–1.03)
UROBILINOGEN FLD QL: 0.2 E.U./DL — SIGNIFICANT CHANGE UP
WBC # BLD: 8.76 K/UL — SIGNIFICANT CHANGE UP (ref 3.8–10.5)
WBC # FLD AUTO: 8.76 K/UL — SIGNIFICANT CHANGE UP (ref 3.8–10.5)

## 2020-08-23 PROCEDURE — 99223 1ST HOSP IP/OBS HIGH 75: CPT | Mod: GC

## 2020-08-23 PROCEDURE — 99285 EMERGENCY DEPT VISIT HI MDM: CPT

## 2020-08-23 PROCEDURE — 71046 X-RAY EXAM CHEST 2 VIEWS: CPT | Mod: 26

## 2020-08-23 RX ORDER — ATORVASTATIN CALCIUM 80 MG/1
20 TABLET, FILM COATED ORAL AT BEDTIME
Refills: 0 | Status: DISCONTINUED | OUTPATIENT
Start: 2020-08-23 | End: 2020-08-26

## 2020-08-23 RX ORDER — CEFTRIAXONE 500 MG/1
1000 INJECTION, POWDER, FOR SOLUTION INTRAMUSCULAR; INTRAVENOUS ONCE
Refills: 0 | Status: COMPLETED | OUTPATIENT
Start: 2020-08-23 | End: 2020-08-23

## 2020-08-23 RX ORDER — IPRATROPIUM/ALBUTEROL SULFATE 18-103MCG
3 AEROSOL WITH ADAPTER (GRAM) INHALATION EVERY 6 HOURS
Refills: 0 | Status: DISCONTINUED | OUTPATIENT
Start: 2020-08-23 | End: 2020-08-26

## 2020-08-23 RX ORDER — KETOROLAC TROMETHAMINE 0.5 %
1 DROPS OPHTHALMIC (EYE) DAILY
Refills: 0 | Status: DISCONTINUED | OUTPATIENT
Start: 2020-08-23 | End: 2020-08-26

## 2020-08-23 RX ORDER — AZITHROMYCIN 500 MG/1
500 TABLET, FILM COATED ORAL ONCE
Refills: 0 | Status: COMPLETED | OUTPATIENT
Start: 2020-08-23 | End: 2020-08-23

## 2020-08-23 RX ORDER — SODIUM CHLORIDE 9 MG/ML
1000 INJECTION INTRAMUSCULAR; INTRAVENOUS; SUBCUTANEOUS ONCE
Refills: 0 | Status: COMPLETED | OUTPATIENT
Start: 2020-08-23 | End: 2020-08-23

## 2020-08-23 RX ORDER — IPRATROPIUM/ALBUTEROL SULFATE 18-103MCG
3 AEROSOL WITH ADAPTER (GRAM) INHALATION ONCE
Refills: 0 | Status: COMPLETED | OUTPATIENT
Start: 2020-08-23 | End: 2020-08-23

## 2020-08-23 RX ORDER — CEFTRIAXONE 500 MG/1
1000 INJECTION, POWDER, FOR SOLUTION INTRAMUSCULAR; INTRAVENOUS EVERY 24 HOURS
Refills: 0 | Status: DISCONTINUED | OUTPATIENT
Start: 2020-08-24 | End: 2020-08-26

## 2020-08-23 RX ORDER — MONTELUKAST 4 MG/1
1 TABLET, CHEWABLE ORAL
Qty: 0 | Refills: 0 | DISCHARGE

## 2020-08-23 RX ORDER — ATENOLOL 25 MG/1
25 TABLET ORAL DAILY
Refills: 0 | Status: DISCONTINUED | OUTPATIENT
Start: 2020-08-23 | End: 2020-08-26

## 2020-08-23 RX ORDER — AZITHROMYCIN 500 MG/1
500 TABLET, FILM COATED ORAL EVERY 24 HOURS
Refills: 0 | Status: COMPLETED | OUTPATIENT
Start: 2020-08-24 | End: 2020-08-25

## 2020-08-23 RX ORDER — ACETAMINOPHEN 500 MG
650 TABLET ORAL ONCE
Refills: 0 | Status: COMPLETED | OUTPATIENT
Start: 2020-08-23 | End: 2020-08-23

## 2020-08-23 RX ORDER — APIXABAN 2.5 MG/1
5 TABLET, FILM COATED ORAL EVERY 12 HOURS
Refills: 0 | Status: DISCONTINUED | OUTPATIENT
Start: 2020-08-23 | End: 2020-08-26

## 2020-08-23 RX ORDER — BRIMONIDINE TARTRATE 2 MG/MG
1 SOLUTION/ DROPS OPHTHALMIC
Refills: 0 | Status: DISCONTINUED | OUTPATIENT
Start: 2020-08-23 | End: 2020-08-26

## 2020-08-23 RX ORDER — FLUTICASONE PROPIONATE AND SALMETEROL 50; 250 UG/1; UG/1
1 POWDER ORAL; RESPIRATORY (INHALATION)
Qty: 0 | Refills: 0 | DISCHARGE

## 2020-08-23 RX ORDER — LATANOPROST 0.05 MG/ML
1 SOLUTION/ DROPS OPHTHALMIC; TOPICAL
Qty: 0 | Refills: 0 | DISCHARGE

## 2020-08-23 RX ORDER — DORZOLAMIDE HYDROCHLORIDE 20 MG/ML
1 SOLUTION/ DROPS OPHTHALMIC
Qty: 0 | Refills: 0 | DISCHARGE

## 2020-08-23 RX ADMIN — BRIMONIDINE TARTRATE 1 DROP(S): 2 SOLUTION/ DROPS OPHTHALMIC at 17:19

## 2020-08-23 RX ADMIN — AZITHROMYCIN 255 MILLIGRAM(S): 500 TABLET, FILM COATED ORAL at 09:10

## 2020-08-23 RX ADMIN — Medication 3 MILLILITER(S): at 09:10

## 2020-08-23 RX ADMIN — ATORVASTATIN CALCIUM 20 MILLIGRAM(S): 80 TABLET, FILM COATED ORAL at 21:50

## 2020-08-23 RX ADMIN — SODIUM CHLORIDE 1000 MILLILITER(S): 9 INJECTION INTRAMUSCULAR; INTRAVENOUS; SUBCUTANEOUS at 08:53

## 2020-08-23 RX ADMIN — Medication 3 MILLILITER(S): at 21:49

## 2020-08-23 RX ADMIN — APIXABAN 5 MILLIGRAM(S): 2.5 TABLET, FILM COATED ORAL at 17:20

## 2020-08-23 RX ADMIN — Medication 3 MILLILITER(S): at 08:53

## 2020-08-23 RX ADMIN — CEFTRIAXONE 100 MILLIGRAM(S): 500 INJECTION, POWDER, FOR SOLUTION INTRAMUSCULAR; INTRAVENOUS at 08:53

## 2020-08-23 RX ADMIN — ATENOLOL 25 MILLIGRAM(S): 25 TABLET ORAL at 14:24

## 2020-08-23 RX ADMIN — Medication 3 MILLILITER(S): at 15:48

## 2020-08-23 RX ADMIN — Medication 650 MILLIGRAM(S): at 08:57

## 2020-08-23 RX ADMIN — Medication 1 DROP(S): at 17:21

## 2020-08-23 NOTE — H&P ADULT - ATTENDING COMMENTS
Patient seen and examined with house-staff during bedside rounds.  Resident note read, including vitals, physical findings, laboratory data, and radiological reports.   Revisions included below.  Direct personal management at bed side and extensive interpretation of the data.  Plan was outlined and discussed in details with the housestaff.  Decision making of high complexity  Action taken for acute disease activity to reflect the level of care provided:  - medication reconciliation  - review laboratory data  seen and examined in er and sdiscussed with er attending and daughter

## 2020-08-23 NOTE — ED PROVIDER NOTE - PHYSICAL EXAMINATION
CONSTITUTIONAL: Elderly but well appearing, no acute distress  HEAD: Normocephalic; atraumatic.   EYES:  conjunctiva and sclera clear  ENT: normal nose; no rhinorrhea; normal pharynx with no erythema or lesions.   NECK: Supple; non-tender;   CARDIOVASCULAR: Normal S1, S2; no murmurs, rubs, or gallops. Regular rate and rhythm.   RESPIRATORY: coughing with deep breaths, poor inspiration, no wheezing heard. chest tenderness over RLL.   GI: Soft; non-distended; non-tender; no palpable organomegaly.   EXT: No cyanosis or edema; N/V intact  SKIN: Normal for age and race; warm; dry; good turgor; no apparent lesions or rash.   NEURO: A & O x 3; face symmetric; grossly unremarkable.   PSYCHOLOGICAL: The patient’s mood and manner are appropriate.

## 2020-08-23 NOTE — H&P ADULT - PROBLEM SELECTOR PLAN 8
On home Amlodipine 5mg daily.  -hold in setting of sepsis    #eliquis  On eliquis 5 BID. Patient and family unsure why she is on eliquis.  -obtain collateral from prescriber

## 2020-08-23 NOTE — ED PROVIDER NOTE - OBJECTIVE STATEMENT
84y/o F with past medical history of COPD/asthma, lung CA (s/p resection in remission) CAD, cardiomyopathy w/ PPM, CKD stage 3A, HTN, CVA, HLD, vertigo, syphilis, glaucoma, admission 2019 for CAP, here today w/ increased cough and sputum for several times, and then developed fevers 100.5 yesterday. No decreased po intake, N/V/D, urinary symptoms, rash. Similar to prior presentations for PNA as per family.     follows w/ Dr. Greenfield/Yahir    CT chest done 6/25/20   "Impression: 1. Since 11/27/2019, there has been near resolution of the consolidation in the left lower lobe, with only a few linear opacities remaining, consistent with scar or atelectasis.    2. Mild improvement right lower lobe consolidation/groundglass. Development of small consolidation right middle lobe. Centrilobular groundglass opacities right upper, middle, and lower lobes. Impaction of bronchi in right middle and lower lobes. These findings may represent persistent infection and/or aspiration.    3. Moderately calcified aortic valve, a finding which may be associated with aortic stenosis."

## 2020-08-23 NOTE — ED ADULT TRIAGE NOTE - ARRIVAL INFO ADDITIONAL COMMENTS
Patient is accompanied by daughter, reported to have fever at home (tMax reported 100.5 prior to arrival), weakness and cough. No known history of COVID or known contact.

## 2020-08-23 NOTE — H&P ADULT - NSHPSOCIALHISTORY_GEN_ALL_CORE
Patient has 7days week HHA from 10a-3p. Granddaughter temporarily lives with patient. Was a social smoker but quit in her 50's.

## 2020-08-23 NOTE — H&P ADULT - PROBLEM SELECTOR PLAN 9
brimonidine 0.15, 1 drop to both eyes twice a day and diclofenac 1% gel once daily.  -c/w home medications brimonidine 0.15, 1 drop to both eyes twice a day and diclofenac 1% gel once daily.  -c/w home glaucoma medications

## 2020-08-23 NOTE — H&P ADULT - PROBLEM SELECTOR PLAN 1
likely 2/2 pna. Met 2/4 sepsis criteria (T 102 and HR 98).  CXR right basilar infiltrates and effusion concerning for pna. Ua clean. Last admission for CAP in 2019. Flu A, Flu B, RSV, Covid all negative.   -f/u blood cx  - ceftriaxone, azithromycin  -consider switch to PO likely 2/2 pna. Met 2/4 sepsis criteria (T 102 and HR 98).  CXR right basilar infiltrates and effusion concerning for pna. Ua clean. Last admission for CAP in 2019. Flu A, Flu B, RSV, Covid all negative.   -f/u blood cx  -ceftriaxone 1g Q 24, azithromycin 500mg Q24  -consider switch to PO antibiotics with improvement likely 2/2 pna. Met 2/4 sepsis criteria (T 102 and HR 98).  CXR right basilar infiltrates and effusion concerning for pna. Ua clean. Last admission for CAP in 2019. Flu A, Flu B, RSV, Covid all negative.   -f/u blood cx  -ceftriaxone 1g Q 24, azithromycin 500mg Q24  -consider switch to PO antibiotics with improvement  agree

## 2020-08-23 NOTE — H&P ADULT - HISTORY OF PRESENT ILLNESS
85y.o. F PMH of COPD/asthma, lung CA (s/p resection in remission) CAD, cardiomyopathy w/ PPM, CKD stage 3A, HTN, CVA, HLD, vertigo, syphilis, glaucoma, admission 2019 for CAP, presented productive cough and fever x 1 week. Per patient and family, patient has had increased productive cough (yellow sputum, no blood), been feeling feverish, and has had worsening SOB and SHANKS. Patient has baseline chronic productive cough that has worsened in the past week. Patient and family never measured temperature but patient reports taking Tylenol whenever she felt feverish. Reports an increase in use of home nebulizer treatments during this period. Denies any recent antibiotics use. Patient also endorses decreased PO intake since she has not been feeling well. Denies chills, N/V, chest pain, palpitations, abdominal pain, LE edema.       In the ED  Vitals Tmax 102, -165/62-89, HR 87-98, RR 18-19 O2 sat 96% on RA  Labs notable for: Albumin 3.2, Alk phos 121, GFR 58, PT 28.6, INR 2.49, PTT 38.9….creatinine 0.90  Venous blood gas pH 7.38, pCO2 46, pO2 26, Na 132  Ua: - ketones, - protein, - glucose, - nitrites, -leuk esterase  - Flu A, -Flu B, - RSV, -COVID    Imaging:  CXR: right basilar infiltrates  and effusion     Received Tylenol 650 (9a), azithromycin 500mg (9:10a), ceftriaxone 1g (9a), 1L NS, duonebs x2. 85y.o. F PMH of COPD/asthma, lung CA (s/p resection in remission) CAD, cardiomyopathy w/ PPM, CKD stage 3A, HTN, CVA, HLD, vertigo, syphilis, glaucoma, admission 2019 for CAP, presented productive cough and fever x 1 week. Per patient and family, patient has had increased productive cough (yellow sputum, no blood), been feeling feverish, and has had worsening SOB and SHANKS. Patient has baseline chronic productive cough that has worsened in the past week. Patient and family never measured temperature but patient reports taking Tylenol whenever she felt feverish. Reports an increase in use of home nebulizer treatments during this period. Denies any recent antibiotics use. Patient also endorses decreased PO intake since she has not been feeling well. Denies chills, N/V, chest pain, palpitations, abdominal pain, dysuria, LE edema.       In the ED  Vitals Tmax 102, -165/62-89, HR 87-98, RR 18-19 O2 sat 96% on RA  Labs notable for: Albumin 3.2, Alk phos 121, GFR 58, PT 28.6, INR 2.49, PTT 38.9….creatinine 0.90  Venous blood gas pH 7.38, pCO2 46, pO2 26, Na 132  Ua: - ketones, - protein, - glucose, - nitrites, -leuk esterase  - Flu A, -Flu B, - RSV, -COVID    Imaging:  CXR: right basilar infiltrates  and effusion     Received Tylenol 650 (9a), azithromycin 500mg (9:10a), ceftriaxone 1g (9a), 1L NS, duonebs x2.

## 2020-08-23 NOTE — H&P ADULT - NSHPPHYSICALEXAM_GEN_ALL_CORE
PHYSICAL EXAM:  GENERAL: NAD, speaks in full sentences, no signs of respiratory distress  HEAD:  Atraumatic, Normocephalic  EYES: EOMI, PERRLA, conjunctiva and sclera clear  NECK: Supple, No JVD  CHEST/LUNG: Clear to auscultation bilaterally; No wheeze; No crackles; No accessory muscles used  HEART: Regular rate and rhythm; No murmurs;   ABDOMEN: Soft, Nontender, Nondistended; Bowel sounds present; No guarding  EXTREMITIES:  2+ Peripheral Pulses, No cyanosis or edema  PSYCH: AAOx2 (does not know year)  NEUROLOGY: non-focal  SKIN: No rashes or lesions

## 2020-08-23 NOTE — H&P ADULT - ASSESSMENT
85y.o. F PMH of COPD/asthma, lung CA (s/p resection in remission) CAD, cardiomyopathy w/ PPM, CKD stage 3A (GFR 58), HTN, CVA, HLD, vertigo, syphilis, glaucoma, admission 2019 for CAP, presented productive cough and fever x 1 week, found to be in sepsis likely 2/2 CAP with CXR showing right basilar infiltrates and effusion.

## 2020-08-23 NOTE — ED ADULT NURSE NOTE - OBJECTIVE STATEMENT
pt to ED for fever for a week. As pt's granddaughter, pt has COPD, has cough with yellow sputum constantly, however fever (max 100.5'f) started a week ago. pt reports R chest~flank~back pain, denies new onset.  fever is subsided by taking tylenol. pt denies n/v, dizziness, numbness, tingling, dizziness, chills, dysuria, blood in sputum/urine/stool. A+Ox4 ambulatory.

## 2020-08-23 NOTE — H&P ADULT - PROBLEM SELECTOR PLAN 5
Atenolol 25mg daily at home.  -hold in setting of sepsis     cardiomyopathy w/ PPM  -f/u EKG Atenolol 25mg daily at home.  -c/w home atenolol    cardiomyopathy w/ PPM  -f/u EKG

## 2020-08-23 NOTE — H&P ADULT - PROBLEM SELECTOR PLAN 3
Hx of COPD w/ increased sputum/cough. Has baseline cough and sputum production. On iptratropium/albuterol 0.5/3mg inh – 1 unit dose in nebulizer 4 times a day  - kaylen q __  -c/w  iptratropium/albuterol 0.5/3mg inh Hx of COPD w/ increased sputum/cough. Has baseline cough and sputum production. On iptratropium/albuterol 0.5/3mg inh – 1 unit dose in nebulizer 4 times a day  -c/w duonebs q 6H Hx of COPD and presenting w/ increased sputum/cough. Has baseline cough and sputum production. On iptratropium/albuterol 0.5/3mg inh – 1 unit dose in nebulizer 4 times a day. No wheezing on exam. No increased work of breathing or accessory muscle use. Worsening cough and sputum likely 2/2 pna and not COPD exacerbation.  -c/w duonebs q 6H  -maintain O2 sats >88% Hx of COPD and presenting w/ increased sputum/cough. Has baseline cough and sputum production. On iptratropium/albuterol 0.5/3mg inh – 1 unit dose in nebulizer 4 times a day. No wheezing on exam. No increased work of breathing or accessory muscle use. Worsening cough and sputum likely 2/2 pna and not COPD exacerbation.  -c/w duonebs q 6H  -maintain O2 sats >88%  no evidence of AECOPD

## 2020-08-23 NOTE — H&P ADULT - PROBLEM SELECTOR PLAN 10
F: tolerating PO, no IVF  E: replete K<4, Mg<2  N: Dash/TLC    VTE Prophylaxis: Eliquis 5 BID  GI: not needed  C: Full Code  D: RMF

## 2020-08-23 NOTE — H&P ADULT - PROBLEM SELECTOR PLAN 4
On iptratropium/albuterol 0.5/3mg inh – 1 unit dose in nebulizer 4 times a day.   -kaylen No wheezing on exam. No increased work of breathing or accessory muscle use. On iptratropium/albuterol 0.5/3mg inh – 1 unit dose in nebulizer 4 times a day.   -c/w duonebs q 6H

## 2020-08-23 NOTE — H&P ADULT - PROBLEM SELECTOR PLAN 7
On home atorvastatin 20 mg.  -c/w atorvastatin 20 mg On home atorvastatin 20 mg.  -c/w atorvastatin 20 mg    CVA   Patient with no residual weakness. Uses cane.   -consider starting asa 81 On home atorvastatin 20 mg.  -c/w atorvastatin 20 mg    CVA   Patient with no residual weakness. Uses cane.   -consider starting asa 81 (was not on med list at pharmacy)  -PT consult

## 2020-08-24 LAB
ANION GAP SERPL CALC-SCNC: 11 MMOL/L — SIGNIFICANT CHANGE UP (ref 5–17)
APTT BLD: 37.6 SEC — HIGH (ref 27.5–35.5)
BASOPHILS # BLD AUTO: 0.01 K/UL — SIGNIFICANT CHANGE UP (ref 0–0.2)
BASOPHILS NFR BLD AUTO: 0.1 % — SIGNIFICANT CHANGE UP (ref 0–2)
BUN SERPL-MCNC: 7 MG/DL — SIGNIFICANT CHANGE UP (ref 7–23)
CALCIUM SERPL-MCNC: 9.8 MG/DL — SIGNIFICANT CHANGE UP (ref 8.4–10.5)
CHLORIDE SERPL-SCNC: 105 MMOL/L — SIGNIFICANT CHANGE UP (ref 96–108)
CO2 SERPL-SCNC: 24 MMOL/L — SIGNIFICANT CHANGE UP (ref 22–31)
CREAT SERPL-MCNC: 0.78 MG/DL — SIGNIFICANT CHANGE UP (ref 0.5–1.3)
CULTURE RESULTS: SIGNIFICANT CHANGE UP
EOSINOPHIL # BLD AUTO: 0.01 K/UL — SIGNIFICANT CHANGE UP (ref 0–0.5)
EOSINOPHIL NFR BLD AUTO: 0.1 % — SIGNIFICANT CHANGE UP (ref 0–6)
GLUCOSE SERPL-MCNC: 89 MG/DL — SIGNIFICANT CHANGE UP (ref 70–99)
HCT VFR BLD CALC: 36.3 % — SIGNIFICANT CHANGE UP (ref 34.5–45)
HGB BLD-MCNC: 11.5 G/DL — SIGNIFICANT CHANGE UP (ref 11.5–15.5)
IMM GRANULOCYTES NFR BLD AUTO: 0.4 % — SIGNIFICANT CHANGE UP (ref 0–1.5)
INR BLD: 2.15 — HIGH (ref 0.88–1.16)
LYMPHOCYTES # BLD AUTO: 1.02 K/UL — SIGNIFICANT CHANGE UP (ref 1–3.3)
LYMPHOCYTES # BLD AUTO: 13.1 % — SIGNIFICANT CHANGE UP (ref 13–44)
MAGNESIUM SERPL-MCNC: 1.8 MG/DL — SIGNIFICANT CHANGE UP (ref 1.6–2.6)
MCHC RBC-ENTMCNC: 29.4 PG — SIGNIFICANT CHANGE UP (ref 27–34)
MCHC RBC-ENTMCNC: 31.7 GM/DL — LOW (ref 32–36)
MCV RBC AUTO: 92.8 FL — SIGNIFICANT CHANGE UP (ref 80–100)
MONOCYTES # BLD AUTO: 0.99 K/UL — HIGH (ref 0–0.9)
MONOCYTES NFR BLD AUTO: 12.7 % — SIGNIFICANT CHANGE UP (ref 2–14)
NEUTROPHILS # BLD AUTO: 5.71 K/UL — SIGNIFICANT CHANGE UP (ref 1.8–7.4)
NEUTROPHILS NFR BLD AUTO: 73.6 % — SIGNIFICANT CHANGE UP (ref 43–77)
NRBC # BLD: 0 /100 WBCS — SIGNIFICANT CHANGE UP (ref 0–0)
PLATELET # BLD AUTO: 288 K/UL — SIGNIFICANT CHANGE UP (ref 150–400)
POTASSIUM SERPL-MCNC: 5 MMOL/L — SIGNIFICANT CHANGE UP (ref 3.5–5.3)
POTASSIUM SERPL-SCNC: 5 MMOL/L — SIGNIFICANT CHANGE UP (ref 3.5–5.3)
PROTHROM AB SERPL-ACNC: 24.9 SEC — HIGH (ref 10.6–13.6)
RBC # BLD: 3.91 M/UL — SIGNIFICANT CHANGE UP (ref 3.8–5.2)
RBC # FLD: 13.9 % — SIGNIFICANT CHANGE UP (ref 10.3–14.5)
SODIUM SERPL-SCNC: 140 MMOL/L — SIGNIFICANT CHANGE UP (ref 135–145)
SPECIMEN SOURCE: SIGNIFICANT CHANGE UP
WBC # BLD: 7.77 K/UL — SIGNIFICANT CHANGE UP (ref 3.8–10.5)
WBC # FLD AUTO: 7.77 K/UL — SIGNIFICANT CHANGE UP (ref 3.8–10.5)

## 2020-08-24 PROCEDURE — 99232 SBSQ HOSP IP/OBS MODERATE 35: CPT | Mod: GC

## 2020-08-24 PROCEDURE — 74019 RADEX ABDOMEN 2 VIEWS: CPT | Mod: 26

## 2020-08-24 RX ORDER — SIMETHICONE 80 MG/1
80 TABLET, CHEWABLE ORAL EVERY 8 HOURS
Refills: 0 | Status: DISCONTINUED | OUTPATIENT
Start: 2020-08-24 | End: 2020-08-26

## 2020-08-24 RX ORDER — POLYETHYLENE GLYCOL 3350 17 G/17G
17 POWDER, FOR SOLUTION ORAL EVERY 24 HOURS
Refills: 0 | Status: DISCONTINUED | OUTPATIENT
Start: 2020-08-24 | End: 2020-08-26

## 2020-08-24 RX ORDER — ACETAMINOPHEN 500 MG
650 TABLET ORAL ONCE
Refills: 0 | Status: COMPLETED | OUTPATIENT
Start: 2020-08-24 | End: 2020-08-24

## 2020-08-24 RX ORDER — LIDOCAINE 4 G/100G
1 CREAM TOPICAL ONCE
Refills: 0 | Status: DISCONTINUED | OUTPATIENT
Start: 2020-08-24 | End: 2020-08-24

## 2020-08-24 RX ORDER — SENNA PLUS 8.6 MG/1
2 TABLET ORAL AT BEDTIME
Refills: 0 | Status: DISCONTINUED | OUTPATIENT
Start: 2020-08-24 | End: 2020-08-26

## 2020-08-24 RX ADMIN — APIXABAN 5 MILLIGRAM(S): 2.5 TABLET, FILM COATED ORAL at 05:59

## 2020-08-24 RX ADMIN — SIMETHICONE 80 MILLIGRAM(S): 80 TABLET, CHEWABLE ORAL at 12:14

## 2020-08-24 RX ADMIN — AZITHROMYCIN 255 MILLIGRAM(S): 500 TABLET, FILM COATED ORAL at 09:00

## 2020-08-24 RX ADMIN — Medication 3 MILLILITER(S): at 09:02

## 2020-08-24 RX ADMIN — BRIMONIDINE TARTRATE 1 DROP(S): 2 SOLUTION/ DROPS OPHTHALMIC at 05:59

## 2020-08-24 RX ADMIN — Medication 1 DROP(S): at 12:09

## 2020-08-24 RX ADMIN — SIMETHICONE 80 MILLIGRAM(S): 80 TABLET, CHEWABLE ORAL at 20:28

## 2020-08-24 RX ADMIN — POLYETHYLENE GLYCOL 3350 17 GRAM(S): 17 POWDER, FOR SOLUTION ORAL at 12:09

## 2020-08-24 RX ADMIN — Medication 650 MILLIGRAM(S): at 06:55

## 2020-08-24 RX ADMIN — APIXABAN 5 MILLIGRAM(S): 2.5 TABLET, FILM COATED ORAL at 17:03

## 2020-08-24 RX ADMIN — Medication 3 MILLILITER(S): at 05:59

## 2020-08-24 RX ADMIN — ATORVASTATIN CALCIUM 20 MILLIGRAM(S): 80 TABLET, FILM COATED ORAL at 21:59

## 2020-08-24 RX ADMIN — ATENOLOL 25 MILLIGRAM(S): 25 TABLET ORAL at 05:59

## 2020-08-24 RX ADMIN — Medication 650 MILLIGRAM(S): at 05:59

## 2020-08-24 RX ADMIN — BRIMONIDINE TARTRATE 1 DROP(S): 2 SOLUTION/ DROPS OPHTHALMIC at 17:03

## 2020-08-24 RX ADMIN — Medication 3 MILLILITER(S): at 17:03

## 2020-08-24 RX ADMIN — Medication 3 MILLILITER(S): at 21:59

## 2020-08-24 RX ADMIN — SENNA PLUS 2 TABLET(S): 8.6 TABLET ORAL at 21:59

## 2020-08-24 RX ADMIN — CEFTRIAXONE 100 MILLIGRAM(S): 500 INJECTION, POWDER, FOR SOLUTION INTRAMUSCULAR; INTRAVENOUS at 07:09

## 2020-08-24 NOTE — CONSULT NOTE ADULT - SUBJECTIVE AND OBJECTIVE BOX
Patient is a 85y old  Female who presents with a chief complaint of fevers (23 Aug 2020 13:17)       HPI:  85y.o. F PMH of COPD/asthma, lung CA (s/p resection in remission) CAD, cardiomyopathy w/ PPM, CKD stage 3A, HTN, CVA, HLD, vertigo, syphilis, glaucoma, admission 2019 for CAP, presented productive cough and fever x 1 week. Per patient and family, patient has had increased productive cough (yellow sputum, no blood), been feeling feverish, and has had worsening SOB and SHANKS. Patient has baseline chronic productive cough that has worsened in the past week. Patient and family never measured temperature but patient reports taking Tylenol whenever she felt feverish. Reports an increase in use of home nebulizer treatments during this period. Denies any recent antibiotics use. Patient also endorses decreased PO intake since she has not been feeling well. Denies chills, N/V, chest pain, palpitations, abdominal pain, dysuria, LE edema.       In the ED  Vitals Tmax 102, -165/62-89, HR 87-98, RR 18-19 O2 sat 96% on RA  Labs notable for: Albumin 3.2, Alk phos 121, GFR 58, PT 28.6, INR 2.49, PTT 38.9….creatinine 0.90  Venous blood gas pH 7.38, pCO2 46, pO2 26, Na 132  Ua: - ketones, - protein, - glucose, - nitrites, -leuk esterase  - Flu A, -Flu B, - RSV, -COVID    Imaging:  CXR: right basilar infiltrates  and effusion     Received Tylenol 650 (9a), azithromycin 500mg (9:10a), ceftriaxone 1g (9a), 1L NS, duonebs x2. (23 Aug 2020 13:17)      PAST MEDICAL & SURGICAL HISTORY:  Bronchiectasis  Syphilis  Pacemaker  Malignant neoplasm of lung, unspecified laterality, unspecified part of lung  Hyperlipidemia, unspecified hyperlipidemia type  Hypercalcemia  Chronic kidney disease, unspecified CKD stage  Cerebrovascular accident (CVA), unspecified mechanism  Coronary artery disease, angina presence unspecified, unspecified vessel or lesion type, unspecified whether native or transplanted heart  Dizziness  Asthma, unspecified asthma severity, unspecified whether complicated, unspecified whether persistent  History of pneumonia  Chronic obstructive pulmonary disease: COPD (chronic obstructive pulmonary disease)  Essential hypertension: HTN (hypertension)  Glaucoma: Glaucoma  History of nephrolithiasis: s/p removal  Cataract of right eye  Lung cancer: s/p resection  H/O abdominal hysterectomy  Cardiac pacemaker  History of cholecystectomy      MEDICATIONS  (STANDING):  albuterol/ipratropium for Nebulization 3 milliLiter(s) Nebulizer every 6 hours  apixaban 5 milliGRAM(s) Oral every 12 hours  ATENolol  Tablet 25 milliGRAM(s) Oral daily  atorvastatin 20 milliGRAM(s) Oral at bedtime  azithromycin  IVPB 500 milliGRAM(s) IV Intermittent every 24 hours  bisacodyl Suppository 10 milliGRAM(s) Rectal once  brimonidine 0.2% Ophthalmic Solution 1 Drop(s) Both EYES two times a day  cefTRIAXone   IVPB 1000 milliGRAM(s) IV Intermittent every 24 hours  ketorolac 0.5% Ophthalmic Solution 1 Drop(s) Both EYES daily  polyethylene glycol 3350 17 Gram(s) Oral every 24 hours  senna 2 Tablet(s) Oral at bedtime  simethicone 80 milliGRAM(s) Chew every 8 hours    MEDICATIONS  (PRN):      FAMILY HISTORY:  Family history of varicose veins (Mother)  Family history of prostate cancer (Father)      CBC Full  -  ( 24 Aug 2020 07:32 )  WBC Count : 7.77 K/uL  RBC Count : 3.91 M/uL  Hemoglobin : 11.5 g/dL  Hematocrit : 36.3 %  Platelet Count - Automated : 288 K/uL  Mean Cell Volume : 92.8 fl  Mean Cell Hemoglobin : 29.4 pg  Mean Cell Hemoglobin Concentration : 31.7 gm/dL  Auto Neutrophil # : 5.71 K/uL  Auto Lymphocyte # : 1.02 K/uL  Auto Monocyte # : 0.99 K/uL  Auto Eosinophil # : 0.01 K/uL  Auto Basophil # : 0.01 K/uL  Auto Neutrophil % : 73.6 %  Auto Lymphocyte % : 13.1 %  Auto Monocyte % : 12.7 %  Auto Eosinophil % : 0.1 %  Auto Basophil % : 0.1 %      08-    140  |  105  |  7   ----------------------------<  89  5.0   |  24  |  0.78    Ca    9.8      24 Aug 2020 07:32  Mg     1.8     08-24    TPro  7.5  /  Alb  3.2<L>  /  TBili  0.6  /  DBili  x   /  AST  25  /  ALT  16  /  AlkPhos  121<H>        Urinalysis Basic - ( 23 Aug 2020 10:59 )    Color: Yellow / Appearance: Clear / S.010 / pH: x  Gluc: x / Ketone: NEGATIVE  / Bili: Negative / Urobili: 0.2 E.U./dL   Blood: x / Protein: NEGATIVE mg/dL / Nitrite: NEGATIVE   Leuk Esterase: NEGATIVE / RBC: x / WBC x   Sq Epi: x / Non Sq Epi: x / Bacteria: x          Radiology:    < from: Xray Chest 2 Views PA/Lat (20 @ 09:50) >  EXAM:  XR CHEST PA LAT 2V                          PROCEDURE DATE:  2020          INTERPRETATION:  Clinical History: Fever    Frontal and lateral examination of the chest demonstrates right basilar infiltrates and effusion. Pacemaker overliesleft chest wall. Tips of pacer wires overlie right atrium and right ventricle respectively. Degenerative changes thoracic spine. Surgical Sutures and clips overlying right hilar region.    IMPRESSION: Right basilar infiltrates and effusion                  Vital Signs Last 24 Hrs  T(C): 37.1 (24 Aug 2020 09:05), Max: 37.3 (24 Aug 2020 05:29)  T(F): 98.7 (24 Aug 2020 09:05), Max: 99.1 (24 Aug 2020 05:29)  HR: 71 (24 Aug 2020 09:05) (71 - 88)  BP: 129/71 (24 Aug 2020 09:05) (129/71 - 147/85)  BP(mean): --  RR: 18 (24 Aug 2020 09:05) (16 - 18)  SpO2: 95% (24 Aug 2020 09:05) (95% - 97%)    REVIEW OF SYSTEMS: per HPI          Physical Exam: WDWN 86 yo  woman lying in semi Montiel's position, no acute complaints    Head: normocephalic, atraumatic    Eyes: PERRLA, EOMI, no nystagmus, sclera anicteric    ENT: nasal discharge, uvula midline, no oropharyngeal erythema/exudate    Neck: supple, negative JVD, negative carotid bruits, no thyromegaly    Chest: CTA bilaterally, neg wheeze/ rhonchi/ rales/ crackles/ egophany    Cardiovascular: regular rate and rhythm, neg murmurs/rubs/gallops    Abdomen: soft, non distended, non tender to palpation in all 4 quadrants, negative rebound/guarding, normal bowel sounds    Extremities: WWP, neg cyanosis/clubbing/edema, negative calf tenderness to palpation, negative Lashon's sign    Musculoskeletal:      :     Neurologic Exam:    Alert and oriented x 2 to person, place, speech fluent w/o dysarthria, recent and remote memory intact, repetition intact, comprehension intact    Cranial Nerves:     II:                       pupils equal, round and reactive to light, visual fields intact   III/ IV/VI:            extraocular movements intact, neg nystagmus, neg ptosis  V:                       facial sensation intact, V1-3 normal  VII:                     face symmetric, no droop, normal eye closure and smile  VIII:                    hearing intact to finger rub bilaterally  IX/ X:                 soft palate rise symmetrical  XI:                      head turning, shoulder shrug normal  XII:                     tongue midline    Motor Exam:    Upper Extremities:     Right:    suboptimal effort > 3/5    Left:     suboptimal effort > 3/5      Lower Extremities:    Right:    suboptimal effort > 3/5    Left :    suboptimal effort > 3/5               Sensation: Intact to light touch bilaterally                       DTR:            = biceps/     triceps/     brachioradialis                      = patella/   medial hamstring/ankle                      neg clonus                      neg Babinski                      Gait:  not tested        PM&R Impression:    1) deconditioned  2) no focal weakness    Plan:    1) Physical therapy focusing on therapeutic exercises, bed mobility/transfer out of bed evaluation, progressive ambulation with assistive devices prn.    2) Anticipated Disposition Plan/Recs:  pending functional progress

## 2020-08-24 NOTE — DIETITIAN INITIAL EVALUATION ADULT. - ENERGY NEEDS
Height: 62" Weight: 163lbs, IBW 110lbs+/-10%, %%, BMI 29.8,  IBW used for calculations as pt >120% of IBW, adjusted for age, sepsis

## 2020-08-24 NOTE — DIETITIAN INITIAL EVALUATION ADULT. - PROBLEM SELECTOR PLAN 1
likely 2/2 pna. Met 2/4 sepsis criteria (T 102 and HR 98).  CXR right basilar infiltrates and effusion concerning for pna. Ua clean. Last admission for CAP in 2019. Flu A, Flu B, RSV, Covid all negative.   -f/u blood cx  -ceftriaxone 1g Q 24, azithromycin 500mg Q24  -consider switch to PO antibiotics with improvement  agree

## 2020-08-24 NOTE — PHYSICAL THERAPY INITIAL EVALUATION ADULT - GENERAL OBSERVATIONS, REHAB EVAL
As per Lee GOLDMAN patient cleared for PT/OOB. Received supine + IV, primafit, c/o left side abdominal pain As per Lee GOLDMAN, Dr. bowen medicine attending patient cleared for PT/OOB. Received supine + IV, primafit, in NAD

## 2020-08-24 NOTE — PROGRESS NOTE ADULT - PROBLEM SELECTOR PLAN 7
On home atorvastatin 20 mg.  -c/w atorvastatin 20 mg    CVA   Patient with no residual weakness. Uses cane.   -consider starting asa 81 (was not on med list at pharmacy)  -PT consulted

## 2020-08-24 NOTE — DIETITIAN INITIAL EVALUATION ADULT. - PROBLEM SELECTOR PLAN 9
brimonidine 0.15, 1 drop to both eyes twice a day and diclofenac 1% gel once daily.  -c/w home glaucoma medications

## 2020-08-24 NOTE — DIETITIAN INITIAL EVALUATION ADULT. - PROBLEM SELECTOR PLAN 7
On home atorvastatin 20 mg.  -c/w atorvastatin 20 mg    CVA   Patient with no residual weakness. Uses cane.   -consider starting asa 81 (was not on med list at pharmacy)  -PT consult

## 2020-08-24 NOTE — CONSULT NOTE ADULT - ASSESSMENT
per Internal Medicine    85y.o. F PMH of COPD/asthma, lung CA (s/p resection in remission) CAD, cardiomyopathy w/ PPM, CKD stage 3A (GFR 58), HTN, CVA, HLD, vertigo, syphilis, glaucoma, admission 2019 for CAP, presented productive cough and fever x 1 week, found to be in sepsis likely 2/2 CAP with CXR showing right basilar infiltrates and effusion.      Problem/Plan - 1:  ·  Problem: Sepsis.  Plan: likely 2/2 pna. Met 2/4 sepsis criteria (T 102 and HR 98).  CXR right basilar infiltrates and effusion concerning for pna. Ua clean. Last admission for CAP in 2019. Flu A, Flu B, RSV, Covid all negative.   -f/u blood cx  -ceftriaxone 1g Q 24, azithromycin 500mg Q24  -consider switch to PO antibiotics with improvement  agree.    Problem/Plan - 2:  ·  Problem: CAP (community acquired pneumonia).  Plan: management as above  new compared to CXR from 2/20.    Problem/Plan - 3:  ·  Problem: COPD (chronic obstructive pulmonary disease).  Plan: Hx of COPD and presenting w/ increased sputum/cough. Has baseline cough and sputum production. On iptratropium/albuterol 0.5/3mg inh – 1 unit dose in nebulizer 4 times a day. No wheezing on exam. No increased work of breathing or accessory muscle use. Worsening cough and sputum likely 2/2 pna and not COPD exacerbation.  -c/w duonebs q 6H  -maintain O2 sats >88%  no evidence of AECOPD.    Problem/Plan - 4:  ·  Problem: Asthma, unspecified asthma severity, unspecified whether complicated, unspecified whether persistent.  Plan: No wheezing on exam. No increased work of breathing or accessory muscle use. On iptratropium/albuterol 0.5/3mg inh – 1 unit dose in nebulizer 4 times a day.   -c/w duonebs q 6H.     Problem/Plan - 5:  ·  Problem: Coronary artery disease.  Plan: Atenolol 25mg daily at home.  -c/w home atenolol    cardiomyopathy w/ PPM  -f/u EKG.     Problem/Plan - 6:  Problem: Chronic kidney disease, unspecified CKD stage. Plan: GFR 58 on admission; Creatinine 0.9   - trend BMP   - avoid nephrotoxic medications.    Problem/Plan - 7:  ·  Problem: Hyperlipidemia, unspecified hyperlipidemia type.  Plan: On home atorvastatin 20 mg.  -c/w atorvastatin 20 mg    CVA   Patient with no residual weakness. Uses cane.   -consider starting asa 81 (was not on med list at pharmacy)  -PT consult.     Problem/Plan - 8:  ·  Problem: HTN (hypertension).  Plan: On home Amlodipine 5mg daily.  -hold in setting of sepsis    #eliquis  On eliquis 5 BID. Patient and family unsure why she is on eliquis.  -obtain collateral from prescriber.     Problem/Plan - 9:  ·  Problem: Glaucoma.  Plan: brimonidine 0.15, 1 drop to both eyes twice a day and diclofenac 1% gel once daily.  -c/w home glaucoma medications.     Problem/Plan - 10:  Problem: Preventive measure. Plan; F: tolerating PO, no IVF  E: replete K<4, Mg<2  N: Dash/TLC    VTE Prophylaxis: Eliquis 5 BID  GI: not needed  C: Full Code  D: RMF.

## 2020-08-24 NOTE — DIETITIAN INITIAL EVALUATION ADULT. - ADD RECOMMEND
1. Recommend DASH diet +add Ensure Enlive QD (350kcal/20grpro). 2. Monitor %PO intake and tolerance to diet. 3. Trend wts weekly. 4. Monitor lytes and replete 5. Pain and bowel regimens per team

## 2020-08-24 NOTE — PHYSICAL THERAPY INITIAL EVALUATION ADULT - ADDITIONAL COMMENTS
Patient reports living alone in elevator building. States she has a HHA but didn't specify how many days or hours. States she uses cane for ambulation Patient reports living alone in elevator building. States she has a HHA  daily for 5 hours a day. States she uses cane for ambulation. Reports owning a walker

## 2020-08-24 NOTE — DIETITIAN INITIAL EVALUATION ADULT. - PROBLEM SELECTOR PLAN 3
Hx of COPD and presenting w/ increased sputum/cough. Has baseline cough and sputum production. On iptratropium/albuterol 0.5/3mg inh – 1 unit dose in nebulizer 4 times a day. No wheezing on exam. No increased work of breathing or accessory muscle use. Worsening cough and sputum likely 2/2 pna and not COPD exacerbation.  -c/w duonebs q 6H  -maintain O2 sats >88%  no evidence of AECOPD

## 2020-08-24 NOTE — PHYSICAL THERAPY INITIAL EVALUATION ADULT - PERTINENT HX OF CURRENT PROBLEM, REHAB EVAL
85y.o. F PMH of COPD/asthma, lung CA (s/p resection in remission) CAD, cardiomyopathy w/ PPM, CKD stage 3A, HTN, CVA, HLD, vertigo, syphilis, glaucoma, admission 2019 for CAP, presented productive cough and fever x 1 week. Per patient and family, patient has had increased productive cough (yellow sputum, no blood), been feeling feverish, and has had worsening SOB and SHANKS. Patient has baseline chronic productive cough that has worsened in the past week Chest x ray showing right sided infiltrate

## 2020-08-24 NOTE — PHYSICAL THERAPY INITIAL EVALUATION ADULT - ACTIVE RANGE OF MOTION EXAMINATION, REHAB EVAL
bilateral upper extremity Active ROM was WFL (within functional limits) no Active ROM deficits were identified/bilateral upper extremity Active ROM was WFL (within functional limits)

## 2020-08-24 NOTE — DIETITIAN INITIAL EVALUATION ADULT. - PROBLEM SELECTOR PLAN 4
No wheezing on exam. No increased work of breathing or accessory muscle use. On iptratropium/albuterol 0.5/3mg inh – 1 unit dose in nebulizer 4 times a day.   -c/w duonebs q 6H

## 2020-08-25 ENCOUNTER — TRANSCRIPTION ENCOUNTER (OUTPATIENT)
Age: 85
End: 2020-08-25

## 2020-08-25 LAB
ANION GAP SERPL CALC-SCNC: 12 MMOL/L — SIGNIFICANT CHANGE UP (ref 5–17)
BUN SERPL-MCNC: 6 MG/DL — LOW (ref 7–23)
CALCIUM SERPL-MCNC: 9.8 MG/DL — SIGNIFICANT CHANGE UP (ref 8.4–10.5)
CHLORIDE SERPL-SCNC: 105 MMOL/L — SIGNIFICANT CHANGE UP (ref 96–108)
CO2 SERPL-SCNC: 23 MMOL/L — SIGNIFICANT CHANGE UP (ref 22–31)
CREAT SERPL-MCNC: 0.68 MG/DL — SIGNIFICANT CHANGE UP (ref 0.5–1.3)
GLUCOSE SERPL-MCNC: 94 MG/DL — SIGNIFICANT CHANGE UP (ref 70–99)
HCT VFR BLD CALC: 36.3 % — SIGNIFICANT CHANGE UP (ref 34.5–45)
HGB BLD-MCNC: 11.9 G/DL — SIGNIFICANT CHANGE UP (ref 11.5–15.5)
MCHC RBC-ENTMCNC: 29.6 PG — SIGNIFICANT CHANGE UP (ref 27–34)
MCHC RBC-ENTMCNC: 32.8 GM/DL — SIGNIFICANT CHANGE UP (ref 32–36)
MCV RBC AUTO: 90.3 FL — SIGNIFICANT CHANGE UP (ref 80–100)
NRBC # BLD: 0 /100 WBCS — SIGNIFICANT CHANGE UP (ref 0–0)
PLATELET # BLD AUTO: 306 K/UL — SIGNIFICANT CHANGE UP (ref 150–400)
POTASSIUM SERPL-MCNC: 3.7 MMOL/L — SIGNIFICANT CHANGE UP (ref 3.5–5.3)
POTASSIUM SERPL-SCNC: 3.7 MMOL/L — SIGNIFICANT CHANGE UP (ref 3.5–5.3)
RBC # BLD: 4.02 M/UL — SIGNIFICANT CHANGE UP (ref 3.8–5.2)
RBC # FLD: 13.8 % — SIGNIFICANT CHANGE UP (ref 10.3–14.5)
SODIUM SERPL-SCNC: 140 MMOL/L — SIGNIFICANT CHANGE UP (ref 135–145)
WBC # BLD: 6.09 K/UL — SIGNIFICANT CHANGE UP (ref 3.8–10.5)
WBC # FLD AUTO: 6.09 K/UL — SIGNIFICANT CHANGE UP (ref 3.8–10.5)

## 2020-08-25 PROCEDURE — 99232 SBSQ HOSP IP/OBS MODERATE 35: CPT | Mod: GC

## 2020-08-25 RX ORDER — CEFPODOXIME PROXETIL 100 MG
1 TABLET ORAL
Qty: 4 | Refills: 0
Start: 2020-08-25 | End: 2020-08-26

## 2020-08-25 RX ORDER — POTASSIUM CHLORIDE 20 MEQ
10 PACKET (EA) ORAL ONCE
Refills: 0 | Status: COMPLETED | OUTPATIENT
Start: 2020-08-25 | End: 2020-08-25

## 2020-08-25 RX ADMIN — Medication 1 DROP(S): at 12:25

## 2020-08-25 RX ADMIN — CEFTRIAXONE 100 MILLIGRAM(S): 500 INJECTION, POWDER, FOR SOLUTION INTRAMUSCULAR; INTRAVENOUS at 07:40

## 2020-08-25 RX ADMIN — SENNA PLUS 2 TABLET(S): 8.6 TABLET ORAL at 21:25

## 2020-08-25 RX ADMIN — ATENOLOL 25 MILLIGRAM(S): 25 TABLET ORAL at 06:35

## 2020-08-25 RX ADMIN — Medication 3 MILLILITER(S): at 21:25

## 2020-08-25 RX ADMIN — SIMETHICONE 80 MILLIGRAM(S): 80 TABLET, CHEWABLE ORAL at 19:31

## 2020-08-25 RX ADMIN — AZITHROMYCIN 255 MILLIGRAM(S): 500 TABLET, FILM COATED ORAL at 10:09

## 2020-08-25 RX ADMIN — Medication 3 MILLILITER(S): at 03:34

## 2020-08-25 RX ADMIN — APIXABAN 5 MILLIGRAM(S): 2.5 TABLET, FILM COATED ORAL at 06:35

## 2020-08-25 RX ADMIN — ATORVASTATIN CALCIUM 20 MILLIGRAM(S): 80 TABLET, FILM COATED ORAL at 21:25

## 2020-08-25 RX ADMIN — Medication 10 MILLIEQUIVALENT(S): at 15:33

## 2020-08-25 RX ADMIN — Medication 3 MILLILITER(S): at 10:09

## 2020-08-25 RX ADMIN — APIXABAN 5 MILLIGRAM(S): 2.5 TABLET, FILM COATED ORAL at 18:15

## 2020-08-25 RX ADMIN — Medication 3 MILLILITER(S): at 16:23

## 2020-08-25 RX ADMIN — SIMETHICONE 80 MILLIGRAM(S): 80 TABLET, CHEWABLE ORAL at 12:26

## 2020-08-25 RX ADMIN — BRIMONIDINE TARTRATE 1 DROP(S): 2 SOLUTION/ DROPS OPHTHALMIC at 18:15

## 2020-08-25 RX ADMIN — POLYETHYLENE GLYCOL 3350 17 GRAM(S): 17 POWDER, FOR SOLUTION ORAL at 12:26

## 2020-08-25 RX ADMIN — BRIMONIDINE TARTRATE 1 DROP(S): 2 SOLUTION/ DROPS OPHTHALMIC at 06:35

## 2020-08-25 NOTE — PROGRESS NOTE ADULT - PROBLEM SELECTOR PLAN 8
On home Amlodipine 5mg daily.  -hold in setting of sepsis    #eliquis  On eliquis 5 BID. Patient and family unsure why she is on eliquis.  -obtain collateral from prescriber On home Amlodipine 5mg daily.  -hold in setting of sepsis  -on atenolol as above    #eliquis  On eliquis 5 BID. Patient and family unsure why she is on eliquis.  -obtain collateral from prescriber as above

## 2020-08-25 NOTE — DISCHARGE NOTE PROVIDER - HOSPITAL COURSE
#Discharge: do not delete        Patient is an 86 yo F with past medical history of COPD/asthma, lung cancer s/p resection in remission, HLD, CAD, HTN, cardiomyopathy w/ PPM, CVA, CKD III, vertigo, syphilis, glaucoma    Presented with fever and cough x 1 week, found to have sepsis secondary to pneumonia    Problem List/Main Diagnoses (system-based):         1) Sepsis secondary to pneumonia. Met 2/4 sepsis criteria (T 102 and HR 98) on admission. CXR showed right basilar infiltrates and effusion concerning for pneumonia. Last admission for CAP in 2019. Flu A, Flu B, RSV, Covid all negative. Blood cultures remained NGTD. Improved on antibiotics, with azithromycin 500mg Q24 x3 days (8/23-8/25), and Ceftriaxone 1g Q 24 (8/23-8/27) , which was transitioned to cefpodoxime 200mg PO BID to complete on 8/27.            2) COPD (chronic obstructive pulmonary disease).  Presented w/ increased sputum/cough. Has baseline cough and sputum production. On iptratropium/albuterol 0.5/3mg inh – 1 unit dose in nebulizer 4 times a day.             3) Asthma, unspecified asthma severity, unspecified whether complicated, unspecified whether persistent.  Management as per COPD above.        4) Other known medical conditions: noted with no changes in management    .    Inpatient treatment course: As above.    New medications: Azithromycin, Ceftriaxone, Cefpodoxime as above.    Labs to be followed outpatient: CBC, BMP, CXR    Exam to be followed outpatient: Primary Care Provider #Discharge: do not delete        Patient is an 84 yo F with past medical history of COPD/asthma, lung cancer s/p resection in remission, HLD, CAD, HTN, cardiomyopathy w/ PPM, CVA, CKD III, vertigo, syphilis, glaucoma    Presented with fever and cough x 1 week, found to have sepsis secondary to pneumonia    Problem List/Main Diagnoses (system-based):         1) Sepsis secondary to pneumonia. Met 2/4 sepsis criteria (T 102 and HR 98) on admission. CXR showed right basilar infiltrates and effusion concerning for pneumonia. Last admission for CAP in 2019. Flu A, Flu B, RSV, Covid all negative. Blood cultures remained NGTD. Improved on antibiotics, with azithromycin 500mg Q24 x3 days (8/23-8/25), and Ceftriaxone 1g Q 24 (8/23-8/26) , which was transitioned to cefpodoxime 200mg PO BID to complete a 10 day course with last dose on 9/1.            2) COPD (chronic obstructive pulmonary disease).  Presented w/ increased sputum/cough. Has baseline cough and sputum production. On iptratropium/albuterol 3mL nebulizer 4 times a day.             3) Asthma, unspecified asthma severity, unspecified whether complicated, unspecified whether persistent.  Management as per COPD above.        4) Other known medical conditions: noted with no changes in management    .    Inpatient treatment course: As above.    New medications: Azithromycin, Ceftriaxone, Cefpodoxime as above.    Labs to be followed outpatient: CBC, BMP, CXR    Exam to be followed outpatient: Primary Care Provider

## 2020-08-25 NOTE — PROGRESS NOTE ADULT - PROBLEM SELECTOR PLAN 3
Hx of COPD and presenting w/ increased sputum/cough. Has baseline cough and sputum production. On iptratropium/albuterol 0.5/3mg inh – 1 unit dose in nebulizer 4 times a day. No wheezing on exam. No increased work of breathing or accessory muscle use. Worsening cough and sputum likely 2/2 pna and not COPD exacerbation.  -c/w duonebs q 6H  -maintain O2 sats >88%  no evidence of AECOPD
Hx of COPD and presenting w/ increased sputum/cough. Has baseline cough and sputum production. On iptratropium/albuterol 0.5/3mg inh – 1 unit dose in nebulizer 4 times a day. No wheezing on exam. No increased work of breathing or accessory muscle use. Worsening cough and sputum likely 2/2 pna and not COPD exacerbation.  -c/w duonebs q 6H  -maintain O2 sats >88%  no evidence of AECOPD

## 2020-08-25 NOTE — DISCHARGE NOTE PROVIDER - NSDCCPCAREPLAN_GEN_ALL_CORE_FT
PRINCIPAL DISCHARGE DIAGNOSIS  Diagnosis: Pneumonia due to infectious organism, unspecified laterality, unspecified part of lung  Assessment and Plan of Treatment: You came in with a cough and fever, and were found to have an infection of the lung causing sepsis, which is the body's inflammatory response to fighting infection.   Flu A, Flu B, RSV, COVID tests were all negative. Blood cultures were negative. You improved on antibiotics, with azithromycin for 3 days (8/23-8/25), and Ceftriaxone 3 days (8/23-8/27) , which was transitioned to Cefpodoxime 200mg PO BID to complete on 8/27. PRINCIPAL DISCHARGE DIAGNOSIS  Diagnosis: Pneumonia due to infectious organism, unspecified laterality, unspecified part of lung  Assessment and Plan of Treatment: You came in with a cough and fever, and were found to have an infection of the lung causing sepsis, which is the body's inflammatory response to fighting infection.   Flu A, Flu B, RSV, COVID tests were all negative. Blood cultures were negative. You improved on antibiotics, with azithromycin for 3 days (8/23-8/25), and Ceftriaxone 4 days (8/23-8/26) , which was transitioned to Cefpodoxime 200mg PO BID to complete a 10 day  course, with last 2 doses on 9/1/20.

## 2020-08-25 NOTE — PROGRESS NOTE ADULT - ASSESSMENT
per Internal Medicine    85F PMH of COPD/asthma, lung CA (s/p resection in remission) CAD, cardiomyopathy w/ PPM, CKD stage 3A (GFR 58), HTN, CVA, HLD, vertigo, syphilis, glaucoma, admission 2019 for CAP, presented with  productive cough and fever x 1 week, admitted for sepsis likely 2/2 CAP     Problem/Plan - 1:  ·  Problem: Sepsis.  Plan: likely 2/2 pna. Met 2/4 sepsis criteria (T 102 and HR 98).  CXR right basilar infiltrates and effusion concerning for PNA. UA clean. Last admission for CAP in 2019. Flu A, Flu B, RSV, Covid all negative.   -blood cx NGTD  -ceftriaxone 1g Q 24, azithromycin 500mg Q24  -consider switch to PO antibiotics with improvement.     Problem/Plan - 2:  ·  Problem: CAP (community acquired pneumonia).  Plan: management as above  new compared to CXR from 2/20.     Problem/Plan - 3:  ·  Problem: COPD (chronic obstructive pulmonary disease).  Plan: Hx of COPD and presenting w/ increased sputum/cough. Has baseline cough and sputum production. On iptratropium/albuterol 0.5/3mg inh – 1 unit dose in nebulizer 4 times a day. No wheezing on exam. No increased work of breathing or accessory muscle use. Worsening cough and sputum likely 2/2 pna and not COPD exacerbation.  -c/w duonebs q 6H  -maintain O2 sats >88%  no evidence of AECOPD.     Problem/Plan - 4:  ·  Problem: Asthma, unspecified asthma severity, unspecified whether complicated, unspecified whether persistent.  Plan: No wheezing on exam. No increased work of breathing or accessory muscle use. On iptratropium/albuterol 0.5/3mg inh – 1 unit dose in nebulizer 4 times a day.   -c/w duonebs q 6H.     Problem/Plan - 5:  ·  Problem: Coronary artery disease.  Plan: Atenolol 25mg daily at home.  -c/w home atenolol    cardiomyopathy w/ PPM  -no acute issues at this time.    Problem/Plan - 6:  Problem: Chronic kidney disease, unspecified CKD stage. Plan: GFR 58 on admission; Creatinine 0.9   - trend BMP   - avoid nephrotoxic medications.    Problem/Plan - 7:  ·  Problem: Hyperlipidemia, unspecified hyperlipidemia type.  Plan: On home atorvastatin 20 mg.  -c/w atorvastatin 20 mg    CVA   Patient with no residual weakness. Uses cane.   -consider starting asa 81 (was not on med list at pharmacy); unclear why was on eliquis prior to admission; spoke with pt's daughter who will clarify w/ pt's doctor  -PT consulted, recommending [facility] vs. subacute rehab.    Problem/Plan - 8:  ·  Problem: HTN (hypertension).  Plan: On home Amlodipine 5mg daily.  -hold in setting of sepsis  -on atenolol as above    #eliquis  On eliquis 5 BID. Patient and family unsure why she is on eliquis.  -obtain collateral from prescriber as above.    Problem/Plan - 9:  ·  Problem: Glaucoma.  Plan: brimonidine 0.15, 1 drop to both eyes twice a day and diclofenac 1% gel once daily.  -c/w home glaucoma medications.     Problem/Plan - 10:  Problem: Preventive measure. Plan; F: tolerating PO, no IVF  E: replete K<4, Mg<2  N: Dash/TLC  VTE Prophylaxis: Eliquis 5 BID  GI: senna, polyethylene glycol 3350, simethicone  C: Full Code
85F PMH of COPD/asthma, lung CA (s/p resection in remission) CAD, cardiomyopathy w/ PPM, CKD stage 3A (GFR 58), HTN, CVA, HLD, vertigo, syphilis, glaucoma, admission 2019 for CAP, presented with  productive cough and fever x 1 week, admitted for sepsis likely 2/2 CAP
85F PMH of COPD/asthma, lung CA (s/p resection in remission) CAD, cardiomyopathy w/ PPM, CKD stage 3A (GFR 58), HTN, CVA, HLD, vertigo, syphilis, glaucoma, admission 2019 for CAP, presented with  productive cough and fever x 1 week, admitted for sepsis likely 2/2 CAP

## 2020-08-25 NOTE — PROGRESS NOTE ADULT - SUBJECTIVE AND OBJECTIVE BOX
INTERVAL HPI/OVERNIGHT EVENTS:  Patient was seen and examined at bedside. Has pain on R abdomen.    VITAL SIGNS:  T(F): 97.7 (20 @ 16:01)  HR: 76 (20 @ 16:01)  BP: 145/75 (20 @ 16:01)  RR: 18 (20 @ 16:01)  SpO2: 96% (20 @ 16:01)      PHYSICAL EXAM:    Constitutional: WDWN, NAD  HEENT: PERRL, EOMI, sclera non-icteric  Respiratory: CTA b/l, good air entry b/l, no wheezing, no rhonchi, no rales  Cardiovascular: RRR, normal S1S2  Gastrointestinal: soft, nondistended, (+) tenderness in RUQ  Extremities: Warm, well perfused  Neurological: AAOx3, CN Grossly intact  Skin: Normal temperature, warm, dry    MEDICATIONS  (STANDING):  albuterol/ipratropium for Nebulization 3 milliLiter(s) Nebulizer every 6 hours  apixaban 5 milliGRAM(s) Oral every 12 hours  ATENolol  Tablet 25 milliGRAM(s) Oral daily  atorvastatin 20 milliGRAM(s) Oral at bedtime  azithromycin  IVPB 500 milliGRAM(s) IV Intermittent every 24 hours  brimonidine 0.2% Ophthalmic Solution 1 Drop(s) Both EYES two times a day  cefTRIAXone   IVPB 1000 milliGRAM(s) IV Intermittent every 24 hours  ketorolac 0.5% Ophthalmic Solution 1 Drop(s) Both EYES daily  polyethylene glycol 3350 17 Gram(s) Oral every 24 hours  senna 2 Tablet(s) Oral at bedtime  simethicone 80 milliGRAM(s) Chew every 8 hours    MEDICATIONS  (PRN):      Allergies    No Known Allergies    Intolerances        LABS:                        11.5   7.77  )-----------( 288      ( 24 Aug 2020 07:32 )             36.3     08-    140  |  105  |  7   ----------------------------<  89  5.0   |  24  |  0.78    Ca    9.8      24 Aug 2020 07:32  Mg     1.8     08-24    TPro  7.5  /  Alb  3.2<L>  /  TBili  0.6  /  DBili  x   /  AST  25  /  ALT  16  /  AlkPhos  121<H>  08-23    PT/INR - ( 24 Aug 2020 07:32 )   PT: 24.9 sec;   INR: 2.15          PTT - ( 24 Aug 2020 07:32 )  PTT:37.6 sec  Urinalysis Basic - ( 23 Aug 2020 10:59 )    Color: Yellow / Appearance: Clear / S.010 / pH: x  Gluc: x / Ketone: NEGATIVE  / Bili: Negative / Urobili: 0.2 E.U./dL   Blood: x / Protein: NEGATIVE mg/dL / Nitrite: NEGATIVE   Leuk Esterase: NEGATIVE / RBC: x / WBC x   Sq Epi: x / Non Sq Epi: x / Bacteria: x        RADIOLOGY & ADDITIONAL TESTS:  Reviewed
INTERVAL HPI/OVERNIGHT EVENTS:  Patient was seen and examined at bedside. No longer reports right sided pain. No recent BMs. Feels well overall.     VITAL SIGNS:  T(F): 97.8 (08-25-20 @ 09:26)  HR: 73 (08-25-20 @ 09:26)  BP: 114/75 (08-25-20 @ 09:26)  RR: 16 (08-25-20 @ 09:26)  SpO2: 97% (08-25-20 @ 09:26)    PHYSICAL EXAM:    Constitutional: WDWN, NAD  HEENT: EOMI, sclera non-icteric  Respiratory: decreased breath sound on R side, otherwise CTA, no wheezing, no rhonchi, no rales  Cardiovascular: RRR, normal S1S2, no M/R/G  Gastrointestinal: soft, NTND  Extremities: Warm, well perfused    MEDICATIONS  (STANDING):  albuterol/ipratropium for Nebulization 3 milliLiter(s) Nebulizer every 6 hours  apixaban 5 milliGRAM(s) Oral every 12 hours  ATENolol  Tablet 25 milliGRAM(s) Oral daily  atorvastatin 20 milliGRAM(s) Oral at bedtime  brimonidine 0.2% Ophthalmic Solution 1 Drop(s) Both EYES two times a day  cefTRIAXone   IVPB 1000 milliGRAM(s) IV Intermittent every 24 hours  ketorolac 0.5% Ophthalmic Solution 1 Drop(s) Both EYES daily  polyethylene glycol 3350 17 Gram(s) Oral every 24 hours  senna 2 Tablet(s) Oral at bedtime  simethicone 80 milliGRAM(s) Chew every 8 hours    MEDICATIONS  (PRN):      Allergies    No Known Allergies    Intolerances        LABS:                        11.9   6.09  )-----------( 306      ( 25 Aug 2020 07:49 )             36.3     08-25    140  |  105  |  6<L>  ----------------------------<  94  3.7   |  23  |  0.68    Ca    9.8      25 Aug 2020 07:49  Mg     1.8     08-24      PT/INR - ( 24 Aug 2020 07:32 )   PT: 24.9 sec;   INR: 2.15          PTT - ( 24 Aug 2020 07:32 )  PTT:37.6 sec      RADIOLOGY & ADDITIONAL TESTS:  Reviewed
Physical Medicine and Rehabilitation Progress Note:    Patient is a 85y old  Female who presents with a chief complaint of fevers (25 Aug 2020 13:20)      HPI:  85y.o. F PMH of COPD/asthma, lung CA (s/p resection in remission) CAD, cardiomyopathy w/ PPM, CKD stage 3A, HTN, CVA, HLD, vertigo, syphilis, glaucoma, admission 2019 for CAP, presented productive cough and fever x 1 week. Per patient and family, patient has had increased productive cough (yellow sputum, no blood), been feeling feverish, and has had worsening SOB and SHANKS. Patient has baseline chronic productive cough that has worsened in the past week. Patient and family never measured temperature but patient reports taking Tylenol whenever she felt feverish. Reports an increase in use of home nebulizer treatments during this period. Denies any recent antibiotics use. Patient also endorses decreased PO intake since she has not been feeling well. Denies chills, N/V, chest pain, palpitations, abdominal pain, dysuria, LE edema.       In the ED  Vitals Tmax 102, -165/62-89, HR 87-98, RR 18-19 O2 sat 96% on RA  Labs notable for: Albumin 3.2, Alk phos 121, GFR 58, PT 28.6, INR 2.49, PTT 38.9….creatinine 0.90  Venous blood gas pH 7.38, pCO2 46, pO2 26, Na 132  Ua: - ketones, - protein, - glucose, - nitrites, -leuk esterase  - Flu A, -Flu B, - RSV, -COVID    Imaging:  CXR: right basilar infiltrates  and effusion     Received Tylenol 650 (9a), azithromycin 500mg (9:10a), ceftriaxone 1g (9a), 1L NS, duonebs x2. (23 Aug 2020 13:17)                            11.9   6.09  )-----------( 306      ( 25 Aug 2020 07:49 )             36.3       08-25    140  |  105  |  6<L>  ----------------------------<  94  3.7   |  23  |  0.68    Ca    9.8      25 Aug 2020 07:49  Mg     1.8     08-24      Vital Signs Last 24 Hrs  T(C): 36.6 (25 Aug 2020 09:26), Max: 37.4 (25 Aug 2020 05:15)  T(F): 97.8 (25 Aug 2020 09:26), Max: 99.4 (25 Aug 2020 05:15)  HR: 73 (25 Aug 2020 09:26) (73 - 83)  BP: 114/75 (25 Aug 2020 09:26) (114/75 - 153/78)  BP(mean): --  RR: 16 (25 Aug 2020 09:26) (16 - 18)  SpO2: 97% (25 Aug 2020 09:26) (96% - 97%)    MEDICATIONS  (STANDING):  albuterol/ipratropium for Nebulization 3 milliLiter(s) Nebulizer every 6 hours  apixaban 5 milliGRAM(s) Oral every 12 hours  ATENolol  Tablet 25 milliGRAM(s) Oral daily  atorvastatin 20 milliGRAM(s) Oral at bedtime  brimonidine 0.2% Ophthalmic Solution 1 Drop(s) Both EYES two times a day  cefTRIAXone   IVPB 1000 milliGRAM(s) IV Intermittent every 24 hours  ketorolac 0.5% Ophthalmic Solution 1 Drop(s) Both EYES daily  polyethylene glycol 3350 17 Gram(s) Oral every 24 hours  potassium chloride    Tablet ER 10 milliEquivalent(s) Oral once  senna 2 Tablet(s) Oral at bedtime  simethicone 80 milliGRAM(s) Chew every 8 hours    MEDICATIONS  (PRN):    Currently Undergoing Physical/ Occupational Therapy at bedside.    Functional Status Assessment:   8/24/2020    Previous Level of Function:     · Ambulation Skills  needs device    · Transfer Skills  needs device    · ADL Skills  needs device    · Work/Leisure Activity  needs device    · Additional Comments  Patient reports living alone in elevator building. States she has a HHA  daily for 5 hours a day. States she uses cane for ambulation. Reports owning a walker            Cognitive Status Examination:   · Orientation  person; place; situation    · Level of Consciousness  alert; confused          · Follows Commands and Answers Questions  100% of the time; able to follow single-step instructions    · Personal Safety and Judgment  impaired      Range of Motion Exam:   · Active Range of Motion Examination  bilateral upper extremity Active ROM was WFL (within functional limits); no Active ROM deficits were identified            Manual Muscle Testing:   · Manual Muscle Testing Results  no strength deficits were identified       Bed Mobility: Supine to Sit:     · Level of [place]  independent      Transfer: Sit to Stand:     · Level of [place]  contact guard; minimum assist (75% patients effort)    · Physical Assist/Nonphysical Assist  1 person assist; verbal cues    · Weight-Bearing Restrictions  weight-bearing as tolerated    · Assistive Device  rolling walker      Transfer: Stand to Sit:     · Level of [place]  contact guard    · Physical Assist/Nonphysical Assist  1 person assist; verbal cues    · Weight-Bearing Restrictions  weight-bearing as tolerated    · Assistive Device  rolling walker      Sit/Stand Transfer Safety Analysis:     · Transfer Safety Concerns Noted  decreased safety awareness    · Impairments Contributing to Impaired Transfers  impaired balance; cognition      Gait Skills:     · Level of [place]  contact guard; minimum assist (75% patients effort)    · Physical Assist/Nonphysical Assist  1 person assist; verbal cues    · Weight-Bearing Restrictions  weight-bearing as tolerated    · Assistive Device  rolling walker    · Gait Distance  5 feet      Gait Analysis:     · Gait Pattern Used  3-point gait     · Gait Deviations Noted  decreased nieves; decreased step length; decreased stride length    · Impairments Contributing to Gait Deviations  impaired balance; cognition      Balance Skills Assessment:     · Sitting Balance: Static  normal balance     · Sitting Balance: Dynamic  normal balance     · Sit-to-Stand Balance  poor plus     · Standing Balance: Static  fair minus     · Standing Balance: Dynamic  poor plus       Sensory Examination:   Sensory Examination:    Grossly Intact:   · Gross Sensory Examination  Grossly Intact; Left UE; Right UE; Left LE; Right LE        Clinical Impressions:   · Criteria for Skilled Therapeutic Interventions  impairments found; functional limitations in following categories; rehab potential; therapy frequency; anticipated discharge recommendation    · Impairments Found (describe specific impairments)  gait, locomotion, and balance    · Functional Limitations in Following Categories (describe specific limitations)  self-care    · Rehab Potential  good, to achieve stated therapy goals    · Therapy Frequency  2-3x/week          PM&R Impression: as above    Current Disposition Plan Recommendations:    subacute rehab placement

## 2020-08-25 NOTE — DISCHARGE NOTE PROVIDER - NSDCMRMEDTOKEN_GEN_ALL_CORE_FT
amLODIPine 5 mg oral tablet: 1 tab(s) orally once a day  atenolol 25 mg oral tablet: 1 tab(s) orally once a day  atorvastatin 20 mg oral tablet: 1 tab(s) orally once a day (at bedtime)  brimonidine 0.15% ophthalmic solution: 1 drop(s) to each affected eye 3 times a day, OU  cefpodoxime 200 mg oral tablet: 1 tab(s) orally 2 times a day for pneumonia  diclofenac 0.1% ophthalmic solution: 1 application to each affected eye once a day  Eliquis 5 mg oral tablet: 1 tab(s) orally 2 times a day  Proventil HFA 90 mcg/inh inhalation aerosol: 2 puff(s) inhaled every 6 hours - for shortness of breath and/or wheezing amLODIPine 5 mg oral tablet: 1 tab(s) orally once a day  atenolol 25 mg oral tablet: 1 tab(s) orally once a day  atorvastatin 20 mg oral tablet: 1 tab(s) orally once a day (at bedtime)  brimonidine 0.15% ophthalmic solution: 1 drop(s) to each affected eye 3 times a day, OU  cefpodoxime 200 mg oral tablet: 1 tab(s) orally 2 times a day for pneumonia  diclofenac 0.1% ophthalmic solution: 1 application to each affected eye once a day  Proventil HFA 90 mcg/inh inhalation aerosol: 2 puff(s) inhaled every 6 hours - for shortness of breath and/or wheezing amLODIPine 5 mg oral tablet: 1 tab(s) orally once a day  apixaban 5 mg oral tablet: 1 tab(s) orally every 12 hours  atenolol 25 mg oral tablet: 1 tab(s) orally once a day  atorvastatin 20 mg oral tablet: 1 tab(s) orally once a day (at bedtime)  brimonidine 0.15% ophthalmic solution: 1 drop(s) to each affected eye 3 times a day, OU  cefpodoxime 200 mg oral tablet: 1 tab(s) orally 2 times a day for pneumonia  diclofenac 0.1% ophthalmic solution: 1 application to each affected eye once a day  ipratropium-albuterol 0.5 mg-2.5 mg/3 mLinhalation solution: 3 milliliter(s) inhaled every 6 hours  Proventil HFA 90 mcg/inh inhalation aerosol: 2 puff(s) inhaled every 6 hours - for shortness of breath and/or wheezing

## 2020-08-25 NOTE — PROGRESS NOTE ADULT - ATTENDING COMMENTS
Patient seen and examined with house-staff during bedside rounds.  Resident note read, including vitals, physical findings, laboratory data, and radiological reports.   Revisions included below.  Direct personal management at bed side and extensive interpretation of the data.  Plan was outlined and discussed in details with the housestaff.  Decision making of high complexity  Action taken for acute disease activity to reflect the level of care provided:  - medication reconciliation  - review laboratory data  she is improving  XRay consistent with stool impaction  laxative  sat is OK
Patient seen and examined with house-staff during bedside rounds.  Resident note read, including vitals, physical findings, laboratory data, and radiological reports.   Revisions included below.  Direct personal management at bed side and extensive interpretation of the data.  Plan was outlined and discussed in details with the housestaff.  Decision making of high complexity  Action taken for acute disease activity to reflect the level of care provided:  - medication reconciliation  - review laboratory data  patient is slowly improving  follow with PT evalaution  I will discuss with family DC home

## 2020-08-25 NOTE — PROGRESS NOTE ADULT - PROBLEM SELECTOR PLAN 10
F: tolerating PO, no IVF  E: replete K<4, Mg<2  N: Dash/TLC    VTE Prophylaxis: Eliquis 5 BID  GI: not needed  C: Full Code  D: RMF F: tolerating PO, no IVF  E: replete K<4, Mg<2  N: Dash/TLC  VTE Prophylaxis: Eliquis 5 BID  GI: senna, polyethylene glycol 3350, simethicone  C: Full Code  D: [facility] vs. subacute rehab, goal today/kirby

## 2020-08-25 NOTE — PROGRESS NOTE ADULT - PROBLEM SELECTOR PLAN 6
GFR 58 on admission; Creatinine 0.9   - trend BMP   - avoid nephrotoxic medications
GFR 58 on admission; Creatinine 0.9   - trend BMP   - avoid nephrotoxic medications

## 2020-08-25 NOTE — PROGRESS NOTE ADULT - PROBLEM SELECTOR PLAN 9
brimonidine 0.15, 1 drop to both eyes twice a day and diclofenac 1% gel once daily.  -c/w home glaucoma medications
brimonidine 0.15, 1 drop to both eyes twice a day and diclofenac 1% gel once daily.  -c/w home glaucoma medications

## 2020-08-25 NOTE — PROGRESS NOTE ADULT - PROBLEM SELECTOR PLAN 7
On home atorvastatin 20 mg.  -c/w atorvastatin 20 mg    CVA   Patient with no residual weakness. Uses cane.   -consider starting asa 81 (was not on med list at pharmacy)  -PT consulted On home atorvastatin 20 mg.  -c/w atorvastatin 20 mg    CVA   Patient with no residual weakness. Uses cane.   -consider starting asa 81 (was not on med list at pharmacy); unclear why was on eliquis prior to admission; spoke with pt's daughter who will clarify w/ pt's doctor  -PT consulted, recommending [facility] vs. subacute rehab

## 2020-08-25 NOTE — DISCHARGE NOTE PROVIDER - NSDCFUSCHEDAPPT_GEN_ALL_CORE_FT
[facility] ; 08/27/2020 ; NPP Rheum [address] [facility] ; 08/27/2020 ; NPP Intmed [address] [facility] ; 09/24/2020 ; NPP Intmed [address] [facility] ; 09/29/2020 ; NPP Endocrin [address] [facility] ; 08/27/2020 ; NPP Rheum [address] [facility] ; 08/27/2020 ; NPP Intmed [address] [facility] ; 09/24/2020 ; NPP Intmed [address] [facility] ; 09/29/2020 ; NPP Endocrin [address] [facility] ; 08/27/2020 ; NPP Rheum [address] [facility] ; 08/27/2020 ; NPP Intmed [address] [facility] ; 09/24/2020 ; NPP Intmed [address] [facility] ; 09/29/2020 ; NPP Endocrin [address] [facility] ; 08/27/2020 ; N[facility] Rheum [address] [facility] ; 08/27/2020 ; NPP Intmed [address] [facility] ; 09/24/2020 ; NPP Intmed [address] [facility] ; 09/29/2020 ; NPP Endocrin [address] [facility] ; 08/27/2020 ; NPP Rheum [address] [facility] ; 08/27/2020 ; NPP Intmed [address] [facility] ; 09/24/2020 ; NPP Intmed [address] [facility] ; 09/29/2020 ; NPP Endocrin [address]

## 2020-08-25 NOTE — PROGRESS NOTE ADULT - PROBLEM SELECTOR PLAN 5
Atenolol 25mg daily at home.  -c/w home atenolol    cardiomyopathy w/ PPM  -f/u EKG Atenolol 25mg daily at home.  -c/w home atenolol    cardiomyopathy w/ PPM  -no acute issues at this time.

## 2020-08-25 NOTE — DISCHARGE NOTE PROVIDER - NSDCCAREPROVSEEN_GEN_ALL_CORE_FT
[facility]  Patient seen and examined with house-staff during bedside rounds.  Resident note read, including vitals, physical findings, laboratory data, and radiological reports.   Revisions included below.  Direct personal management at bed side and extensive interpretation of the data.  Plan was outlined and discussed in details with the housestaff.  Decision making of high complexity  Action taken for acute disease activity to reflect the level of care provided:  - medication reconciliation  - review laboratory data

## 2020-08-25 NOTE — DISCHARGE NOTE PROVIDER - NSDCFUADDAPPT_GEN_ALL_CORE_FT
*Please follow up with your scheduled appointments:     -rheumatology  (8/27)     -internal medicine (8/27)     -internal medicine (9/24)     -endocrinology (9/29)

## 2020-08-25 NOTE — PROGRESS NOTE ADULT - PROBLEM SELECTOR PLAN 2
management as above  new compared to CXR from 2/20
management as above  new compared to CXR from 2/20

## 2020-08-25 NOTE — PROGRESS NOTE ADULT - PROBLEM SELECTOR PLAN 1
likely 2/2 pna. Met 2/4 sepsis criteria (T 102 and HR 98).  CXR right basilar infiltrates and effusion concerning for pna. Ua clean. Last admission for CAP in 2019. Flu A, Flu B, RSV, Covid all negative.   -f/u blood cx  -ceftriaxone 1g Q 24, azithromycin 500mg Q24  -consider switch to PO antibiotics with improvement
likely 2/2 pna. Met 2/4 sepsis criteria (T 102 and HR 98).  CXR right basilar infiltrates and effusion concerning for PNA. UA clean. Last admission for CAP in 2019. Flu A, Flu B, RSV, Covid all negative.   -blood cx NGTD  -ceftriaxone 1g Q 24, azithromycin 500mg Q24  -consider switch to PO antibiotics with improvement

## 2020-08-25 NOTE — PROGRESS NOTE ADULT - PROBLEM SELECTOR PLAN 4
No wheezing on exam. No increased work of breathing or accessory muscle use. On iptratropium/albuterol 0.5/3mg inh – 1 unit dose in nebulizer 4 times a day.   -c/w duonebs q 6H
No wheezing on exam. No increased work of breathing or accessory muscle use. On iptratropium/albuterol 0.5/3mg inh – 1 unit dose in nebulizer 4 times a day.   -c/w duonebs q 6H

## 2020-08-26 ENCOUNTER — TRANSCRIPTION ENCOUNTER (OUTPATIENT)
Age: 85
End: 2020-08-26

## 2020-08-26 VITALS
TEMPERATURE: 98 F | HEART RATE: 74 BPM | SYSTOLIC BLOOD PRESSURE: 158 MMHG | DIASTOLIC BLOOD PRESSURE: 86 MMHG | OXYGEN SATURATION: 96 % | RESPIRATION RATE: 18 BRPM

## 2020-08-26 LAB
ANION GAP SERPL CALC-SCNC: 10 MMOL/L — SIGNIFICANT CHANGE UP (ref 5–17)
BUN SERPL-MCNC: 8 MG/DL — SIGNIFICANT CHANGE UP (ref 7–23)
CALCIUM SERPL-MCNC: 9.6 MG/DL — SIGNIFICANT CHANGE UP (ref 8.4–10.5)
CHLORIDE SERPL-SCNC: 102 MMOL/L — SIGNIFICANT CHANGE UP (ref 96–108)
CO2 SERPL-SCNC: 24 MMOL/L — SIGNIFICANT CHANGE UP (ref 22–31)
CREAT SERPL-MCNC: 0.74 MG/DL — SIGNIFICANT CHANGE UP (ref 0.5–1.3)
GLUCOSE SERPL-MCNC: 95 MG/DL — SIGNIFICANT CHANGE UP (ref 70–99)
HCT VFR BLD CALC: 35.1 % — SIGNIFICANT CHANGE UP (ref 34.5–45)
HGB BLD-MCNC: 11.2 G/DL — LOW (ref 11.5–15.5)
MAGNESIUM SERPL-MCNC: 1.9 MG/DL — SIGNIFICANT CHANGE UP (ref 1.6–2.6)
MCHC RBC-ENTMCNC: 29.5 PG — SIGNIFICANT CHANGE UP (ref 27–34)
MCHC RBC-ENTMCNC: 31.9 GM/DL — LOW (ref 32–36)
MCV RBC AUTO: 92.4 FL — SIGNIFICANT CHANGE UP (ref 80–100)
NRBC # BLD: 0 /100 WBCS — SIGNIFICANT CHANGE UP (ref 0–0)
PLATELET # BLD AUTO: 316 K/UL — SIGNIFICANT CHANGE UP (ref 150–400)
POTASSIUM SERPL-MCNC: 3.9 MMOL/L — SIGNIFICANT CHANGE UP (ref 3.5–5.3)
POTASSIUM SERPL-SCNC: 3.9 MMOL/L — SIGNIFICANT CHANGE UP (ref 3.5–5.3)
RBC # BLD: 3.8 M/UL — SIGNIFICANT CHANGE UP (ref 3.8–5.2)
RBC # FLD: 13.9 % — SIGNIFICANT CHANGE UP (ref 10.3–14.5)
SODIUM SERPL-SCNC: 136 MMOL/L — SIGNIFICANT CHANGE UP (ref 135–145)
WBC # BLD: 5.56 K/UL — SIGNIFICANT CHANGE UP (ref 3.8–10.5)
WBC # FLD AUTO: 5.56 K/UL — SIGNIFICANT CHANGE UP (ref 3.8–10.5)

## 2020-08-26 PROCEDURE — 87086 URINE CULTURE/COLONY COUNT: CPT

## 2020-08-26 PROCEDURE — 36415 COLL VENOUS BLD VENIPUNCTURE: CPT

## 2020-08-26 PROCEDURE — 82803 BLOOD GASES ANY COMBINATION: CPT

## 2020-08-26 PROCEDURE — 87635 SARS-COV-2 COVID-19 AMP PRB: CPT

## 2020-08-26 PROCEDURE — 84295 ASSAY OF SERUM SODIUM: CPT

## 2020-08-26 PROCEDURE — 99239 HOSP IP/OBS DSCHRG MGMT >30: CPT

## 2020-08-26 PROCEDURE — 96375 TX/PRO/DX INJ NEW DRUG ADDON: CPT

## 2020-08-26 PROCEDURE — 96374 THER/PROPH/DIAG INJ IV PUSH: CPT

## 2020-08-26 PROCEDURE — 83735 ASSAY OF MAGNESIUM: CPT

## 2020-08-26 PROCEDURE — 87631 RESP VIRUS 3-5 TARGETS: CPT

## 2020-08-26 PROCEDURE — 87040 BLOOD CULTURE FOR BACTERIA: CPT

## 2020-08-26 PROCEDURE — 74019 RADEX ABDOMEN 2 VIEWS: CPT

## 2020-08-26 PROCEDURE — 84132 ASSAY OF SERUM POTASSIUM: CPT

## 2020-08-26 PROCEDURE — 81003 URINALYSIS AUTO W/O SCOPE: CPT

## 2020-08-26 PROCEDURE — 93005 ELECTROCARDIOGRAM TRACING: CPT

## 2020-08-26 PROCEDURE — 85610 PROTHROMBIN TIME: CPT

## 2020-08-26 PROCEDURE — 97161 PT EVAL LOW COMPLEX 20 MIN: CPT

## 2020-08-26 PROCEDURE — 85025 COMPLETE CBC W/AUTO DIFF WBC: CPT

## 2020-08-26 PROCEDURE — 83605 ASSAY OF LACTIC ACID: CPT

## 2020-08-26 PROCEDURE — 80053 COMPREHEN METABOLIC PANEL: CPT

## 2020-08-26 PROCEDURE — 94640 AIRWAY INHALATION TREATMENT: CPT

## 2020-08-26 PROCEDURE — 82330 ASSAY OF CALCIUM: CPT

## 2020-08-26 PROCEDURE — 99285 EMERGENCY DEPT VISIT HI MDM: CPT | Mod: 25

## 2020-08-26 PROCEDURE — 80048 BASIC METABOLIC PNL TOTAL CA: CPT

## 2020-08-26 PROCEDURE — 71046 X-RAY EXAM CHEST 2 VIEWS: CPT

## 2020-08-26 PROCEDURE — 85730 THROMBOPLASTIN TIME PARTIAL: CPT

## 2020-08-26 PROCEDURE — 85027 COMPLETE CBC AUTOMATED: CPT

## 2020-08-26 RX ORDER — APIXABAN 2.5 MG/1
1 TABLET, FILM COATED ORAL
Qty: 0 | Refills: 0 | DISCHARGE
Start: 2020-08-26

## 2020-08-26 RX ORDER — APIXABAN 2.5 MG/1
1 TABLET, FILM COATED ORAL
Qty: 0 | Refills: 0 | DISCHARGE

## 2020-08-26 RX ORDER — IPRATROPIUM/ALBUTEROL SULFATE 18-103MCG
3 AEROSOL WITH ADAPTER (GRAM) INHALATION
Qty: 0 | Refills: 0 | DISCHARGE
Start: 2020-08-26

## 2020-08-26 RX ORDER — MAGNESIUM OXIDE 400 MG ORAL TABLET 241.3 MG
400 TABLET ORAL ONCE
Refills: 0 | Status: COMPLETED | OUTPATIENT
Start: 2020-08-26 | End: 2020-08-26

## 2020-08-26 RX ADMIN — ATENOLOL 25 MILLIGRAM(S): 25 TABLET ORAL at 05:44

## 2020-08-26 RX ADMIN — SIMETHICONE 80 MILLIGRAM(S): 80 TABLET, CHEWABLE ORAL at 04:15

## 2020-08-26 RX ADMIN — CEFTRIAXONE 100 MILLIGRAM(S): 500 INJECTION, POWDER, FOR SOLUTION INTRAMUSCULAR; INTRAVENOUS at 07:42

## 2020-08-26 RX ADMIN — Medication 3 MILLILITER(S): at 10:36

## 2020-08-26 RX ADMIN — MAGNESIUM OXIDE 400 MG ORAL TABLET 400 MILLIGRAM(S): 241.3 TABLET ORAL at 10:36

## 2020-08-26 RX ADMIN — POLYETHYLENE GLYCOL 3350 17 GRAM(S): 17 POWDER, FOR SOLUTION ORAL at 12:17

## 2020-08-26 RX ADMIN — SIMETHICONE 80 MILLIGRAM(S): 80 TABLET, CHEWABLE ORAL at 12:17

## 2020-08-26 RX ADMIN — Medication 3 MILLILITER(S): at 04:15

## 2020-08-26 RX ADMIN — APIXABAN 5 MILLIGRAM(S): 2.5 TABLET, FILM COATED ORAL at 04:15

## 2020-08-26 RX ADMIN — BRIMONIDINE TARTRATE 1 DROP(S): 2 SOLUTION/ DROPS OPHTHALMIC at 05:44

## 2020-08-26 NOTE — DISCHARGE NOTE NURSING/CASE MANAGEMENT/SOCIAL WORK - PATIENT PORTAL LINK FT
You can access the FollowMyHealth Patient Portal offered by [facility] by registering at the following website: [email or web address]. By joining [facility] FollowMyHealth portal, you will also be able to view your health information using other applications (apps) compatible with our system.

## 2020-08-27 RX ORDER — CEFPODOXIME PROXETIL 100 MG
1 TABLET ORAL
Qty: 12 | Refills: 0
Start: 2020-08-27 | End: 2020-09-01

## 2020-08-29 DIAGNOSIS — I42.9 CARDIOMYOPATHY, UNSPECIFIED: ICD-10-CM

## 2020-08-29 DIAGNOSIS — Z95.0 PRESENCE OF CARDIAC PACEMAKER: ICD-10-CM

## 2020-08-29 DIAGNOSIS — Z79.01 LONG TERM (CURRENT) USE OF ANTICOAGULANTS: ICD-10-CM

## 2020-08-29 DIAGNOSIS — I12.9 HYPERTENSIVE CHRONIC KIDNEY DISEASE WITH STAGE 1 THROUGH STAGE 4 CHRONIC KIDNEY DISEASE, OR UNSPECIFIED CHRONIC KIDNEY DISEASE: ICD-10-CM

## 2020-08-29 DIAGNOSIS — Z86.73 PERSONAL HISTORY OF TRANSIENT ISCHEMIC ATTACK (TIA), AND CEREBRAL INFARCTION WITHOUT RESIDUAL DEFICITS: ICD-10-CM

## 2020-08-29 DIAGNOSIS — A41.9 SEPSIS, UNSPECIFIED ORGANISM: ICD-10-CM

## 2020-08-29 DIAGNOSIS — Z90.49 ACQUIRED ABSENCE OF OTHER SPECIFIED PARTS OF DIGESTIVE TRACT: ICD-10-CM

## 2020-08-29 DIAGNOSIS — J44.0 CHRONIC OBSTRUCTIVE PULMONARY DISEASE WITH (ACUTE) LOWER RESPIRATORY INFECTION: ICD-10-CM

## 2020-08-29 DIAGNOSIS — J18.9 PNEUMONIA, UNSPECIFIED ORGANISM: ICD-10-CM

## 2020-08-29 DIAGNOSIS — Z86.19 PERSONAL HISTORY OF OTHER INFECTIOUS AND PARASITIC DISEASES: ICD-10-CM

## 2020-08-29 DIAGNOSIS — Z90.2 ACQUIRED ABSENCE OF LUNG [PART OF]: ICD-10-CM

## 2020-08-29 DIAGNOSIS — I25.10 ATHEROSCLEROTIC HEART DISEASE OF NATIVE CORONARY ARTERY WITHOUT ANGINA PECTORIS: ICD-10-CM

## 2020-08-29 DIAGNOSIS — Z90.710 ACQUIRED ABSENCE OF BOTH CERVIX AND UTERUS: ICD-10-CM

## 2020-08-29 DIAGNOSIS — N18.3 CHRONIC KIDNEY DISEASE, STAGE 3 (MODERATE): ICD-10-CM

## 2020-08-29 DIAGNOSIS — Z11.59 ENCOUNTER FOR SCREENING FOR OTHER VIRAL DISEASES: ICD-10-CM

## 2020-08-29 DIAGNOSIS — E78.5 HYPERLIPIDEMIA, UNSPECIFIED: ICD-10-CM

## 2020-08-29 DIAGNOSIS — Z87.442 PERSONAL HISTORY OF URINARY CALCULI: ICD-10-CM

## 2020-08-29 DIAGNOSIS — H40.9 UNSPECIFIED GLAUCOMA: ICD-10-CM

## 2020-08-29 DIAGNOSIS — Z79.82 LONG TERM (CURRENT) USE OF ASPIRIN: ICD-10-CM

## 2020-08-29 DIAGNOSIS — Z87.891 PERSONAL HISTORY OF NICOTINE DEPENDENCE: ICD-10-CM

## 2020-08-29 DIAGNOSIS — Z85.118 PERSONAL HISTORY OF OTHER MALIGNANT NEOPLASM OF BRONCHUS AND LUNG: ICD-10-CM

## 2020-08-29 DIAGNOSIS — J90 PLEURAL EFFUSION, NOT ELSEWHERE CLASSIFIED: ICD-10-CM

## 2020-08-29 DIAGNOSIS — Z87.01 PERSONAL HISTORY OF PNEUMONIA (RECURRENT): ICD-10-CM

## 2020-09-10 ENCOUNTER — APPOINTMENT (OUTPATIENT)
Dept: RHEUMATOLOGY | Facility: CLINIC | Age: 85
End: 2020-09-10

## 2020-09-10 ENCOUNTER — APPOINTMENT (OUTPATIENT)
Dept: INTERNAL MEDICINE | Facility: CLINIC | Age: 85
End: 2020-09-10

## 2020-09-24 ENCOUNTER — APPOINTMENT (OUTPATIENT)
Dept: INTERNAL MEDICINE | Facility: CLINIC | Age: 85
End: 2020-09-24
Payer: MEDICARE

## 2020-09-24 VITALS
BODY MASS INDEX: 22.6 KG/M2 | OXYGEN SATURATION: 98 % | WEIGHT: 144 LBS | TEMPERATURE: 97.8 F | SYSTOLIC BLOOD PRESSURE: 135 MMHG | HEIGHT: 67 IN | HEART RATE: 71 BPM | DIASTOLIC BLOOD PRESSURE: 74 MMHG

## 2020-09-24 PROCEDURE — 99214 OFFICE O/P EST MOD 30 MIN: CPT | Mod: 25

## 2020-09-24 PROCEDURE — 36415 COLL VENOUS BLD VENIPUNCTURE: CPT

## 2020-09-24 NOTE — PHYSICAL EXAM
[No Acute Distress] : no acute distress [Well Nourished] : well nourished [Well Developed] : well developed [Well-Appearing] : well-appearing [Normal Sclera/Conjunctiva] : normal sclera/conjunctiva [PERRL] : pupils equal round and reactive to light [EOMI] : extraocular movements intact [Normal Outer Ear/Nose] : the outer ears and nose were normal in appearance [Normal Oropharynx] : the oropharynx was normal [No JVD] : no jugular venous distention [No Lymphadenopathy] : no lymphadenopathy [Supple] : supple [Thyroid Normal, No Nodules] : the thyroid was normal and there were no nodules present [No Respiratory Distress] : no respiratory distress  [No Accessory Muscle Use] : no accessory muscle use [Normal Rate] : normal rate  [Regular Rhythm] : with a regular rhythm [Normal S1, S2] : normal S1 and S2 [No Murmur] : no murmur heard [No Carotid Bruits] : no carotid bruits [No Abdominal Bruit] : a ~M bruit was not heard ~T in the abdomen [No Varicosities] : no varicosities [Pedal Pulses Present] : the pedal pulses are present [No Edema] : there was no peripheral edema [No Palpable Aorta] : no palpable aorta [No Extremity Clubbing/Cyanosis] : no extremity clubbing/cyanosis [Soft] : abdomen soft [Non Tender] : non-tender [Non-distended] : non-distended [No Masses] : no abdominal mass palpated [No HSM] : no HSM [Normal Bowel Sounds] : normal bowel sounds [Normal Posterior Cervical Nodes] : no posterior cervical lymphadenopathy [Normal Anterior Cervical Nodes] : no anterior cervical lymphadenopathy [No CVA Tenderness] : no CVA  tenderness [No Spinal Tenderness] : no spinal tenderness [No Joint Swelling] : no joint swelling [Grossly Normal Strength/Tone] : grossly normal strength/tone [No Rash] : no rash [Coordination Grossly Intact] : coordination grossly intact [No Focal Deficits] : no focal deficits [Normal Gait] : normal gait [Normal Affect] : the affect was normal [Normal Insight/Judgement] : insight and judgment were intact [de-identified] : crackles at bases

## 2020-09-24 NOTE — HISTORY OF PRESENT ILLNESS
[FreeTextEntry1] : Recently admitted to hospital for pneumonia'  Discharged on PO antibiotics [de-identified] : History obtained via . Off antibiotics  . Occasional cough;\par She is followed by Dr. Jaimes; Will need a Flu vaccine at some point; Very vulnerable\par She is off blood thinners

## 2020-09-24 NOTE — HEALTH RISK ASSESSMENT
[No] : No [No falls in past year] : Patient reported no falls in the past year [0] : 2) Feeling down, depressed, or hopeless: Not at all (0) [] : No [[id number]] : 0

## 2020-09-24 NOTE — ASSESSMENT
[FreeTextEntry1] : Appears to have recovered from recent pneumonia; Improved ; Will get labs;  RTC 1 month

## 2020-09-25 LAB
ALBUMIN SERPL ELPH-MCNC: 4 G/DL
ALP BLD-CCNC: 134 U/L
ALT SERPL-CCNC: 17 U/L
ANION GAP SERPL CALC-SCNC: 16 MMOL/L
AST SERPL-CCNC: 23 U/L
BASOPHILS # BLD AUTO: 0.03 K/UL
BASOPHILS NFR BLD AUTO: 0.7 %
BILIRUB SERPL-MCNC: 0.5 MG/DL
BUN SERPL-MCNC: 20 MG/DL
CALCIUM SERPL-MCNC: 10.3 MG/DL
CHLORIDE SERPL-SCNC: 105 MMOL/L
CO2 SERPL-SCNC: 22 MMOL/L
CREAT SERPL-MCNC: 1.12 MG/DL
EOSINOPHIL # BLD AUTO: 0.01 K/UL
EOSINOPHIL NFR BLD AUTO: 0.2 %
GLUCOSE SERPL-MCNC: 103 MG/DL
HCT VFR BLD CALC: 40 %
HGB BLD-MCNC: 12.2 G/DL
IMM GRANULOCYTES NFR BLD AUTO: 0 %
LYMPHOCYTES # BLD AUTO: 1.42 K/UL
LYMPHOCYTES NFR BLD AUTO: 35.4 %
MAN DIFF?: NORMAL
MCHC RBC-ENTMCNC: 30.2 PG
MCHC RBC-ENTMCNC: 30.5 GM/DL
MCV RBC AUTO: 99 FL
MONOCYTES # BLD AUTO: 0.34 K/UL
MONOCYTES NFR BLD AUTO: 8.5 %
NEUTROPHILS # BLD AUTO: 2.21 K/UL
NEUTROPHILS NFR BLD AUTO: 55.2 %
PLATELET # BLD AUTO: 244 K/UL
POTASSIUM SERPL-SCNC: 5.2 MMOL/L
PROT SERPL-MCNC: 7 G/DL
RBC # BLD: 4.04 M/UL
RBC # FLD: 17.2 %
SODIUM SERPL-SCNC: 143 MMOL/L
WBC # FLD AUTO: 4.01 K/UL

## 2020-09-29 ENCOUNTER — APPOINTMENT (OUTPATIENT)
Dept: ENDOCRINOLOGY | Facility: CLINIC | Age: 85
End: 2020-09-29
Payer: MEDICARE

## 2020-09-29 VITALS
DIASTOLIC BLOOD PRESSURE: 70 MMHG | WEIGHT: 145 LBS | SYSTOLIC BLOOD PRESSURE: 138 MMHG | BODY MASS INDEX: 22.71 KG/M2 | HEART RATE: 69 BPM

## 2020-09-29 PROCEDURE — 99204 OFFICE O/P NEW MOD 45 MIN: CPT

## 2020-09-29 RX ORDER — SODIUM ZIRCONIUM CYCLOSILICATE 10 G/10G
10 POWDER, FOR SUSPENSION ORAL
Qty: 34 | Refills: 3 | Status: DISCONTINUED | COMMUNITY
Start: 2020-07-24 | End: 2020-09-29

## 2020-09-29 RX ORDER — SODIUM POLYSTYRENE SULFONATE 15 G/60ML
15 SUSPENSION ORAL; RECTAL
Qty: 1 | Refills: 3 | Status: DISCONTINUED | COMMUNITY
Start: 2020-07-24 | End: 2020-09-29

## 2020-09-29 RX ORDER — IPRATROPIUM BROMIDE AND ALBUTEROL SULFATE 2.5; .5 MG/3ML; MG/3ML
0.5-2.5 (3) SOLUTION RESPIRATORY (INHALATION) 4 TIMES DAILY
Qty: 2 | Refills: 5 | Status: DISCONTINUED | COMMUNITY
Start: 2020-08-13 | End: 2020-09-29

## 2020-09-29 RX ORDER — ALBUTEROL 90 MCG
90 AEROSOL (GRAM) INHALATION
Refills: 0 | Status: DISCONTINUED | COMMUNITY
End: 2020-09-29

## 2020-09-29 RX ORDER — FLUTICASONE PROPIONATE AND SALMETEROL 50; 250 UG/1; UG/1
250-50 POWDER RESPIRATORY (INHALATION)
Refills: 0 | Status: DISCONTINUED | COMMUNITY
End: 2020-09-29

## 2020-09-29 RX ORDER — LATANOPROST 50 UG/ML
SOLUTION OPHTHALMIC
Refills: 0 | Status: DISCONTINUED | COMMUNITY
End: 2020-09-29

## 2020-10-07 ENCOUNTER — APPOINTMENT (OUTPATIENT)
Dept: ULTRASOUND IMAGING | Facility: CLINIC | Age: 85
End: 2020-10-07
Payer: MEDICARE

## 2020-10-07 ENCOUNTER — APPOINTMENT (OUTPATIENT)
Dept: RADIOLOGY | Facility: CLINIC | Age: 85
End: 2020-10-07
Payer: MEDICARE

## 2020-10-07 ENCOUNTER — OUTPATIENT (OUTPATIENT)
Dept: OUTPATIENT SERVICES | Facility: HOSPITAL | Age: 85
LOS: 1 days | End: 2020-10-07

## 2020-10-07 DIAGNOSIS — Z87.442 PERSONAL HISTORY OF URINARY CALCULI: Chronic | ICD-10-CM

## 2020-10-07 DIAGNOSIS — C34.90 MALIGNANT NEOPLASM OF UNSPECIFIED PART OF UNSPECIFIED BRONCHUS OR LUNG: Chronic | ICD-10-CM

## 2020-10-07 DIAGNOSIS — Z98.890 OTHER SPECIFIED POSTPROCEDURAL STATES: Chronic | ICD-10-CM

## 2020-10-07 DIAGNOSIS — H26.9 UNSPECIFIED CATARACT: Chronic | ICD-10-CM

## 2020-10-07 DIAGNOSIS — Z95.0 PRESENCE OF CARDIAC PACEMAKER: Chronic | ICD-10-CM

## 2020-10-07 PROCEDURE — 76770 US EXAM ABDO BACK WALL COMP: CPT | Mod: 26

## 2020-10-07 PROCEDURE — 77085 DXA BONE DENSITY AXL VRT FX: CPT | Mod: 26

## 2020-10-07 PROCEDURE — 76775 US EXAM ABDO BACK WALL LIM: CPT | Mod: 26

## 2020-10-07 NOTE — HISTORY OF PRESENT ILLNESS
[FreeTextEntry1] : Ms. Kim is an 85 year-old woman with a history of multiple medical problems including lung cancer status post resection and in remission, ischemic cardiomyopathy, stage 3 chronic kidney disease presenting to establish care with me for primary hyperparathyroidism. She is accompanied by her home health aide. The visit was conducted in [place].\par \par Primary hyperparathyroidism. \par She has a history of hypercalcemia within 1 mg/dL above the upper limit of normal in our system since June 2020; previously calcium at the upper end of normal. PTH was inappropriately normal, consistent with primary hyperparathyroidism. Renal function is stable within stage 3 chronic kidney disease. Vitamin D has been replete.\par No history of kidney stones. No recent renal imaging. \par No history of fracture. No recent bone density testing.\par She has up to half a glass of milk in her coffee and has an Ensure daily. She is taking a prescription multivitamin; unknown dose of calcium and vitamin D. She is not taking calcium or vitamin D supplements. \par No known family history of calcium disorders. No parental history of hip fracture.

## 2020-10-07 NOTE — ADDENDUM
[FreeTextEntry1] : Recent radiology test results as below; discussed with Ms. Kim. Renal ultrasound without kidney stones. Bone density with T7 compression fracture but otherwise normal bone density at the spine, hip, and wrist. Due to her fracture, we will discuss therapy for bone health at her upcoming appointment. 10/07/20

## 2020-10-07 NOTE — PHYSICAL EXAM
[Alert] : alert [Healthy Appearance] : healthy appearance [No Acute Distress] : no acute distress [Normal Sclera/Conjunctiva] : normal sclera/conjunctiva [No Neck Mass] : no neck mass was observed [No LAD] : no lymphadenopathy [Supple] : the neck was supple [Thyroid Not Enlarged] : the thyroid was not enlarged [No Respiratory Distress] : no respiratory distress [Clear to Auscultation] : lungs were clear to auscultation bilaterally [Normal S1, S2] : normal S1 and S2 [Normal Rate] : heart rate was normal [Regular Rhythm] : with a regular rhythm [No Spinal Tenderness] : no spinal tenderness [No Stigmata of Cushings Syndrome] : no stigmata of Cushings Syndrome [Normal Insight/Judgement] : insight and judgment were intact [Kyphosis] : no kyphosis present [Scoliosis] : no scoliosis [Acanthosis Nigricans] : no acanthosis nigricans [de-identified] : no moon facies, no supraclavicular fat pads [de-identified] : narrow-based gait with cane

## 2020-10-26 ENCOUNTER — APPOINTMENT (OUTPATIENT)
Dept: INTERNAL MEDICINE | Facility: CLINIC | Age: 85
End: 2020-10-26
Payer: MEDICARE

## 2020-10-26 VITALS
OXYGEN SATURATION: 80 % | WEIGHT: 149 LBS | TEMPERATURE: 97.8 F | HEART RATE: 98 BPM | HEIGHT: 67 IN | BODY MASS INDEX: 23.39 KG/M2 | DIASTOLIC BLOOD PRESSURE: 80 MMHG | SYSTOLIC BLOOD PRESSURE: 143 MMHG

## 2020-10-26 PROCEDURE — 90662 IIV NO PRSV INCREASED AG IM: CPT

## 2020-10-26 PROCEDURE — G0008: CPT

## 2020-10-26 PROCEDURE — 99213 OFFICE O/P EST LOW 20 MIN: CPT | Mod: 25

## 2020-10-26 NOTE — HISTORY OF PRESENT ILLNESS
[FreeTextEntry1] : Endocrine follow up noted; Primary Hyperparotidism but not a ideal candidate  for operation [de-identified] : I would be conservative in her treatment; \par Medication with out change;

## 2020-11-12 ENCOUNTER — APPOINTMENT (OUTPATIENT)
Dept: ENDOCRINOLOGY | Facility: CLINIC | Age: 85
End: 2020-11-12
Payer: MEDICARE

## 2020-11-12 VITALS
BODY MASS INDEX: 22.91 KG/M2 | DIASTOLIC BLOOD PRESSURE: 77 MMHG | WEIGHT: 146 LBS | HEIGHT: 67 IN | SYSTOLIC BLOOD PRESSURE: 133 MMHG | HEART RATE: 70 BPM

## 2020-11-12 PROCEDURE — 99214 OFFICE O/P EST MOD 30 MIN: CPT | Mod: 25

## 2020-11-12 PROCEDURE — 36415 COLL VENOUS BLD VENIPUNCTURE: CPT

## 2020-11-13 LAB
ALBUMIN SERPL ELPH-MCNC: 3.9 G/DL
ALP BLD-CCNC: 125 U/L
ALT SERPL-CCNC: 17 U/L
ANION GAP SERPL CALC-SCNC: 10 MMOL/L
AST SERPL-CCNC: 20 U/L
BASOPHILS # BLD AUTO: 0.02 K/UL
BASOPHILS NFR BLD AUTO: 0.4 %
BILIRUB SERPL-MCNC: 0.4 MG/DL
BUN SERPL-MCNC: 18 MG/DL
CALCIUM SERPL-MCNC: 10.4 MG/DL
CALCIUM SERPL-MCNC: 10.4 MG/DL
CHLORIDE SERPL-SCNC: 107 MMOL/L
CO2 SERPL-SCNC: 24 MMOL/L
CREAT SERPL-MCNC: 1.06 MG/DL
EOSINOPHIL # BLD AUTO: 0.02 K/UL
EOSINOPHIL NFR BLD AUTO: 0.4 %
GLUCOSE SERPL-MCNC: 102 MG/DL
HCT VFR BLD CALC: 40.3 %
HGB BLD-MCNC: 12.3 G/DL
IMM GRANULOCYTES NFR BLD AUTO: 0.2 %
LYMPHOCYTES # BLD AUTO: 1.57 K/UL
LYMPHOCYTES NFR BLD AUTO: 29.4 %
MAGNESIUM SERPL-MCNC: 2.1 MG/DL
MAN DIFF?: NORMAL
MCHC RBC-ENTMCNC: 29.9 PG
MCHC RBC-ENTMCNC: 30.5 GM/DL
MCV RBC AUTO: 98.1 FL
MONOCYTES # BLD AUTO: 0.56 K/UL
MONOCYTES NFR BLD AUTO: 10.5 %
NEUTROPHILS # BLD AUTO: 3.16 K/UL
NEUTROPHILS NFR BLD AUTO: 59.1 %
PARATHYROID HORMONE INTACT: 35 PG/ML
PHOSPHATE SERPL-MCNC: 3.8 MG/DL
PLATELET # BLD AUTO: 304 K/UL
POTASSIUM SERPL-SCNC: 5.3 MMOL/L
PROT SERPL-MCNC: 7.4 G/DL
RBC # BLD: 4.11 M/UL
RBC # FLD: 15.3 %
SODIUM SERPL-SCNC: 140 MMOL/L
WBC # FLD AUTO: 5.34 K/UL

## 2020-11-13 NOTE — HISTORY OF PRESENT ILLNESS
[FreeTextEntry1] : Ms. Kim is an 85 year-old woman with a history of multiple medical problems including lung cancer status post resection and in remission, ischemic cardiomyopathy, stage 3 chronic kidney disease presenting for follow-up of primary hyperparathyroidism. She is accompanied by her home health aide. The visit was conducted in French.\par \par Primary hyperparathyroidism. Compression fracture.\par She has a history of hypercalcemia within 1 mg/dL above the upper limit of normal in our system since June 2020; previously calcium at the upper end of normal. PTH was inappropriately normal, consistent with primary hyperparathyroidism. Renal function is stable within stage 3 chronic kidney disease. Vitamin D has been replete.\par No history of kidney stones. Renal imaging in October 2020 without nephrolithiasis.\par T7 fracture noted on vertebral fracture assessment in October 2020. No known precipitating trauma; fracture not noted on previous chest imaging. \par She has up to half a glass of milk in her coffee and has an Ensure daily. She is taking a prescription multivitamin; unknown dose of calcium and vitamin D. She is not taking calcium or vitamin D supplements. \par No known family history of calcium disorders. No parental history of hip fracture.\par \par Interim History \par Renal ultrasound without kidney stones. Bone density with T7 compression fracture but otherwise normal bone density at the spine, hip, and wrist.\par She has back and lower extremity pain. \par Medical and surgical history, medications, allergies, social and family history reviewed and updated as needed.

## 2020-11-13 NOTE — ADDENDUM
[FreeTextEntry1] : Recent laboratory results as below; discussed with Ms. Kim. Calcium at the upper end of normal. eGFR 55 mL/min, stable from previous. Other test results within range. We will proceed with zoledronic acid. 11/13/20

## 2020-11-13 NOTE — PHYSICAL EXAM
[Alert] : alert [Healthy Appearance] : healthy appearance [No Acute Distress] : no acute distress [Normal Sclera/Conjunctiva] : normal sclera/conjunctiva [No Neck Mass] : no neck mass was observed [No LAD] : no lymphadenopathy [Supple] : the neck was supple [Thyroid Not Enlarged] : the thyroid was not enlarged [No Respiratory Distress] : no respiratory distress [Clear to Auscultation] : lungs were clear to auscultation bilaterally [Normal S1, S2] : normal S1 and S2 [Normal Rate] : heart rate was normal [Regular Rhythm] : with a regular rhythm [No Spinal Tenderness] : no spinal tenderness [No Stigmata of Cushings Syndrome] : no stigmata of Cushings Syndrome [Normal Insight/Judgement] : insight and judgment were intact [Kyphosis] : no kyphosis present [Scoliosis] : no scoliosis [Acanthosis Nigricans] : no acanthosis nigricans [de-identified] : no moon facies, no supraclavicular fat pads [de-identified] : narrow-based gait with cane [de-identified] : + difficulty balancing on one leg bilaterally

## 2020-11-13 NOTE — ASSESSMENT
[FreeTextEntry1] : Primary hyperparathyroidism. T7 compression fracture. She was first noted to have mild hypercalcemia in June 2020; serum calcium previously at the upper end of normal. She has hypercalcemia with inappropriately normal PTH concentrations consistent with primary hyperparathyroidism. She has a history of lung cancer and possible sarcoidosis, however, evaluation for other causes of hypercalcemia unremarkable, including PTHrP and 1,25-dihydroxyvitamin D levels. We discussed the complications of primary hyperparathyroidism, including but not limited to hypercalcemia, nephrolithiasis, and osteoporosis. She meets criteria for parathyroid surgery due to decreased renal function, however, she is not an ideal surgical candidate due to comorbidities and declines further consideration of surgery at this time. She was recently noted to have a T7 compression fracture without known precipitating trauma. While her bone density is within range at all sites, an atraumatic compression fracture is indicative of osteoporosis. Without including her bone density, her 10 year fracture risk calculated by [facility] is 22% for major osteoporotic fracture and 12% for hip fracture, above the treatment thresholds. We discussed the potential benefits and risks of antiresorptive osteoporosis therapy at length, including but not limited to osteonecrosis of the jaw and atypical femoral fracture. She is amenable to therapy with zoledronic acid. The below instructions were given regarding zoledronic acid infusion. We discussed the recommendations for calcium and vitamin D intake; there is no indication to restrict calcium intake in patients with primary hyperparathyroidism.\par Zoledronic acid 5 mg IV\par Calcium 2543-5815 mg daily from diet and supplements (to be taken in divided doses as no more than 500-600 mg can be absorbed at one time); advised dietary calcium\par Continue current vitamin D regimen\par Diet, exercise, and fall prevention reviewed\par Physical therapy for balance and bone health\par \par Instructions for zoledronic acid:\par Drink at least 32 oz (~4 glasses) of water the day you have the infusion. If this is your first infusion, take acetaminophen (Tylenol) 500-1000 mg every 8 hours the day of and the day after your infusion, starting in the morning before coming to the hospital for your infusion. Be careful if you are taking other products with acetaminophen that you do not exceed the daily recommended dose.

## 2020-11-19 RX ORDER — SODIUM CHLORIDE 9 MG/ML
250 INJECTION INTRAMUSCULAR; INTRAVENOUS; SUBCUTANEOUS ONCE
Refills: 0 | Status: COMPLETED | OUTPATIENT
Start: 2020-11-20 | End: 2020-11-20

## 2020-11-19 RX ORDER — ZOLEDRONIC ACID 5 MG/100ML
5 INJECTION, SOLUTION INTRAVENOUS ONCE
Refills: 0 | Status: COMPLETED | OUTPATIENT
Start: 2020-11-20 | End: 2020-11-20

## 2020-11-20 ENCOUNTER — APPOINTMENT (OUTPATIENT)
Age: 85
End: 2020-11-20

## 2020-11-20 ENCOUNTER — OUTPATIENT (OUTPATIENT)
Dept: OUTPATIENT SERVICES | Facility: HOSPITAL | Age: 85
LOS: 1 days | End: 2020-11-20
Payer: MEDICARE

## 2020-11-20 VITALS
TEMPERATURE: 98 F | HEART RATE: 68 BPM | DIASTOLIC BLOOD PRESSURE: 83 MMHG | WEIGHT: 169.98 LBS | HEIGHT: 64 IN | RESPIRATION RATE: 17 BRPM | SYSTOLIC BLOOD PRESSURE: 146 MMHG | OXYGEN SATURATION: 98 %

## 2020-11-20 VITALS
SYSTOLIC BLOOD PRESSURE: 146 MMHG | HEART RATE: 70 BPM | TEMPERATURE: 97 F | DIASTOLIC BLOOD PRESSURE: 84 MMHG | RESPIRATION RATE: 18 BRPM | OXYGEN SATURATION: 98 %

## 2020-11-20 DIAGNOSIS — Z87.442 PERSONAL HISTORY OF URINARY CALCULI: Chronic | ICD-10-CM

## 2020-11-20 DIAGNOSIS — Z95.0 PRESENCE OF CARDIAC PACEMAKER: Chronic | ICD-10-CM

## 2020-11-20 DIAGNOSIS — Z98.890 OTHER SPECIFIED POSTPROCEDURAL STATES: Chronic | ICD-10-CM

## 2020-11-20 DIAGNOSIS — C34.90 MALIGNANT NEOPLASM OF UNSPECIFIED PART OF UNSPECIFIED BRONCHUS OR LUNG: Chronic | ICD-10-CM

## 2020-11-20 DIAGNOSIS — H26.9 UNSPECIFIED CATARACT: Chronic | ICD-10-CM

## 2020-11-20 DIAGNOSIS — M81.0 AGE-RELATED OSTEOPOROSIS WITHOUT CURRENT PATHOLOGICAL FRACTURE: ICD-10-CM

## 2020-11-20 PROCEDURE — 96365 THER/PROPH/DIAG IV INF INIT: CPT

## 2020-11-20 RX ADMIN — ZOLEDRONIC ACID 200 MILLIGRAM(S): 5 INJECTION, SOLUTION INTRAVENOUS at 12:31

## 2020-11-20 RX ADMIN — SODIUM CHLORIDE 250 MILLILITER(S): 9 INJECTION INTRAMUSCULAR; INTRAVENOUS; SUBCUTANEOUS at 13:21

## 2020-11-20 RX ADMIN — ZOLEDRONIC ACID 5 MILLIGRAM(S): 5 INJECTION, SOLUTION INTRAVENOUS at 13:10

## 2020-11-20 RX ADMIN — SODIUM CHLORIDE 500 MILLILITER(S): 9 INJECTION INTRAMUSCULAR; INTRAVENOUS; SUBCUTANEOUS at 12:31

## 2020-11-30 RX ORDER — SOFT LENS DISINFECTANT
SOLUTION, NON-ORAL MISCELLANEOUS
Qty: 1 | Refills: 0 | Status: ACTIVE | COMMUNITY
Start: 2020-08-13 | End: 1900-01-01

## 2020-12-08 ENCOUNTER — EMERGENCY (EMERGENCY)
Facility: HOSPITAL | Age: 85
LOS: 1 days | Discharge: HOME | End: 2020-12-08
Attending: EMERGENCY MEDICINE | Admitting: EMERGENCY MEDICINE
Payer: MEDICARE

## 2020-12-08 VITALS
SYSTOLIC BLOOD PRESSURE: 165 MMHG | HEART RATE: 78 BPM | RESPIRATION RATE: 18 BRPM | OXYGEN SATURATION: 98 % | DIASTOLIC BLOOD PRESSURE: 75 MMHG | TEMPERATURE: 98 F

## 2020-12-08 VITALS
OXYGEN SATURATION: 95 % | DIASTOLIC BLOOD PRESSURE: 79 MMHG | TEMPERATURE: 98 F | SYSTOLIC BLOOD PRESSURE: 144 MMHG | RESPIRATION RATE: 18 BRPM | HEIGHT: 64 IN | WEIGHT: 139.99 LBS | HEART RATE: 82 BPM

## 2020-12-08 DIAGNOSIS — C34.90 MALIGNANT NEOPLASM OF UNSPECIFIED PART OF UNSPECIFIED BRONCHUS OR LUNG: Chronic | ICD-10-CM

## 2020-12-08 DIAGNOSIS — Z87.442 PERSONAL HISTORY OF URINARY CALCULI: Chronic | ICD-10-CM

## 2020-12-08 DIAGNOSIS — Z20.828 CONTACT WITH AND (SUSPECTED) EXPOSURE TO OTHER VIRAL COMMUNICABLE DISEASES: ICD-10-CM

## 2020-12-08 DIAGNOSIS — Z98.890 OTHER SPECIFIED POSTPROCEDURAL STATES: Chronic | ICD-10-CM

## 2020-12-08 DIAGNOSIS — H26.9 UNSPECIFIED CATARACT: Chronic | ICD-10-CM

## 2020-12-08 DIAGNOSIS — R05 COUGH: ICD-10-CM

## 2020-12-08 DIAGNOSIS — Z95.0 PRESENCE OF CARDIAC PACEMAKER: Chronic | ICD-10-CM

## 2020-12-08 LAB
ALBUMIN SERPL ELPH-MCNC: 3.6 G/DL — SIGNIFICANT CHANGE UP (ref 3.3–5)
ALP SERPL-CCNC: 115 U/L — SIGNIFICANT CHANGE UP (ref 40–120)
ALT FLD-CCNC: 19 U/L — SIGNIFICANT CHANGE UP (ref 10–45)
ANION GAP SERPL CALC-SCNC: 9 MMOL/L — SIGNIFICANT CHANGE UP (ref 5–17)
APTT BLD: 36 SEC — HIGH (ref 27.5–35.5)
AST SERPL-CCNC: 23 U/L — SIGNIFICANT CHANGE UP (ref 10–40)
BASOPHILS # BLD AUTO: 0.02 K/UL — SIGNIFICANT CHANGE UP (ref 0–0.2)
BASOPHILS NFR BLD AUTO: 0.3 % — SIGNIFICANT CHANGE UP (ref 0–2)
BILIRUB SERPL-MCNC: 0.3 MG/DL — SIGNIFICANT CHANGE UP (ref 0.2–1.2)
BUN SERPL-MCNC: 10 MG/DL — SIGNIFICANT CHANGE UP (ref 7–23)
CALCIUM SERPL-MCNC: 10.2 MG/DL — SIGNIFICANT CHANGE UP (ref 8.4–10.5)
CHLORIDE SERPL-SCNC: 108 MMOL/L — SIGNIFICANT CHANGE UP (ref 96–108)
CO2 SERPL-SCNC: 26 MMOL/L — SIGNIFICANT CHANGE UP (ref 22–31)
CREAT SERPL-MCNC: 0.93 MG/DL — SIGNIFICANT CHANGE UP (ref 0.5–1.3)
EOSINOPHIL # BLD AUTO: 0.01 K/UL — SIGNIFICANT CHANGE UP (ref 0–0.5)
EOSINOPHIL NFR BLD AUTO: 0.2 % — SIGNIFICANT CHANGE UP (ref 0–6)
GLUCOSE SERPL-MCNC: 115 MG/DL — HIGH (ref 70–99)
HCT VFR BLD CALC: 37 % — SIGNIFICANT CHANGE UP (ref 34.5–45)
HGB BLD-MCNC: 11.7 G/DL — SIGNIFICANT CHANGE UP (ref 11.5–15.5)
IMM GRANULOCYTES NFR BLD AUTO: 0.3 % — SIGNIFICANT CHANGE UP (ref 0–1.5)
INR BLD: 1.98 — HIGH (ref 0.88–1.16)
LYMPHOCYTES # BLD AUTO: 0.96 K/UL — LOW (ref 1–3.3)
LYMPHOCYTES # BLD AUTO: 15.7 % — SIGNIFICANT CHANGE UP (ref 13–44)
MCHC RBC-ENTMCNC: 29.5 PG — SIGNIFICANT CHANGE UP (ref 27–34)
MCHC RBC-ENTMCNC: 31.6 GM/DL — LOW (ref 32–36)
MCV RBC AUTO: 93.2 FL — SIGNIFICANT CHANGE UP (ref 80–100)
MONOCYTES # BLD AUTO: 0.71 K/UL — SIGNIFICANT CHANGE UP (ref 0–0.9)
MONOCYTES NFR BLD AUTO: 11.6 % — SIGNIFICANT CHANGE UP (ref 2–14)
NEUTROPHILS # BLD AUTO: 4.38 K/UL — SIGNIFICANT CHANGE UP (ref 1.8–7.4)
NEUTROPHILS NFR BLD AUTO: 71.9 % — SIGNIFICANT CHANGE UP (ref 43–77)
NRBC # BLD: 0 /100 WBCS — SIGNIFICANT CHANGE UP (ref 0–0)
PLATELET # BLD AUTO: 297 K/UL — SIGNIFICANT CHANGE UP (ref 150–400)
POTASSIUM SERPL-MCNC: 4.5 MMOL/L — SIGNIFICANT CHANGE UP (ref 3.5–5.3)
POTASSIUM SERPL-SCNC: 4.5 MMOL/L — SIGNIFICANT CHANGE UP (ref 3.5–5.3)
PROT SERPL-MCNC: 8 G/DL — SIGNIFICANT CHANGE UP (ref 6–8.3)
PROTHROM AB SERPL-ACNC: 23 SEC — HIGH (ref 10.6–13.6)
RBC # BLD: 3.97 M/UL — SIGNIFICANT CHANGE UP (ref 3.8–5.2)
RBC # FLD: 14.6 % — HIGH (ref 10.3–14.5)
SARS-COV-2 RNA SPEC QL NAA+PROBE: SIGNIFICANT CHANGE UP
SODIUM SERPL-SCNC: 143 MMOL/L — SIGNIFICANT CHANGE UP (ref 135–145)
WBC # BLD: 6.1 K/UL — SIGNIFICANT CHANGE UP (ref 3.8–10.5)
WBC # FLD AUTO: 6.1 K/UL — SIGNIFICANT CHANGE UP (ref 3.8–10.5)

## 2020-12-08 PROCEDURE — U0003: CPT

## 2020-12-08 PROCEDURE — 85025 COMPLETE CBC W/AUTO DIFF WBC: CPT

## 2020-12-08 PROCEDURE — 99284 EMERGENCY DEPT VISIT MOD MDM: CPT | Mod: 25

## 2020-12-08 PROCEDURE — 87040 BLOOD CULTURE FOR BACTERIA: CPT

## 2020-12-08 PROCEDURE — 93010 ELECTROCARDIOGRAM REPORT: CPT

## 2020-12-08 PROCEDURE — 93005 ELECTROCARDIOGRAM TRACING: CPT

## 2020-12-08 PROCEDURE — 36415 COLL VENOUS BLD VENIPUNCTURE: CPT

## 2020-12-08 PROCEDURE — 71046 X-RAY EXAM CHEST 2 VIEWS: CPT

## 2020-12-08 PROCEDURE — 85610 PROTHROMBIN TIME: CPT

## 2020-12-08 PROCEDURE — 80053 COMPREHEN METABOLIC PANEL: CPT

## 2020-12-08 PROCEDURE — 85730 THROMBOPLASTIN TIME PARTIAL: CPT

## 2020-12-08 PROCEDURE — 71046 X-RAY EXAM CHEST 2 VIEWS: CPT | Mod: 26

## 2020-12-08 RX ORDER — SODIUM CHLORIDE 9 MG/ML
1000 INJECTION INTRAMUSCULAR; INTRAVENOUS; SUBCUTANEOUS ONCE
Refills: 0 | Status: COMPLETED | OUTPATIENT
Start: 2020-12-08 | End: 2020-12-08

## 2020-12-08 RX ADMIN — SODIUM CHLORIDE 1000 MILLILITER(S): 9 INJECTION INTRAMUSCULAR; INTRAVENOUS; SUBCUTANEOUS at 13:07

## 2020-12-08 NOTE — ED PROVIDER NOTE - NSFOLLOWUPINSTRUCTIONS_ED_ALL_ED_FT
ACUTE COUGH - Ambulatory Care           Acute Cough    AMBULATORY CARE:    An acute cough can last up to 3 weeks. Common causes of an acute cough include a cold, allergies, or a lung infection.     Seek care immediately if:   •You have trouble breathing or feel short of breath.      •You cough up blood, or you see blood in your mucus.       •You faint or feel weak or dizzy.       •You have chest pain when you cough or take a deep breath.       •You have new wheezing.       Contact your healthcare provider if:   •You have a fever.       •Your cough lasts longer than 4 weeks.       •Your symptoms do not improve with treatment.       •You have questions or concerns about your condition or care.       Treatment: An acute cough usually goes away on its own. Ask your healthcare provider about medicines you can take to decrease your cough. You may need medicine to stop the cough, decrease swelling in your airways, or help open your airways. Medicine may also be given to help you cough up mucus. If you have an infection caused by bacteria, you may need antibiotics.     Manage your symptoms:   •Do not smoke and stay away from others who smoke. Nicotine and other chemicals in cigarettes and cigars can cause lung damage and make your cough worse. Ask your healthcare provider for information if you currently smoke and need help to quit. E-cigarettes or smokeless tobacco still contain nicotine. Talk to your healthcare provider before you use these products.       •Drink extra liquids as directed. Liquids will help thin and loosen mucus so you can cough it up. Liquids will also help prevent dehydration. Examples of good liquids to drink include water, fruit juice, and broth. Do not drink liquids that contain caffeine. Caffeine can increase your risk for dehydration. Ask your healthcare provider how much liquid to drink each day.       •Rest as directed. Do not do activities that make your cough worse, such as exercise.       •Use a humidifier or vaporizer. Use a cool mist humidifier or a vaporizer to increase air moisture in your home. This may make it easier for you to breathe and help decrease your cough.       •Eat 2 to 5 mL of honey 2 times each day. Honey can help thin mucus and decrease your cough.       •Use cough drops or lozenges. These can help decrease throat irritation and your cough.       Follow up with your healthcare provider as directed: Write down your questions so you remember to ask them during your visits.        © Copyright [employer] 2020           back to top                          © Copyright [employer] 2020

## 2020-12-08 NOTE — ED ADULT TRIAGE NOTE - CHIEF COMPLAINT QUOTE
per  Moises #[id number], patient's aide reports patient has been spitting up blood for the past several days and reports patient's other aide's report patient has been having fevers for the last several nights. Aide reports patient's temperature this morning was 100.3F. Reports patient has hx of lung cancer. per  Moises #[id number], patient's aide reports patient has been spitting up blood for the past several days and reports patient's other aide's report patient has been having fevers for the last several nights. Patient also reports cough. Aide reports patient's temperature this morning was 100.3F. Reports patient has hx of lung cancer.

## 2020-12-08 NOTE — ED ADULT NURSE NOTE - CHIEF COMPLAINT QUOTE
per  Moises #[id number], patient's aide reports patient has been spitting up blood for the past several days and reports patient's other aide's report patient has been having fevers for the last several nights. Patient also reports cough. Aide reports patient's temperature this morning was 100.3F. Reports patient has hx of lung cancer.

## 2020-12-08 NOTE — ED PROVIDER NOTE - OBJECTIVE STATEMENT
85 F hx of lung ca, bronchitis, aflutter on eliquis- presents w scant hemoptysis in the morning since Sunday - sx only in the morning- coughs up a few handfuls of red sputum- then is fine the rest of the day- no f/c during the day- px lives w other elderly person has home care 5 hrs a day only- now coughing up brownish phlegm- px denies sob- mild sob with ambulation- no sig change  mild-moderate severity

## 2020-12-08 NOTE — ED ADULT NURSE NOTE - OBJECTIVE STATEMENT
Patient A&Ox2, respirations even and unlabored, skin intact, arrives with HHA at bedside. Patient poor historian, states "I don't feel well". As per HHA, patient had fever this morning 100.3, took tylenol, in addition patient has been complaining of right side abdominal pain. HHA states, "she has been taking too much tylenol", Dr. Hawkins aware. Patient denies chest pain, SOB, recent falls. Right AC 18g IV placed labs drawn and sent. Patient hx lung CA, pacemaker, HTN.

## 2020-12-08 NOTE — ED PROVIDER NOTE - PATIENT PORTAL LINK FT
You can access the FollowMyHealth Patient Portal offered by [facility] by registering at the following website: [email or web address]. By joining [facility]’s FollowMyHealth portal, you will also be able to view your health information using other applications (apps) compatible with our system.

## 2020-12-09 PROBLEM — Z00.00 ENCOUNTER FOR PREVENTIVE HEALTH EXAMINATION: Noted: 2020-12-09

## 2020-12-28 ENCOUNTER — APPOINTMENT (OUTPATIENT)
Dept: INTERNAL MEDICINE | Facility: CLINIC | Age: 85
End: 2020-12-28
Payer: MEDICARE

## 2020-12-28 VITALS
BODY MASS INDEX: 21.19 KG/M2 | WEIGHT: 135 LBS | DIASTOLIC BLOOD PRESSURE: 77 MMHG | TEMPERATURE: 98.8 F | HEIGHT: 67 IN | SYSTOLIC BLOOD PRESSURE: 128 MMHG | HEART RATE: 68 BPM | OXYGEN SATURATION: 97 %

## 2020-12-28 PROCEDURE — 99214 OFFICE O/P EST MOD 30 MIN: CPT

## 2020-12-28 NOTE — HISTORY OF PRESENT ILLNESS
[FreeTextEntry1] : PAtient accompanied by an aide; \par  line used for interview [de-identified] : Dr. Rojas follow up noted; \par Getting Reclast hor Hypercalcemia

## 2020-12-28 NOTE — PHYSICAL EXAM
[No Acute Distress] : no acute distress [Well Nourished] : well nourished [Well Developed] : well developed [Well-Appearing] : well-appearing [Normal Sclera/Conjunctiva] : normal sclera/conjunctiva [PERRL] : pupils equal round and reactive to light [EOMI] : extraocular movements intact [Normal Outer Ear/Nose] : the outer ears and nose were normal in appearance [Normal Oropharynx] : the oropharynx was normal [No JVD] : no jugular venous distention [No Lymphadenopathy] : no lymphadenopathy [Supple] : supple [Thyroid Normal, No Nodules] : the thyroid was normal and there were no nodules present [No Respiratory Distress] : no respiratory distress  [No Accessory Muscle Use] : no accessory muscle use [Clear to Auscultation] : lungs were clear to auscultation bilaterally [Normal Rate] : normal rate  [Regular Rhythm] : with a regular rhythm [Normal S1, S2] : normal S1 and S2 [No Murmur] : no murmur heard [No Carotid Bruits] : no carotid bruits [No Abdominal Bruit] : a ~M bruit was not heard ~T in the abdomen [No Varicosities] : no varicosities [Pedal Pulses Present] : the pedal pulses are present [No Edema] : there was no peripheral edema [No Palpable Aorta] : no palpable aorta [No Extremity Clubbing/Cyanosis] : no extremity clubbing/cyanosis [Soft] : abdomen soft [Non Tender] : non-tender [Non-distended] : non-distended [No Masses] : no abdominal mass palpated [No HSM] : no HSM [Normal Bowel Sounds] : normal bowel sounds [Normal Posterior Cervical Nodes] : no posterior cervical lymphadenopathy [Normal Anterior Cervical Nodes] : no anterior cervical lymphadenopathy [No CVA Tenderness] : no CVA  tenderness [No Spinal Tenderness] : no spinal tenderness [No Joint Swelling] : no joint swelling [Grossly Normal Strength/Tone] : grossly normal strength/tone [No Rash] : no rash [Coordination Grossly Intact] : coordination grossly intact [No Focal Deficits] : no focal deficits [Normal Gait] : normal gait [Normal Affect] : the affect was normal [Normal Insight/Judgement] : insight and judgment were intact [de-identified] : Pain in right rib area

## 2021-01-05 ENCOUNTER — APPOINTMENT (OUTPATIENT)
Dept: NEPHROLOGY | Facility: CLINIC | Age: 86
End: 2021-01-05
Payer: MEDICARE

## 2021-01-05 VITALS
HEART RATE: 72 BPM | OXYGEN SATURATION: 97 % | SYSTOLIC BLOOD PRESSURE: 145 MMHG | RESPIRATION RATE: 16 BRPM | DIASTOLIC BLOOD PRESSURE: 78 MMHG

## 2021-01-05 PROCEDURE — 99214 OFFICE O/P EST MOD 30 MIN: CPT

## 2021-01-05 NOTE — PHYSICAL EXAM
[General Appearance - Alert] : alert [General Appearance - In No Acute Distress] : in no acute distress [Sclera] : the sclera and conjunctiva were normal [PERRL With Normal Accommodation] : pupils were equal in size, round, and reactive to light [Extraocular Movements] : extraocular movements were intact [Outer Ear] : the ears and nose were normal in appearance [Oropharynx] : the oropharynx was normal [Neck Appearance] : the appearance of the neck was normal [Jugular Venous Distention Increased] : there was no jugular-venous distention [Respiration, Rhythm And Depth] : normal respiratory rhythm and effort [Exaggerated Use Of Accessory Muscles For Inspiration] : no accessory muscle use [Heart Rate And Rhythm] : heart rate was normal and rhythm regular [Heart Sounds] : normal S1 and S2 [Full Pulse] : the pedal pulses are present [Bowel Sounds] : normal bowel sounds [Abdomen Soft] : soft [Abdomen Tenderness] : non-tender [No CVA Tenderness] : no ~M costovertebral angle tenderness [Involuntary Movements] : no involuntary movements were seen [Musculoskeletal - Swelling] : no joint swelling seen [Skin Color & Pigmentation] : normal skin color and pigmentation [Skin Turgor] : normal skin turgor [] : no rash [Cranial Nerves] : cranial nerves 2-12 were intact [No Focal Deficits] : no focal deficits [Oriented To Time, Place, And Person] : oriented to person, place, and time [Impaired Insight] : insight and judgment were intact [Affect] : the affect was normal [FreeTextEntry1] : cane to ambulate

## 2021-01-05 NOTE — HISTORY OF PRESENT ILLNESS
[FreeTextEntry1] : 84yo Liberian speaking F accompanied by home aid with h/o CVA, copd/asthma, lung ca (s/p resection in remission), ischemic cardiomyopathy w/PPM EF 45%, vertigo, preDM, ?sarcoid here for f/u of CKD 3, HTN, hypercalcemia and hyperkalemia:  \par \par EP [facility] Dr. Martine Arrieta\par Cardiologist: pt doesn't remember name. \par \par Her home aide provides most of the history\par Having pain in R lower back, dx with lumbar fx, using tramadol sparingly. \par She hasn't had much of an appetite, not eating a lot of solids mostly liquids. Drinsk a lot of take out soup and broth. No abdominal pain. No dysphagia/odynophagia. No vomiting. \par Occasional nausea. Doesn't eat breakfast, just coffee and juice. Every time she drinks ensure she gets diarrhea. Denies constipation. \par

## 2021-01-05 NOTE — ASSESSMENT
[FreeTextEntry1] : 84yo Latvian speaking F accompanied by home aid with h/o CVA, copd/asthma, lung ca (s/p resection in remission), ischemic cardiomyopathy w/PPM EF 45%, vertigo, preDM, ?sarcoid here for f/u of CKD 3, HTN, hypercalcemia and hyperkalemia:  \par \par # CKD II/III - Cr stable\par bland u/a. Likey CKD 2/2 age, HTN, ICM\par Hyperkalemia has resolved, aide not sure if she's taking Lokelma or Losartan, she doesn't think so\par \par # Hypercalcemia - ?primary hyperpara, less likely sarcoid. s/p reclast oct or nov 2020 per pt's aide \par \par # HTN -  well controlled\par \par # ICM EF 45% with Afib/PPM - compensated\par \par RTC in 6 months for f/u\par \par

## 2021-01-28 ENCOUNTER — APPOINTMENT (OUTPATIENT)
Dept: INTERNAL MEDICINE | Facility: CLINIC | Age: 86
End: 2021-01-28
Payer: MEDICARE

## 2021-01-28 VITALS
TEMPERATURE: 97.6 F | SYSTOLIC BLOOD PRESSURE: 124 MMHG | HEART RATE: 76 BPM | WEIGHT: 130 LBS | BODY MASS INDEX: 20.4 KG/M2 | OXYGEN SATURATION: 98 % | HEIGHT: 67 IN | DIASTOLIC BLOOD PRESSURE: 79 MMHG

## 2021-01-28 DIAGNOSIS — R42 DIZZINESS AND GIDDINESS: ICD-10-CM

## 2021-01-28 PROCEDURE — 36415 COLL VENOUS BLD VENIPUNCTURE: CPT

## 2021-01-28 PROCEDURE — 99214 OFFICE O/P EST MOD 30 MIN: CPT | Mod: 25

## 2021-01-28 NOTE — HISTORY OF PRESENT ILLNESS
- Likely scar induced from Previous MI, s/p 2 ablations last in April 2017 and ICD placement on opt Amiodarone  - s/p Amio bolus 150mg x2, amio drip that stopped at 4AM per electrophysiology so easier to induce arrythmia during ablation this morning   - Will undergo RFA ablation this morning   -The patient Lower rate limit reprogrammed to 80 bpm for arrhythmia suppression. VT1 detection zone lowered to 105 bpm with ATP therapy, no shock.    - Tele monitoring  -f/u EP recs [FreeTextEntry1] : Daughter left a message; Tried to reach her but did not answer phone;\par  [de-identified] : Interview conducted vis  in [place]; \par Again daughter not answering phone\par Patient chief complaint diarrhea that aide feels from the Ensure; \par No appetite; \par Did get injection from Dr. Rojas\par mUlti vitamin drink\par Had conversation with daughter and she is drinking the Ensure;  - Likely scar induced from Previous MI, s/p 2 ablations last in April 2017 and ICD placement on opt Amiodarone  s/p 3rd ablation (RFA) 10/5.   -The patient Lower rate limit reprogrammed to 80 bpm for arrhythmia suppression. VT1 detection zone lowered to 105 bpm with ATP therapy, no shock.    - Tele monitoring  -metoprolol 50 ER BID  -Sotalol 120mg BID  -EP testing of pacemaker planned for Monday  -f/u EP recs

## 2021-01-28 NOTE — ASSESSMENT
[FreeTextEntry1] : Multiple complaints of diarrhea; Also no appetite\par Also weight loss; \par Also rash on skin; \par Will get ;labs;\par Also will try Megace for appetite\par RTC one month\par

## 2021-01-29 LAB
ALBUMIN SERPL ELPH-MCNC: 3.5 G/DL
ALP BLD-CCNC: 126 U/L
ALT SERPL-CCNC: 17 U/L
ANION GAP SERPL CALC-SCNC: 15 MMOL/L
AST SERPL-CCNC: 21 U/L
BASOPHILS # BLD AUTO: 0.03 K/UL
BASOPHILS NFR BLD AUTO: 0.5 %
BILIRUB SERPL-MCNC: 0.4 MG/DL
BUN SERPL-MCNC: 11 MG/DL
CALCIUM SERPL-MCNC: 10 MG/DL
CHLORIDE SERPL-SCNC: 105 MMOL/L
CO2 SERPL-SCNC: 20 MMOL/L
CREAT SERPL-MCNC: 0.94 MG/DL
EOSINOPHIL # BLD AUTO: 0.01 K/UL
EOSINOPHIL NFR BLD AUTO: 0.2 %
ESTIMATED AVERAGE GLUCOSE: 134 MG/DL
GLUCOSE SERPL-MCNC: 88 MG/DL
HBA1C MFR BLD HPLC: 6.3 %
HCT VFR BLD CALC: 32.2 %
HGB BLD-MCNC: 9.7 G/DL
IMM GRANULOCYTES NFR BLD AUTO: 0.2 %
LYMPHOCYTES # BLD AUTO: 1.59 K/UL
LYMPHOCYTES NFR BLD AUTO: 24.2 %
MAN DIFF?: NORMAL
MCHC RBC-ENTMCNC: 29 PG
MCHC RBC-ENTMCNC: 30.1 GM/DL
MCV RBC AUTO: 96.4 FL
MONOCYTES # BLD AUTO: 0.69 K/UL
MONOCYTES NFR BLD AUTO: 10.5 %
NEUTROPHILS # BLD AUTO: 4.23 K/UL
NEUTROPHILS NFR BLD AUTO: 64.4 %
PLATELET # BLD AUTO: 442 K/UL
POTASSIUM SERPL-SCNC: 5.1 MMOL/L
PROT SERPL-MCNC: 7.5 G/DL
RBC # BLD: 3.34 M/UL
RBC # FLD: 15.6 %
SODIUM SERPL-SCNC: 141 MMOL/L
WBC # FLD AUTO: 6.56 K/UL

## 2021-02-08 NOTE — ED PROVIDER NOTE - NS ED MD TWO NIGHTS YN
[FreeTextEntry1] : CAESAR needs better compliance\par Gout on tx\par Hyperlipidemia on tx. \par History of carotid disease.  Seeing cardiology.\par Ulcerative colitis.
Yes

## 2021-02-19 ENCOUNTER — TRANSCRIPTION ENCOUNTER (OUTPATIENT)
Age: 86
End: 2021-02-19

## 2021-02-19 ENCOUNTER — INPATIENT (INPATIENT)
Facility: HOSPITAL | Age: 86
LOS: 0 days | Discharge: HOME | DRG: 149 | End: 2021-02-19
Attending: INTERNAL MEDICINE | Admitting: INTERNAL MEDICINE
Payer: COMMERCIAL

## 2021-02-19 VITALS
DIASTOLIC BLOOD PRESSURE: 78 MMHG | RESPIRATION RATE: 17 BRPM | SYSTOLIC BLOOD PRESSURE: 164 MMHG | TEMPERATURE: 99 F | HEART RATE: 75 BPM | OXYGEN SATURATION: 99 %

## 2021-02-19 VITALS
WEIGHT: 130.07 LBS | HEIGHT: 64 IN | HEART RATE: 94 BPM | OXYGEN SATURATION: 99 % | SYSTOLIC BLOOD PRESSURE: 154 MMHG | TEMPERATURE: 98 F | RESPIRATION RATE: 18 BRPM | DIASTOLIC BLOOD PRESSURE: 82 MMHG

## 2021-02-19 DIAGNOSIS — I48.91 UNSPECIFIED ATRIAL FIBRILLATION: ICD-10-CM

## 2021-02-19 DIAGNOSIS — Z98.890 OTHER SPECIFIED POSTPROCEDURAL STATES: Chronic | ICD-10-CM

## 2021-02-19 DIAGNOSIS — N32.89 OTHER SPECIFIED DISORDERS OF BLADDER: ICD-10-CM

## 2021-02-19 DIAGNOSIS — C34.90 MALIGNANT NEOPLASM OF UNSPECIFIED PART OF UNSPECIFIED BRONCHUS OR LUNG: Chronic | ICD-10-CM

## 2021-02-19 DIAGNOSIS — Z95.0 PRESENCE OF CARDIAC PACEMAKER: Chronic | ICD-10-CM

## 2021-02-19 DIAGNOSIS — C34.90 MALIGNANT NEOPLASM OF UNSPECIFIED PART OF UNSPECIFIED BRONCHUS OR LUNG: ICD-10-CM

## 2021-02-19 DIAGNOSIS — H26.9 UNSPECIFIED CATARACT: Chronic | ICD-10-CM

## 2021-02-19 DIAGNOSIS — Z87.442 PERSONAL HISTORY OF URINARY CALCULI: Chronic | ICD-10-CM

## 2021-02-19 DIAGNOSIS — I10 ESSENTIAL (PRIMARY) HYPERTENSION: ICD-10-CM

## 2021-02-19 DIAGNOSIS — R42 DIZZINESS AND GIDDINESS: ICD-10-CM

## 2021-02-19 DIAGNOSIS — D64.9 ANEMIA, UNSPECIFIED: ICD-10-CM

## 2021-02-19 DIAGNOSIS — R04.2 HEMOPTYSIS: ICD-10-CM

## 2021-02-19 DIAGNOSIS — R63.8 OTHER SYMPTOMS AND SIGNS CONCERNING FOOD AND FLUID INTAKE: ICD-10-CM

## 2021-02-19 LAB
ALBUMIN SERPL ELPH-MCNC: 3.2 G/DL — LOW (ref 3.3–5)
ALP SERPL-CCNC: 86 U/L — SIGNIFICANT CHANGE UP (ref 40–120)
ALT FLD-CCNC: 13 U/L — SIGNIFICANT CHANGE UP (ref 10–45)
ANION GAP SERPL CALC-SCNC: 11 MMOL/L — SIGNIFICANT CHANGE UP (ref 5–17)
APTT BLD: 29.8 SEC — SIGNIFICANT CHANGE UP (ref 27.5–35.5)
AST SERPL-CCNC: 20 U/L — SIGNIFICANT CHANGE UP (ref 10–40)
BASE EXCESS BLDV CALC-SCNC: -1.7 MMOL/L — SIGNIFICANT CHANGE UP
BASOPHILS # BLD AUTO: 0.02 K/UL — SIGNIFICANT CHANGE UP (ref 0–0.2)
BASOPHILS NFR BLD AUTO: 0.3 % — SIGNIFICANT CHANGE UP (ref 0–2)
BILIRUB SERPL-MCNC: 0.3 MG/DL — SIGNIFICANT CHANGE UP (ref 0.2–1.2)
BUN SERPL-MCNC: 20 MG/DL — SIGNIFICANT CHANGE UP (ref 7–23)
CA-I SERPL-SCNC: 1.23 MMOL/L — SIGNIFICANT CHANGE UP (ref 1.12–1.3)
CALCIUM SERPL-MCNC: 10.3 MG/DL — SIGNIFICANT CHANGE UP (ref 8.4–10.5)
CHLORIDE SERPL-SCNC: 105 MMOL/L — SIGNIFICANT CHANGE UP (ref 96–108)
CHOLEST SERPL-MCNC: 119 MG/DL — SIGNIFICANT CHANGE UP
CK SERPL-CCNC: 34 U/L — SIGNIFICANT CHANGE UP (ref 25–170)
CO2 SERPL-SCNC: 22 MMOL/L — SIGNIFICANT CHANGE UP (ref 22–31)
CREAT SERPL-MCNC: 1.04 MG/DL — SIGNIFICANT CHANGE UP (ref 0.5–1.3)
CRP SERPL-MCNC: 5.46 MG/DL — HIGH (ref 0–0.4)
D DIMER BLD IA.RAPID-MCNC: 177 NG/ML DDU — SIGNIFICANT CHANGE UP
EOSINOPHIL # BLD AUTO: 0.01 K/UL — SIGNIFICANT CHANGE UP (ref 0–0.5)
EOSINOPHIL NFR BLD AUTO: 0.1 % — SIGNIFICANT CHANGE UP (ref 0–6)
FERRITIN SERPL-MCNC: 114 NG/ML — SIGNIFICANT CHANGE UP (ref 15–150)
FIBRINOGEN PPP-MCNC: 642 MG/DL — HIGH (ref 258–438)
GAS PNL BLDV: 133 MMOL/L — LOW (ref 138–146)
GAS PNL BLDV: SIGNIFICANT CHANGE UP
GLUCOSE SERPL-MCNC: 129 MG/DL — HIGH (ref 70–99)
HCO3 BLDV-SCNC: 23 MMOL/L — SIGNIFICANT CHANGE UP (ref 20–27)
HCT VFR BLD CALC: 35.6 % — SIGNIFICANT CHANGE UP (ref 34.5–45)
HDLC SERPL-MCNC: 59 MG/DL — SIGNIFICANT CHANGE UP
HGB BLD-MCNC: 11 G/DL — LOW (ref 11.5–15.5)
HIV 1+2 AB+HIV1 P24 AG SERPL QL IA: SIGNIFICANT CHANGE UP
IMM GRANULOCYTES NFR BLD AUTO: 0.4 % — SIGNIFICANT CHANGE UP (ref 0–1.5)
INR BLD: 2.22 — HIGH (ref 0.88–1.16)
LACTATE SERPL-SCNC: 1.2 MMOL/L — SIGNIFICANT CHANGE UP (ref 0.5–2)
LDH SERPL L TO P-CCNC: 198 U/L — SIGNIFICANT CHANGE UP (ref 50–242)
LIPID PNL WITH DIRECT LDL SERPL: 53 MG/DL — SIGNIFICANT CHANGE UP
LYMPHOCYTES # BLD AUTO: 0.97 K/UL — LOW (ref 1–3.3)
LYMPHOCYTES # BLD AUTO: 12.2 % — LOW (ref 13–44)
MCHC RBC-ENTMCNC: 29 PG — SIGNIFICANT CHANGE UP (ref 27–34)
MCHC RBC-ENTMCNC: 30.9 GM/DL — LOW (ref 32–36)
MCV RBC AUTO: 93.9 FL — SIGNIFICANT CHANGE UP (ref 80–100)
MONOCYTES # BLD AUTO: 0.74 K/UL — SIGNIFICANT CHANGE UP (ref 0–0.9)
MONOCYTES NFR BLD AUTO: 9.3 % — SIGNIFICANT CHANGE UP (ref 2–14)
NEUTROPHILS # BLD AUTO: 6.21 K/UL — SIGNIFICANT CHANGE UP (ref 1.8–7.4)
NEUTROPHILS NFR BLD AUTO: 77.7 % — HIGH (ref 43–77)
NON HDL CHOLESTEROL: 60 MG/DL — SIGNIFICANT CHANGE UP
NRBC # BLD: 0 /100 WBCS — SIGNIFICANT CHANGE UP (ref 0–0)
NT-PROBNP SERPL-SCNC: 354 PG/ML — HIGH (ref 0–300)
PCO2 BLDV: 41 MMHG — SIGNIFICANT CHANGE UP (ref 41–51)
PH BLDV: 7.38 — SIGNIFICANT CHANGE UP (ref 7.32–7.43)
PLATELET # BLD AUTO: 382 K/UL — SIGNIFICANT CHANGE UP (ref 150–400)
PO2 BLDV: 27 MMHG — SIGNIFICANT CHANGE UP
POTASSIUM BLDV-SCNC: 4.1 MMOL/L — SIGNIFICANT CHANGE UP (ref 3.5–4.9)
POTASSIUM SERPL-MCNC: 5.3 MMOL/L — SIGNIFICANT CHANGE UP (ref 3.5–5.3)
POTASSIUM SERPL-SCNC: 5.3 MMOL/L — SIGNIFICANT CHANGE UP (ref 3.5–5.3)
PROCALCITONIN SERPL-MCNC: 0.11 NG/ML — HIGH (ref 0.02–0.1)
PROT SERPL-MCNC: 8.3 G/DL — SIGNIFICANT CHANGE UP (ref 6–8.3)
PROTHROM AB SERPL-ACNC: 25.7 SEC — HIGH (ref 10.6–13.6)
RBC # BLD: 3.79 M/UL — LOW (ref 3.8–5.2)
RBC # FLD: 17.9 % — HIGH (ref 10.3–14.5)
SAO2 % BLDV: 42 % — SIGNIFICANT CHANGE UP
SODIUM SERPL-SCNC: 138 MMOL/L — SIGNIFICANT CHANGE UP (ref 135–145)
TRIGL SERPL-MCNC: 35 MG/DL — SIGNIFICANT CHANGE UP
TROPONIN T SERPL-MCNC: <0.01 NG/ML — SIGNIFICANT CHANGE UP (ref 0–0.01)
WBC # BLD: 7.98 K/UL — SIGNIFICANT CHANGE UP (ref 3.8–10.5)
WBC # FLD AUTO: 7.98 K/UL — SIGNIFICANT CHANGE UP (ref 3.8–10.5)

## 2021-02-19 PROCEDURE — 99285 EMERGENCY DEPT VISIT HI MDM: CPT

## 2021-02-19 PROCEDURE — 85610 PROTHROMBIN TIME: CPT

## 2021-02-19 PROCEDURE — 82962 GLUCOSE BLOOD TEST: CPT

## 2021-02-19 PROCEDURE — 85730 THROMBOPLASTIN TIME PARTIAL: CPT

## 2021-02-19 PROCEDURE — 0042T: CPT

## 2021-02-19 PROCEDURE — 70498 CT ANGIOGRAPHY NECK: CPT

## 2021-02-19 PROCEDURE — 70498 CT ANGIOGRAPHY NECK: CPT | Mod: 26,MA

## 2021-02-19 PROCEDURE — 82803 BLOOD GASES ANY COMBINATION: CPT

## 2021-02-19 PROCEDURE — 80053 COMPREHEN METABOLIC PANEL: CPT

## 2021-02-19 PROCEDURE — 86900 BLOOD TYPING SEROLOGIC ABO: CPT

## 2021-02-19 PROCEDURE — 85379 FIBRIN DEGRADATION QUANT: CPT

## 2021-02-19 PROCEDURE — 83615 LACTATE (LD) (LDH) ENZYME: CPT

## 2021-02-19 PROCEDURE — 99232 SBSQ HOSP IP/OBS MODERATE 35: CPT | Mod: GC

## 2021-02-19 PROCEDURE — 70496 CT ANGIOGRAPHY HEAD: CPT | Mod: 26,MA

## 2021-02-19 PROCEDURE — 80061 LIPID PANEL: CPT

## 2021-02-19 PROCEDURE — 86850 RBC ANTIBODY SCREEN: CPT

## 2021-02-19 PROCEDURE — 83880 ASSAY OF NATRIURETIC PEPTIDE: CPT

## 2021-02-19 PROCEDURE — U0005: CPT

## 2021-02-19 PROCEDURE — 82728 ASSAY OF FERRITIN: CPT

## 2021-02-19 PROCEDURE — 71045 X-RAY EXAM CHEST 1 VIEW: CPT

## 2021-02-19 PROCEDURE — 71045 X-RAY EXAM CHEST 1 VIEW: CPT | Mod: 26

## 2021-02-19 PROCEDURE — 87040 BLOOD CULTURE FOR BACTERIA: CPT

## 2021-02-19 PROCEDURE — 84132 ASSAY OF SERUM POTASSIUM: CPT

## 2021-02-19 PROCEDURE — 82550 ASSAY OF CK (CPK): CPT

## 2021-02-19 PROCEDURE — 85025 COMPLETE CBC W/AUTO DIFF WBC: CPT

## 2021-02-19 PROCEDURE — 70496 CT ANGIOGRAPHY HEAD: CPT

## 2021-02-19 PROCEDURE — 84295 ASSAY OF SERUM SODIUM: CPT

## 2021-02-19 PROCEDURE — 99284 EMERGENCY DEPT VISIT MOD MDM: CPT

## 2021-02-19 PROCEDURE — 74174 CTA ABD&PLVS W/CONTRAST: CPT

## 2021-02-19 PROCEDURE — 87389 HIV-1 AG W/HIV-1&-2 AB AG IA: CPT

## 2021-02-19 PROCEDURE — 82330 ASSAY OF CALCIUM: CPT

## 2021-02-19 PROCEDURE — 84145 PROCALCITONIN (PCT): CPT

## 2021-02-19 PROCEDURE — 86901 BLOOD TYPING SEROLOGIC RH(D): CPT

## 2021-02-19 PROCEDURE — 83605 ASSAY OF LACTIC ACID: CPT

## 2021-02-19 PROCEDURE — 71275 CT ANGIOGRAPHY CHEST: CPT | Mod: 26,MA

## 2021-02-19 PROCEDURE — 36415 COLL VENOUS BLD VENIPUNCTURE: CPT

## 2021-02-19 PROCEDURE — 84484 ASSAY OF TROPONIN QUANT: CPT

## 2021-02-19 PROCEDURE — 85384 FIBRINOGEN ACTIVITY: CPT

## 2021-02-19 PROCEDURE — 71275 CT ANGIOGRAPHY CHEST: CPT

## 2021-02-19 PROCEDURE — U0003: CPT

## 2021-02-19 PROCEDURE — 86140 C-REACTIVE PROTEIN: CPT

## 2021-02-19 PROCEDURE — 70450 CT HEAD/BRAIN W/O DYE: CPT | Mod: 26,59,MA

## 2021-02-19 PROCEDURE — 70450 CT HEAD/BRAIN W/O DYE: CPT

## 2021-02-19 PROCEDURE — 74174 CTA ABD&PLVS W/CONTRAST: CPT | Mod: 26,MA

## 2021-02-19 NOTE — H&P ADULT - HISTORY OF PRESENT ILLNESS
Ms Kim is an 86yo Sinhala-speaking female with pmhx lung cancer and Afib/flutter (on Eliquis, s/p PPM) who presents from home after having and episode of confusion, dizziness and 1x episode of small volume hemoptysis.    She reportedly took tramadol for the first time around 8am for back pain. She laid down for a nap around 2pm and woke up at 5pm complaining of left facial numbness, dizziness, and weakness as well as possible hemoptysis as seen by her HHA so her family brought her to the ED for evaluation. Upon arrival, pt. no longer endorsing facial numbness or dizziness.    Of not, pt visited [facility] ED in December 2020 for hemoptysis and was d/jaime home from the ED as hemoptysis appeared to have resolved, per the ED  notes.    ED course:  Stroke code was called when pt arirved to the ED. NIHSS score was 0. CTH negative for acute infarction or hemorrhage, CTA negative for hemodynamically significant stenosis or occlusion, CT perfusion negative for perfusion abnormalities. Stroke team rec for no further stroke workup needed from their perspective.  Vitals: T 97.7, HR 75-94, //69, RR 18, spO2 97-99% on RA  Labs: Hgb 11.0, INR 2.22, Fibrinogen 642, albumin 3.2, CRP 5.46,   CTA Chest:  IMPRESSION:  1. No aortic dissection.  2. Large airways disease, with markedly dilated and fluid-filled bronchi in right lower lobe and additional mucoid impaction in right upper lobe.  3. Heterogeneous consolidation right upper lobe, with several nodular areas of low-density centrally. This could represent necrotizing pneumonia, but malignancy should be excluded.  4. Multiple nodules right lower and upper lobes. This could represent infection, aspiration, or malignancy.  5. There is a 5.2 cm heterogeneous uterine mass. The differential diagnosis includes uterine cancer versus necrotic fibroid.  6. Moderate thoracic lymphadenopathy.  EKG: AV sequential pacing  Meds: None   Ms Kim is an 86yo Portuguese-speaking female with pmhx lung cancer and Afib/flutter (on Eliquis, s/p PPM) who presents from home after having and episode of confusion and dizziness. Pt took tramadol at 8am for back pain. She laid down for a nap around 2pm and woke up at 5pm complaining of left facial numbness, dizziness, and weakness. Her family then brought her to the ED for evaluation. Upon arrival, pt was no longer endorsing facial numbness or dizziness. Pt's daughter, Renea, at bedside providing most of history. States that patient is A&Ox3 at baseline, uses a cane for outdoor ambulation and has 24/7 HHA. Daughter reports that pt has had 25 lb weight loss in the past few months. Reports mildly productive cough with clear/white sputum for the past month. States that when pt coughs hard, sputum will be blood tinged, but there have been no witnessed episodes of small or large volume hemoptysis or blood clots      ED course:  Stroke code was called when pt arirved to the ED. NIHSS score was 0. CTH negative for acute infarction or hemorrhage, CTA negative for hemodynamically significant stenosis or occlusion, CT perfusion negative for perfusion abnormalities. Stroke team rec for no further stroke workup needed from their perspective.  Vitals: T 97.7, HR 75-94, //69, RR 18, spO2 97-99% on RA  Labs: Hgb 11.0, INR 2.22, Fibrinogen 642, albumin 3.2, CRP 5.46,   CTA Chest:  IMPRESSION:  1. No aortic dissection.  2. Large airways disease, with markedly dilated and fluid-filled bronchi in right lower lobe and additional mucoid impaction in right upper lobe.  3. Heterogeneous consolidation right upper lobe, with several nodular areas of low-density centrally. This could represent necrotizing pneumonia, but malignancy should be excluded.  4. Multiple nodules right lower and upper lobes. This could represent infection, aspiration, or malignancy.  5. There is a 5.2 cm heterogeneous uterine mass. The differential diagnosis includes uterine cancer versus necrotic fibroid.  6. Moderate thoracic lymphadenopathy.  EKG: AV sequential pacing  Meds: None   Ms Kim is an 84yo Polish-speaking female with pmhx lung cancer and Afib/flutter (on Eliquis, s/p PPM) who presents from home after having and episode of confusion and dizziness. Pt took tramadol at 8am for back pain. She laid down for a nap around 2pm and woke up at 5pm complaining of left facial numbness, dizziness, and weakness. Her family then brought her to the ED for evaluation. Upon arrival, pt was no longer endorsing facial numbness or dizziness. Pt's daughter, Renea, at bedside providing most of history. States that patient is A&Ox3 at baseline, uses a cane for outdoor ambulation and has 24/7 HHA. Daughter reports that pt has had 25 lb weight loss in the past few months. Reports mildly productive cough with clear/white sputum for the past month. States that when pt coughs hard, sputum will be blood tinged, but there have been no witnessed episodes of small or large volume hemoptysis or blood clots. Denies fever, chills, myalgias, change in character of sputum or cough.    ED course:  Stroke code was called when pt arirved to the ED. NIHSS score was 0. CTH negative for acute infarction or hemorrhage, CTA negative for hemodynamically significant stenosis or occlusion, CT perfusion negative for perfusion abnormalities. Stroke team rec for no further stroke workup needed from their perspective.  Vitals: T 97.7, HR 75-94, //69, RR 18, spO2 97-99% on RA  Labs: Hgb 11.0, INR 2.22, Fibrinogen 642, albumin 3.2, CRP 5.46,   CTA Chest:  IMPRESSION:  1. No aortic dissection.  2. Large airways disease, with markedly dilated and fluid-filled bronchi in right lower lobe and additional mucoid impaction in right upper lobe.  3. Heterogeneous consolidation right upper lobe, with several nodular areas of low-density centrally. This could represent necrotizing pneumonia, but malignancy should be excluded.  4. Multiple nodules right lower and upper lobes. This could represent infection, aspiration, or malignancy.  5. There is a 5.2 cm heterogeneous uterine mass. The differential diagnosis includes uterine cancer versus necrotic fibroid.  6. Moderate thoracic lymphadenopathy.  EKG: AV sequential pacing  Meds: None

## 2021-02-19 NOTE — H&P ADULT - NSHPLABSRESULTS_GEN_ALL_CORE
LABS:                         11.0   7.98  )-----------( 382      ( 19 Feb 2021 19:41 )             35.6     02-19    138  |  105  |  20  ----------------------------<  129<H>  5.3   |  22  |  1.04    Ca    10.3      19 Feb 2021 19:41    TPro  8.3  /  Alb  3.2<L>  /  TBili  0.3  /  DBili  x   /  AST  20  /  ALT  13  /  AlkPhos  86  02-19    PT/INR - ( 19 Feb 2021 19:41 )   PT: 25.7 sec;   INR: 2.22          PTT - ( 19 Feb 2021 19:41 )  PTT:29.8 sec    CARDIAC MARKERS ( 19 Feb 2021 19:41 )  x     / <0.01 ng/mL / 34 U/L / x     / x          Serum Pro-Brain Natriuretic Peptide: 354 pg/mL (02-19 @ 19:41)    Lactate, Blood: 1.2 mmol/L (02-19 @ 19:53)      RADIOLOGY, EKG & ADDITIONAL TESTS: Reviewed.

## 2021-02-19 NOTE — H&P ADULT - ASSESSMENT
84yo Divehi-speaking female with pmhx lung cancer and Afib/flutter (on Eliquis, s/p PPM) who presents from home after having and episode of confusion, dizziness and 1x episode of small volume hemoptysis. Patient admitted to [facility] for further management. 86yo Yakut-speaking female with pmhx L lower lobe adenocarcinoma (s/p lobectomy), recurrent PNA, Afib/flutter (on Eliquis, s/p PPM), ischemic CM (EF 45%), CVA, COPD/Asthma, Sarcoidosis, CKD II/III who presents from home after having and episode of confusion and dizziness. Pt admitted for further management.

## 2021-02-19 NOTE — ED PROVIDER NOTE - OBJECTIVE STATEMENT
85 F hx of lung ca,? bronchitis, aflutter on eliquis -took 1 tab tramadol for back pain approx 8-9 am this am-- awoke from a nap at 5 pm-  last nl 2 pm--  c/o of dizziness left facial numbness ? hemoptysis  unable to walk very weak acc to family  no falls or head trauma

## 2021-02-19 NOTE — ED ADULT NURSE NOTE - CHPI ED NUR SYMPTOMS NEG
no blurred vision/no change in level of consciousness/no confusion/no fever/no loss of consciousness/no nausea/no vomiting/no weakness

## 2021-02-19 NOTE — CONSULT NOTE ADULT - ASSESSMENT
**INCOMPLETE**    Ms. Kim is an 85y Female with PMHx of lung CA, bronchitis, Afib on eliquis who presents to the ED after an episode of dizziness, left facial numbness and weakness after waking up from at nap at 5pm today. LKN 2pm before laying down for nap. NIHSS 0. CTH negative for acute infarction or hemorrhage, CTA negative for hemodynamically significant stenosis or occlusion, CT perfusion negative for perfusion abnormalities. TPA not given due to presentation outside of TPA window and symptom resolution. No further inpatient workup needed from stroke perspective.      #Episode of dizziness  - Goal normotension  - Continue home medications, but consider holding Tramadol   - Follow-up with PCP for blood pressure optimization ('s)   - Can send A1C and LDL to optimize management of other stroke risk factors  - Can consider MRI of symptoms re-occur   - Rest of management per ED   Ms. Kim is an 85y Female with PMHx of lung CA, bronchitis, Afib on eliquis who presents to the ED after an episode of dizziness, left facial numbness and weakness after waking up from at nap at 5pm today. LKN 2pm before laying down for nap. NIHSS 0. CTH negative for acute infarction or hemorrhage, CTA negative for hemodynamically significant stenosis or occlusion, CT perfusion negative for perfusion abnormalities. TPA not given due to presentation outside of TPA window and symptom resolution. No further inpatient workup needed from stroke perspective.      #Episode of dizziness  - Goal normotension  - Continue home medications, but consider holding Tramadol   - Follow-up with PCP for blood pressure optimization ('s)   - Can send A1C and LDL to optimize management of other stroke risk factors  - Can consider MRI if symptoms re-occur   - Rest of management per ED

## 2021-02-19 NOTE — ED PROVIDER NOTE - CLINICAL SUMMARY MEDICAL DECISION MAKING FREE TEXT BOX
86 yo F with ? drug reaction to tramadol- dizziness/ ? left facial tingling numbness /confusion episode of hemoptysis earlier-  no current hemoptysis ? nec changes in lung -  CT head neg for CVA  - no PE or dissection--rec admit for serial exams tonite in light oif cancer risk and on eliquis

## 2021-02-19 NOTE — ED PROVIDER NOTE - CRANIAL NERVE AND PUPILLARY EXAM
cranial nerves 2-12 intact/cough reflex intact/corneal reflex intact/central and peripheral vision intact/central vision intact/peripheral vision intact

## 2021-02-19 NOTE — H&P ADULT - NSHPSOCIALHISTORY_GEN_ALL_CORE
Lives alone with 24hr HHA  Former smoker  Denies alcohol and illicit drugs  Walks with cane outside of her home

## 2021-02-19 NOTE — DISCHARGE NOTE PROVIDER - PROVIDER TOKENS
PROVIDER:[TOKEN:[[id number]],FOLLOWUP:[1 week]],PROVIDER:[TOKEN:[[id number]],FOLLOWUP:[1 week]]

## 2021-02-19 NOTE — ED ADULT NURSE NOTE - OBJECTIVE STATEMENT
Patient arrives ambulatory accompanied by family for eval of dizziness and left sided facial numbness that started at 5pm and has since resolved.  Family states home health aid gave patient tramadol/acetaminophen for the first time today, patient went to sleep and woke up around 5.  Patient is primarily Tamazight speaking, family states speech is clear/unchanged.  No facial droop, no arm or leg numbness/tingling/weakness.  Patient evaluated by Dr. Hendricks upon arrival, stroke code initiated 1909.  .

## 2021-02-19 NOTE — DISCHARGE NOTE PROVIDER - HOSPITAL COURSE
Ms Kim is an 86yo Occitan-speaking female with pmhx lung cancer and Afib/flutter (on Eliquis, s/p PPM) who presents from home after having and episode of confusion, dizziness and 1x episode of small volume hemoptysis.    She reportedly took tramadol for the first time around 8am for back pain. She laid down for a nap around 2pm and woke up at 5pm complaining of left facial numbness, dizziness, and weakness as well as possible hemoptysis as seen by her HHA so her family brought her to the ED for evaluation. Upon arrival, pt. no longer endorsing facial numbness or dizziness.    Of not, pt visited [facility] ED in December 2020 for hemoptysis and was d/jaime home from the ED as hemoptysis appeared to have resolved, per the ED  notes.    ED course:  Stroke code was called when pt arirved to the ED. NIHSS score was 0. CTH negative for acute infarction or hemorrhage, CTA negative for hemodynamically significant stenosis or occlusion, CT perfusion negative for perfusion abnormalities. Stroke team rec for no further stroke workup needed from their perspective.  Vitals: T 97.7, HR 75-94, //69, RR 18, spO2 97-99% on RA  Labs: Hgb 11.0, INR 2.22, Fibrinogen 642, albumin 3.2, CRP 5.46,   CTA Chest:  IMPRESSION:  1. No aortic dissection.  2. Large airways disease, with markedly dilated and fluid-filled bronchi in right lower lobe and additional mucoid impaction in right upper lobe.  3. Heterogeneous consolidation right upper lobe, with several nodular areas of low-density centrally. This could represent necrotizing pneumonia, but malignancy should be excluded.  4. Multiple nodules right lower and upper lobes. This could represent infection, aspiration, or malignancy.  5. There is a 5.2 cm heterogeneous uterine mass. The differential diagnosis includes uterine cancer versus necrotic fibroid.  6. Moderate thoracic lymphadenopathy.  EKG: AV sequential pacing  Meds: None Ms Kim is an 84yo Djiboutian-speaking female with pmhx lung cancer and Afib/flutter (on Eliquis, s/p PPM) who presents from home after having and episode of confusion and dizziness. Pt took tramadol at 8am for back pain. She laid down for a nap around 2pm and woke up at 5pm complaining of left facial numbness, dizziness, and weakness. Her family then brought her to the ED for evaluation. Upon arrival, pt was no longer endorsing facial numbness or dizziness. Pt's daughter, Kiya, at bedside providing most of history. States that patient is A&Ox3 at baseline, uses a cane for outdoor ambulation and has 24/7 HHA. Daughter reports that pt has had 25 lb weight loss in the past few months. Reports mildly productive cough with clear/white sputum for the past month. States that when pt coughs hard, sputum will be blood tinged, but there have been no witnessed episodes of small or large volume hemoptysis or blood clots. Denies fever, chills, myalgias, change in character of sputum or cough.  In the ED, stroke code was called when pt arrived to the ED. NIHSS score was 0. CTH negative for acute infarction or hemorrhage, CTA negative for hemodynamically significant stenosis or occlusion, CT perfusion negative for perfusion abnormalities. Stroke team rec for no further stroke workup needed from their perspective. Vitals and labs were stable.   CTA chest had multiple findings:  1. No aortic dissection.  2. Large airways disease, with markedly dilated and fluid-filled bronchi in right lower lobe and additional mucoid impaction in right upper lobe.  3. Heterogeneous consolidation right upper lobe, with several nodular areas of low-density centrally. This could represent necrotizing pneumonia, but malignancy should be excluded.  4. Multiple nodules right lower and upper lobes. This could represent infection, aspiration, or malignancy.  5. There is a 5.2 cm heterogeneous uterine mass. The differential diagnosis includes uterine cancer versus necrotic fibroid.  6. Moderate thoracic lymphadenopathy.    After speaking with attending, Dr. Greenfield, that pt was at her baseline, nontoxic appearing, afebrile, no change in chronic respiratory symptoms and that pt's family wanted to take her home. Patient was deemed stable for discharge with follow up with her PCP, Dr. Greenfield and pulm, Dr. Sinclair.       Problem/Plan - 1:  ·  Problem: Dizziness. Plan: Resolved.      Problem/Plan - 2:  ·  Problem: Lung cancer. Plan: Lung CA  Pt sees Dr. Sinclair outpatient. Per his notes, no clinical evidence of metastatic disease. June 2020 CT scan showed interval resolution of 6 mm nodule in the RUL, however waxing and waning tree-in-bud micronodule and some progression in the RLL consolidation/atelectasis as well as traction bronchiectasis.   -Patient had CT chest no win the ED showing Large airways disease, with markedly dilated and fluid-filled bronchi in RLL and additional mucoid impaction in RUL; Heterogeneous consolidation RUL, with several nodular areas of low-density centrally which could represent necrotizing PNA but malignancy should be excluded. Multiple nodules RLL and RUL that could represent infection, aspiration, or malignancy. Moderate thoracic LAD  - Pt denied, fever, chills, change in character of cough or sputum production, non-toxic appearing on exam  - Pt and family wish to leave the hospital and have established care with pulmDr. Sinclair.     Problem/Plan - 3:  ·  Problem: Afib.  Plan: On home Eliquis. S/p PPM.  - H&H stable, will continue with Eliquis.      Problem/Plan - 4:  ·  Problem: Anemia.   - f/u outpatient CBC     Problem/Plan - 5:  ·  Problem: Hypertension.   - c/w home meds atenolol, amlodipine and losartan     Problem/Plan - 6:  Problem: Bladder mass.Plan: CT Pelvis showing 5.2 cm heterogeneous uterine mass, not seen on July 2020 CT A/P outpt.  - follow up outpatient.

## 2021-02-19 NOTE — DISCHARGE NOTE PROVIDER - CARE PROVIDER_API CALL
Michaela Greenfield)  Critical Care Medicine; Internal Medicine  [address], [address]  [place] [address]  Phone: [phone]  Fax: [phone]  Follow Up Time: 1 week    [place] Sinclair)  Critical Care Medicine; Pulmonary Disease  [address], [address] [place] [id number]  Phone: [phone]  Fax: [phone]  Follow Up Time: 1 week

## 2021-02-19 NOTE — DISCHARGE NOTE NURSING/CASE MANAGEMENT/SOCIAL WORK - PATIENT PORTAL LINK FT
You can access the FollowMyHealth Patient Portal offered by [facility] by registering at the following website: [email or web address]. By joining [facility]’s FollowMyHealth portal, you will also be able to view your health information using other applications (apps) compatible with our system.

## 2021-02-19 NOTE — ED PROVIDER NOTE - CARE PLAN
Principal Discharge DX:	Dizziness   Principal Discharge DX:	Dizziness  Secondary Diagnosis:	Lung cancer  Secondary Diagnosis:	Hemoptysis  Secondary Diagnosis:	Drug reaction

## 2021-02-19 NOTE — H&P ADULT - PROBLEM SELECTOR PLAN 2
Resolved. Lung CA  Pt sees Dr. Sinclair outpatient. Per his notes, no clinical evidence of metastatic disease. June 2020 CT scan showed interval resolution of 6 mm nodule in the RUL, however waxing and waning tree-in-bud micronodule and some progression in the RLL consolidation/atelectasis as well as traction bronchiectasis.   -Patient had CT chest no win the ED showing Large airways disease, with markedly dilated and fluid-filled bronchi in RLL and additional mucoid impaction in RUL; Heterogeneous consolidation RUL, with several nodular areas of low-density centrally which could represent necrotizing PNA but malignancy should be excluded. Multiple nodules RLL and RUL that could represent infection, aspiration, or malignancy. Moderate thoracic LAD  - Pt denies, fever, chills, change in character of cough or sputum production, non-toxic appearing on exam  - Pt and family wish to leave the hospital and have established care with pulm, Dr. Sinclair

## 2021-02-19 NOTE — H&P ADULT - PROBLEM SELECTOR PLAN 6
CT Pelvis showing 5.2 cm heterogeneous uterine mass, not seen on July 2020 CT A/P outpt.  - monitor and follow up outpatient

## 2021-02-19 NOTE — DISCHARGE NOTE PROVIDER - CARE PROVIDERS DIRECT ADDRESSES
[email or web address]

## 2021-02-19 NOTE — DISCHARGE NOTE PROVIDER - NSDCCPCAREPLAN_GEN_ALL_CORE_FT
PRINCIPAL DISCHARGE DIAGNOSIS  Diagnosis: Dizziness  Assessment and Plan of Treatment: You came into the hospital after you felt dizzy at home after taking the medication Tramadol. When you came into the hospital, you had CT scans of your head which were normal looking and did not show stroke. You were seen by a team of neurologists who felt that you were stable from a neurological persepective. After you spent time in the emergency room, your dizziness improved and you felt like yourself.  - Stop taking the medication Tramadol  - Return to the emergency room if you have severe dizziness, intense headache, slurred speech, facial droop or weakness in just one side of the body  - Follow up with your primary care doctor, Dr. Greenfield in 1-2 weeks      SECONDARY DISCHARGE DIAGNOSES  Diagnosis: Lung cancer  Assessment and Plan of Treatment: When you were in the Emergency Room, you had a CT scan of your chest which was abnormal. You have had abnromal CT scans of your chest in the past. Since you are not having fever, change in your cough or showing other signs of infection, it is most likely that your banormal CT scan has to do with your known history of lung cancer.  - Please return to the emergency room if you have a fever, have changes in your cough, or are coughing up more sputum  - Please follow up woth your lung doctor, Dr. Sinclair, in 1-2 weeks

## 2021-02-19 NOTE — CONSULT NOTE ADULT - SUBJECTIVE AND OBJECTIVE BOX
**STROKE CODE CONSULT NOTE**    Last known well time/Time of onset of symptoms: 2/19/2021 @2pm    HPI: 85y Female with PMHx of lung CA, bronchitis, Afib on eliquis who presents to the ED with a chief complaint of dizziness. She reportedly took tramadol for the first time around 8am this morning for back pain. She laid down for a nap around 2pm and woke up at 5pm complaining of left facial numbness, dizziness, and weakness as well as possible hemoptysis so her family brought her to the ED for evaluation. Upon arrival, pt. no longer endorsing facial numbness or dizziness. Denies speech difficulties, visual disturbance, loss of consciousness, and weakness.       T(C): 36.5 (02-19-21 @ 18:58), Max: 36.5 (02-19-21 @ 18:58)  HR: 75 (02-19-21 @ 20:03) (75 - 94)  BP: 156/69 (02-19-21 @ 20:03) (154/82 - 156/69)  RR: 18 (02-19-21 @ 20:03) (18 - 18)  SpO2: 97% (02-19-21 @ 20:03) (97% - 99%)    PAST MEDICAL & SURGICAL HISTORY:      FAMILY HISTORY:      SOCIAL HISTORY:    ROS:   Constitutional: No fever, weight loss or fatigue  Eyes: No eye pain, visual disturbances, or discharge  ENMT:  No difficulty hearing, tinnitus, vertigo; No sinus or throat pain  Neck: No pain or stiffness  Respiratory: +hemoptysis. No cough, wheezing, chills  Cardiovascular: No chest pain, palpitations, shortness of breath, dizziness or leg swelling  Gastrointestinal: No abdominal pain. No nausea, vomiting or hematemesis; No diarrhea or constipation. No hematochezia.  Genitourinary: No dysuria, frequency, hematuria or incontinence  Neurological: As per HPI  Skin: No itching, burning, rashes or lesions   Endocrine: No heat or cold intolerance; No hair loss  Musculoskeletal: No joint pain or swelling; No muscle, back or extremity pain  Psychiatric: No depression, anxiety, mood swings or difficulty sleeping  Heme/Lymph: No easy bruising or bleeding gums    MEDICATIONS  (STANDING):    MEDICATIONS  (PRN):    Allergies    No Known Allergies    Intolerances      Vital Signs Last 24 Hrs  T(C): 36.5 (19 Feb 2021 18:58), Max: 36.5 (19 Feb 2021 18:58)  T(F): 97.7 (19 Feb 2021 18:58), Max: 97.7 (19 Feb 2021 18:58)  HR: 75 (19 Feb 2021 20:03) (75 - 94)  BP: 156/69 (19 Feb 2021 20:03) (154/82 - 156/69)  BP(mean): --  RR: 18 (19 Feb 2021 20:03) (18 - 18)  SpO2: 97% (19 Feb 2021 20:03) (97% - 99%)    Physical exam:  Constitutional: No acute distress, conversant  Eyes: Anicteric sclerae, moist conjunctivae, see below for CNs  Neck: trachea midline, FROM, supple, no thyromegaly or lymphadenopathy  Cardiovascular: Regular rate and rhythm, no murmurs, rubs, or gallops. No carotid bruits.   Pulmonary: Anterior breath sounds clear bilaterally, no crackles or wheezing. No use of accessory muscles  GI: Abdomen soft, non-distended, non-tender  Extremities: Radial and DP pulses +2, no edema    Neurologic:  -Mental status: Awake, alert, oriented to person, place, and time. Speech is fluent with intact naming, repetition, and comprehension, no dysarthria. Recent and remote memory intact. Follows commands. Attention/concentration intact. Fund of knowledge appropriate.  -Cranial nerves:   II: Visual fields are full to confrontation.  III, IV, VI: Extraocular movements are intact without nystagmus. Pupils equally round and reactive to light  V:  Facial sensation V1-V3 equal and intact   VII: Face is symmetric with normal eye closure and smile  VIII: Hearing is bilaterally intact to finger rub  IX, X: Uvula is midline and soft palate rises symmetrically  XI: Head turning and shoulder shrug are intact.  XII: Tongue protrudes midline  Motor: Normal bulk and tone. No pronator drift. Strength bilateral upper extremity 5/5, bilateral lower extremities 5/5.  Rapid alternating movements intact and symmetric  Sensation: Intact to light touch bilaterally. No neglect or extinction on double simultaneous testing.  Coordination: No dysmetria on finger-to-nose and heel-to-shin bilaterally  Reflexes: Downgoing toes bilaterally   Gait: Narrow gait and steady    NIHSS: 0    Fingerstick Blood Glucose: CAPILLARY BLOOD GLUCOSE      POCT Blood Glucose.: 120 mg/dL (19 Feb 2021 19:13)    LABS:                        11.0   7.98  )-----------( 382      ( 19 Feb 2021 19:41 )             35.6     02-19    138  |  105  |  20  ----------------------------<  129<H>  5.3   |  22  |  1.04    Ca    10.3      19 Feb 2021 19:41    TPro  8.3  /  Alb  3.2<L>  /  TBili  0.3  /  DBili  x   /  AST  20  /  ALT  13  /  AlkPhos  86  02-19      CARDIAC MARKERS ( 19 Feb 2021 19:41 )  x     / <0.01 ng/mL / 34 U/L / x     / x              RADIOLOGY & ADDITIONAL STUDIES:    < from: CT Brain Stroke Protocol (02.19.21 @ 19:42) >  IMPRESSION:  No acute intracranial hemorrhage or transcortical infarct.    < end of copied text >    < from: CT Angio Neck w/ IV Cont (02.19.21 @ 19:43) >  IMPRESSION:    No intracranial arterial steno-occlusive disease.    < end of copied text >    < from: CT Perfusion w/ Maps w/ IV Cont (02.19.21 @ 19:42) >  IMPRESSION:    No intracranial arterial steno-occlusive disease.    Negative CT perfusion study.    < end of copied text >          -----------------------------------------------------------------------------------------------------------------  IV-tPA (Y/N):    N                             Reason IV-tPA not given: Presented outside of TPA window, resolving symptoms    **STROKE CODE CONSULT NOTE**    Last known well time/Time of onset of symptoms: 2/19/2021 @2pm    HPI: 85y Female with PMHx of lung CA, bronchitis, Afib on eliquis who presents to the ED with a chief complaint of dizziness. She reportedly took tramadol for the first time around 8am this morning for back pain. She laid down for a nap around 2pm and woke up at 5pm complaining of left facial numbness, dizziness, and weakness as well as possible hemoptysis so her family brought her to the ED for evaluation. Upon arrival, pt. no longer endorsing facial numbness or dizziness. Denies speech difficulties, visual disturbance, loss of consciousness, and weakness.       T(C): 36.5 (02-19-21 @ 18:58), Max: 36.5 (02-19-21 @ 18:58)  HR: 75 (02-19-21 @ 20:03) (75 - 94)  BP: 156/69 (02-19-21 @ 20:03) (154/82 - 156/69)  RR: 18 (02-19-21 @ 20:03) (18 - 18)  SpO2: 97% (02-19-21 @ 20:03) (97% - 99%)    PAST MEDICAL & SURGICAL HISTORY:      FAMILY HISTORY:      SOCIAL HISTORY:    ROS:   Constitutional: No fever, weight loss or fatigue  Eyes: No eye pain, visual disturbances, or discharge  ENMT:  No difficulty hearing, tinnitus, vertigo; No sinus or throat pain  Neck: No pain or stiffness  Respiratory: +hemoptysis. No cough, wheezing, chills  Cardiovascular: No chest pain, palpitations, shortness of breath, dizziness or leg swelling  Gastrointestinal: No abdominal pain. No nausea, vomiting or hematemesis; No diarrhea or constipation. No hematochezia.  Genitourinary: No dysuria, frequency, hematuria or incontinence  Neurological: As per HPI  Skin: No itching, burning, rashes or lesions   Endocrine: No heat or cold intolerance; No hair loss  Musculoskeletal: No joint pain or swelling; No muscle, back or extremity pain  Psychiatric: No depression, anxiety, mood swings or difficulty sleeping  Heme/Lymph: No easy bruising or bleeding gums    MEDICATIONS  (STANDING):    MEDICATIONS  (PRN):    Allergies    No Known Allergies    Intolerances      Vital Signs Last 24 Hrs  T(C): 36.5 (19 Feb 2021 18:58), Max: 36.5 (19 Feb 2021 18:58)  T(F): 97.7 (19 Feb 2021 18:58), Max: 97.7 (19 Feb 2021 18:58)  HR: 75 (19 Feb 2021 20:03) (75 - 94)  BP: 156/69 (19 Feb 2021 20:03) (154/82 - 156/69)  BP(mean): --  RR: 18 (19 Feb 2021 20:03) (18 - 18)  SpO2: 97% (19 Feb 2021 20:03) (97% - 99%)    Physical exam:  Neurologic:  -Mental status: Awake, alert, oriented to person, place, and time. Speech is fluent with intact naming, repetition, and comprehension, no dysarthria hypophonic. Recent and remote memory intact. Follows commands. Attention/concentration intact. Fund of knowledge appropriate.  -Cranial nerves:   II: Visual fields are full to confrontation.  III, IV, VI: Extraocular movements are intact without nystagmus. Pupils equally round and reactive to light  V:  Facial sensation V1-V3 equal and intact   VII: Face is symmetric with normal eye closure and smile  Motor: Normal bulk and tone. No pronator drift. Strength bilateral upper extremity 5/5, bilateral lower extremities 5/5.  Rapid alternating movements intact and symmetric  Sensation: Intact to light touch bilaterally. No neglect or extinction on double simultaneous testing.  Coordination: No dysmetria on finger-to-nose     NIHSS: 0    Fingerstick Blood Glucose: CAPILLARY BLOOD GLUCOSE      POCT Blood Glucose.: 120 mg/dL (19 Feb 2021 19:13)    LABS:                        11.0   7.98  )-----------( 382      ( 19 Feb 2021 19:41 )             35.6     02-19    138  |  105  |  20  ----------------------------<  129<H>  5.3   |  22  |  1.04    Ca    10.3      19 Feb 2021 19:41    TPro  8.3  /  Alb  3.2<L>  /  TBili  0.3  /  DBili  x   /  AST  20  /  ALT  13  /  AlkPhos  86  02-19      CARDIAC MARKERS ( 19 Feb 2021 19:41 )  x     / <0.01 ng/mL / 34 U/L / x     / x              RADIOLOGY & ADDITIONAL STUDIES:    < from: CT Brain Stroke Protocol (02.19.21 @ 19:42) >  IMPRESSION:  No acute intracranial hemorrhage or transcortical infarct.    < end of copied text >    < from: CT Angio Neck w/ IV Cont (02.19.21 @ 19:43) >  IMPRESSION:    No intracranial arterial steno-occlusive disease.    < end of copied text >    < from: CT Perfusion w/ Maps w/ IV Cont (02.19.21 @ 19:42) >  IMPRESSION:    No intracranial arterial steno-occlusive disease.    Negative CT perfusion study.    < end of copied text >          -----------------------------------------------------------------------------------------------------------------  IV-tPA (Y/N):    N                             Reason IV-tPA not given: Presented outside of TPA window, resolving symptoms    **STROKE CODE CONSULT NOTE**    Last known well time/Time of onset of symptoms: 2/19/2021 @2pm    HPI: 85y Female with PMHx of lung CA, bronchitis, Afib on eliquis who presents to the ED with a chief complaint of dizziness. She reportedly took tramadol for the first time around 8am this morning for back pain. She laid down for a nap around 2pm and woke up at 5pm complaining of left facial numbness, dizziness, and weakness as well as possible hemoptysis so her family brought her to the ED for evaluation. Upon arrival, pt. no longer endorsing facial numbness or dizziness. Denies speech difficulties, visual disturbance, loss of consciousness, and weakness.       T(C): 36.5 (02-19-21 @ 18:58), Max: 36.5 (02-19-21 @ 18:58)  HR: 75 (02-19-21 @ 20:03) (75 - 94)  BP: 156/69 (02-19-21 @ 20:03) (154/82 - 156/69)  RR: 18 (02-19-21 @ 20:03) (18 - 18)  SpO2: 97% (02-19-21 @ 20:03) (97% - 99%)    PAST MEDICAL & SURGICAL HISTORY:      FAMILY HISTORY:      SOCIAL HISTORY:    ROS:   Constitutional: No fever, weight loss or fatigue  Eyes: No eye pain, visual disturbances, or discharge  ENMT:  No difficulty hearing, tinnitus, vertigo; No sinus or throat pain  Neck: No pain or stiffness  Respiratory: +hemoptysis. No cough, wheezing, chills  Cardiovascular: No chest pain, palpitations, shortness of breath, dizziness or leg swelling  Gastrointestinal: No abdominal pain. No nausea, vomiting or hematemesis; No diarrhea or constipation. No hematochezia.  Genitourinary: No dysuria, frequency, hematuria or incontinence  Neurological: As per HPI  Skin: No itching, burning, rashes or lesions   Endocrine: No heat or cold intolerance; No hair loss  Musculoskeletal: No joint pain or swelling; No muscle, back or extremity pain  Psychiatric: No depression, anxiety, mood swings or difficulty sleeping  Heme/Lymph: No easy bruising or bleeding gums    MEDICATIONS  (STANDING):    MEDICATIONS  (PRN):    Allergies    No Known Allergies    Intolerances      Vital Signs Last 24 Hrs  T(C): 36.5 (19 Feb 2021 18:58), Max: 36.5 (19 Feb 2021 18:58)  T(F): 97.7 (19 Feb 2021 18:58), Max: 97.7 (19 Feb 2021 18:58)  HR: 75 (19 Feb 2021 20:03) (75 - 94)  BP: 156/69 (19 Feb 2021 20:03) (154/82 - 156/69)  BP(mean): --  RR: 18 (19 Feb 2021 20:03) (18 - 18)  SpO2: 97% (19 Feb 2021 20:03) (97% - 99%)    Physical exam:  Neurologic:  -Mental status: Awake, alert, oriented to person, place, and time. Speech is fluent with intact naming, repetition, and comprehension, no dysarthria hypophonic. Recent and remote memory intact. Follows commands. Attention/concentration intact. Fund of knowledge appropriate.  -Cranial nerves:   II: Visual fields are full to confrontation.  III, IV, VI: Extraocular movements are intact without nystagmus. Pupils equally round and reactive to light  V:  Facial sensation V1-V3 equal and intact   VII: Face is symmetric with normal eye closure and smile  Motor: Normal bulk and tone. No pronator drift. Strength bilateral upper extremity 5/5, bilateral lower extremities 5/5.  Rapid alternating movements intact and symmetric  Sensation: Intact to light touch bilaterally. No neglect or extinction on double simultaneous testing.  Coordination: No dysmetria on finger-to-nose     NIHSS: 0    Fingerstick Blood Glucose: CAPILLARY BLOOD GLUCOSE      POCT Blood Glucose.: 120 mg/dL (19 Feb 2021 19:13)    LABS:                        11.0   7.98  )-----------( 382      ( 19 Feb 2021 19:41 )             35.6     02-19    138  |  105  |  20  ----------------------------<  129<H>  5.3   |  22  |  1.04    Ca    10.3      19 Feb 2021 19:41    TPro  8.3  /  Alb  3.2<L>  /  TBili  0.3  /  DBili  x   /  AST  20  /  ALT  13  /  AlkPhos  86  02-19      CARDIAC MARKERS ( 19 Feb 2021 19:41 )  x     / <0.01 ng/mL / 34 U/L / x     / x              RADIOLOGY & ADDITIONAL STUDIES:    < from: CT Brain Stroke Protocol (02.19.21 @ 19:42) >  IMPRESSION:  No acute intracranial hemorrhage or transcortical infarct.    < end of copied text >    < from: CT Angio Neck w/ IV Cont (02.19.21 @ 19:43) >  IMPRESSION:    No intracranial arterial steno-occlusive disease.    < end of copied text >    < from: CT Perfusion w/ Maps w/ IV Cont (02.19.21 @ 19:42) >  IMPRESSION:    No intracranial arterial steno-occlusive disease.    Negative CT perfusion study.    < end of copied text >    < from: CT Angio Neck w/ IV Cont (02.19.21 @ 19:43) >  IMPRESSION:    The cervical carotid and vertebral arteries are patent, without hemodynamically significant stenosis or dissection injury.    Please see dedicated report of chest CT for more complete detail of right lung disease.    < end of copied text >        -----------------------------------------------------------------------------------------------------------------  IV-tPA (Y/N):    N                             Reason IV-tPA not given: Presented outside of TPA window, resolving symptoms

## 2021-02-19 NOTE — H&P ADULT - NSHPPHYSICALEXAM_GEN_ALL_CORE
VITAL SIGNS:  T(C): 36.5 (02-19-21 @ 18:58), Max: 36.5 (02-19-21 @ 18:58)  T(F): 97.7 (02-19-21 @ 18:58), Max: 97.7 (02-19-21 @ 18:58)  HR: 75 (02-19-21 @ 20:03) (75 - 94)  BP: 156/69 (02-19-21 @ 20:03) (154/82 - 156/69)  BP(mean): --  RR: 18 (02-19-21 @ 20:03) (18 - 18)  SpO2: 97% (02-19-21 @ 20:03) (97% - 99%)  Wt(kg): --    PHYSICAL EXAM:  Constitutional: WDWN resting comfortably in bed; NAD  Head: NC/AT  Eyes: PERRL, EOMI, anicteric sclera  ENT: no nasal discharge; uvula midline, no oropharyngeal erythema or exudates; MMM  Neck: supple; no JVD or thyromegaly  Respiratory: CTA B/L; no W/R/R, no retractions  Cardiac: +S1/S2; RRR; no M/R/G; PMI non-displaced  Gastrointestinal: abdomen soft, NT/ND; no rebound or guarding; +BSx4  Back: spine midline, no bony tenderness or step-offs; no CVAT B/L  Extremities: WWP, no clubbing or cyanosis; no peripheral edema  Musculoskeletal: NROM x4; no joint swelling, tenderness or erythema  Vascular: 2+ radial, femoral, DP/PT pulses B/L  Dermatologic: skin warm, dry and intact; no rashes, wounds, or scars  Lymphatic: no submandibular or cervical LAD  Neurologic: AAOx3; CNII-XII grossly intact; no focal deficits  Psychiatric: affect and characteristics of appearance, verbalizations, behaviors are appropriate VITAL SIGNS:  T(C): 36.5 (02-19-21 @ 18:58), Max: 36.5 (02-19-21 @ 18:58)  T(F): 97.7 (02-19-21 @ 18:58), Max: 97.7 (02-19-21 @ 18:58)  HR: 75 (02-19-21 @ 20:03) (75 - 94)  BP: 156/69 (02-19-21 @ 20:03) (154/82 - 156/69)  BP(mean): --  RR: 18 (02-19-21 @ 20:03) (18 - 18)  SpO2: 97% (02-19-21 @ 20:03) (97% - 99%)  Wt(kg): --    PHYSICAL EXAM:  Constitutional: Thin, WDWN resting comfortably in bed; NAD  Head: NC/AT  Eyes: PERRL, EOMI, anicteric sclera  ENT: no nasal discharge; uvula midline, no oropharyngeal erythema or exudates; MMM  Respiratory: CTA B/L; no W/R/R, no retractions  Cardiac: +S1/S2; irregularly irregular rhythm, no M/R/G; PMI non-displaced  Gastrointestinal: abdomen soft, NT/ND; no rebound or guarding; +BSx4  Back: spine midline, no bony tenderness or step-offs; no CVAT B/L  Extremities: WWP, no clubbing or cyanosis; no peripheral edema  Musculoskeletal: NROM x4; no joint swelling, tenderness or erythema  Vascular: 2+ radial, femoral, DP/PT pulses B/L  Dermatologic: skin warm, dry and intact; no rashes, wounds, or scars  Neurologic: AAOx3; CNII-XII grossly intact; no focal deficits  Psychiatric: affect and characteristics of appearance, verbalizations, behaviors are appropriate

## 2021-02-19 NOTE — H&P ADULT - PROBLEM SELECTOR PLAN 7
F: none  E: replete as needed  N: Regular Diet    DVT: Eliquis  GI: none  Dispo: [facility]

## 2021-02-20 LAB — SARS-COV-2 RNA SPEC QL NAA+PROBE: SIGNIFICANT CHANGE UP

## 2021-02-26 ENCOUNTER — APPOINTMENT (OUTPATIENT)
Dept: INTERNAL MEDICINE | Facility: CLINIC | Age: 86
End: 2021-02-26
Payer: MEDICARE

## 2021-02-26 VITALS
SYSTOLIC BLOOD PRESSURE: 131 MMHG | WEIGHT: 130 LBS | BODY MASS INDEX: 20.4 KG/M2 | DIASTOLIC BLOOD PRESSURE: 86 MMHG | HEART RATE: 84 BPM | TEMPERATURE: 98 F | HEIGHT: 67 IN | OXYGEN SATURATION: 99 %

## 2021-02-26 DIAGNOSIS — M25.511 PAIN IN RIGHT SHOULDER: ICD-10-CM

## 2021-02-26 DIAGNOSIS — Z00.00 ENCOUNTER FOR GENERAL ADULT MEDICAL EXAMINATION W/OUT ABNORMAL FINDINGS: ICD-10-CM

## 2021-02-26 PROCEDURE — 99214 OFFICE O/P EST MOD 30 MIN: CPT

## 2021-03-03 DIAGNOSIS — Z95.0 PRESENCE OF CARDIAC PACEMAKER: ICD-10-CM

## 2021-03-03 DIAGNOSIS — I25.5 ISCHEMIC CARDIOMYOPATHY: ICD-10-CM

## 2021-03-03 DIAGNOSIS — D25.9 LEIOMYOMA OF UTERUS, UNSPECIFIED: ICD-10-CM
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
## 2021-03-03 DIAGNOSIS — R91.8 OTHER NONSPECIFIC ABNORMAL FINDING OF LUNG FIELD: ICD-10-CM

## 2021-03-03 DIAGNOSIS — R42 DIZZINESS AND GIDDINESS: ICD-10-CM

## 2021-03-03 DIAGNOSIS — T40.425A ADVERSE EFFECT OF TRAMADOL, INITIAL ENCOUNTER: ICD-10-CM

## 2021-03-03 DIAGNOSIS — I48.92 UNSPECIFIED ATRIAL FLUTTER: ICD-10-CM

## 2021-03-03 DIAGNOSIS — R20.0 ANESTHESIA OF SKIN: ICD-10-CM

## 2021-03-03 DIAGNOSIS — Z79.01 LONG TERM (CURRENT) USE OF ANTICOAGULANTS: ICD-10-CM

## 2021-03-03 DIAGNOSIS — R63.4 ABNORMAL WEIGHT LOSS: ICD-10-CM

## 2021-03-03 DIAGNOSIS — R04.2 HEMOPTYSIS: ICD-10-CM
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
## 2021-03-03 DIAGNOSIS — Z86.73 PERSONAL HISTORY OF TRANSIENT ISCHEMIC ATTACK (TIA), AND CEREBRAL INFARCTION WITHOUT RESIDUAL DEFICITS: ICD-10-CM

## 2021-03-03 DIAGNOSIS — N18.30 CHRONIC KIDNEY DISEASE, STAGE 3 UNSPECIFIED: ICD-10-CM

## 2021-03-03 DIAGNOSIS — I48.91 UNSPECIFIED ATRIAL FIBRILLATION: ICD-10-CM

## 2021-03-03 DIAGNOSIS — Z85.118 PERSONAL HISTORY OF OTHER MALIGNANT NEOPLASM OF BRONCHUS AND LUNG: ICD-10-CM

## 2021-03-03 DIAGNOSIS — J40 BRONCHITIS, NOT SPECIFIED AS ACUTE OR CHRONIC: ICD-10-CM

## 2021-03-03 DIAGNOSIS — J44.9 CHRONIC OBSTRUCTIVE PULMONARY DISEASE, UNSPECIFIED: ICD-10-CM

## 2021-03-03 DIAGNOSIS — D64.9 ANEMIA, UNSPECIFIED: ICD-10-CM

## 2021-03-03 DIAGNOSIS — R53.1 WEAKNESS: ICD-10-CM

## 2021-03-03 DIAGNOSIS — N32.9 BLADDER DISORDER, UNSPECIFIED: ICD-10-CM

## 2021-03-04 ENCOUNTER — APPOINTMENT (OUTPATIENT)
Dept: PULMONOLOGY | Facility: CLINIC | Age: 86
End: 2021-03-04
Payer: MEDICARE

## 2021-03-04 VITALS
TEMPERATURE: 97.3 F | WEIGHT: 130 LBS | HEIGHT: 67 IN | RESPIRATION RATE: 12 BRPM | HEART RATE: 82 BPM | DIASTOLIC BLOOD PRESSURE: 72 MMHG | BODY MASS INDEX: 20.4 KG/M2 | SYSTOLIC BLOOD PRESSURE: 122 MMHG | OXYGEN SATURATION: 97 %

## 2021-03-04 PROBLEM — I48.91 UNSPECIFIED ATRIAL FIBRILLATION: Chronic | Status: ACTIVE | Noted: 2021-02-19

## 2021-03-04 PROCEDURE — 71046 X-RAY EXAM CHEST 2 VIEWS: CPT

## 2021-03-04 PROCEDURE — 99215 OFFICE O/P EST HI 40 MIN: CPT | Mod: 25

## 2021-03-04 NOTE — HISTORY OF PRESENT ILLNESS
[Never] : never [TextBox_4] : she is complaining of productive cough but since the last 3 weeks the sputum is more than before.  The color of the sputum is marone in color.  She has lots of sputum.  She is losing weight.  She is not eating and does not want to. She sometimes wheezes.  Sometimes she has blood in sputum.  No fever.  She is sob.  She barely walks in her apartment

## 2021-03-04 NOTE — PROCEDURE
[FreeTextEntry1] : CXR PA and lateral good cooperation\par  Infiltrate in the RUL no effusion no change from previous one

## 2021-03-04 NOTE — ASSESSMENT
[FreeTextEntry1] : Bacterial pneumonia\par The patient was admitted in February with right middle lobe pneumonia.  She was started on antibiotic and was discharged home.  Patient has copious amount of productive cough which is purulent in nature.  A sample was sent for culture.  Chest x-ray did not reveal any change or probably slight decrease in infiltrate in right middle lobe compared to the last chest x-ray and CT scan dated 2/19.  I started the patient on cefdinir.  Follow-up in sputum culture.  I instructed her assistant that if is any change in her condition or increase amount of hemoptysis she is proceed to the emergency room otherwise we will follow the patient in 1 week.\par \par Bronchiectasis\par \par Continue bronchodilators and continue antibiotic\par \par Sarcoidosis\par \par Stable\par \par Lung cancer\par \par There is no clinical evidence no radiological evidence of recurrent tumor as compared to the CAT scan prior to the last 1.

## 2021-03-04 NOTE — PHYSICAL EXAM
[No Acute Distress] : no acute distress [Normal Oropharynx] : normal oropharynx [Normal Appearance] : normal appearance [No Neck Mass] : no neck mass [Normal Rate/Rhythm] : normal rate/rhythm [Normal S1, S2] : normal s1, s2 [No Murmurs] : no murmurs [No Abnormalities] : no abnormalities [Benign] : benign [Normal Gait] : normal gait [No Clubbing] : no clubbing [No Cyanosis] : no cyanosis [No Edema] : no edema [FROM] : FROM [Normal Color/ Pigmentation] : normal color/ pigmentation [No Focal Deficits] : no focal deficits [Oriented x3] : oriented x3 [Normal Affect] : normal affect [TextBox_68] : anita miller

## 2021-03-07 LAB — BACTERIA SPT CULT: NORMAL

## 2021-03-11 ENCOUNTER — APPOINTMENT (OUTPATIENT)
Dept: PULMONOLOGY | Facility: CLINIC | Age: 86
End: 2021-03-11
Payer: MEDICARE

## 2021-03-11 VITALS
BODY MASS INDEX: 20.4 KG/M2 | DIASTOLIC BLOOD PRESSURE: 68 MMHG | RESPIRATION RATE: 12 BRPM | HEART RATE: 83 BPM | SYSTOLIC BLOOD PRESSURE: 112 MMHG | HEIGHT: 67 IN | WEIGHT: 130 LBS | OXYGEN SATURATION: 98 % | TEMPERATURE: 97.3 F

## 2021-03-11 PROCEDURE — 99214 OFFICE O/P EST MOD 30 MIN: CPT | Mod: 25

## 2021-03-11 PROCEDURE — 71046 X-RAY EXAM CHEST 2 VIEWS: CPT

## 2021-03-11 NOTE — HISTORY OF PRESENT ILLNESS
[Never] : never [TextBox_4] : She has gotten a lot better with treatment.  She is was feeling pain in the lower right back but has improved.  She has an improvement in cough and having less flem.  Denies hemoptysis or fever.  She is eating well.  She is having a little SOB 6 steps, which is the same as last visit.\par  \par She still has 3 pills cefdinir.  She is on Eliquis without any bleeding.  She is using albuterol HFA 3 times a day and has not needed the nebulizer.\par \par [place] # [id number]

## 2021-03-11 NOTE — ASSESSMENT
[FreeTextEntry1] : Bacterial pneumonia\par The patient was admitted in February with right middle lobe pneumonia.  She was started on antibiotic and was discharged home.  Patient has copious amount of productive cough which is purulent in nature.  A sample was sent for culture which was negative. Prior Chest x-ray did not reveal any change or probably slight decrease in infiltrate in right middle lobe compared to the last chest x-ray and CT scan dated 2/19.  I started the patient on cefdinir. Follow up CXR shows resolution of infiltrate.  \par \par Bronchiectasis\par \par Continue bronchodilators and finish antibiotic\par \par Sarcoidosis\par \par Stable\par \par Lung cancer\par \par There is no clinical evidence no radiological evidence of recurrent tumor as compared to the CAT scan prior to the last 1.

## 2021-03-11 NOTE — REVIEW OF SYSTEMS
[Fatigue] : fatigue [Poor Appetite] : poor appetite [Recent Wt Loss (___ Lbs)] : ~T recent [unfilled] lb weight loss [Cough] : cough [Hemoptysis] : hemoptysis [Sputum] : sputum [Wheezing] : wheezing [SOB on Exertion] : sob on exertion [Negative] : Endocrine [Fever] : no fever [Chills] : no chills [Chest Tightness] : no chest tightness [Frequent URIs] : no frequent URIs [Dyspnea] : no dyspnea [Pleuritic Pain] : no pleuritic pain [A.M. Dry Mouth] : no a.m. dry mouth

## 2021-03-11 NOTE — PROCEDURE
[FreeTextEntry1] : CXR PA and lateral good cooperation\par  Infiltrate in the RUL no effusion no change from previous one\par \par Repeat CXR 3/11/2021\par \par Resolution of the right infiltrate

## 2021-03-18 ENCOUNTER — APPOINTMENT (OUTPATIENT)
Dept: INTERNAL MEDICINE | Facility: CLINIC | Age: 86
End: 2021-03-18
Payer: MEDICARE

## 2021-03-18 VITALS
TEMPERATURE: 98.6 F | DIASTOLIC BLOOD PRESSURE: 75 MMHG | WEIGHT: 130 LBS | BODY MASS INDEX: 20.4 KG/M2 | HEART RATE: 77 BPM | OXYGEN SATURATION: 98 % | SYSTOLIC BLOOD PRESSURE: 133 MMHG | HEIGHT: 67 IN

## 2021-03-18 PROCEDURE — 36415 COLL VENOUS BLD VENIPUNCTURE: CPT

## 2021-03-18 PROCEDURE — 99214 OFFICE O/P EST MOD 30 MIN: CPT | Mod: 25

## 2021-03-22 LAB
ALBUMIN SERPL ELPH-MCNC: 3.5 G/DL
ALP BLD-CCNC: 102 U/L
ALT SERPL-CCNC: 18 U/L
ANION GAP SERPL CALC-SCNC: 13 MMOL/L
AST SERPL-CCNC: 20 U/L
BASOPHILS # BLD AUTO: 0.02 K/UL
BASOPHILS NFR BLD AUTO: 0.4 %
BILIRUB SERPL-MCNC: 0.3 MG/DL
BUN SERPL-MCNC: 28 MG/DL
CALCIUM SERPL-MCNC: 10.1 MG/DL
CHLORIDE SERPL-SCNC: 109 MMOL/L
CO2 SERPL-SCNC: 18 MMOL/L
CREAT SERPL-MCNC: 1.25 MG/DL
EOSINOPHIL # BLD AUTO: 0.01 K/UL
EOSINOPHIL NFR BLD AUTO: 0.2 %
ESTIMATED AVERAGE GLUCOSE: 120 MG/DL
GLUCOSE SERPL-MCNC: 93 MG/DL
HBA1C MFR BLD HPLC: 5.8 %
HCT VFR BLD CALC: 37 %
HGB BLD-MCNC: 10.9 G/DL
IMM GRANULOCYTES NFR BLD AUTO: 0.4 %
LYMPHOCYTES # BLD AUTO: 1.71 K/UL
LYMPHOCYTES NFR BLD AUTO: 31.5 %
MAN DIFF?: NORMAL
MCHC RBC-ENTMCNC: 29.1 PG
MCHC RBC-ENTMCNC: 29.5 GM/DL
MCV RBC AUTO: 98.9 FL
MONOCYTES # BLD AUTO: 0.59 K/UL
MONOCYTES NFR BLD AUTO: 10.9 %
NEUTROPHILS # BLD AUTO: 3.08 K/UL
NEUTROPHILS NFR BLD AUTO: 56.6 %
PLATELET # BLD AUTO: 350 K/UL
POTASSIUM SERPL-SCNC: 4.7 MMOL/L
PROT SERPL-MCNC: 7.5 G/DL
RBC # BLD: 3.74 M/UL
RBC # FLD: 18.8 %
SODIUM SERPL-SCNC: 140 MMOL/L
WBC # FLD AUTO: 5.43 K/UL

## 2021-03-22 NOTE — ED PROVIDER NOTE - DISPOSITION TYPE
S/p c/s neuraxial Duramorph  POD # 1, doing well  Denies HA no BA no new neurologic signs or symptoms   Site is clean dry intact   Discharged  from service
DISCHARGE

## 2021-03-22 NOTE — ED PROVIDER NOTE - NS ED MD DISPO ADMITTING SERVICE
Head, normocephalic, atraumatic, Face, Face within normal limits, Ears, External ears within normal limits, Nose/Nasopharynx, External nose  normal appearance, nares patent, no nasal discharge, Mouth and Throat, Oral cavity appearance normal, Breath odor normal, Lips, Appearance normal MED

## 2021-03-31 NOTE — ASSESSMENT
[FreeTextEntry1] : Appears to be stable however will need knee brace due to osteoarthritis of the knees (bilateral) \par brace will improve instability and laxity of the joint \par \par Will get Covid vaccine;\par \par

## 2021-03-31 NOTE — HISTORY OF PRESENT ILLNESS
[FreeTextEntry1] : Interim reviewed; \par Dr. Sinclair follow up noted;  No evidence of recurrent cancer [de-identified] : Has large amount of secretions; \par Encouraged to get Covid vaccine; \par She will likely get tomorrow

## 2021-03-31 NOTE — HEALTH RISK ASSESSMENT
[No] : No [No falls in past year] : Patient reported no falls in the past year [0] : 2) Feeling down, depressed, or hopeless: Not at all (0) [] : No [[id number]] : 0

## 2021-04-22 ENCOUNTER — LABORATORY RESULT (OUTPATIENT)
Age: 86
End: 2021-04-22

## 2021-04-22 ENCOUNTER — APPOINTMENT (OUTPATIENT)
Dept: INTERNAL MEDICINE | Facility: CLINIC | Age: 86
End: 2021-04-22
Payer: MEDICARE

## 2021-04-22 VITALS
HEART RATE: 78 BPM | TEMPERATURE: 97.7 F | SYSTOLIC BLOOD PRESSURE: 154 MMHG | WEIGHT: 141 LBS | BODY MASS INDEX: 22.13 KG/M2 | OXYGEN SATURATION: 98 % | DIASTOLIC BLOOD PRESSURE: 79 MMHG | HEIGHT: 67 IN

## 2021-04-22 DIAGNOSIS — R10.9 UNSPECIFIED ABDOMINAL PAIN: ICD-10-CM

## 2021-04-22 PROCEDURE — 99213 OFFICE O/P EST LOW 20 MIN: CPT

## 2021-04-22 NOTE — ASSESSMENT
[FreeTextEntry1] : Patient with flank pain;\par Will check urine and culture;\par Also obtain Ultrasound of abdomen and pelvis

## 2021-04-22 NOTE — HISTORY OF PRESENT ILLNESS
[FreeTextEntry1] : Chief right flank pain on and off [de-identified] : No burning with urination; \par Will check urine and culture\par Also Ultrasound

## 2021-04-22 NOTE — HEALTH RISK ASSESSMENT
[No] : No [No falls in past year] : Patient reported no falls in the past year [0] : 2) Feeling down, depressed, or hopeless: Not at all (0) [] : No [[id number]] : 0

## 2021-04-23 ENCOUNTER — LABORATORY RESULT (OUTPATIENT)
Age: 86
End: 2021-04-23

## 2021-04-23 LAB
APPEARANCE: ABNORMAL
BILIRUBIN URINE: NEGATIVE
BLOOD URINE: NORMAL
COLOR: ABNORMAL
GLUCOSE QUALITATIVE U: NEGATIVE
KETONES URINE: NEGATIVE
LEUKOCYTE ESTERASE URINE: ABNORMAL
NITRITE URINE: NEGATIVE
PH URINE: 7.5
PROTEIN URINE: NORMAL
SPECIFIC GRAVITY URINE: 1.01
UROBILINOGEN URINE: NORMAL

## 2021-05-04 ENCOUNTER — OUTPATIENT (OUTPATIENT)
Dept: OUTPATIENT SERVICES | Facility: HOSPITAL | Age: 86
LOS: 1 days | End: 2021-05-04
Payer: MEDICARE

## 2021-05-04 ENCOUNTER — RESULT REVIEW (OUTPATIENT)
Age: 86
End: 2021-05-04

## 2021-05-04 ENCOUNTER — APPOINTMENT (OUTPATIENT)
Dept: ULTRASOUND IMAGING | Facility: HOSPITAL | Age: 86
End: 2021-05-04
Payer: MEDICARE

## 2021-05-04 DIAGNOSIS — Z98.890 OTHER SPECIFIED POSTPROCEDURAL STATES: Chronic | ICD-10-CM

## 2021-05-04 DIAGNOSIS — Z95.0 PRESENCE OF CARDIAC PACEMAKER: Chronic | ICD-10-CM

## 2021-05-04 DIAGNOSIS — H26.9 UNSPECIFIED CATARACT: Chronic | ICD-10-CM

## 2021-05-04 DIAGNOSIS — C34.90 MALIGNANT NEOPLASM OF UNSPECIFIED PART OF UNSPECIFIED BRONCHUS OR LUNG: Chronic | ICD-10-CM

## 2021-05-04 DIAGNOSIS — Z87.442 PERSONAL HISTORY OF URINARY CALCULI: Chronic | ICD-10-CM

## 2021-05-04 PROCEDURE — 76830 TRANSVAGINAL US NON-OB: CPT | Mod: 26

## 2021-05-04 PROCEDURE — 76856 US EXAM PELVIC COMPLETE: CPT | Mod: 26

## 2021-05-04 PROCEDURE — 76770 US EXAM ABDO BACK WALL COMP: CPT | Mod: 26

## 2021-05-04 PROCEDURE — 76830 TRANSVAGINAL US NON-OB: CPT

## 2021-05-04 PROCEDURE — 76856 US EXAM PELVIC COMPLETE: CPT

## 2021-05-04 PROCEDURE — 76770 US EXAM ABDO BACK WALL COMP: CPT

## 2021-05-06 DIAGNOSIS — R10.9 UNSPECIFIED ABDOMINAL PAIN: ICD-10-CM

## 2021-05-06 DIAGNOSIS — D25.1 INTRAMURAL LEIOMYOMA OF UTERUS: ICD-10-CM

## 2021-05-06 DIAGNOSIS — N83.8 OTHER NONINFLAMMATORY DISORDERS OF OVARY, FALLOPIAN TUBE AND BROAD LIGAMENT: ICD-10-CM

## 2021-05-12 ENCOUNTER — INPATIENT (INPATIENT)
Facility: HOSPITAL | Age: 86
LOS: 5 days | Discharge: HOME HEALTH | DRG: 175 | End: 2021-05-18
Attending: INTERNAL MEDICINE | Admitting: INTERNAL MEDICINE
Payer: MEDICARE

## 2021-05-12 VITALS
HEIGHT: 64 IN | SYSTOLIC BLOOD PRESSURE: 125 MMHG | TEMPERATURE: 99 F | RESPIRATION RATE: 22 BRPM | DIASTOLIC BLOOD PRESSURE: 79 MMHG | OXYGEN SATURATION: 95 % | HEART RATE: 98 BPM

## 2021-05-12 DIAGNOSIS — Z95.0 PRESENCE OF CARDIAC PACEMAKER: Chronic | ICD-10-CM

## 2021-05-12 DIAGNOSIS — C34.90 MALIGNANT NEOPLASM OF UNSPECIFIED PART OF UNSPECIFIED BRONCHUS OR LUNG: Chronic | ICD-10-CM

## 2021-05-12 DIAGNOSIS — Z87.442 PERSONAL HISTORY OF URINARY CALCULI: Chronic | ICD-10-CM

## 2021-05-12 DIAGNOSIS — H40.9 UNSPECIFIED GLAUCOMA: ICD-10-CM

## 2021-05-12 DIAGNOSIS — N17.9 ACUTE KIDNEY FAILURE, UNSPECIFIED: ICD-10-CM

## 2021-05-12 DIAGNOSIS — I26.99 OTHER PULMONARY EMBOLISM WITHOUT ACUTE COR PULMONALE: ICD-10-CM

## 2021-05-12 DIAGNOSIS — Z98.890 OTHER SPECIFIED POSTPROCEDURAL STATES: Chronic | ICD-10-CM

## 2021-05-12 DIAGNOSIS — J90 PLEURAL EFFUSION, NOT ELSEWHERE CLASSIFIED: ICD-10-CM

## 2021-05-12 DIAGNOSIS — J45.909 UNSPECIFIED ASTHMA, UNCOMPLICATED: ICD-10-CM

## 2021-05-12 DIAGNOSIS — C34.90 MALIGNANT NEOPLASM OF UNSPECIFIED PART OF UNSPECIFIED BRONCHUS OR LUNG: ICD-10-CM

## 2021-05-12 DIAGNOSIS — J18.9 PNEUMONIA, UNSPECIFIED ORGANISM: ICD-10-CM

## 2021-05-12 DIAGNOSIS — I10 ESSENTIAL (PRIMARY) HYPERTENSION: ICD-10-CM

## 2021-05-12 DIAGNOSIS — I48.91 UNSPECIFIED ATRIAL FIBRILLATION: ICD-10-CM

## 2021-05-12 DIAGNOSIS — R63.8 OTHER SYMPTOMS AND SIGNS CONCERNING FOOD AND FLUID INTAKE: ICD-10-CM

## 2021-05-12 DIAGNOSIS — H26.9 UNSPECIFIED CATARACT: Chronic | ICD-10-CM

## 2021-05-12 LAB
ALBUMIN SERPL ELPH-MCNC: 3.5 G/DL — SIGNIFICANT CHANGE UP (ref 3.3–5)
ALP SERPL-CCNC: 105 U/L — SIGNIFICANT CHANGE UP (ref 40–120)
ALT FLD-CCNC: 16 U/L — SIGNIFICANT CHANGE UP (ref 10–45)
ANION GAP SERPL CALC-SCNC: 11 MMOL/L — SIGNIFICANT CHANGE UP (ref 5–17)
APPEARANCE UR: CLEAR — SIGNIFICANT CHANGE UP
AST SERPL-CCNC: 18 U/L — SIGNIFICANT CHANGE UP (ref 10–40)
BASE EXCESS BLDV CALC-SCNC: -2.3 MMOL/L — LOW (ref -2–3)
BASOPHILS # BLD AUTO: 0.02 K/UL — SIGNIFICANT CHANGE UP (ref 0–0.2)
BASOPHILS NFR BLD AUTO: 0.2 % — SIGNIFICANT CHANGE UP (ref 0–2)
BILIRUB SERPL-MCNC: 0.4 MG/DL — SIGNIFICANT CHANGE UP (ref 0.2–1.2)
BILIRUB UR-MCNC: NEGATIVE — SIGNIFICANT CHANGE UP
BUN SERPL-MCNC: 24 MG/DL — HIGH (ref 7–23)
CA-I SERPL-SCNC: 1.31 MMOL/L — SIGNIFICANT CHANGE UP (ref 1.15–1.33)
CALCIUM SERPL-MCNC: 9.5 MG/DL — SIGNIFICANT CHANGE UP (ref 8.4–10.5)
CHLORIDE SERPL-SCNC: 104 MMOL/L — SIGNIFICANT CHANGE UP (ref 96–108)
CO2 BLDV-SCNC: 26.6 MMOL/L — HIGH (ref 22–26)
CO2 SERPL-SCNC: 24 MMOL/L — SIGNIFICANT CHANGE UP (ref 22–31)
COLOR SPEC: YELLOW — SIGNIFICANT CHANGE UP
CREAT SERPL-MCNC: 1.05 MG/DL — SIGNIFICANT CHANGE UP (ref 0.5–1.3)
CRP SERPL-MCNC: 75.5 MG/L — HIGH (ref 0–4)
DIFF PNL FLD: NEGATIVE — SIGNIFICANT CHANGE UP
EOSINOPHIL # BLD AUTO: 0.03 K/UL — SIGNIFICANT CHANGE UP (ref 0–0.5)
EOSINOPHIL NFR BLD AUTO: 0.3 % — SIGNIFICANT CHANGE UP (ref 0–6)
FERRITIN SERPL-MCNC: 43 NG/ML — SIGNIFICANT CHANGE UP (ref 15–150)
GAS PNL BLDV: 138 MMOL/L — SIGNIFICANT CHANGE UP (ref 136–145)
GAS PNL BLDV: SIGNIFICANT CHANGE UP
GLUCOSE SERPL-MCNC: 104 MG/DL — HIGH (ref 70–99)
GLUCOSE UR QL: NEGATIVE — SIGNIFICANT CHANGE UP
HCO3 BLDV-SCNC: 25 MMOL/L — SIGNIFICANT CHANGE UP (ref 22–29)
HCT VFR BLD CALC: 39.8 % — SIGNIFICANT CHANGE UP (ref 34.5–45)
HGB BLD-MCNC: 13 G/DL — SIGNIFICANT CHANGE UP (ref 11.5–15.5)
IMM GRANULOCYTES NFR BLD AUTO: 0.4 % — SIGNIFICANT CHANGE UP (ref 0–1.5)
KETONES UR-MCNC: NEGATIVE — SIGNIFICANT CHANGE UP
LEUKOCYTE ESTERASE UR-ACNC: NEGATIVE — SIGNIFICANT CHANGE UP
LYMPHOCYTES # BLD AUTO: 1.44 K/UL — SIGNIFICANT CHANGE UP (ref 1–3.3)
LYMPHOCYTES # BLD AUTO: 13.2 % — SIGNIFICANT CHANGE UP (ref 13–44)
MCHC RBC-ENTMCNC: 30.4 PG — SIGNIFICANT CHANGE UP (ref 27–34)
MCHC RBC-ENTMCNC: 32.7 GM/DL — SIGNIFICANT CHANGE UP (ref 32–36)
MCV RBC AUTO: 93.2 FL — SIGNIFICANT CHANGE UP (ref 80–100)
MONOCYTES # BLD AUTO: 0.83 K/UL — SIGNIFICANT CHANGE UP (ref 0–0.9)
MONOCYTES NFR BLD AUTO: 7.6 % — SIGNIFICANT CHANGE UP (ref 2–14)
MRSA PCR RESULT.: NEGATIVE — SIGNIFICANT CHANGE UP
NEUTROPHILS # BLD AUTO: 8.59 K/UL — HIGH (ref 1.8–7.4)
NEUTROPHILS NFR BLD AUTO: 78.3 % — HIGH (ref 43–77)
NITRITE UR-MCNC: NEGATIVE — SIGNIFICANT CHANGE UP
NRBC # BLD: 0 /100 WBCS — SIGNIFICANT CHANGE UP (ref 0–0)
PCO2 BLDV: 52 MMHG — HIGH (ref 39–42)
PH BLDV: 7.29 — LOW (ref 7.32–7.43)
PH UR: 7.5 — SIGNIFICANT CHANGE UP (ref 5–8)
PLATELET # BLD AUTO: 318 K/UL — SIGNIFICANT CHANGE UP (ref 150–400)
PO2 BLDV: 25 MMHG — SIGNIFICANT CHANGE UP
POTASSIUM BLDV-SCNC: 4.5 MMOL/L — SIGNIFICANT CHANGE UP (ref 3.5–5.1)
POTASSIUM SERPL-MCNC: 4.3 MMOL/L — SIGNIFICANT CHANGE UP (ref 3.5–5.3)
POTASSIUM SERPL-SCNC: 4.3 MMOL/L — SIGNIFICANT CHANGE UP (ref 3.5–5.3)
PROCALCITONIN SERPL-MCNC: 0.12 NG/ML — HIGH (ref 0.02–0.1)
PROT SERPL-MCNC: 8.2 G/DL — SIGNIFICANT CHANGE UP (ref 6–8.3)
PROT UR-MCNC: NEGATIVE MG/DL — SIGNIFICANT CHANGE UP
RBC # BLD: 4.27 M/UL — SIGNIFICANT CHANGE UP (ref 3.8–5.2)
RBC # FLD: 17.5 % — HIGH (ref 10.3–14.5)
S AUREUS DNA NOSE QL NAA+PROBE: NEGATIVE — SIGNIFICANT CHANGE UP
SAO2 % BLDV: 39.2 % — SIGNIFICANT CHANGE UP
SARS-COV-2 RNA SPEC QL NAA+PROBE: SIGNIFICANT CHANGE UP
SODIUM SERPL-SCNC: 139 MMOL/L — SIGNIFICANT CHANGE UP (ref 135–145)
SP GR SPEC: 1.02 — SIGNIFICANT CHANGE UP (ref 1–1.03)
UROBILINOGEN FLD QL: 0.2 E.U./DL — SIGNIFICANT CHANGE UP
WBC # BLD: 10.95 K/UL — HIGH (ref 3.8–10.5)
WBC # FLD AUTO: 10.95 K/UL — HIGH (ref 3.8–10.5)

## 2021-05-12 PROCEDURE — 71275 CT ANGIOGRAPHY CHEST: CPT | Mod: 26,MA

## 2021-05-12 PROCEDURE — 99285 EMERGENCY DEPT VISIT HI MDM: CPT | Mod: CS,25

## 2021-05-12 PROCEDURE — 99223 1ST HOSP IP/OBS HIGH 75: CPT | Mod: GC

## 2021-05-12 PROCEDURE — 99222 1ST HOSP IP/OBS MODERATE 55: CPT | Mod: GC

## 2021-05-12 PROCEDURE — 71045 X-RAY EXAM CHEST 1 VIEW: CPT | Mod: 26

## 2021-05-12 PROCEDURE — 93010 ELECTROCARDIOGRAM REPORT: CPT

## 2021-05-12 RX ORDER — AMLODIPINE BESYLATE 2.5 MG/1
1 TABLET ORAL
Qty: 0 | Refills: 0 | DISCHARGE

## 2021-05-12 RX ORDER — ATENOLOL 25 MG/1
0 TABLET ORAL
Qty: 0 | Refills: 0 | DISCHARGE

## 2021-05-12 RX ORDER — LATANOPROST 0.05 MG/ML
1 SOLUTION/ DROPS OPHTHALMIC; TOPICAL AT BEDTIME
Refills: 0 | Status: DISCONTINUED | OUTPATIENT
Start: 2021-05-12 | End: 2021-05-18

## 2021-05-12 RX ORDER — CEFTRIAXONE 500 MG/1
1000 INJECTION, POWDER, FOR SOLUTION INTRAMUSCULAR; INTRAVENOUS ONCE
Refills: 0 | Status: COMPLETED | OUTPATIENT
Start: 2021-05-12 | End: 2021-05-12

## 2021-05-12 RX ORDER — MEGESTROL ACETATE 40 MG/ML
800 SUSPENSION ORAL DAILY
Refills: 0 | Status: DISCONTINUED | OUTPATIENT
Start: 2021-05-12 | End: 2021-05-18

## 2021-05-12 RX ORDER — ATORVASTATIN CALCIUM 80 MG/1
20 TABLET, FILM COATED ORAL AT BEDTIME
Refills: 0 | Status: DISCONTINUED | OUTPATIENT
Start: 2021-05-12 | End: 2021-05-18

## 2021-05-12 RX ORDER — AZITHROMYCIN 500 MG/1
500 TABLET, FILM COATED ORAL ONCE
Refills: 0 | Status: COMPLETED | OUTPATIENT
Start: 2021-05-12 | End: 2021-05-12

## 2021-05-12 RX ORDER — LOSARTAN POTASSIUM 100 MG/1
0 TABLET, FILM COATED ORAL
Qty: 0 | Refills: 0 | DISCHARGE

## 2021-05-12 RX ORDER — ENOXAPARIN SODIUM 100 MG/ML
50 INJECTION SUBCUTANEOUS ONCE
Refills: 0 | Status: COMPLETED | OUTPATIENT
Start: 2021-05-12 | End: 2021-05-12

## 2021-05-12 RX ORDER — VANCOMYCIN HCL 1 G
750 VIAL (EA) INTRAVENOUS EVERY 24 HOURS
Refills: 0 | Status: DISCONTINUED | OUTPATIENT
Start: 2021-05-12 | End: 2021-05-12

## 2021-05-12 RX ORDER — ATENOLOL 25 MG/1
25 TABLET ORAL DAILY
Refills: 0 | Status: DISCONTINUED | OUTPATIENT
Start: 2021-05-12 | End: 2021-05-18

## 2021-05-12 RX ORDER — BRIMONIDINE TARTRATE 2 MG/MG
1 SOLUTION/ DROPS OPHTHALMIC THREE TIMES A DAY
Refills: 0 | Status: DISCONTINUED | OUTPATIENT
Start: 2021-05-12 | End: 2021-05-18

## 2021-05-12 RX ORDER — AMLODIPINE BESYLATE 2.5 MG/1
0 TABLET ORAL
Qty: 0 | Refills: 0 | DISCHARGE

## 2021-05-12 RX ORDER — SODIUM CHLORIDE 9 MG/ML
500 INJECTION INTRAMUSCULAR; INTRAVENOUS; SUBCUTANEOUS ONCE
Refills: 0 | Status: COMPLETED | OUTPATIENT
Start: 2021-05-12 | End: 2021-05-12

## 2021-05-12 RX ORDER — ALBUTEROL 90 UG/1
2 AEROSOL, METERED ORAL EVERY 4 HOURS
Refills: 0 | Status: DISCONTINUED | OUTPATIENT
Start: 2021-05-12 | End: 2021-05-18

## 2021-05-12 RX ORDER — PIPERACILLIN AND TAZOBACTAM 4; .5 G/20ML; G/20ML
4.5 INJECTION, POWDER, LYOPHILIZED, FOR SOLUTION INTRAVENOUS EVERY 8 HOURS
Refills: 0 | Status: DISCONTINUED | OUTPATIENT
Start: 2021-05-12 | End: 2021-05-13

## 2021-05-12 RX ORDER — ALBUTEROL 90 UG/1
2 AEROSOL, METERED ORAL EVERY 4 HOURS
Refills: 0 | Status: DISCONTINUED | OUTPATIENT
Start: 2021-05-12 | End: 2021-05-12

## 2021-05-12 RX ORDER — DICLOFENAC SODIUM 0.1 %
1 DROPS OPHTHALMIC (EYE)
Qty: 0 | Refills: 0 | DISCHARGE

## 2021-05-12 RX ORDER — OXYCODONE HYDROCHLORIDE 5 MG/1
5 TABLET ORAL EVERY 6 HOURS
Refills: 0 | Status: DISCONTINUED | OUTPATIENT
Start: 2021-05-12 | End: 2021-05-18

## 2021-05-12 RX ORDER — APIXABAN 2.5 MG/1
0 TABLET, FILM COATED ORAL
Qty: 0 | Refills: 0 | DISCHARGE

## 2021-05-12 RX ORDER — MORPHINE SULFATE 50 MG/1
4 CAPSULE, EXTENDED RELEASE ORAL ONCE
Refills: 0 | Status: DISCONTINUED | OUTPATIENT
Start: 2021-05-12 | End: 2021-05-12

## 2021-05-12 RX ORDER — DORZOLAMIDE HYDROCHLORIDE 20 MG/ML
1 SOLUTION/ DROPS OPHTHALMIC
Refills: 0 | Status: DISCONTINUED | OUTPATIENT
Start: 2021-05-12 | End: 2021-05-18

## 2021-05-12 RX ADMIN — OXYCODONE HYDROCHLORIDE 5 MILLIGRAM(S): 5 TABLET ORAL at 12:59

## 2021-05-12 RX ADMIN — SODIUM CHLORIDE 500 MILLILITER(S): 9 INJECTION INTRAMUSCULAR; INTRAVENOUS; SUBCUTANEOUS at 18:34

## 2021-05-12 RX ADMIN — OXYCODONE HYDROCHLORIDE 5 MILLIGRAM(S): 5 TABLET ORAL at 13:59

## 2021-05-12 RX ADMIN — Medication 250 MILLIGRAM(S): at 14:56

## 2021-05-12 RX ADMIN — PIPERACILLIN AND TAZOBACTAM 200 GRAM(S): 4; .5 INJECTION, POWDER, LYOPHILIZED, FOR SOLUTION INTRAVENOUS at 13:47

## 2021-05-12 RX ADMIN — CEFTRIAXONE 100 MILLIGRAM(S): 500 INJECTION, POWDER, FOR SOLUTION INTRAMUSCULAR; INTRAVENOUS at 11:10

## 2021-05-12 RX ADMIN — PIPERACILLIN AND TAZOBACTAM 200 GRAM(S): 4; .5 INJECTION, POWDER, LYOPHILIZED, FOR SOLUTION INTRAVENOUS at 21:46

## 2021-05-12 RX ADMIN — OXYCODONE HYDROCHLORIDE 5 MILLIGRAM(S): 5 TABLET ORAL at 21:46

## 2021-05-12 RX ADMIN — ATORVASTATIN CALCIUM 20 MILLIGRAM(S): 80 TABLET, FILM COATED ORAL at 21:48

## 2021-05-12 RX ADMIN — LATANOPROST 1 DROP(S): 0.05 SOLUTION/ DROPS OPHTHALMIC; TOPICAL at 21:45

## 2021-05-12 RX ADMIN — MORPHINE SULFATE 4 MILLIGRAM(S): 50 CAPSULE, EXTENDED RELEASE ORAL at 06:12

## 2021-05-12 RX ADMIN — MORPHINE SULFATE 4 MILLIGRAM(S): 50 CAPSULE, EXTENDED RELEASE ORAL at 06:45

## 2021-05-12 RX ADMIN — AZITHROMYCIN 255 MILLIGRAM(S): 500 TABLET, FILM COATED ORAL at 12:12

## 2021-05-12 RX ADMIN — BRIMONIDINE TARTRATE 1 DROP(S): 2 SOLUTION/ DROPS OPHTHALMIC at 21:46

## 2021-05-12 RX ADMIN — DORZOLAMIDE HYDROCHLORIDE 1 DROP(S): 20 SOLUTION/ DROPS OPHTHALMIC at 21:45

## 2021-05-12 RX ADMIN — ENOXAPARIN SODIUM 50 MILLIGRAM(S): 100 INJECTION SUBCUTANEOUS at 21:45

## 2021-05-12 RX ADMIN — OXYCODONE HYDROCHLORIDE 5 MILLIGRAM(S): 5 TABLET ORAL at 22:16

## 2021-05-12 NOTE — ED ADULT NURSE NOTE - NSIMPLEMENTINTERV_GEN_ALL_ED
Implemented All Fall with Harm Risk Interventions:  [facility] to call system. Call bell, personal items and telephone within reach. Instruct patient to call for assistance. Room bathroom lighting operational. Non-slip footwear when patient is off stretcher. Physically safe environment: no spills, clutter or unnecessary equipment. Stretcher in lowest position, wheels locked, appropriate side rails in place. Provide visual cue, wrist band, yellow gown, etc. Monitor gait and stability. Monitor for mental status changes and reorient to person, place, and time. Review medications for side effects contributing to fall risk. Reinforce activity limits and safety measures with patient and family. Provide visual clues: red socks.

## 2021-05-12 NOTE — H&P ADULT - HISTORY OF PRESENT ILLNESS
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  The pain is described as constant, pounding, isolated to her R scapular region.  The patient normally uses a cane to help her walk, and her daughter is unsure if the back pain has affected her mobility.  In addition to the back pain, the patient has noticed increased thickness and yellowing of her sputum, which makes it difficult for her to cough up.  She has a cough and thin, white sputum production at baseline, but has not been diagnosed with COPD.  She also has had increased sob.  She denies any dyspnea, hemoptysis, chest pain, lower leg swelling, fevers, or chills.        Interview performed with help of daughter Marilu (HCP) at bedside.    In the ED:  Initial vital signs: T: 99.5 F, HR: 98, BP: 131/67, R: 18, SpO2: 94% on RA  ED course:   Labs: significant for WBC 10.95, CRP 75.5, Procal 0.12  VBG pH 7.29, pCO2 52, pO2 25, HCO3 25  Imaging:   CXR: Small right basal pleural effusion. Opacification/consolidation right base.  CT Chest: Suspicion for pulmonary emboli in the right lower lobe pulmonary artery as well as the lingular branch of the left upper lobe pulmonary artery.  Also c/f R heart strain.    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  The pain is described as constant, pounding, isolated to her R scapular region.  The patient normally uses a cane to help her walk, and her daughter is unsure if the back pain has affected her mobility.  In addition to the back pain, the patient has noticed increased thickness and yellowing of her sputum, which makes it difficult for her to cough up.  She has a cough and thin, white sputum production at baseline, but has not been diagnosed with COPD.  She also has had increased sob.  She denies any dyspnea, hemoptysis, chest pain, lower leg swelling, fevers, or chills.        Interview performed with help of daughter Marilu (HCP) at bedside.     In the ED:  Initial vital signs: T: 99.5 F, HR: 98, BP: 131/67, R: 18, SpO2: 94% on RA  ED course:   Labs: significant for WBC 10.95, CRP 75.5, Procal 0.12  VBG pH 7.29, pCO2 52, pO2 25, HCO3 25  Imaging:   CXR: Small right basal pleural effusion. Opacification/consolidation right base.  CT Chest: Suspicion for pulmonary emboli in the right lower lobe pulmonary artery as well as the lingular branch of the left upper lobe pulmonary artery.  Also c/f R heart strain.    EKG: NSR with LBBB  Medications: Azithromycin 500mg, CTX 1g, Morphine 4mg, Zosyn 4.5g, Oxycodone 5mg  Consults: none

## 2021-05-12 NOTE — H&P ADULT - NSHPLABSRESULTS_GEN_ALL_CORE
13.0   10.95 )-----------( 318      ( 12 May 2021 06:11 )             39.8       05-12    139  |  104  |  24<H>  ----------------------------<  104<H>  4.3   |  24  |  1.05    Ca    9.5      12 May 2021 06:11    TPro  8.2  /  Alb  3.5  /  TBili  0.4  /  DBili  x   /  AST  18  /  ALT  16  /  AlkPhos  105  05-12              Urinalysis Basic - ( 12 May 2021 09:18 )    Color: Yellow / Appearance: Clear / S.020 / pH: x  Gluc: x / Ketone: NEGATIVE  / Bili: Negative / Urobili: 0.2 E.U./dL   Blood: x / Protein: NEGATIVE mg/dL / Nitrite: NEGATIVE   Leuk Esterase: NEGATIVE / RBC: x / WBC x   Sq Epi: x / Non Sq Epi: x / Bacteria: x            Lactate Trend      CARDIAC MARKERS ( 12 May 2021 06:11 )  x     / <0.01 ng/mL / x     / x     / x            CAPILLARY BLOOD GLUCOSE

## 2021-05-12 NOTE — ED ADULT NURSE REASSESSMENT NOTE - NS ED NURSE REASSESS COMMENT FT1
CT completed, primafit placed. Awaiting CT results. Safety precautions in place, will continue to monitor.

## 2021-05-12 NOTE — ED PROVIDER NOTE - PHYSICAL EXAMINATION
Constitutional: Well appearing, well nourished, awake, alert, oriented to person, place, time/situation, moans frequently in discomfort  ENMT: Airway patent. Normal MM  Eyes: Clear bilaterally  Cardiac: Normal rate, regular rhythm.  Heart sounds S1, S2.  No murmurs, rubs or gallops. No JVD or LE edema  Respiratory: Coarse BS b/l, R > L. + frequent, thick coughing. No rales or rhonchi. No increased WOB, tachypnea, hypoxia, or accessory mm use. Pt speaks in full sentences.   Gastrointestinal: Abd soft, NT, ND, NABS. No guarding, rebound, or rigidity. No pulsatile abdominal masses. No organomegaly appreciated.   Musculoskeletal: Range of motion is not limited. + ttp in the R mid-lower thoracic region, reproducing pt's sx, over the posterior ribs. No signs of trauma. No LE edema or calf ttp  Neuro: Alert and oriented x 3, face symmetric and speech fluent. Strength 5/5 x 4 ext and symmetric, nml gross motor movement, nml gait. No focal deficits noted.  Skin: Skin normal color for race, warm, dry and intact. No evidence of rash.  Psych: Alert and oriented to person, place, time/situation. normal mood and affect. no apparent risk to self or others.

## 2021-05-12 NOTE — H&P ADULT - PROBLEM SELECTOR PLAN 5
Patient with hx of lung ca s/p resection in 2000, no chemo/RT.  Follows with Dr. Greenfield and Dr. Sinclair outpatient.

## 2021-05-12 NOTE — H&P ADULT - PROBLEM SELECTOR PLAN 1
Patient presents with back pain and CT scan suspicious for pulmonary emboli in the right lower lobe pulmonary artery as well as the lingular branch of the left upper lobe pulmonary artery.  Also c/f R heart strain.  There remains uncertainty regarding the read of the CT, as patient has been on anticoagulation with eliquis for her afib.    - hold eliquis in anticipation of thoracentesis  - f/u pulm recs (Dr. Sinclair) regarding additional intervention  - formal echo to evaluate R heart strain

## 2021-05-12 NOTE — H&P ADULT - NSICDXFAMILYHX_GEN_ALL_CORE_FT
FAMILY HISTORY:  Father  Still living? Unknown  Family history of prostate cancer, Age at diagnosis: Age Unknown    Mother  Still living? Unknown  FH: hypertension, Age at diagnosis: Age Unknown

## 2021-05-12 NOTE — ED ADULT NURSE NOTE - OBJECTIVE STATEMENT
Pt presented to the ED with complaints of back pain. As per pts daughter Marilu, pain is in the right mid back for the past two weeks. Pt took Tylenol around 4am prior to arrival. Pt also has a cough and was recently diagnosed with bronchitis and has been on antibiotics. Pt denies chest pain, SOB, palpitations, lightheadedness, dizziness, fever, nausea, or vomiting.

## 2021-05-12 NOTE — H&P ADULT - PROBLEM SELECTOR PLAN 2
#Respiratory acidosis Patient with recurrent PNA, most recently treated 3 weeks ago.  Presents today with leukocytosis of 10.95 with neutrophilic predominance, elevated CRP, and elevated procal.  In addition, patient with increasing sputum production and change in color.  CXR showing opacification/consolidation in R base.    - received 3g unasyn in ED  - broadened to Vanc and Zosyn, as patient recently treated outpatient for PNA with antibiotics  - MRSA swab; if negative, will dc vanc  - albuterol inhaler prn sob     #Respiratory acidosis  Likely 2/2 both chronic and acute lung pathology

## 2021-05-12 NOTE — H&P ADULT - NSICDXPASTSURGICALHX_GEN_ALL_CORE_FT
PAST SURGICAL HISTORY:  Cardiac pacemaker     Cataract of right eye     H/O abdominal hysterectomy     History of cholecystectomy     Lung cancer s/p resection

## 2021-05-12 NOTE — H&P ADULT - PROBLEM SELECTOR PLAN 3
CXR with evidence of right basal pleural effusion.  - holding eliquis i/s/o possible thoracentesis  - f/u pulm recs (Dr. Sinclair)

## 2021-05-12 NOTE — ED PROVIDER NOTE - CLINICAL SUMMARY MEDICAL DECISION MAKING FREE TEXT BOX
Pt p/w atraumatic back pain, reproducible, but also somewhat pleuritic. + cough w/ productive phlegm for several weeks. Remote hx lung CA, reportedly in remission. No radiation of pain. No assoc neuro vasc complaints. EKG paced and unchanged. DDx includes but not limited to MSK Pain, mets, PNA, PE, less likely other pathology. Analgesia, check labs, CTA chest. Dispo pending w/u and clinical status

## 2021-05-12 NOTE — ED ADULT NURSE NOTE - CHPI ED NUR SYMPTOMS NEG
no bladder dysfunction/no bowel dysfunction/no motor function loss/no neck tenderness/no numbness/no tingling

## 2021-05-12 NOTE — ED ADULT NURSE NOTE - PMH
Afib    Asthma, unspecified asthma severity, unspecified whether complicated, unspecified whether persistent    Bronchiectasis    Cerebrovascular accident (CVA), unspecified mechanism    Chronic kidney disease, unspecified CKD stage    Chronic obstructive pulmonary disease  COPD (chronic obstructive pulmonary disease)  Coronary artery disease, angina presence unspecified, unspecified vessel or lesion type, unspecified whether native or transplanted heart    Dizziness    Essential hypertension  HTN (hypertension)  Glaucoma  Glaucoma  History of pneumonia    Hypercalcemia    Hyperlipidemia, unspecified hyperlipidemia type    Lung cancer    Malignant neoplasm of lung, unspecified laterality, unspecified part of lung    Pacemaker    Syphilis

## 2021-05-12 NOTE — CONSULT NOTE ADULT - SUBJECTIVE AND OBJECTIVE BOX
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  The pain is described as constant, pounding, isolated to her R scapular region.  The patient normally uses a cane to help her walk, and her daughter is unsure if the back pain has affected her mobility.  In addition to the back pain, the patient has noticed increased thickness and yellowing of her sputum, which makes it difficult for her to cough up.  She has a cough and thin, white sputum production at baseline, but has not been diagnosed with COPD.  She also has had increased sob.  She denies any dyspnea, hemoptysis, chest pain, lower leg swelling, fevers, or chills.        Interview performed with help of jane Millerith (HCP) at bedside.     In the ED:  Initial vital signs: T: 99.5 F, HR: 98, BP: 131/67, R: 18, SpO2: 94% on RA  ED course:   Labs: significant for WBC 10.95, CRP 75.5, Procal 0.12  VBG pH 7.29, pCO2 52, pO2 25, HCO3 25  Imaging:   CXR: Small right basal pleural effusion. Opacification/consolidation right base.  CT Chest: Suspicion for pulmonary emboli in the right lower lobe pulmonary artery as well as the lingular branch of the left upper lobe pulmonary artery.  Also c/f R heart strain.    EKG: NSR with LBBB  Medications: Azithromycin 500mg, CTX 1g, Morphine 4mg, Zosyn 4.5g, Oxycodone 5mg  Consults: none  (12 May 2021 15:27)      PAST MEDICAL & SURGICAL HISTORY:  Glaucoma  blind in L eye, 2% vision in R eye    Essential hypertension  HTN (hypertension)    History of pneumonia    Asthma, unspecified asthma severity, unspecified whether complicated, unspecified whether persistent    Cerebrovascular accident (CVA), unspecified mechanism  remote hx    Hyperlipidemia, unspecified hyperlipidemia type    Afib    Lung cancer  s/p resection in 2000, no chemo/RT    History of cholecystectomy    Cardiac pacemaker    H/O abdominal hysterectomy    Lung cancer  s/p resection    Cataract of right eye        FAMILY HISTORY:  Family history of prostate cancer (Father)    FH: hypertension (Mother)        SOCIAL HISTORY:  Smoking Status: [ ] Current, [ ] Former, [ ] Never  Pack Years:    MEDICATIONS:  Pulmonary:  ALBUTerol    90 MICROgram(s) HFA Inhaler 2 Puff(s) Inhalation every 4 hours PRN    Antimicrobials:  piperacillin/tazobactam IVPB.. 3.375 Gram(s) IV Intermittent every 6 hours    Anticoagulants:  apixaban 10 milliGRAM(s) Oral every 12 hours    Onc:    GI/:  pantoprazole    Tablet 40 milliGRAM(s) Oral before breakfast    Endocrine:  atorvastatin 20 milliGRAM(s) Oral at bedtime  megestrol Suspension 800 milliGRAM(s) Oral daily    Cardiac:  ATENolol  Tablet 25 milliGRAM(s) Oral daily    Other Medications:  brimonidine 0.2% Ophthalmic Solution 1 Drop(s) Both EYES three times a day  dorzolamide 2% Ophthalmic Solution 1 Drop(s) Both EYES <User Schedule>  latanoprost 0.005% Ophthalmic Solution 1 Drop(s) Both EYES at bedtime  multivitamin 1 Tablet(s) Oral daily  oxyCODONE    IR 5 milliGRAM(s) Oral every 6 hours PRN      Allergies    No Known Allergies    Intolerances        Review of Systems:   •	General: negative  •	Skin/Breast: negative  •	Ophthalmologic: negative  •	ENMT: negative  •	Respiratory and Thorax: negative  •	Cardiovascular: negative  •	Gastrointestinal: negative  •	Genitourinary: negative  •	Musculoskeletal: negative  •	Neurological: negative  •	Psychiatric: negative  •	Hematology/Lymphatics: negative  •	Endocrine: negative  •	Allergic/Immunologic: negative    Physical Exam:   • Constitutional:	Well-developed, well nourished  • Eyes:	EOMI; PERRL; no drainage or redness  • ENMT:	No oral lesions; no gross abnormalities  • Neck	No bruits; no thyromegaly or nodules  • Breasts:	not examined  • Back:	No deformity or limitation of movement  • Respiratory:	Breath Sounds equal & clear to percussion & auscultation, no accessory muscle use  • Cardiovascular:	Regular rate & rhythm, normal S1, S2; no murmurs, gallops or rubs; no S3, S4  • Gastrointestinal:	Soft, non-tender, no hepatosplenomegaly, normal bowel sounds  • Genitourinary:	not examined  • Rectal: not examined  • Extremities:	No cyanosis, clubbing or edema  • Vascular:	Equal and normal pulses (carotid, femoral, dorsalis pedis)  • Neurologica:l	not examined  • Skin:	No lesions; no rash  • Lymph Nodes:	No lymphadedenopathy  • Musculoskeletal:	No joint pain, swelling or deformity; no limitation of movement      Vital Signs Last 24 Hrs  T(C): 36.6 (14 May 2021 08:55), Max: 36.8 (14 May 2021 05:17)  T(F): 97.9 (14 May 2021 08:55), Max: 98.2 (14 May 2021 05:17)  HR: 82 (14 May 2021 10:15) (82 - 101)  BP: 128/72 (14 May 2021 08:55) (128/72 - 151/80)  BP(mean): --  RR: 20 (14 May 2021 10:15) (17 - 20)  SpO2: 94% (14 May 2021 10:15) (92% - 96%)    05-13 @ 07:01  -  05-14 @ 07:00  --------------------------------------------------------  IN: 200 mL / OUT: 0 mL / NET: 200 mL          LABS:      CBC Full  -  ( 14 May 2021 06:54 )  WBC Count : 17.36 K/uL  RBC Count : 4.25 M/uL  Hemoglobin : 12.6 g/dL  Hematocrit : 39.6 %  Platelet Count - Automated : 231 K/uL  Mean Cell Volume : 93.2 fl  Mean Cell Hemoglobin : 29.6 pg  Mean Cell Hemoglobin Concentration : 31.8 gm/dL  Auto Neutrophil # : x  Auto Lymphocyte # : x  Auto Monocyte # : x  Auto Eosinophil # : x  Auto Basophil # : x  Auto Neutrophil % : x  Auto Lymphocyte % : x  Auto Monocyte % : x  Auto Eosinophil % : x  Auto Basophil % : x    05-14    136  |  105  |  18  ----------------------------<  95  4.1   |  19<L>  |  1.00    Ca    9.4      14 May 2021 06:54  Phos  3.1     05-14  Mg     2.1     05-14      < from: Xray Chest 1 View-PORTABLE IMMEDIATE (05.12.21 @ 05:54) >    EXAM:  XR CHEST PORTABLE IMMED 1V                          PROCEDURE DATE:  05/12/2021          INTERPRETATION:  PORTABLE CHEST X-RAY    HISTORY: Shortness of Breath, Cough,  Fever    PRIOR STUDIES: 2/19/2021    FINDINGS: Small right basal pleural effusion. Patchy opacification/consolidation right base. The cardiomediastinal silhouette, bones and soft tissues are unremarkable. Left-sided cardiac pacemaker with dual leads overlying right atrium and right ventricle.    IMPRESSION:  Small right basal pleural effusion. Opacification/consolidation right base.      < end of copied text >  < from: CT Angio Chest PE Protocol w/ IV Cont (05.12.21 @ 07:44) >      < end of copied text >                  RADIOLOGY & ADDITIONAL STUDIES (The following images were personally reviewed):        
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  The pain is described as constant, pounding, isolated to her R scapular region.  The patient normally uses a cane to help her walk, and her daughter is unsure if the back pain has affected her mobility.  In addition to the back pain, the patient has noticed increased thickness and yellowing of her sputum, which makes it difficult for her to cough up.  She has a cough and thin, white sputum production at baseline, but has not been diagnosed with COPD.  She also has had increased sob.  She denies any dyspnea, hemoptysis, chest pain, lower leg swelling, fevers, or chills.        Interview performed with help of jane Millerith (HCP) at bedside.     In the ED:  Initial vital signs: T: 99.5 F, HR: 98, BP: 131/67, R: 18, SpO2: 94% on RA  ED course:   Labs: significant for WBC 10.95, CRP 75.5, Procal 0.12  VBG pH 7.29, pCO2 52, pO2 25, HCO3 25  Imaging:   CXR: Small right basal pleural effusion. Opacification/consolidation right base.  CT Chest: Suspicion for pulmonary emboli in the right lower lobe pulmonary artery as well as the lingular branch of the left upper lobe pulmonary artery.  Also c/f R heart strain.    EKG: NSR with LBBB  Medications: Azithromycin 500mg, CTX 1g, Morphine 4mg, Zosyn 4.5g, Oxycodone 5mg  Consults: none  (12 May 2021 15:27)      PAST MEDICAL & SURGICAL HISTORY:  Glaucoma  blind in L eye, 2% vision in R eye    Essential hypertension  HTN (hypertension)    History of pneumonia    Asthma, unspecified asthma severity, unspecified whether complicated, unspecified whether persistent    Cerebrovascular accident (CVA), unspecified mechanism  remote hx    Hyperlipidemia, unspecified hyperlipidemia type    Afib    Lung cancer  s/p resection in , no chemo/RT    History of cholecystectomy    Cardiac pacemaker    H/O abdominal hysterectomy    Lung cancer  s/p resection    Cataract of right eye        FAMILY HISTORY:  Family history of prostate cancer (Father)    FH: hypertension (Mother)        SOCIAL HISTORY:  Smoking Status: [ ] Current, [ ] Former, [ ] Never  Pack Years:    MEDICATIONS:  Pulmonary:  ALBUTerol    90 MICROgram(s) HFA Inhaler 2 Puff(s) Inhalation every 4 hours PRN    Antimicrobials:  piperacillin/tazobactam IVPB.. 4.5 Gram(s) IV Intermittent every 8 hours    Anticoagulants:    Onc:    GI/:    Endocrine:  atorvastatin 20 milliGRAM(s) Oral at bedtime  megestrol Suspension 800 milliGRAM(s) Oral daily    Cardiac:  ATENolol  Tablet 25 milliGRAM(s) Oral daily    Other Medications:  brimonidine 0.2% Ophthalmic Solution 1 Drop(s) Both EYES three times a day  dorzolamide 2% Ophthalmic Solution 1 Drop(s) Both EYES <User Schedule>  latanoprost 0.005% Ophthalmic Solution 1 Drop(s) Both EYES at bedtime  multivitamin 1 Tablet(s) Oral daily  oxyCODONE    IR 5 milliGRAM(s) Oral every 6 hours PRN      Allergies    No Known Allergies    Intolerances        Review of Systems:   •	General: negative  •	Skin/Breast: negative  •	Ophthalmologic: negative  •	ENMT: negative  •	Respiratory and Thorax: negative  •	Cardiovascular: negative  •	Gastrointestinal: negative  •	Genitourinary: negative  •	Musculoskeletal: negative  •	Neurological: negative  •	Psychiatric: negative  •	Hematology/Lymphatics: negative  •	Endocrine: negative  •	Allergic/Immunologic: negative    Physical Exam:   • Constitutional:	Well-developed, well nourished  • Eyes:	EOMI; PERRL; no drainage or redness  • ENMT:	No oral lesions; no gross abnormalities  • Neck	No bruits; no thyromegaly or nodules  • Breasts:	not examined  • Back:	No deformity or limitation of movement  • Respiratory:	Breath Sounds equal & clear to percussion & auscultation, no accessory muscle use  • Cardiovascular:	Regular rate & rhythm, normal S1, S2; no murmurs, gallops or rubs; no S3, S4  • Gastrointestinal:	Soft, non-tender, no hepatosplenomegaly, normal bowel sounds  • Genitourinary:	not examined  • Rectal: not examined  • Extremities:	No cyanosis, clubbing or edema  • Vascular:	Equal and normal pulses (carotid, femoral, dorsalis pedis)  • Neurologica:l	not examined  • Skin:	No lesions; no rash  • Lymph Nodes:	No lymphadedenopathy  • Musculoskeletal:	No joint pain, swelling or deformity; no limitation of movement      Vital Signs Last 24 Hrs  T(C): 36.7 (12 May 2021 21:23), Max: 37.5 (12 May 2021 11:32)  T(F): 98.1 (12 May 2021 21:23), Max: 99.5 (12 May 2021 11:32)  HR: 96 (12 May 2021 21:) (84 - 100)  BP: 119/66 (12 May 2021 21:) (100/60 - 136/80)  BP(mean): --  RR: 18 (12 May 2021 21:23) (18 - 22)  SpO2: 94% (12 May 2021 21:23) (94% - 100%)     @ 07:01  -  12 @ 22:27  --------------------------------------------------------  IN: 0 mL / OUT: 500 mL / NET: -500 mL          LABS:      CBC Full  -  ( 12 May 2021 06:11 )  WBC Count : 10.95 K/uL  RBC Count : 4.27 M/uL  Hemoglobin : 13.0 g/dL  Hematocrit : 39.8 %  Platelet Count - Automated : 318 K/uL  Mean Cell Volume : 93.2 fl  Mean Cell Hemoglobin : 30.4 pg  Mean Cell Hemoglobin Concentration : 32.7 gm/dL  Auto Neutrophil # : 8.59 K/uL  Auto Lymphocyte # : 1.44 K/uL  Auto Monocyte # : 0.83 K/uL  Auto Eosinophil # : 0.03 K/uL  Auto Basophil # : 0.02 K/uL  Auto Neutrophil % : 78.3 %  Auto Lymphocyte % : 13.2 %  Auto Monocyte % : 7.6 %  Auto Eosinophil % : 0.3 %  Auto Basophil % : 0.2 %        139  |  104  |  24<H>  ----------------------------<  104<H>  4.3   |  24  |  1.05    Ca    9.5      12 May 2021 06:11    TPro  8.2  /  Alb  3.5  /  TBili  0.4  /  DBili  x   /  AST  18  /  ALT  16  /  AlkPhos  105  05-12          Urinalysis Basic - ( 12 May 2021 09:18 )    Color: Yellow / Appearance: Clear / S.020 / pH: x  Gluc: x / Ketone: NEGATIVE  / Bili: Negative / Urobili: 0.2 E.U./dL   Blood: x / Protein: NEGATIVE mg/dL / Nitrite: NEGATIVE   Leuk Esterase: NEGATIVE / RBC: x / WBC x   Sq Epi: x / Non Sq Epi: x / Bacteria: x                RADIOLOGY & ADDITIONAL STUDIES (The following images were personally reviewed):

## 2021-05-12 NOTE — ED PROVIDER NOTE - OBJECTIVE STATEMENT
Pt w/ PMHx lung CA 2000 s/p resection in remission, HTN, HLD, anemia, asthma, AF s/p PPM on Eliquis p/w R sided, mid back pain, intermittent, and non radiating x 1 week. No trauma. Pt has been taking both Tylenol and Motrin w/o relief. Pain does not radiate to the legs, or the abdomen. Pt a/w 3 weeks continuous cough, productive w/ yellow sputum. Pt reports some SOB w/ constant coughing. Pt had low grade fever x 1 on Mother's Day, low 100s. No fever since. No n/v/d. No known hx CHF. No orthopnea or PND. No CP. Pt  has been vaccinated against COVID19. Pt never had COVID19. Pt has been on unknown abx for approx 2 weeks for cough, by PCP Dr Greenfield. Of note, pt seen in this ED in Feb 2021 for dizziness and hemoptysis, and at that time had CT chest showing " Large airways disease, with markedly dilated and fluid-filled bronchi in right lower lobe and additional mucoid impaction in right upper lobe. Heterogeneous consolidation right upper lobe, with several nodular areas of low-density centrally. This could represent necrotizing pneumonia, but malignancy should be excluded. Multiple nodules right lower and upper lobes. This could represent infection, aspiration, or malignancy." At that time, pt was not symptomatic, and was not tx'd w/ abx. Pt started abx 2-3 weeks ago when the cough began. AS per HIE Tab, pt was prescribed Cefdinir 300 mg BID x 14 day on 3/4/21, Cipro 500 mg BID x 5 days on 4/28/21

## 2021-05-12 NOTE — H&P ADULT - PROBLEM SELECTOR PLAN 4
Patient with admission Cr 1.05 with baseline noted to be 0.6-0.7.  Likely prerenal i/s/o infection.  - will give 500cc bolus NS  - if no improvement in Cr, order urine lytes

## 2021-05-12 NOTE — ED PROVIDER NOTE - NS ED ROS FT
Constitutional: No fever or chills.   Eyes: No pain, blurry vision, or discharge.  ENMT: No hearing changes, pain, discharge or infections. No neck pain or stiffness.  Cardiac: No chest pain, or edema. No chest pain with exertion.  Respiratory: See HPI  GI: No nausea, vomiting, diarrhea or abdominal pain.  : No dysuria, frequency or burning.  MS: See HPI  Neuro: No headache or weakness. No LOC.  Skin: No skin rash.   Endocrine: No history of thyroid disease or diabetes.  Except as documented in the HPI, all other systems are negative.

## 2021-05-12 NOTE — H&P ADULT - NSHPSOCIALHISTORY_GEN_ALL_CORE
Living situation: alone in apartment with HHA 10am-3pm 7 days a week  Tobacco: none  Alcohol: none  Illicit drug use: none

## 2021-05-12 NOTE — H&P ADULT - PROBLEM SELECTOR PLAN 7
#Prolonged QTc Patient with hx of afib on eliquis 5mg qd, s/p PPM.  Admission EKG with NSR.  - hold eliquis i/s/o possible thoracentesis    #Prolonged QTc  Admission EKG with QTc 509.  Received 1 dose of azithromycin in ED.  - repeat EKG prior to giving any QTc prolonging medications

## 2021-05-12 NOTE — H&P ADULT - ASSESSMENT
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

## 2021-05-12 NOTE — H&P ADULT - BIRTH SEX
PRE-OP DATA and Check List - GI:  Procedure:  Esophageal Motility Study (24244)  Diagnosis:  Esophageal Dysmotility  Tentative Date:  Routine (next available or patient preference)  Tentative Time:  To be determined  Duration of Procedure:  TBD  Anesthesia:  No sedation       Female

## 2021-05-12 NOTE — ED ADULT NURSE NOTE - PSH
Cardiac pacemaker    Cataract of right eye    H/O abdominal hysterectomy    History of cholecystectomy    History of nephrolithiasis  s/p removal  Lung cancer  s/p resection  Pacemaker

## 2021-05-12 NOTE — H&P ADULT - PROBLEM SELECTOR PLAN 8
Patient on brimonidine 0.15%, dorzolamide 2%, latanoprost 0.005% ophthalmic solutions  - c/w meds inpatient  - brimonidine only available as 0.2% solution in hospital, continue with that dose while inpatient

## 2021-05-12 NOTE — CONSULT NOTE ADULT - PROBLEM SELECTOR RECOMMENDATION 9
CT scan revealed right lower lobe infiltrate which is new from the previous 1.  The patient has bronchiectasis in the right middle lobe and she had frequent episode of pneumonia and that area.  Continue antibiotic.  Follow culture

## 2021-05-12 NOTE — ED PROVIDER NOTE - PROGRESS NOTE DETAILS
as per verbal reports from radiology pt has a PE. case discussed with . will admit for further management. pt is hemodynamically stable.

## 2021-05-12 NOTE — ED PROVIDER NOTE - WR INTERPRETATION 1
CXR negative - Improved R lung lung infilatrate. PPM present., No consolidation, No atelectasis seen

## 2021-05-12 NOTE — CONSULT NOTE ADULT - PROBLEM SELECTOR RECOMMENDATION 3
Patient had a lung resection at the spine there is no evidence of recurrence.  We will follow-up on the 12 mm nodule that was  mentioned in the CT scan

## 2021-05-12 NOTE — H&P ADULT - NSHPPHYSICALEXAM_GEN_ALL_CORE
VITALS:   T(C): 36.7 (05-12-21 @ 14:50), Max: 37.5 (05-12-21 @ 11:32)  HR: 91 (05-12-21 @ 14:50) (91 - 100)  BP: 100/60 (05-12-21 @ 14:50) (100/60 - 136/80)  RR: 19 (05-12-21 @ 14:50) (18 - 22)  SpO2: 100% (05-12-21 @ 14:50) (94% - 100%)    GENERAL: mild distress, looks frustrated, elderly  HEAD:  Atraumatic, Normocephalic  EYES: pupils minimally reactive, appears to be tracking and following commands despite limited eyesight, conjunctiva and sclera clear  ENT: Moist mucous membranes  NECK: Supple, no LAD  CHEST/LUNG: coarse breath sounds, mixture of rhonchi and crackles, decreased breath sounds in R lower lung field  HEART: difficult to assess 2/2 coarse breath sounds  ABDOMEN: BSx4; Soft, nontender, nondistended  EXTREMITIES:  2+ Peripheral Pulses, brisk capillary refill. No clubbing, cyanosis, or edema  NERVOUS SYSTEM:  moves all extremities, follows commands  SKIN: No rashes or lesions

## 2021-05-12 NOTE — H&P ADULT - NSICDXPASTMEDICALHX_GEN_ALL_CORE_FT
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

## 2021-05-13 ENCOUNTER — TRANSCRIPTION ENCOUNTER (OUTPATIENT)
Age: 86
End: 2021-05-13

## 2021-05-13 DIAGNOSIS — I26.99 OTHER PULMONARY EMBOLISM WITHOUT ACUTE COR PULMONALE: ICD-10-CM

## 2021-05-13 LAB
ANION GAP SERPL CALC-SCNC: 16 MMOL/L — SIGNIFICANT CHANGE UP (ref 5–17)
ANISOCYTOSIS BLD QL: SLIGHT — SIGNIFICANT CHANGE UP
BASOPHILS # BLD AUTO: 0 K/UL — SIGNIFICANT CHANGE UP (ref 0–0.2)
BASOPHILS NFR BLD AUTO: 0 % — SIGNIFICANT CHANGE UP (ref 0–2)
BUN SERPL-MCNC: 18 MG/DL — SIGNIFICANT CHANGE UP (ref 7–23)
BURR CELLS BLD QL SMEAR: PRESENT — SIGNIFICANT CHANGE UP
CALCIUM SERPL-MCNC: 10 MG/DL — SIGNIFICANT CHANGE UP (ref 8.4–10.5)
CHLORIDE SERPL-SCNC: 102 MMOL/L — SIGNIFICANT CHANGE UP (ref 96–108)
CO2 SERPL-SCNC: 18 MMOL/L — LOW (ref 22–31)
COVID-19 SPIKE DOMAIN AB INTERP: POSITIVE
COVID-19 SPIKE DOMAIN ANTIBODY RESULT: >250 U/ML — HIGH
CREAT SERPL-MCNC: 1.1 MG/DL — SIGNIFICANT CHANGE UP (ref 0.5–1.3)
CULTURE RESULTS: SIGNIFICANT CHANGE UP
EOSINOPHIL # BLD AUTO: 0 K/UL — SIGNIFICANT CHANGE UP (ref 0–0.5)
EOSINOPHIL NFR BLD AUTO: 0 % — SIGNIFICANT CHANGE UP (ref 0–6)
GIANT PLATELETS BLD QL SMEAR: PRESENT — SIGNIFICANT CHANGE UP
GLUCOSE SERPL-MCNC: 107 MG/DL — HIGH (ref 70–99)
HCT VFR BLD CALC: 41 % — SIGNIFICANT CHANGE UP (ref 34.5–45)
HGB BLD-MCNC: 13.4 G/DL — SIGNIFICANT CHANGE UP (ref 11.5–15.5)
LYMPHOCYTES # BLD AUTO: 1.6 K/UL — SIGNIFICANT CHANGE UP (ref 1–3.3)
LYMPHOCYTES # BLD AUTO: 7 % — LOW (ref 13–44)
MAGNESIUM SERPL-MCNC: 2 MG/DL — SIGNIFICANT CHANGE UP (ref 1.6–2.6)
MANUAL SMEAR VERIFICATION: SIGNIFICANT CHANGE UP
MCHC RBC-ENTMCNC: 30.1 PG — SIGNIFICANT CHANGE UP (ref 27–34)
MCHC RBC-ENTMCNC: 32.7 GM/DL — SIGNIFICANT CHANGE UP (ref 32–36)
MCV RBC AUTO: 92.1 FL — SIGNIFICANT CHANGE UP (ref 80–100)
MICROCYTES BLD QL: SLIGHT — SIGNIFICANT CHANGE UP
MONOCYTES # BLD AUTO: 2.22 K/UL — HIGH (ref 0–0.9)
MONOCYTES NFR BLD AUTO: 9.7 % — SIGNIFICANT CHANGE UP (ref 2–14)
NEUTROPHILS # BLD AUTO: 19.08 K/UL — HIGH (ref 1.8–7.4)
NEUTROPHILS NFR BLD AUTO: 83.3 % — HIGH (ref 43–77)
OVALOCYTES BLD QL SMEAR: SLIGHT — SIGNIFICANT CHANGE UP
PHOSPHATE SERPL-MCNC: 4.3 MG/DL — SIGNIFICANT CHANGE UP (ref 2.5–4.5)
PLAT MORPH BLD: NORMAL — SIGNIFICANT CHANGE UP
PLATELET # BLD AUTO: 322 K/UL — SIGNIFICANT CHANGE UP (ref 150–400)
POIKILOCYTOSIS BLD QL AUTO: SLIGHT — SIGNIFICANT CHANGE UP
POLYCHROMASIA BLD QL SMEAR: SLIGHT — SIGNIFICANT CHANGE UP
POTASSIUM SERPL-MCNC: 4.5 MMOL/L — SIGNIFICANT CHANGE UP (ref 3.5–5.3)
POTASSIUM SERPL-SCNC: 4.5 MMOL/L — SIGNIFICANT CHANGE UP (ref 3.5–5.3)
RBC # BLD: 4.45 M/UL — SIGNIFICANT CHANGE UP (ref 3.8–5.2)
RBC # FLD: 17.5 % — HIGH (ref 10.3–14.5)
RBC BLD AUTO: ABNORMAL
SARS-COV-2 IGG+IGM SERPL QL IA: >250 U/ML — HIGH
SARS-COV-2 IGG+IGM SERPL QL IA: POSITIVE
SCHISTOCYTES BLD QL AUTO: SLIGHT — SIGNIFICANT CHANGE UP
SODIUM SERPL-SCNC: 136 MMOL/L — SIGNIFICANT CHANGE UP (ref 135–145)
SPECIMEN SOURCE: SIGNIFICANT CHANGE UP
SPHEROCYTES BLD QL SMEAR: SLIGHT — SIGNIFICANT CHANGE UP
WBC # BLD: 22.91 K/UL — HIGH (ref 3.8–10.5)
WBC # FLD AUTO: 22.91 K/UL — HIGH (ref 3.8–10.5)

## 2021-05-13 PROCEDURE — 99233 SBSQ HOSP IP/OBS HIGH 50: CPT | Mod: GC

## 2021-05-13 PROCEDURE — 93306 TTE W/DOPPLER COMPLETE: CPT | Mod: 26

## 2021-05-13 PROCEDURE — 99232 SBSQ HOSP IP/OBS MODERATE 35: CPT | Mod: GC

## 2021-05-13 RX ORDER — APIXABAN 2.5 MG/1
10 TABLET, FILM COATED ORAL EVERY 12 HOURS
Refills: 0 | Status: DISCONTINUED | OUTPATIENT
Start: 2021-05-13 | End: 2021-05-14

## 2021-05-13 RX ORDER — AMLODIPINE BESYLATE 2.5 MG/1
0 TABLET ORAL
Qty: 0 | Refills: 0 | DISCHARGE

## 2021-05-13 RX ORDER — PANTOPRAZOLE SODIUM 20 MG/1
40 TABLET, DELAYED RELEASE ORAL
Refills: 0 | Status: DISCONTINUED | OUTPATIENT
Start: 2021-05-13 | End: 2021-05-18

## 2021-05-13 RX ORDER — LOSARTAN POTASSIUM 100 MG/1
1 TABLET, FILM COATED ORAL
Qty: 0 | Refills: 0 | DISCHARGE

## 2021-05-13 RX ORDER — APIXABAN 2.5 MG/1
5 TABLET, FILM COATED ORAL EVERY 12 HOURS
Refills: 0 | Status: DISCONTINUED | OUTPATIENT
Start: 2021-05-13 | End: 2021-05-13

## 2021-05-13 RX ORDER — PIPERACILLIN AND TAZOBACTAM 4; .5 G/20ML; G/20ML
3.38 INJECTION, POWDER, LYOPHILIZED, FOR SOLUTION INTRAVENOUS EVERY 6 HOURS
Refills: 0 | Status: DISCONTINUED | OUTPATIENT
Start: 2021-05-13 | End: 2021-05-18

## 2021-05-13 RX ADMIN — DORZOLAMIDE HYDROCHLORIDE 1 DROP(S): 20 SOLUTION/ DROPS OPHTHALMIC at 19:07

## 2021-05-13 RX ADMIN — DORZOLAMIDE HYDROCHLORIDE 1 DROP(S): 20 SOLUTION/ DROPS OPHTHALMIC at 07:43

## 2021-05-13 RX ADMIN — PIPERACILLIN AND TAZOBACTAM 200 GRAM(S): 4; .5 INJECTION, POWDER, LYOPHILIZED, FOR SOLUTION INTRAVENOUS at 03:49

## 2021-05-13 RX ADMIN — PIPERACILLIN AND TAZOBACTAM 200 GRAM(S): 4; .5 INJECTION, POWDER, LYOPHILIZED, FOR SOLUTION INTRAVENOUS at 19:06

## 2021-05-13 RX ADMIN — ATENOLOL 25 MILLIGRAM(S): 25 TABLET ORAL at 07:43

## 2021-05-13 RX ADMIN — OXYCODONE HYDROCHLORIDE 5 MILLIGRAM(S): 5 TABLET ORAL at 22:15

## 2021-05-13 RX ADMIN — LATANOPROST 1 DROP(S): 0.05 SOLUTION/ DROPS OPHTHALMIC; TOPICAL at 21:12

## 2021-05-13 RX ADMIN — ATORVASTATIN CALCIUM 20 MILLIGRAM(S): 80 TABLET, FILM COATED ORAL at 21:12

## 2021-05-13 RX ADMIN — OXYCODONE HYDROCHLORIDE 5 MILLIGRAM(S): 5 TABLET ORAL at 08:42

## 2021-05-13 RX ADMIN — MEGESTROL ACETATE 800 MILLIGRAM(S): 40 SUSPENSION ORAL at 11:00

## 2021-05-13 RX ADMIN — BRIMONIDINE TARTRATE 1 DROP(S): 2 SOLUTION/ DROPS OPHTHALMIC at 14:11

## 2021-05-13 RX ADMIN — APIXABAN 10 MILLIGRAM(S): 2.5 TABLET, FILM COATED ORAL at 14:11

## 2021-05-13 RX ADMIN — PIPERACILLIN AND TAZOBACTAM 200 GRAM(S): 4; .5 INJECTION, POWDER, LYOPHILIZED, FOR SOLUTION INTRAVENOUS at 11:01

## 2021-05-13 RX ADMIN — PIPERACILLIN AND TAZOBACTAM 200 GRAM(S): 4; .5 INJECTION, POWDER, LYOPHILIZED, FOR SOLUTION INTRAVENOUS at 23:41

## 2021-05-13 RX ADMIN — Medication 1 TABLET(S): at 11:00

## 2021-05-13 RX ADMIN — OXYCODONE HYDROCHLORIDE 5 MILLIGRAM(S): 5 TABLET ORAL at 07:42

## 2021-05-13 RX ADMIN — BRIMONIDINE TARTRATE 1 DROP(S): 2 SOLUTION/ DROPS OPHTHALMIC at 21:12

## 2021-05-13 RX ADMIN — OXYCODONE HYDROCHLORIDE 5 MILLIGRAM(S): 5 TABLET ORAL at 21:12

## 2021-05-13 RX ADMIN — BRIMONIDINE TARTRATE 1 DROP(S): 2 SOLUTION/ DROPS OPHTHALMIC at 07:43

## 2021-05-13 NOTE — DIETITIAN INITIAL EVALUATION ADULT. - OTHER CALCULATIONS
ABW used for calculations as pt between % of IBW. (87% IBW). Nutrient needs based on [facility] standards of care for maintenance in adults, adjusted for age, suspected PCM

## 2021-05-13 NOTE — DIETITIAN INITIAL EVALUATION ADULT. - ADD RECOMMEND
1. Encourage intake through day 2. Manage pain prn 3. Trend wts 4. Reinforce ed 5. Add ONS 6. Liberalize diet to meet needs

## 2021-05-13 NOTE — PROGRESS NOTE ADULT - SUBJECTIVE AND OBJECTIVE BOX
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  In addition to the back pain, the patient has noticed increased thickness and yellowing of her sputum, which makes it difficult for her to cough up.  She has a cough and thin, white sputum production at baseline, but has not been diagnosed with COPD.  She also has had increased sob. On admission, CXR s/f small right basal pleural effusion. In ED, given Azithromycin 500mg, CTX 1g, Morphine 4mg, Zosyn 4.5g, Oxycodone 5mg.   CTA of chest: pulmonary emboli in the right lower lobe pulmonary artery as well as the lingular branch of the left upper lobe pulmonary artery. There is also CT suggestion of right heart strain. Since 2021, there is now near complete consolidation of the right lower lobe as well as new loculated small to moderate right pleural effusion. Interval improvement in a few right-sided nodular lung opacities and masses with a residual 12 mm nodule within the lateral and inferior aspect of the right upper lobe as above. Abbreviated differential includes improved infectious, inflammatory and neoplastic etiologies.    OVERNIGHT EVENTS: None    SUBJECTIVE / INTERVAL HPI: Patient seen and examined at bedside. Endorses the R sided back pain. Denies chest pain, shortness of breath, fevers of chills. Endorses cough productive of some sputum. No vomiting, diarrhea, abdominal pain.      VITAL SIGNS:  Vital Signs Last 24 Hrs  T(C): 36.6 (13 May 2021 05:40), Max: 37.5 (12 May 2021 11:32)  T(F): 97.9 (13 May 2021 05:40), Max: 99.5 (12 May 2021 11:32)  HR: 101 (13 May 2021 05:40) (84 - 101)  BP: 129/62 (13 May 2021 05:40) (100/60 - 131/67)  BP(mean): --  RR: 18 (13 May 2021 05:40) (18 - 19)  SpO2: 92% (13 May 2021 05:40) (92% - 100%)    PHYSICAL EXAM:    General: elderly woman in NAD lying in bed  HEENT: NC/AT; PERRL, anicteric sclera; MMM  Neck: supple  Cardiovascular: +S1/S2; RRR  Respiratory: ronchi diffusely bilaterally R>L, especially in RML and RLL  Gastrointestinal: soft, NT/ND; +BSx4  Extremities: WWP; no edema, clubbing or cyanosis  Vascular: 2+ radial, DP pulses B/L  Neurological: AAOx3; no focal deficits    MEDICATIONS:  MEDICATIONS  (STANDING):  ATENolol  Tablet 25 milliGRAM(s) Oral daily  atorvastatin 20 milliGRAM(s) Oral at bedtime  brimonidine 0.2% Ophthalmic Solution 1 Drop(s) Both EYES three times a day  dorzolamide 2% Ophthalmic Solution 1 Drop(s) Both EYES <User Schedule>  latanoprost 0.005% Ophthalmic Solution 1 Drop(s) Both EYES at bedtime  megestrol Suspension 800 milliGRAM(s) Oral daily  multivitamin 1 Tablet(s) Oral daily  piperacillin/tazobactam IVPB.. 4.5 Gram(s) IV Intermittent every 8 hours    MEDICATIONS  (PRN):  ALBUTerol    90 MICROgram(s) HFA Inhaler 2 Puff(s) Inhalation every 4 hours PRN Shortness of Breath  oxyCODONE    IR 5 milliGRAM(s) Oral every 6 hours PRN Severe Pain (7 - 10)      ALLERGIES:  Allergies    No Known Allergies    Intolerances        LABS:                        13.0   10.95 )-----------( 318      ( 12 May 2021 06:11 )             39.8     05-12    139  |  104  |  24<H>  ----------------------------<  104<H>  4.3   |  24  |  1.05    Ca    9.5      12 May 2021 06:11    TPro  8.2  /  Alb  3.5  /  TBili  0.4  /  DBili  x   /  AST  18  /  ALT  16  /  AlkPhos  105  05-12      Urinalysis Basic - ( 12 May 2021 09:18 )    Color: Yellow / Appearance: Clear / S.020 / pH: x  Gluc: x / Ketone: NEGATIVE  / Bili: Negative / Urobili: 0.2 E.U./dL   Blood: x / Protein: NEGATIVE mg/dL / Nitrite: NEGATIVE   Leuk Esterase: NEGATIVE / RBC: x / WBC x   Sq Epi: x / Non Sq Epi: x / Bacteria: x      CAPILLARY BLOOD GLUCOSE          RADIOLOGY & ADDITIONAL TESTS: Reviewed.

## 2021-05-13 NOTE — DIETITIAN INITIAL EVALUATION ADULT. - PROBLEM SELECTOR PLAN 7
Patient with hx of afib on eliquis 5mg qd, s/p PPM.  Admission EKG with NSR.  - hold eliquis i/s/o possible thoracentesis    #Prolonged QTc  Admission EKG with QTc 509.  Received 1 dose of azithromycin in ED.  - repeat EKG prior to giving any QTc prolonging medications

## 2021-05-13 NOTE — PROGRESS NOTE ADULT - PROBLEM SELECTOR PLAN 1
CTA chest s/f pulmonary emboli in the right lower lobe pulmonary artery as well as the lingular branch of the left upper lobe pulmonary artery. CT suggestion of right heart strain.  - CTA also notable for complete consolidation of the right lower lobe as well as new loculated small to moderate right pleural effusion  - Hold eliquis in anticipation of thoracentesis  - f/u pulm recs (Dr. Sinclair) regarding additional intervention  - formal echo to evaluate R heart strain

## 2021-05-13 NOTE — DIETITIAN INITIAL EVALUATION ADULT. - PROBLEM SELECTOR PLAN 6
/67, dropped to 100/60.  On home amlodipine 5mg qd, atenolol 25mg qd, and losartan 25mg qd.  - c/w atenolol 25mg qd  - hold remaining meds i/s/o AL    #HLD  - c/w home atorvastatin 20mg qd    #CVA  No residual effects

## 2021-05-13 NOTE — DIETITIAN INITIAL EVALUATION ADULT. - MALNUTRITION
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

## 2021-05-13 NOTE — DISCHARGE NOTE PROVIDER - NSDCMRMEDTOKEN_GEN_ALL_CORE_FT
amLODIPine 5 mg oral tablet: orally 2 times a day  apixaban 5 mg oral tablet: 1 tab(s) orally every 12 hours  atenolol 25 mg oral tablet: 1 tab(s) orally once a day  atorvastatin 20 mg oral tablet: 1 tab(s) orally once a day (at bedtime)  brimonidine 0.15% ophthalmic solution: 1 drop(s) to each affected eye 3 times a day, OU  dorzolamide 2% ophthalmic solution: 1 drop to each eye 2 times a day  latanoprost 0.005% ophthalmic solution: 1 drop(s) to each affected eye once a day (in the evening)  losartan 25 mg oral tablet: 1 tab(s) orally once a day  megestrol 40 mg/mL oral suspension: 20 milliliter(s) orally once a day  Multiple Vitamins oral capsule: 1 cap(s) orally once a day  Proventil HFA 90 mcg/inh inhalation aerosol: 2 puff(s) inhaled every 6 hours - for shortness of breath and/or wheezing     amLODIPine 5 mg oral tablet: 1 tab(s) orally once a day  apixaban 5 mg oral tablet: 1 tab(s) orally every 12 hours  atenolol 25 mg oral tablet: 1 tab(s) orally once a day  atorvastatin 20 mg oral tablet: 1 tab(s) orally once a day (at bedtime)  brimonidine 0.15% ophthalmic solution: 1 drop(s) to each affected eye 3 times a day, OU  dorzolamide 2% ophthalmic solution: 1 drop to each eye 2 times a day  latanoprost 0.005% ophthalmic solution: 1 drop(s) to each affected eye once a day (in the evening)  losartan 25 mg oral tablet: 1 tab(s) orally once a day  megestrol 40 mg/mL oral suspension: 20 milliliter(s) orally once a day  Multiple Vitamins oral capsule: 1 cap(s) orally once a day  Proventil HFA 90 mcg/inh inhalation aerosol: 2 puff(s) inhaled every 6 hours - for shortness of breath and/or wheezing     amLODIPine 5 mg oral tablet: 1 tab(s) orally once a day  apixaban 5 mg oral tablet: 1 tab(s) orally every 12 hours  atenolol 25 mg oral tablet: 1 tab(s) orally once a day  atorvastatin 20 mg oral tablet: 1 tab(s) orally once a day (at bedtime)  brimonidine 0.15% ophthalmic solution: 1 drop(s) to each affected eye 3 times a day, OU  dorzolamide 2% ophthalmic solution: 1 drop to each eye 2 times a day  Eliquis 5 mg oral tablet: 2 tab(s) orally 2 times a day for 3 more days (last dose of 2 pills 5/19 PM). THEN 5/20 AM please take 1 pill (5mg) orally 2 times a day.  latanoprost 0.005% ophthalmic solution: 1 drop(s) to each affected eye once a day (in the evening)  losartan 25 mg oral tablet: 1 tab(s) orally once a day  megestrol 40 mg/mL oral suspension: 20 milliliter(s) orally once a day  Multiple Vitamins oral capsule: 1 cap(s) orally once a day  Proventil HFA 90 mcg/inh inhalation aerosol: 2 puff(s) inhaled every 6 hours - for shortness of breath and/or wheezing     amLODIPine 5 mg oral tablet: 1 tab(s) orally once a day  atenolol 25 mg oral tablet: 1 tab(s) orally once a day  atorvastatin 20 mg oral tablet: 1 tab(s) orally once a day (at bedtime)  brimonidine 0.15% ophthalmic solution: 1 drop(s) to each affected eye 3 times a day, OU  cefpodoxime 200 mg oral tablet: 1 tab(s) orally 2 times a day   dorzolamide 2% ophthalmic solution: 1 drop to each eye 2 times a day  Eliquis 5 mg oral tablet: 2 tab(s) orally 2 times a day for 3 more days (last dose of 2 pills 5/19 PM). THEN 5/20 AM please take 1 pill (5mg) orally 2 times a day.  latanoprost 0.005% ophthalmic solution: 1 drop(s) to each affected eye once a day (in the evening)  losartan 25 mg oral tablet: 1 tab(s) orally once a day  megestrol 40 mg/mL oral suspension: 20 milliliter(s) orally once a day  Multiple Vitamins oral capsule: 1 cap(s) orally once a day  Proventil HFA 90 mcg/inh inhalation aerosol: 2 puff(s) inhaled every 6 hours - for shortness of breath and/or wheezing    Wheelchair due to inability to ambulate (ICD10 R26.2) Size 18x16. Elevating leg rests, seat belt, cushion, back cushion, and antitippers: for 99+months    amLODIPine 5 mg oral tablet: 1 tab(s) orally once a day  atenolol 25 mg oral tablet: 1 tab(s) orally once a day  atorvastatin 20 mg oral tablet: 1 tab(s) orally once a day (at bedtime)  brimonidine 0.15% ophthalmic solution: 1 drop(s) to each affected eye 3 times a day, OU  cefpodoxime 200 mg oral tablet: 1 tab(s) orally 2 times a day   dorzolamide 2% ophthalmic solution: 1 drop to each eye 2 times a day  Eliquis 5 mg oral tablet: 2 tab(s) orally 2 times a day (last dose of 2 pills 5/19 PM). THEN 5/20 AM please take 1 pill (5mg) orally 2 times a day.  latanoprost 0.005% ophthalmic solution: 1 drop(s) to each affected eye once a day (in the evening)  losartan 25 mg oral tablet: 1 tab(s) orally once a day  megestrol 40 mg/mL oral suspension: 20 milliliter(s) orally once a day  Multiple Vitamins oral capsule: 1 cap(s) orally once a day  Proventil HFA 90 mcg/inh inhalation aerosol: 2 puff(s) inhaled every 6 hours - for shortness of breath and/or wheezing    Wheelchair due to inability to ambulate (ICD10 R26.2) Size 18x16. Elevating leg rests, seat belt, cushion, back cushion, and antitippers: for 99+months

## 2021-05-13 NOTE — PROGRESS NOTE ADULT - SUBJECTIVE AND OBJECTIVE BOX
Interval Events: Reviewed  Patient seen and examined at bedside.    Patient is a 85y old  Female who presents with a chief complaint of   she has a cough  PAST MEDICAL & SURGICAL HISTORY:  Glaucoma  blind in L eye, 2% vision in R eye    Essential hypertension  HTN (hypertension)    History of pneumonia    Asthma, unspecified asthma severity, unspecified whether complicated, unspecified whether persistent    Cerebrovascular accident (CVA), unspecified mechanism  remote hx    Hyperlipidemia, unspecified hyperlipidemia type    Afib    Lung cancer  s/p resection in 2000, no chemo/RT    History of cholecystectomy    Cardiac pacemaker    H/O abdominal hysterectomy    Lung cancer  s/p resection    Cataract of right eye        MEDICATIONS:  Pulmonary:  ALBUTerol    90 MICROgram(s) HFA Inhaler 2 Puff(s) Inhalation every 4 hours PRN    Antimicrobials:  piperacillin/tazobactam IVPB.. 3.375 Gram(s) IV Intermittent every 6 hours    Anticoagulants:  apixaban 10 milliGRAM(s) Oral every 12 hours    Cardiac:  ATENolol  Tablet 25 milliGRAM(s) Oral daily      Allergies    No Known Allergies    Intolerances        Vital Signs Last 24 Hrs  T(C): 36.6 (14 May 2021 08:55), Max: 36.8 (14 May 2021 05:17)  T(F): 97.9 (14 May 2021 08:55), Max: 98.2 (14 May 2021 05:17)  HR: 82 (14 May 2021 10:15) (82 - 101)  BP: 128/72 (14 May 2021 08:55) (128/72 - 151/80)  BP(mean): --  RR: 20 (14 May 2021 10:15) (17 - 20)  SpO2: 94% (14 May 2021 10:15) (92% - 96%)    05-13 @ 07:01  -  05-14 @ 07:00  --------------------------------------------------------  IN: 200 mL / OUT: 0 mL / NET: 200 mL          Review of Systems:   •	General: negative  •	Skin/Breast: negative  •	Ophthalmologic: negative  •	ENMT: negative  •	Respiratory and Thorax: negative  •	Cardiovascular: negative  •	Gastrointestinal: negative  •	Genitourinary: negative  •	Musculoskeletal: negative  •	Neurological: negative  •	Psychiatric: negative  •	Hematology/Lymphatics: negative  •	Endocrine: negative  •	Allergic/Immunologic: negative    Physical Exam:   • Constitutional:	Well-developed, well nourished  • Eyes:	EOMI; PERRL; no drainage or redness  • ENMT:	No oral lesions; no gross abnormalities  • Neck	No bruits; no thyromegaly or nodules  • Breasts:	not examined  • Back:	No deformity or limitation of movement  • Respiratory:	Breath Sounds equal & clear to percussion & auscultation, no accessory muscle use  • Cardiovascular:	Regular rate & rhythm, normal S1, S2; no murmurs, gallops or rubs; no S3, S4  • Gastrointestinal:	Soft, non-tender, no hepatosplenomegaly, normal bowel sounds  • Genitourinary:	not examined  • Rectal: not examined  • Extremities:	No cyanosis, clubbing or edema  • Vascular:	Equal and normal pulses (carotid, femoral, dorsalis pedis)  • Neurologica:l	not examined  • Skin:	No lesions; no rash  • Lymph Nodes:	No lymphadedenopathy  • Musculoskeletal:	No joint pain, swelling or deformity; no limitation of movement        LABS:      CBC Full  -  ( 14 May 2021 06:54 )  WBC Count : 17.36 K/uL  RBC Count : 4.25 M/uL  Hemoglobin : 12.6 g/dL  Hematocrit : 39.6 %  Platelet Count - Automated : 231 K/uL  Mean Cell Volume : 93.2 fl  Mean Cell Hemoglobin : 29.6 pg  Mean Cell Hemoglobin Concentration : 31.8 gm/dL  Auto Neutrophil # : x  Auto Lymphocyte # : x  Auto Monocyte # : x  Auto Eosinophil # : x  Auto Basophil # : x  Auto Neutrophil % : x  Auto Lymphocyte % : x  Auto Monocyte % : x  Auto Eosinophil % : x  Auto Basophil % : x    05-14    136  |  105  |  18  ----------------------------<  95  4.1   |  19<L>  |  1.00    Ca    9.4      14 May 2021 06:54  Phos  3.1     05-14  Mg     2.1     05-14                          RADIOLOGY & ADDITIONAL STUDIES (The following images were personally reviewed):  Nuñez:                                     No  Urine output:                       adequate  DVT prophylaxis:                 Yes  Flattus:                                  Yes  Bowel movement:              No

## 2021-05-13 NOTE — DIETITIAN INITIAL EVALUATION ADULT. - OTHER INFO
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  In addition to the back pain, the patient has noticed increased thickness and yellowing of her sputum, which makes it difficult for her to cough up.  She has a cough and thin, white sputum production at baseline, but has not been diagnosed with COPD.  She also has had increased sob. On admission, CXR s/f small right basal pleural effusion. Known to nutrition services from prior admissions. Appears wt had been stable October 2019-August 2020 at 74-78kg, since has continued to lose wt throughout admissions. Wt in October 2020 noted at 70kg, Feb 2021 59kg, and present wt 47.7kg indicating -23kg, 32% wt loss x 7-8months. Previous admission dtr endorsed recent/ large wt loss. At time of assessment breakfast tray ~50% consumed. Suspect underlying illness leading to hypermetabolic state leading to wt loss. Denies n/v/d/c, chewing/ swallowing issues or pain impacting intake, skin with asge 21. NKFA. Noted eats what is prepared for her, fair-poor intake recently. Based on stated criteria pt meets ASPEN guidelines for malnutrition, see chart note. Will follow per protocol.

## 2021-05-13 NOTE — DISCHARGE NOTE PROVIDER - HOSPITAL COURSE
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  In addition to the back pain, the patient has noticed increased thickness and yellowing of her sputum, which makes it difficult for her to cough up.  She has a cough and thin, white sputum production at baseline, but has not been diagnosed with COPD.  She also has had increased sob. On admission, CXR s/f small right basal pleural effusion. In ED, given Azithromycin 500mg, CTX 1g, Morphine 4mg, Zosyn 4.5g, Oxycodone 5mg.   CTA of chest: pulmonary emboli in the right lower lobe pulmonary artery as well as the lingular branch of the left upper lobe pulmonary artery. There is also CT suggestion of right heart strain. Since 2/19/2021, there is now near complete consolidation of the right lower lobe as well as new loculated small to moderate right pleural effusion. Interval improvement in a few right-sided nodular lung opacities and masses with a residual 12 mm nodule within the lateral and inferior aspect of the right upper lobe as above. Abbreviated differential includes improved infectious, inflammatory and neoplastic etiologies.    Problem List/Main Diagnoses (system-based):     1.Pulmonary embolism: CTA chest s/f pulmonary emboli in the right lower lobe pulmonary artery as well as the lingular branch of the left upper lobe pulmonary artery. CT suggestion of right heart strain.  - CTA also notable for complete consolidation of the right lower lobe as well as new loculated small to moderate right pleural effusion  - Hold eliquis in anticipation of thoracentesis  - f/u pulm recs (Dr. Sinclair) regarding additional intervention  - formal echo to evaluate R heart strain.     2. PNA: Patient with recurrent PNA, most recently treated 3 weeks ago.  Presents today with leukocytosis of 10.95 with neutrophilic predominance, elevated CRP, and elevated procal.  In addition, patient with increasing sputum production and change in color.  CXR showing opacification/consolidation in R base.    - received 3g unasyn in ED  - broadened to Vanc and Zosyn, as patient recently treated outpatient for PNA with antibiotics  - MRSA swab negative therefore will dc vanc  - albuterol inhaler prn sob     3. Pleural effusion: CXR with evidence of right basal pleural effusion.  - holding eliquis for possible thoracentesis  - f/u pulm recs (Dr. Sinclair).     4. AL: Patient with admission Cr 1.05 with baseline noted to be 0.6-0.7.  Likely prerenal i/s/o infection.  - will give 500cc bolus NS  - if no improvement in Cr, order urine lytes.     5. Lung cancer: Patient with hx of lung ca s/p resection in 2000, no chemo/RT.  Follows with Dr. Greenfield and Dr. Sinclair outpatient.     6. Essential hypertension: /67, dropped to 100/60.  On home amlodipine 5mg qd, atenolol 25mg qd, and losartan 25mg qd.  - c/w atenolol 25mg qd  - hold remaining meds i/s/o AL    #HLD  - c/w home atorvastatin 20mg qd    #CVA  No residual effects.    7. Afib: Patient with hx of afib on eliquis 5mg qd, s/p PPM.  Admission EKG with NSR.  - hold eliquis i/s/o possible thoracentesis    8. Glaucoma: Patient on brimonidine 0.15%, dorzolamide 2%, latanoprost 0.005% ophthalmic solutions  - c/w home meds     9. Asthma: - c/w home albuterol prn.     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  In addition to the back pain, the patient has noticed increased thickness and yellowing of her sputum, which makes it difficult for her to cough up.  She has a cough and thin, white sputum production at baseline, but has not been diagnosed with COPD.  She also has had increased sob. On admission, CXR s/f small right basal pleural effusion. In ED, given Azithromycin 500mg, CTX 1g, Morphine 4mg, Zosyn 4.5g, Oxycodone 5mg.   CTA of chest: pulmonary emboli in the right lower lobe pulmonary artery as well as the lingular branch of the left upper lobe pulmonary artery. There is also CT suggestion of right heart strain. Since 2/19/2021, there is now near complete consolidation of the right lower lobe as well as new loculated small to moderate right pleural effusion. Interval improvement in a few right-sided nodular lung opacities and masses with a residual 12 mm nodule within the lateral and inferior aspect of the right upper lobe as above. Abbreviated differential includes improved infectious, inflammatory and neoplastic etiologies.    Problem List/Main Diagnoses (system-based):     1.Pulmonary embolism: CTA chest s/f pulmonary emboli in the right lower lobe pulmonary artery as well as the lingular branch of the left upper lobe pulmonary artery. CT suggestion of right heart strain.  - CTA also notable for complete consolidation of the right lower lobe as well as new loculated small to moderate right pleural effusion  - now on eliquis restarted loading dose 10mg BID x 7 days 5/13, to be then transitioned to 5mg BID  -No RHS on echo    2. PNA: Patient with recurrent PNA, most recently treated 3 weeks ago.  Presents today with leukocytosis of 10.95 with neutrophilic predominance, elevated CRP, and elevated procal.  In addition, patient with increasing sputum production and change in color.  CXR showing opacification/consolidation in R base.    - received 3g unasyn in ED  - broadened to Vanc and Zosyn, as patient recently treated outpatient for PNA with antibiotics  - MRSA swab negative therefore vanc d/c  - albuterol inhaler prn sob     3. Lung cancer: Patient with hx of lung ca s/p resection in 2000, no chemo/RT.  Follows with Dr. Greenfield and Dr. Sinclair outpatient.     6. Essential hypertension: /67, dropped to 100/60.  On home amlodipine 5mg qd, atenolol 25mg qd, and losartan 25mg qd.  - c/w atenolol 25mg qd  - hold remaining meds i/s/o AL (can likely restart on d/c)    #HLD  - c/w home atorvastatin 20mg qd    #CVA  No residual effects.    7. Afib: Patient with hx of afib on eliquis 5mg qd, s/p PPM.  Admission EKG with NSR.  -c/w eliquis    8. Glaucoma: Patient on brimonidine 0.15%, dorzolamide 2%, latanoprost 0.005% ophthalmic solutions  - c/w home meds     9. Asthma: - c/w home albuterol prn.     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  In addition to the back pain, the patient has noticed increased thickness and yellowing of her sputum, which makes it difficult for her to cough up.  She has a cough and thin, white sputum production at baseline, but has not been diagnosed with COPD.  She also has had increased sob. On admission, CXR s/f small right basal pleural effusion.  CTA of chest: pulmonary emboli in the right lower lobe pulmonary artery as well as the lingular branch of the left upper lobe pulmonary artery. There is also CT suggestion of right heart strain however TTE on admission did not show evidence of RHS.  Pt was admitted for PNA treatment as well as PE treatment. Was restarted on eliquis 10mg BID as pt was not thought to have failed eliquis, rather was thought to have potentially not been appropriately taking. Therefore, on 10mg eliquis BID x 7 days, followed by 5mg BID. Also receiving antibiotics for treatment of pneumonia. Given c/f initial pleural effusion requiring potential thoracentesis, ultrasound of lung was performed however showed consolidation with no fluid pocket therefore thoracentesis not indicated.  Problem List/Main Diagnoses (system-based):     1.Pulmonary embolism: CTA chest s/f pulmonary emboli in the right lower lobe pulmonary artery as well as the lingular branch of the left upper lobe pulmonary artery. CT suggestion of right heart strain.  - CTA also notable for complete consolidation of the right lower lobe as well as new loculated small to moderate right pleural effusion  - now on eliquis restarted loading dose 10mg BID x 7 days 5/13, to be then transitioned to 5mg BID on 5/20 AM    2. PNA: Patient with recurrent PNA, most recently treated 3 weeks ago.  Presents today with leukocytosis of 10.95 with neutrophilic predominance, elevated CRP, and elevated procal.  In addition, patient with increasing sputum production and change in color.  CXR showing opacification/consolidation in R base.    - received 3g unasyn in ED  - broadened to Vanc and Zosyn, as patient recently treated outpatient for PNA with antibiotics  - MRSA swab negative therefore vanc d/c  - albuterol inhaler prn sob     3. Lung cancer: Patient with hx of lung ca s/p resection in 2000, no chemo/RT.  Follows with Dr. Greenfield and Dr. Sinclair outpatient.     6. Essential hypertension: /67, dropped to 100/60.  On home amlodipine 5mg qd, atenolol 25mg qd, and losartan 25mg qd.  - can resume home meds on discharge    #HLD  - c/w home atorvastatin 20mg qd    #CVA  No residual effects.    7. Afib: Patient with hx of afib on eliquis 5mg qd, s/p PPM.  Admission EKG with NSR.  -c/w eliquis    8. Glaucoma: Patient on brimonidine 0.15%, dorzolamide 2%, latanoprost 0.005% ophthalmic solutions  - c/w home meds     9. Asthma: - c/w home albuterol prn.     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  In addition to the back pain, the patient has noticed increased thickness and yellowing of her sputum, which makes it difficult for her to cough up.  She has a cough and thin, white sputum production at baseline, but has not been diagnosed with COPD.  She also has had increased sob. On admission, CXR s/f small right basal pleural effusion.  CTA of chest: pulmonary emboli in the right lower lobe pulmonary artery as well as the lingular branch of the left upper lobe pulmonary artery. There is also CT suggestion of right heart strain however TTE on admission did not show evidence of RHS.  Pt was admitted for PNA treatment as well as PE treatment. Was restarted on eliquis 10mg BID as pt was not thought to have failed eliquis, rather was thought to have potentially not been appropriately taking. Therefore, on 10mg eliquis BID x 7 days, followed by 5mg BID. Also receiving antibiotics for treatment of pneumonia. Given c/f initial pleural effusion requiring potential thoracentesis, ultrasound of lung was performed however showed consolidation with no fluid pocket therefore thoracentesis not indicated.  Problem List/Main Diagnoses (system-based):     1.Pulmonary embolism: CTA chest s/f pulmonary emboli in the right lower lobe pulmonary artery as well as the lingular branch of the left upper lobe pulmonary artery. CT suggestion of right heart strain.  - CTA also notable for complete consolidation of the right lower lobe as well as new loculated small to moderate right pleural effusion  - now on eliquis restarted loading dose 10mg BID x 7 days 5/13, to be then transitioned to 5mg BID on 5/20 AM    2. PNA: Patient with recurrent PNA, most recently treated 3 weeks ago.  Presents today with leukocytosis of 10.95 with neutrophilic predominance, elevated CRP, and elevated procal.  In addition, patient with increasing sputum production and change in color.  CXR showing opacification/consolidation in R base.    - received 3g unasyn in ED  - broadened to Vanc and Zosyn, as patient recently treated outpatient for PNA with antibiotics  - MRSA swab negative therefore vanc d/c  - albuterol inhaler prn sob    3. Lung cancer: Patient with hx of lung ca s/p resection in 2000, no chemo/RT.  Follows with Dr. Greenfield and Dr. Sinclair outpatient.     6. Essential hypertension: /67, dropped to 100/60.  On home amlodipine 5mg qd, atenolol 25mg qd, and losartan 25mg qd.  - can resume home meds on discharge    #HLD  - c/w home atorvastatin 20mg qd    #CVA  No residual effects.    7. Afib: Patient with hx of afib on eliquis 5mg qd, s/p PPM.  Admission EKG with NSR.  -c/w eliquis    8. Glaucoma: Patient on brimonidine 0.15%, dorzolamide 2%, latanoprost 0.005% ophthalmic solutions  - c/w home meds     9. Asthma: - c/w home albuterol prn.     Inpatient treatment course: please see hospital coure above     New medications: eliquis 10mg BID x 7 days, followed by 5mg BID; antibiotics for pneumonia     Labs to be followed outpatient: None    Exam to be followed outpatient: None

## 2021-05-13 NOTE — DISCHARGE NOTE PROVIDER - CARE PROVIDERS DIRECT ADDRESSES
[email or web address]

## 2021-05-13 NOTE — PROGRESS NOTE ADULT - PROBLEM SELECTOR PLAN 3
CXR with evidence of right basal pleural effusion.  - holding eliquis for possible thoracentesis  - f/u pulm recs (Dr. Sinclair)

## 2021-05-13 NOTE — DISCHARGE NOTE PROVIDER - DETAILS OF MALNUTRITION DIAGNOSIS/DIAGNOSES
This patient has been assessed with a concern for Malnutrition and was treated during this hospitalization for the following Nutrition diagnosis/diagnoses:     -  05/13/2021: Severe protein-calorie malnutrition   -  05/13/2021: Underweight (BMI < 19)

## 2021-05-13 NOTE — DISCHARGE NOTE PROVIDER - NSDCFUSCHEDAPPT_GEN_ALL_CORE_FT
[facility] ; 06/02/2021 ; NPP Intmed [address] [facility] ; 06/29/2021 ; NPP PulmMed [address] [place] [address] ; 08/03/2021 ; NPP Nephro [address]

## 2021-05-13 NOTE — DISCHARGE NOTE PROVIDER - NSDCCPTREATMENT_GEN_ALL_CORE_FT
PRINCIPAL PROCEDURE  Procedure: CTA chest w/w/o contrast  Findings and Treatment: There is significant artifact associated with this examination as described above. Within that limitation however there is suspicion for pulmonary emboli in the right lower lobe pulmonary artery as well as the lingular branch of the left upper lobe pulmonary artery. There is also CT suggestion of right heart strain. As clinically indicated a repeat examination may be of value for confirmation.  Since 2/19/2021, there is now near complete consolidation of the right lower lobe as well as new loculated small to moderate right pleural effusion. Interval improvement in a few right-sided nodular lung opacities and masses with a residual 12 mm nodule within the lateral and inferior aspect of the right upper lobe as above. Abbreviated differential includes improved infectious, inflammatory and neoplastic etiologies.

## 2021-05-13 NOTE — DISCHARGE NOTE PROVIDER - NSDCCPCAREPLAN_GEN_ALL_CORE_FT
PRINCIPAL DISCHARGE DIAGNOSIS  Diagnosis: PE (pulmonary thromboembolism)  Assessment and Plan of Treatment: Pulmonary embolism (or "PE") is a blockage in one or more of the blood vessels that supply blood to the lungs. Most often these blockages are caused by blood clots that form elsewhere and then travel to the lungs. In rare cases, blockages can also be caused by air bubbles, tiny globs of fat, or pieces of tumor that travel to the lungs. If a blood clot forms or gets stuck inside a blood vessel, it can clog the vessel and keep blood from getting where it needs to go. When that happens in the lungs, the lungs can get damaged. Having blocked arteries in the lung can also make it hard to breathe and can even lead to death. Common symptoms include panting, shortness of breath, or trouble breathing, sharp, knife-like chest pain when you breathe in or strain, coughing or coughing up blood or simply a rapid heartbeat. If you get any of these symptoms, especially if they happen over a short period of time (hours or days) or get worse quickly, call for an ambulance. At the hospital, doctors can run tests to find out if you do have a clot. Blood clots in the lungs can lead to death. That's why it's important to act fast and find out if there is a clot. ____________________      SECONDARY DISCHARGE DIAGNOSES  Diagnosis: Afib  Assessment and Plan of Treatment: You have a known history of afib. This is an irregular, often rapid heart rate that commonly causes poor blood flow. The heart's upper chambers (atria) beat out of coordination with the lower chambers (ventricles). This condition may have no symptoms, but when symptoms do appear they include palpitations, shortness of breath, and fatigue. Treatments include drugs, electrical shock (cardioversion), and minimally invasive surgery (ablation). Continue to take your blood thinner and rate controlling medications as directed. Please continue to take your medications as prescribed and follow up with your primary care doctor and cardiologist in 10-14 days.     PRINCIPAL DISCHARGE DIAGNOSIS  Diagnosis: PE (pulmonary thromboembolism)  Assessment and Plan of Treatment: Pulmonary embolism (or "PE") is a blockage in one or more of the blood vessels that supply blood to the lungs. Most often these blockages are caused by blood clots that form elsewhere and then travel to the lungs. In rare cases, blockages can also be caused by air bubbles, tiny globs of fat, or pieces of tumor that travel to the lungs. If a blood clot forms or gets stuck inside a blood vessel, it can clog the vessel and keep blood from getting where it needs to go. When that happens in the lungs, the lungs can get damaged. Having blocked arteries in the lung can also make it hard to breathe and can even lead to death. Common symptoms include panting, shortness of breath, or trouble breathing, sharp, knife-like chest pain when you breathe in or strain, coughing or coughing up blood or simply a rapid heartbeat. If you get any of these symptoms, especially if they happen over a short period of time (hours or days) or get worse quickly, call for an ambulance.   Please continue to take Eliquis 10mg twice per day for the next 3 days (last dose of 10mg in PM of 5/19). Then, please continue to take 5mg twice per day.      SECONDARY DISCHARGE DIAGNOSES  Diagnosis: PNA (pneumonia)  Assessment and Plan of Treatment: You were found to have a pneumonia. Pneumonia is an infection that inflames air sacs in one or both lungs, which may fill with fluid. With pneumonia, the air sacs may fill with fluid or pus. The infection can be life-threatening to infants, children, and people over 65. Symptoms include cough with phlegm or pus, fever, chills, and difficulty breathing. Antibiotics can treat many forms of pneumonia. Some forms of pneumonia can be prevented by vaccines.   Please continue to finish your couse of antibiotics for this pneumonia with 200mg of cefpodoxime 2 times per day for 2 more days.    Diagnosis: Afib  Assessment and Plan of Treatment: You have a known history of afib. This is an irregular, often rapid heart rate that commonly causes poor blood flow. The heart's upper chambers (atria) beat out of coordination with the lower chambers (ventricles). This condition may have no symptoms, but when symptoms do appear they include palpitations, shortness of breath, and fatigue. Please continue to take your blood thinner and rate controlling medications as directed. Please continue to take your medications as prescribed and follow up with your primary care doctor and cardiologist in 10-14 days.

## 2021-05-13 NOTE — DIETITIAN INITIAL EVALUATION ADULT. - PROBLEM SELECTOR PLAN 2
Patient with recurrent PNA, most recently treated 3 weeks ago.  Presents today with leukocytosis of 10.95 with neutrophilic predominance, elevated CRP, and elevated procal.  In addition, patient with increasing sputum production and change in color.  CXR showing opacification/consolidation in R base.    - received 3g unasyn in ED  - broadened to Vanc and Zosyn, as patient recently treated outpatient for PNA with antibiotics  - MRSA swab; if negative, will dc vanc  - albuterol inhaler prn sob     #Respiratory acidosis  Likely 2/2 both chronic and acute lung pathology

## 2021-05-13 NOTE — DISCHARGE NOTE PROVIDER - CARE PROVIDER_API CALL
[place] Sinclair)  Critical Care Medicine; Pulmonary Disease  [address], [address] [place] [id number]  Phone: [phone]  Fax: [phone]  Follow Up Time: 1 week    Michaela Greenfield)  Critical Care Medicine; Internal Medicine  [address], [address] [address]  Phone: [phone]  Fax: [phone]  Follow Up Time: 1 week

## 2021-05-13 NOTE — DISCHARGE NOTE PROVIDER - PROVIDER TOKENS
PROVIDER:[TOKEN:[[id number]],FOLLOWUP:[1 week]],PROVIDER:[TOKEN:[[id number]],FOLLOWUP:[1 week]]

## 2021-05-13 NOTE — PROGRESS NOTE ADULT - SUBJECTIVE AND OBJECTIVE BOX
INTERVAL HPI/OVERNIGHT EVENTS:  Interim reviewed; Discussed with Dr. Sinclair; Effusion small and no tap;  Will treat with antibiotics;  No eating;   Will discuss with daughter plans      MEDICATIONS  (STANDING):  ATENolol  Tablet 25 milliGRAM(s) Oral daily  atorvastatin 20 milliGRAM(s) Oral at bedtime  brimonidine 0.2% Ophthalmic Solution 1 Drop(s) Both EYES three times a day  dorzolamide 2% Ophthalmic Solution 1 Drop(s) Both EYES <User Schedule>  latanoprost 0.005% Ophthalmic Solution 1 Drop(s) Both EYES at bedtime  megestrol Suspension 800 milliGRAM(s) Oral daily  multivitamin 1 Tablet(s) Oral daily  piperacillin/tazobactam IVPB.. 4.5 Gram(s) IV Intermittent every 8 hours    MEDICATIONS  (PRN):  ALBUTerol    90 MICROgram(s) HFA Inhaler 2 Puff(s) Inhalation every 4 hours PRN Shortness of Breath  oxyCODONE    IR 5 milliGRAM(s) Oral every 6 hours PRN Severe Pain (7 - 10)      Allergies    No Known Allergies    Intolerances        Vital Signs Last 24 Hrs  T(C): 36.8 (13 May 2021 09:07), Max: 37.5 (12 May 2021 11:32)  T(F): 98.3 (13 May 2021 09:07), Max: 99.5 (12 May 2021 11:32)  HR: 96 (13 May 2021 09:07) (84 - 101)  BP: 145/86 (13 May 2021 09:07) (100/60 - 145/86)  BP(mean): --  RR: 18 (13 May 2021 09:07) (18 - 19)  SpO2: 93% (13 May 2021 09:07) (92% - 100%)          Constitutional:  Awake    Eyes: BENY    ENMT: Negative    Neck: Supple    Back:  no tenderness     Respiratory:  clear    Cardiovascular: S1 S2    Gastrointestinal: soft     Genitourinary:    Extremities:  no edema    Vascular:    Neurological:    Skin:    Lymph Nodes:             @ 07:01  -   @ 07:00  --------------------------------------------------------  IN: 200 mL / OUT: 500 mL / NET: -300 mL      LABS:                        13.4   22.91 )-----------( 322      ( 13 May 2021 08:02 )             41.0     05    136  |  102  |  18  ----------------------------<  107<H>  4.5   |  18<L>  |  1.10    Ca    10.0      13 May 2021 08:02  Phos  4.3       Mg     2.0         TPro  8.2  /  Alb  3.5  /  TBili  0.4  /  DBili  x   /  AST  18  /  ALT  16  /  AlkPhos  105  05-12      Urinalysis Basic - ( 12 May 2021 09:18 )    Color: Yellow / Appearance: Clear / S.020 / pH: x  Gluc: x / Ketone: NEGATIVE  / Bili: Negative / Urobili: 0.2 E.U./dL   Blood: x / Protein: NEGATIVE mg/dL / Nitrite: NEGATIVE   Leuk Esterase: NEGATIVE / RBC: x / WBC x   Sq Epi: x / Non Sq Epi: x / Bacteria: x        RADIOLOGY & ADDITIONAL TESTS:

## 2021-05-13 NOTE — DIETITIAN NUTRITION RISK NOTIFICATION - TREATMENT: THE FOLLOWING DIET HAS BEEN RECOMMENDED
Pt meets ASPEN criteria for SPCM in setting of chronic illness AEB 23kg, 32% wt loss x7-8 months, suspect meeting <75% EER >1 month      1. Encourage intake through day   2. Manage pain prn   3. Trend wts   4. Reinforce ed   5. Add Ensure Enlive BID (700 kcal, 40g protein, 360 mL free H2O)   6. Liberalize diet to regular to best meet needs

## 2021-05-14 DIAGNOSIS — J15.9 UNSPECIFIED BACTERIAL PNEUMONIA: ICD-10-CM

## 2021-05-14 DIAGNOSIS — R63.0 ANOREXIA: ICD-10-CM

## 2021-05-14 LAB
ANION GAP SERPL CALC-SCNC: 12 MMOL/L — SIGNIFICANT CHANGE UP (ref 5–17)
BUN SERPL-MCNC: 18 MG/DL — SIGNIFICANT CHANGE UP (ref 7–23)
CALCIUM SERPL-MCNC: 9.4 MG/DL — SIGNIFICANT CHANGE UP (ref 8.4–10.5)
CHLORIDE SERPL-SCNC: 105 MMOL/L — SIGNIFICANT CHANGE UP (ref 96–108)
CO2 SERPL-SCNC: 19 MMOL/L — LOW (ref 22–31)
CREAT SERPL-MCNC: 1 MG/DL — SIGNIFICANT CHANGE UP (ref 0.5–1.3)
GLUCOSE SERPL-MCNC: 95 MG/DL — SIGNIFICANT CHANGE UP (ref 70–99)
HCT VFR BLD CALC: 39.6 % — SIGNIFICANT CHANGE UP (ref 34.5–45)
HGB BLD-MCNC: 12.6 G/DL — SIGNIFICANT CHANGE UP (ref 11.5–15.5)
MAGNESIUM SERPL-MCNC: 2.1 MG/DL — SIGNIFICANT CHANGE UP (ref 1.6–2.6)
MCHC RBC-ENTMCNC: 29.6 PG — SIGNIFICANT CHANGE UP (ref 27–34)
MCHC RBC-ENTMCNC: 31.8 GM/DL — LOW (ref 32–36)
MCV RBC AUTO: 93.2 FL — SIGNIFICANT CHANGE UP (ref 80–100)
NRBC # BLD: 0 /100 WBCS — SIGNIFICANT CHANGE UP (ref 0–0)
PHOSPHATE SERPL-MCNC: 3.1 MG/DL — SIGNIFICANT CHANGE UP (ref 2.5–4.5)
PLATELET # BLD AUTO: 231 K/UL — SIGNIFICANT CHANGE UP (ref 150–400)
POTASSIUM SERPL-MCNC: 4.1 MMOL/L — SIGNIFICANT CHANGE UP (ref 3.5–5.3)
POTASSIUM SERPL-SCNC: 4.1 MMOL/L — SIGNIFICANT CHANGE UP (ref 3.5–5.3)
RBC # BLD: 4.25 M/UL — SIGNIFICANT CHANGE UP (ref 3.8–5.2)
RBC # FLD: 17.2 % — HIGH (ref 10.3–14.5)
SODIUM SERPL-SCNC: 136 MMOL/L — SIGNIFICANT CHANGE UP (ref 135–145)
WBC # BLD: 17.36 K/UL — HIGH (ref 3.8–10.5)
WBC # FLD AUTO: 17.36 K/UL — HIGH (ref 3.8–10.5)

## 2021-05-14 PROCEDURE — 99232 SBSQ HOSP IP/OBS MODERATE 35: CPT | Mod: GC

## 2021-05-14 PROCEDURE — 99233 SBSQ HOSP IP/OBS HIGH 50: CPT | Mod: GC

## 2021-05-14 PROCEDURE — 70450 CT HEAD/BRAIN W/O DYE: CPT | Mod: 26

## 2021-05-14 PROCEDURE — 71045 X-RAY EXAM CHEST 1 VIEW: CPT | Mod: 26

## 2021-05-14 RX ORDER — ENOXAPARIN SODIUM 100 MG/ML
45 INJECTION SUBCUTANEOUS EVERY 12 HOURS
Refills: 0 | Status: DISCONTINUED | OUTPATIENT
Start: 2021-05-15 | End: 2021-05-15

## 2021-05-14 RX ADMIN — Medication 200 MILLIGRAM(S): at 23:56

## 2021-05-14 RX ADMIN — LATANOPROST 1 DROP(S): 0.05 SOLUTION/ DROPS OPHTHALMIC; TOPICAL at 22:21

## 2021-05-14 RX ADMIN — PANTOPRAZOLE SODIUM 40 MILLIGRAM(S): 20 TABLET, DELAYED RELEASE ORAL at 06:29

## 2021-05-14 RX ADMIN — APIXABAN 10 MILLIGRAM(S): 2.5 TABLET, FILM COATED ORAL at 01:26

## 2021-05-14 RX ADMIN — MEGESTROL ACETATE 800 MILLIGRAM(S): 40 SUSPENSION ORAL at 11:33

## 2021-05-14 RX ADMIN — OXYCODONE HYDROCHLORIDE 5 MILLIGRAM(S): 5 TABLET ORAL at 22:50

## 2021-05-14 RX ADMIN — BRIMONIDINE TARTRATE 1 DROP(S): 2 SOLUTION/ DROPS OPHTHALMIC at 22:21

## 2021-05-14 RX ADMIN — ATORVASTATIN CALCIUM 20 MILLIGRAM(S): 80 TABLET, FILM COATED ORAL at 22:21

## 2021-05-14 RX ADMIN — PIPERACILLIN AND TAZOBACTAM 200 GRAM(S): 4; .5 INJECTION, POWDER, LYOPHILIZED, FOR SOLUTION INTRAVENOUS at 18:04

## 2021-05-14 RX ADMIN — PIPERACILLIN AND TAZOBACTAM 200 GRAM(S): 4; .5 INJECTION, POWDER, LYOPHILIZED, FOR SOLUTION INTRAVENOUS at 22:28

## 2021-05-14 RX ADMIN — APIXABAN 10 MILLIGRAM(S): 2.5 TABLET, FILM COATED ORAL at 14:11

## 2021-05-14 RX ADMIN — Medication 1 TABLET(S): at 11:33

## 2021-05-14 RX ADMIN — BRIMONIDINE TARTRATE 1 DROP(S): 2 SOLUTION/ DROPS OPHTHALMIC at 14:10

## 2021-05-14 RX ADMIN — BRIMONIDINE TARTRATE 1 DROP(S): 2 SOLUTION/ DROPS OPHTHALMIC at 06:32

## 2021-05-14 RX ADMIN — DORZOLAMIDE HYDROCHLORIDE 1 DROP(S): 20 SOLUTION/ DROPS OPHTHALMIC at 19:24

## 2021-05-14 RX ADMIN — PIPERACILLIN AND TAZOBACTAM 200 GRAM(S): 4; .5 INJECTION, POWDER, LYOPHILIZED, FOR SOLUTION INTRAVENOUS at 11:33

## 2021-05-14 RX ADMIN — DORZOLAMIDE HYDROCHLORIDE 1 DROP(S): 20 SOLUTION/ DROPS OPHTHALMIC at 06:32

## 2021-05-14 RX ADMIN — PIPERACILLIN AND TAZOBACTAM 200 GRAM(S): 4; .5 INJECTION, POWDER, LYOPHILIZED, FOR SOLUTION INTRAVENOUS at 06:28

## 2021-05-14 RX ADMIN — OXYCODONE HYDROCHLORIDE 5 MILLIGRAM(S): 5 TABLET ORAL at 22:28

## 2021-05-14 RX ADMIN — ATENOLOL 25 MILLIGRAM(S): 25 TABLET ORAL at 06:29

## 2021-05-14 NOTE — PHYSICAL THERAPY INITIAL EVALUATION ADULT - ADDITIONAL COMMENTS
Per daughter, pt lives in an apartment with elevator access, alone. Pt has HHA from 10a-3pm, one of her daughters will relieve the HHA after 3pm and stay with pt until she falls asleep. Pt's sister lives upstairs and will check on the pt throughout the night and makes breakfast in the morning until HHA arrives. Pt owns a RW but does not use it in the home, pt is blind and knows the layout of the home according to pt's daughter. Outside pt uses RW +1person assist to navigate environment. Pt bathe and uses restroom independently.

## 2021-05-14 NOTE — PROGRESS NOTE ADULT - SUBJECTIVE AND OBJECTIVE BOX
INTERVAL HPI/OVERNIGHT EVENTS:  Will not eat;   Appears depressed;  Discussed with patients daughter      MEDICATIONS  (STANDING):  apixaban 10 milliGRAM(s) Oral every 12 hours  ATENolol  Tablet 25 milliGRAM(s) Oral daily  atorvastatin 20 milliGRAM(s) Oral at bedtime  brimonidine 0.2% Ophthalmic Solution 1 Drop(s) Both EYES three times a day  dorzolamide 2% Ophthalmic Solution 1 Drop(s) Both EYES <User Schedule>  latanoprost 0.005% Ophthalmic Solution 1 Drop(s) Both EYES at bedtime  megestrol Suspension 800 milliGRAM(s) Oral daily  multivitamin 1 Tablet(s) Oral daily  pantoprazole    Tablet 40 milliGRAM(s) Oral before breakfast  piperacillin/tazobactam IVPB.. 3.375 Gram(s) IV Intermittent every 6 hours    MEDICATIONS  (PRN):  ALBUTerol    90 MICROgram(s) HFA Inhaler 2 Puff(s) Inhalation every 4 hours PRN Shortness of Breath  oxyCODONE    IR 5 milliGRAM(s) Oral every 6 hours PRN Severe Pain (7 - 10)      Allergies    No Known Allergies    Intolerances        Vital Signs Last 24 Hrs  T(C): 36.6 (14 May 2021 08:55), Max: 36.8 (14 May 2021 05:17)  T(F): 97.9 (14 May 2021 08:55), Max: 98.2 (14 May 2021 05:17)  HR: 82 (14 May 2021 10:15) (82 - 101)  BP: 128/72 (14 May 2021 08:55) (128/72 - 151/80)  BP(mean): --  RR: 20 (14 May 2021 10:15) (17 - 20)  SpO2: 94% (14 May 2021 10:15) (92% - 96%)          Constitutional:  Awake    Eyes: BENY    ENMT: Negative    Neck: Supple    Back:  no tenderness     Respiratory:  rhonchi     Cardiovascular: S1 S2    Gastrointestinal: soft    Genitourinary:    Extremities:  no edema    Vascular:    Neurological: Awake and appears alert;    Skin:    Lymph Nodes:            05-13 @ 07:01  -  05-14 @ 07:00  --------------------------------------------------------  IN: 200 mL / OUT: 0 mL / NET: 200 mL      LABS:                        12.6   17.36 )-----------( 231      ( 14 May 2021 06:54 )             39.6     05-14    136  |  105  |  18  ----------------------------<  95  4.1   |  19<L>  |  1.00    Ca    9.4      14 May 2021 06:54  Phos  3.1     05-14  Mg     2.1     05-14            RADIOLOGY & ADDITIONAL TESTS:

## 2021-05-14 NOTE — PROGRESS NOTE ADULT - PROBLEM SELECTOR PLAN 1
CTA chest s/f pulmonary emboli in the right lower lobe pulmonary artery as well as the lingular branch of the left upper lobe pulmonary artery. CT suggestion of right heart strain.  - CTA also notable for complete consolidation of the right lower lobe as well as new loculated small to moderate right pleural effusion  - no intervention (thora) per Dr. Sinclair, rx with abx and eliquis  - formal TTE 5/13 did not show any evidence of RHS CTA chest s/f pulmonary emboli in the right lower lobe pulmonary artery as well as the lingular branch of the left upper lobe pulmonary artery. CT suggestion of right heart strain.  - no intervention (thora) per Dr. Sinclair  - formal TTE 5/13 did not show any evidence of RHS  - c/w eliquis 10mg BID x 7 days, followed by 5mg BID

## 2021-05-14 NOTE — PROGRESS NOTE ADULT - SUBJECTIVE AND OBJECTIVE BOX
OVERNIGHT EVENTS:    SUBJECTIVE / INTERVAL HPI: Patient seen and examined at bedside. No fevers/chills, nausea, vomiting, diarrhea, abdominal pain, chest pain, shortness of breath.      VITAL SIGNS:  Vital Signs Last 24 Hrs  T(C): 36.8 (14 May 2021 05:17), Max: 36.8 (13 May 2021 09:07)  T(F): 98.2 (14 May 2021 05:17), Max: 98.3 (13 May 2021 09:07)  HR: 96 (14 May 2021 06:27) (95 - 101)  BP: 151/80 (14 May 2021 05:17) (129/78 - 151/80)  BP(mean): --  RR: 17 (14 May 2021 06:27) (17 - 18)  SpO2: 94% (14 May 2021 06:27) (92% - 94%)    PHYSICAL EXAM:    General: WDWN  HEENT: NC/AT; PERRL, anicteric sclera; MMM  Neck: supple  Cardiovascular: +S1/S2; RRR  Respiratory: CTA B/L; no W/R/R  Gastrointestinal: soft, NT/ND; +BSx4  Extremities: WWP; no edema, clubbing or cyanosis  Vascular: 2+ radial, DP/PT pulses B/L  Neurological: AAOx3; no focal deficits    MEDICATIONS:  MEDICATIONS  (STANDING):  apixaban 10 milliGRAM(s) Oral every 12 hours  ATENolol  Tablet 25 milliGRAM(s) Oral daily  atorvastatin 20 milliGRAM(s) Oral at bedtime  brimonidine 0.2% Ophthalmic Solution 1 Drop(s) Both EYES three times a day  dorzolamide 2% Ophthalmic Solution 1 Drop(s) Both EYES <User Schedule>  latanoprost 0.005% Ophthalmic Solution 1 Drop(s) Both EYES at bedtime  megestrol Suspension 800 milliGRAM(s) Oral daily  multivitamin 1 Tablet(s) Oral daily  pantoprazole    Tablet 40 milliGRAM(s) Oral before breakfast  piperacillin/tazobactam IVPB.. 3.375 Gram(s) IV Intermittent every 6 hours    MEDICATIONS  (PRN):  ALBUTerol    90 MICROgram(s) HFA Inhaler 2 Puff(s) Inhalation every 4 hours PRN Shortness of Breath  oxyCODONE    IR 5 milliGRAM(s) Oral every 6 hours PRN Severe Pain (7 - 10)      ALLERGIES:  Allergies    No Known Allergies    Intolerances        LABS:                        12.6   17.36 )-----------( 231      ( 14 May 2021 06:54 )             39.6     05-13    136  |  102  |  18  ----------------------------<  107<H>  4.5   |  18<L>  |  1.10    Ca    10.0      13 May 2021 08:02  Phos  4.3     -  Mg     2.0             Urinalysis Basic - ( 12 May 2021 09:18 )    Color: Yellow / Appearance: Clear / S.020 / pH: x  Gluc: x / Ketone: NEGATIVE  / Bili: Negative / Urobili: 0.2 E.U./dL   Blood: x / Protein: NEGATIVE mg/dL / Nitrite: NEGATIVE   Leuk Esterase: NEGATIVE / RBC: x / WBC x   Sq Epi: x / Non Sq Epi: x / Bacteria: x      CAPILLARY BLOOD GLUCOSE          RADIOLOGY & ADDITIONAL TESTS: Reviewed.   OVERNIGHT EVENTS: Pt was agitated overnight, getting out of bed. was placed on enhanced supervision.    SUBJECTIVE / INTERVAL HPI: Patient seen and examined at bedside. Was sleepy but arousable. States that she feels better today compared to yesterday. No fevers/chills, nausea, vomiting, diarrhea, abdominal pain, chest pain, shortness of breath.      VITAL SIGNS:  Vital Signs Last 24 Hrs  T(C): 36.8 (14 May 2021 05:17), Max: 36.8 (13 May 2021 09:07)  T(F): 98.2 (14 May 2021 05:17), Max: 98.3 (13 May 2021 09:07)  HR: 96 (14 May 2021 06:27) (95 - 101)  BP: 151/80 (14 May 2021 05:17) (129/78 - 151/80)  BP(mean): --  RR: 17 (14 May 2021 06:27) (17 - 18)  SpO2: 94% (14 May 2021 06:27) (92% - 94%)    PHYSICAL EXAM:    General: elderly woman in NAD sleeping in bed  HEENT: NC/AT; anicteric sclera; dry MM  Cardiovascular: +S1/S2; RRR  Respiratory: ronchi and coarse breath sounds diffusely bilaterally, slightly worse compared  to yesterday; in increased work of breathing or accessory muscle use  Gastrointestinal: soft, NT/ND; +BSx4  Extremities: WWP; no edema, clubbing or cyanosis  Vascular: 2+ radial, DP pulses B/L  Neurological: AAOx3; no focal deficits    MEDICATIONS:  MEDICATIONS  (STANDING):  apixaban 10 milliGRAM(s) Oral every 12 hours  ATENolol  Tablet 25 milliGRAM(s) Oral daily  atorvastatin 20 milliGRAM(s) Oral at bedtime  brimonidine 0.2% Ophthalmic Solution 1 Drop(s) Both EYES three times a day  dorzolamide 2% Ophthalmic Solution 1 Drop(s) Both EYES <User Schedule>  latanoprost 0.005% Ophthalmic Solution 1 Drop(s) Both EYES at bedtime  megestrol Suspension 800 milliGRAM(s) Oral daily  multivitamin 1 Tablet(s) Oral daily  pantoprazole    Tablet 40 milliGRAM(s) Oral before breakfast  piperacillin/tazobactam IVPB.. 3.375 Gram(s) IV Intermittent every 6 hours    MEDICATIONS  (PRN):  ALBUTerol    90 MICROgram(s) HFA Inhaler 2 Puff(s) Inhalation every 4 hours PRN Shortness of Breath  oxyCODONE    IR 5 milliGRAM(s) Oral every 6 hours PRN Severe Pain (7 - 10)      ALLERGIES:  Allergies    No Known Allergies    Intolerances        LABS:                        12.6   17.36 )-----------( 231      ( 14 May 2021 06:54 )             39.6     05-13    136  |  102  |  18  ----------------------------<  107<H>  4.5   |  18<L>  |  1.10    Ca    10.0      13 May 2021 08:02  Phos  4.3     -  Mg     2.0             Urinalysis Basic - ( 12 May 2021 09:18 )    Color: Yellow / Appearance: Clear / S.020 / pH: x  Gluc: x / Ketone: NEGATIVE  / Bili: Negative / Urobili: 0.2 E.U./dL   Blood: x / Protein: NEGATIVE mg/dL / Nitrite: NEGATIVE   Leuk Esterase: NEGATIVE / RBC: x / WBC x   Sq Epi: x / Non Sq Epi: x / Bacteria: x      CAPILLARY BLOOD GLUCOSE          RADIOLOGY & ADDITIONAL TESTS: Reviewed.

## 2021-05-14 NOTE — PROGRESS NOTE ADULT - PROBLEM SELECTOR PLAN 4
Patient with admission Cr 1.05 with baseline noted to be 0.6-0.7.  Likely prerenal i/s/o infection.  - if no improvement in Cr, order urine lytes

## 2021-05-14 NOTE — CHART NOTE - NSCHARTNOTEFT_GEN_A_CORE
Patient according her daughter has change in mental status;  Refusing to eat; She wants CT of Head; which was ordered

## 2021-05-14 NOTE — PROGRESS NOTE ADULT - PROBLEM SELECTOR PLAN 1
continue on the antibiotic She is responding with decrease in the wbc and improvement in the clinical condition

## 2021-05-14 NOTE — PHYSICAL THERAPY INITIAL EVALUATION ADULT - MANUAL MUSCLE TESTING RESULTS, REHAB EVAL
Grossly assessed with functional mobility. Pt able to ambulate; atleast 3+/5 in all major B LE musculature.

## 2021-05-14 NOTE — PHYSICAL THERAPY INITIAL EVALUATION ADULT - PERTINENT HX OF CURRENT PROBLEM, REHAB EVAL
84yo F presenting with back pain. 86yo F presenting with back pain. CT chest shows PE, pneumonia and plueral effusion.

## 2021-05-14 NOTE — PROGRESS NOTE ADULT - SUBJECTIVE AND OBJECTIVE BOX
Interval Events: Reviewed  Patient seen and examined at bedside.    Patient is a 85y old  Female who presents with a chief complaint of   she is doing well  PAST MEDICAL & SURGICAL HISTORY:  Glaucoma  blind in L eye, 2% vision in R eye    Essential hypertension  HTN (hypertension)    History of pneumonia    Asthma, unspecified asthma severity, unspecified whether complicated, unspecified whether persistent    Cerebrovascular accident (CVA), unspecified mechanism  remote hx    Hyperlipidemia, unspecified hyperlipidemia type    Afib    Lung cancer  s/p resection in 2000, no chemo/RT    History of cholecystectomy    Cardiac pacemaker    H/O abdominal hysterectomy    Lung cancer  s/p resection    Cataract of right eye        MEDICATIONS:  Pulmonary:  ALBUTerol    90 MICROgram(s) HFA Inhaler 2 Puff(s) Inhalation every 4 hours PRN    Antimicrobials:  piperacillin/tazobactam IVPB.. 3.375 Gram(s) IV Intermittent every 6 hours    Anticoagulants:  apixaban 10 milliGRAM(s) Oral every 12 hours    Cardiac:  ATENolol  Tablet 25 milliGRAM(s) Oral daily      Allergies    No Known Allergies    Intolerances        Vital Signs Last 24 Hrs  T(C): 36.6 (14 May 2021 08:55), Max: 36.8 (14 May 2021 05:17)  T(F): 97.9 (14 May 2021 08:55), Max: 98.2 (14 May 2021 05:17)  HR: 82 (14 May 2021 10:15) (82 - 101)  BP: 128/72 (14 May 2021 08:55) (128/72 - 151/80)  BP(mean): --  RR: 20 (14 May 2021 10:15) (17 - 20)  SpO2: 94% (14 May 2021 10:15) (92% - 96%)    05-13 @ 07:01  -  05-14 @ 07:00  --------------------------------------------------------  IN: 200 mL / OUT: 0 mL / NET: 200 mL          Review of Systems:   •	General: negative  •	Skin/Breast: negative  •	Ophthalmologic: negative  •	ENMT: negative  •	Respiratory and Thorax: negative  •	Cardiovascular: negative  •	Gastrointestinal: negative  •	Genitourinary: negative  •	Musculoskeletal: negative  •	Neurological: negative  •	Psychiatric: negative  •	Hematology/Lymphatics: negative  •	Endocrine: negative  •	Allergic/Immunologic: negative    Physical Exam:   • Constitutional:	Well-developed, well nourished  • Eyes:	EOMI; PERRL; no drainage or redness  • ENMT:	No oral lesions; no gross abnormalities  • Neck	No bruits; no thyromegaly or nodules  • Breasts:	not examined  • Back:	No deformity or limitation of movement  • Respiratory:	Breath Sounds equal & clear to percussion & auscultation, no accessory muscle use  • Cardiovascular:	Regular rate & rhythm, normal S1, S2; no murmurs, gallops or rubs; no S3, S4  • Gastrointestinal:	Soft, non-tender, no hepatosplenomegaly, normal bowel sounds  • Genitourinary:	not examined  • Rectal: not examined  • Extremities:	No cyanosis, clubbing or edema  • Vascular:	Equal and normal pulses (carotid, femoral, dorsalis pedis)  • Neurologica:l	not examined  • Skin:	No lesions; no rash  • Lymph Nodes:	No lymphadedenopathy  • Musculoskeletal:	No joint pain, swelling or deformity; no limitation of movement        LABS:      CBC Full  -  ( 14 May 2021 06:54 )  WBC Count : 17.36 K/uL  RBC Count : 4.25 M/uL  Hemoglobin : 12.6 g/dL  Hematocrit : 39.6 %  Platelet Count - Automated : 231 K/uL  Mean Cell Volume : 93.2 fl  Mean Cell Hemoglobin : 29.6 pg  Mean Cell Hemoglobin Concentration : 31.8 gm/dL  Auto Neutrophil # : x  Auto Lymphocyte # : x  Auto Monocyte # : x  Auto Eosinophil # : x  Auto Basophil # : x  Auto Neutrophil % : x  Auto Lymphocyte % : x  Auto Monocyte % : x  Auto Eosinophil % : x  Auto Basophil % : x    05-14    136  |  105  |  18  ----------------------------<  95  4.1   |  19<L>  |  1.00    Ca    9.4      14 May 2021 06:54  Phos  3.1     05-14  Mg     2.1     05-14              < from: Xray Chest 1 View-PORTABLE IMMEDIATE (Xray Chest 1 View-PORTABLE IMMEDIATE .) (05.14.21 @ 11:03) >  EXAM:  XR CHEST PORTABLE IMMED 1V                          PROCEDURE DATE:  05/14/2021          INTERPRETATION:  PORTABLE CHEST X-RAY    HISTORY: pneumonia    PRIOR STUDIES: 5/12/2021    FINDINGS: No cardiomegaly. Left-sided cardiac pacemaker with dual leads overlying right atrium and right ventricle. Small right basal pleural effusion which has increased. Opacification/consolidation right lower zone-appears increased. Minimal blunting left CP angle.    IMPRESSION:  Increase in the right pleural effusion.    < end of copied text >          RADIOLOGY & ADDITIONAL STUDIES (The following images were personally reviewed):  Nuñez:                                     No  Urine output:                       adequate  DVT prophylaxis:                 Yes  Flattus:                                  Yes  Bowel movement:              No

## 2021-05-14 NOTE — PROGRESS NOTE ADULT - PROBLEM SELECTOR PLAN 3
CXR with evidence of right basal pleural effusion.  - eliquis restarted, restarting loading 10mg BID x 7 days followed by 5mg BID  - f/u pulm recs (Dr. Sinclair) CXR with evidence of right basal pleural effusion.  - eliquis restarted  - f/u pulm recs (Dr. Sinclair)

## 2021-05-15 LAB
ANION GAP SERPL CALC-SCNC: 11 MMOL/L — SIGNIFICANT CHANGE UP (ref 5–17)
BUN SERPL-MCNC: 16 MG/DL — SIGNIFICANT CHANGE UP (ref 7–23)
CALCIUM SERPL-MCNC: 9.1 MG/DL — SIGNIFICANT CHANGE UP (ref 8.4–10.5)
CHLORIDE SERPL-SCNC: 105 MMOL/L — SIGNIFICANT CHANGE UP (ref 96–108)
CO2 SERPL-SCNC: 19 MMOL/L — LOW (ref 22–31)
CREAT SERPL-MCNC: 0.89 MG/DL — SIGNIFICANT CHANGE UP (ref 0.5–1.3)
GLUCOSE SERPL-MCNC: 98 MG/DL — SIGNIFICANT CHANGE UP (ref 70–99)
HCT VFR BLD CALC: 36 % — SIGNIFICANT CHANGE UP (ref 34.5–45)
HGB BLD-MCNC: 11.9 G/DL — SIGNIFICANT CHANGE UP (ref 11.5–15.5)
MAGNESIUM SERPL-MCNC: 2.2 MG/DL — SIGNIFICANT CHANGE UP (ref 1.6–2.6)
MCHC RBC-ENTMCNC: 29.9 PG — SIGNIFICANT CHANGE UP (ref 27–34)
MCHC RBC-ENTMCNC: 33.1 GM/DL — SIGNIFICANT CHANGE UP (ref 32–36)
MCV RBC AUTO: 90.5 FL — SIGNIFICANT CHANGE UP (ref 80–100)
NRBC # BLD: 0 /100 WBCS — SIGNIFICANT CHANGE UP (ref 0–0)
PHOSPHATE SERPL-MCNC: 2.5 MG/DL — SIGNIFICANT CHANGE UP (ref 2.5–4.5)
PLATELET # BLD AUTO: 283 K/UL — SIGNIFICANT CHANGE UP (ref 150–400)
POTASSIUM SERPL-MCNC: 3.6 MMOL/L — SIGNIFICANT CHANGE UP (ref 3.5–5.3)
POTASSIUM SERPL-SCNC: 3.6 MMOL/L — SIGNIFICANT CHANGE UP (ref 3.5–5.3)
RBC # BLD: 3.98 M/UL — SIGNIFICANT CHANGE UP (ref 3.8–5.2)
RBC # FLD: 16.8 % — HIGH (ref 10.3–14.5)
SODIUM SERPL-SCNC: 135 MMOL/L — SIGNIFICANT CHANGE UP (ref 135–145)
WBC # BLD: 14.43 K/UL — HIGH (ref 3.8–10.5)
WBC # FLD AUTO: 14.43 K/UL — HIGH (ref 3.8–10.5)

## 2021-05-15 PROCEDURE — 99232 SBSQ HOSP IP/OBS MODERATE 35: CPT | Mod: GC

## 2021-05-15 RX ORDER — POTASSIUM CHLORIDE 20 MEQ
40 PACKET (EA) ORAL ONCE
Refills: 0 | Status: COMPLETED | OUTPATIENT
Start: 2021-05-15 | End: 2021-05-15

## 2021-05-15 RX ADMIN — ENOXAPARIN SODIUM 45 MILLIGRAM(S): 100 INJECTION SUBCUTANEOUS at 01:19

## 2021-05-15 RX ADMIN — BRIMONIDINE TARTRATE 1 DROP(S): 2 SOLUTION/ DROPS OPHTHALMIC at 21:28

## 2021-05-15 RX ADMIN — Medication 100 MILLIGRAM(S): at 22:02

## 2021-05-15 RX ADMIN — ENOXAPARIN SODIUM 45 MILLIGRAM(S): 100 INJECTION SUBCUTANEOUS at 12:27

## 2021-05-15 RX ADMIN — PIPERACILLIN AND TAZOBACTAM 200 GRAM(S): 4; .5 INJECTION, POWDER, LYOPHILIZED, FOR SOLUTION INTRAVENOUS at 17:42

## 2021-05-15 RX ADMIN — LATANOPROST 1 DROP(S): 0.05 SOLUTION/ DROPS OPHTHALMIC; TOPICAL at 21:58

## 2021-05-15 RX ADMIN — PIPERACILLIN AND TAZOBACTAM 200 GRAM(S): 4; .5 INJECTION, POWDER, LYOPHILIZED, FOR SOLUTION INTRAVENOUS at 06:33

## 2021-05-15 RX ADMIN — PANTOPRAZOLE SODIUM 40 MILLIGRAM(S): 20 TABLET, DELAYED RELEASE ORAL at 06:33

## 2021-05-15 RX ADMIN — OXYCODONE HYDROCHLORIDE 5 MILLIGRAM(S): 5 TABLET ORAL at 21:58

## 2021-05-15 RX ADMIN — MEGESTROL ACETATE 800 MILLIGRAM(S): 40 SUSPENSION ORAL at 13:27

## 2021-05-15 RX ADMIN — ATENOLOL 25 MILLIGRAM(S): 25 TABLET ORAL at 06:33

## 2021-05-15 RX ADMIN — PIPERACILLIN AND TAZOBACTAM 200 GRAM(S): 4; .5 INJECTION, POWDER, LYOPHILIZED, FOR SOLUTION INTRAVENOUS at 12:26

## 2021-05-15 RX ADMIN — BRIMONIDINE TARTRATE 1 DROP(S): 2 SOLUTION/ DROPS OPHTHALMIC at 06:33

## 2021-05-15 RX ADMIN — DORZOLAMIDE HYDROCHLORIDE 1 DROP(S): 20 SOLUTION/ DROPS OPHTHALMIC at 06:35

## 2021-05-15 RX ADMIN — OXYCODONE HYDROCHLORIDE 5 MILLIGRAM(S): 5 TABLET ORAL at 21:28

## 2021-05-15 RX ADMIN — ATORVASTATIN CALCIUM 20 MILLIGRAM(S): 80 TABLET, FILM COATED ORAL at 21:28

## 2021-05-15 RX ADMIN — Medication 1 TABLET(S): at 12:26

## 2021-05-15 RX ADMIN — DORZOLAMIDE HYDROCHLORIDE 1 DROP(S): 20 SOLUTION/ DROPS OPHTHALMIC at 17:42

## 2021-05-15 RX ADMIN — PIPERACILLIN AND TAZOBACTAM 200 GRAM(S): 4; .5 INJECTION, POWDER, LYOPHILIZED, FOR SOLUTION INTRAVENOUS at 22:02

## 2021-05-15 RX ADMIN — BRIMONIDINE TARTRATE 1 DROP(S): 2 SOLUTION/ DROPS OPHTHALMIC at 12:27

## 2021-05-15 RX ADMIN — Medication 40 MILLIEQUIVALENT(S): at 07:47

## 2021-05-15 NOTE — PROGRESS NOTE ADULT - SUBJECTIVE AND OBJECTIVE BOX
Interval Events: Reviewed  Patient seen and examined at bedside.    Patient is a 85y old  Female who presents with a chief complaint of   she is coughing  PAST MEDICAL & SURGICAL HISTORY:  Glaucoma  blind in L eye, 2% vision in R eye    Essential hypertension  HTN (hypertension)    History of pneumonia    Asthma, unspecified asthma severity, unspecified whether complicated, unspecified whether persistent    Cerebrovascular accident (CVA), unspecified mechanism  remote hx    Hyperlipidemia, unspecified hyperlipidemia type    Afib    Lung cancer  s/p resection in 2000, no chemo/RT    History of cholecystectomy    Cardiac pacemaker    H/O abdominal hysterectomy    Lung cancer  s/p resection    Cataract of right eye        MEDICATIONS:  Pulmonary:  ALBUTerol    90 MICROgram(s) HFA Inhaler 2 Puff(s) Inhalation every 4 hours PRN  benzonatate 100 milliGRAM(s) Oral three times a day PRN  guaiFENesin Oral Liquid (Sugar-Free) 200 milliGRAM(s) Oral every 6 hours PRN    Antimicrobials:  piperacillin/tazobactam IVPB.. 3.375 Gram(s) IV Intermittent every 6 hours    Anticoagulants:  enoxaparin Injectable 45 milliGRAM(s) SubCutaneous every 12 hours    Cardiac:  ATENolol  Tablet 25 milliGRAM(s) Oral daily      Allergies    No Known Allergies    Intolerances        Vital Signs Last 24 Hrs  T(C): 36.6 (15 May 2021 09:29), Max: 37.1 (14 May 2021 18:14)  T(F): 97.8 (15 May 2021 09:29), Max: 98.8 (14 May 2021 18:14)  HR: 68 (15 May 2021 09:29) (68 - 92)  BP: 132/70 (15 May 2021 09:29) (126/77 - 160/93)  BP(mean): --  RR: 17 (15 May 2021 09:29) (17 - 20)  SpO2: 95% (15 May 2021 09:29) (92% - 95%)        Review of Systems:   •	General: negative  •	Skin/Breast: negative  •	Ophthalmologic: negative  •	ENMT: negative  •	Respiratory and Thorax: negative  •	Cardiovascular: negative  •	Gastrointestinal: negative  •	Genitourinary: negative  •	Musculoskeletal: negative  •	Neurological: negative  •	Psychiatric: negative  •	Hematology/Lymphatics: negative  •	Endocrine: negative  •	Allergic/Immunologic: negative    Physical Exam:   • Constitutional:	Well-developed, well nourished  • Eyes:	EOMI; PERRL; no drainage or redness  • ENMT:	No oral lesions; no gross abnormalities  • Neck	No bruits; no thyromegaly or nodules  • Breasts:	not examined  • Back:	No deformity or limitation of movement  • Respiratory:	Breath Sounds equal & clear to percussion & BL rhonchi  auscultation, no accessory muscle use  • Cardiovascular:	Regular rate & rhythm, normal S1, S2; no murmurs, gallops or rubs; no S3, S4  • Gastrointestinal:	Soft, non-tender, no hepatosplenomegaly, normal bowel sounds  • Genitourinary:	not examined  • Rectal: not examined  • Extremities:	No cyanosis, clubbing or edema  • Vascular:	Equal and normal pulses (carotid, femoral, dorsalis pedis)  • Neurologica:l	not examined  • Skin:	No lesions; no rash  • Lymph Nodes:	No lymphadedenopathy  • Musculoskeletal:	No joint pain, swelling or deformity; no limitation of movement        LABS:      CBC Full  -  ( 15 May 2021 06:52 )  WBC Count : 14.43 K/uL  RBC Count : 3.98 M/uL  Hemoglobin : 11.9 g/dL  Hematocrit : 36.0 %  Platelet Count - Automated : 283 K/uL  Mean Cell Volume : 90.5 fl  Mean Cell Hemoglobin : 29.9 pg  Mean Cell Hemoglobin Concentration : 33.1 gm/dL  Auto Neutrophil # : x  Auto Lymphocyte # : x  Auto Monocyte # : x  Auto Eosinophil # : x  Auto Basophil # : x  Auto Neutrophil % : x  Auto Lymphocyte % : x  Auto Monocyte % : x  Auto Eosinophil % : x  Auto Basophil % : x    05-15    135  |  105  |  16  ----------------------------<  98  3.6   |  19<L>  |  0.89    Ca    9.1      15 May 2021 06:52  Phos  2.5     05-15  Mg     2.2     05-15                          RADIOLOGY & ADDITIONAL STUDIES (The following images were personally reviewed):  Nuñez:                                     No  Urine output:                       adequate  DVT prophylaxis:                 Yes  Flattus:                                  Yes  Bowel movement:              No

## 2021-05-16 LAB
ANION GAP SERPL CALC-SCNC: 9 MMOL/L — SIGNIFICANT CHANGE UP (ref 5–17)
BASOPHILS # BLD AUTO: 0 K/UL — SIGNIFICANT CHANGE UP (ref 0–0.2)
BASOPHILS NFR BLD AUTO: 0 % — SIGNIFICANT CHANGE UP (ref 0–2)
BUN SERPL-MCNC: 16 MG/DL — SIGNIFICANT CHANGE UP (ref 7–23)
BURR CELLS BLD QL SMEAR: PRESENT — SIGNIFICANT CHANGE UP
CALCIUM SERPL-MCNC: 8.7 MG/DL — SIGNIFICANT CHANGE UP (ref 8.4–10.5)
CHLORIDE SERPL-SCNC: 108 MMOL/L — SIGNIFICANT CHANGE UP (ref 96–108)
CO2 SERPL-SCNC: 20 MMOL/L — LOW (ref 22–31)
CREAT SERPL-MCNC: 0.95 MG/DL — SIGNIFICANT CHANGE UP (ref 0.5–1.3)
EOSINOPHIL # BLD AUTO: 0.09 K/UL — SIGNIFICANT CHANGE UP (ref 0–0.5)
EOSINOPHIL NFR BLD AUTO: 0.9 % — SIGNIFICANT CHANGE UP (ref 0–6)
GIANT PLATELETS BLD QL SMEAR: PRESENT — SIGNIFICANT CHANGE UP
GLUCOSE SERPL-MCNC: 107 MG/DL — HIGH (ref 70–99)
HCT VFR BLD CALC: 33 % — LOW (ref 34.5–45)
HGB BLD-MCNC: 10.9 G/DL — LOW (ref 11.5–15.5)
LYMPHOCYTES # BLD AUTO: 0.99 K/UL — LOW (ref 1–3.3)
LYMPHOCYTES # BLD AUTO: 9.7 % — LOW (ref 13–44)
MAGNESIUM SERPL-MCNC: 2.2 MG/DL — SIGNIFICANT CHANGE UP (ref 1.6–2.6)
MANUAL SMEAR VERIFICATION: SIGNIFICANT CHANGE UP
MCHC RBC-ENTMCNC: 29.9 PG — SIGNIFICANT CHANGE UP (ref 27–34)
MCHC RBC-ENTMCNC: 33 GM/DL — SIGNIFICANT CHANGE UP (ref 32–36)
MCV RBC AUTO: 90.4 FL — SIGNIFICANT CHANGE UP (ref 80–100)
MONOCYTES # BLD AUTO: 0.27 K/UL — SIGNIFICANT CHANGE UP (ref 0–0.9)
MONOCYTES NFR BLD AUTO: 2.6 % — SIGNIFICANT CHANGE UP (ref 2–14)
NEUTROPHILS # BLD AUTO: 8.88 K/UL — HIGH (ref 1.8–7.4)
NEUTROPHILS NFR BLD AUTO: 86.8 % — HIGH (ref 43–77)
NRBC # BLD: 1 /100 — HIGH (ref 0–0)
NRBC # BLD: SIGNIFICANT CHANGE UP /100 WBCS (ref 0–0)
OVALOCYTES BLD QL SMEAR: SLIGHT — SIGNIFICANT CHANGE UP
PHOSPHATE SERPL-MCNC: 2.1 MG/DL — LOW (ref 2.5–4.5)
PLAT MORPH BLD: ABNORMAL
PLATELET # BLD AUTO: 266 K/UL — SIGNIFICANT CHANGE UP (ref 150–400)
POIKILOCYTOSIS BLD QL AUTO: SIGNIFICANT CHANGE UP
POTASSIUM SERPL-MCNC: 3.8 MMOL/L — SIGNIFICANT CHANGE UP (ref 3.5–5.3)
POTASSIUM SERPL-SCNC: 3.8 MMOL/L — SIGNIFICANT CHANGE UP (ref 3.5–5.3)
RBC # BLD: 3.65 M/UL — LOW (ref 3.8–5.2)
RBC # FLD: 16.7 % — HIGH (ref 10.3–14.5)
RBC BLD AUTO: ABNORMAL
SCHISTOCYTES BLD QL AUTO: SLIGHT — SIGNIFICANT CHANGE UP
SODIUM SERPL-SCNC: 137 MMOL/L — SIGNIFICANT CHANGE UP (ref 135–145)
SPHEROCYTES BLD QL SMEAR: SLIGHT — SIGNIFICANT CHANGE UP
TARGETS BLD QL SMEAR: SLIGHT — SIGNIFICANT CHANGE UP
WBC # BLD: 10.23 K/UL — SIGNIFICANT CHANGE UP (ref 3.8–10.5)
WBC # FLD AUTO: 10.23 K/UL — SIGNIFICANT CHANGE UP (ref 3.8–10.5)

## 2021-05-16 PROCEDURE — 99232 SBSQ HOSP IP/OBS MODERATE 35: CPT | Mod: GC

## 2021-05-16 RX ORDER — APIXABAN 2.5 MG/1
10 TABLET, FILM COATED ORAL EVERY 12 HOURS
Refills: 0 | Status: DISCONTINUED | OUTPATIENT
Start: 2021-05-16 | End: 2021-05-18

## 2021-05-16 RX ORDER — APIXABAN 2.5 MG/1
10 TABLET, FILM COATED ORAL EVERY 12 HOURS
Refills: 0 | Status: DISCONTINUED | OUTPATIENT
Start: 2021-05-16 | End: 2021-05-16

## 2021-05-16 RX ORDER — POTASSIUM CHLORIDE 20 MEQ
20 PACKET (EA) ORAL ONCE
Refills: 0 | Status: COMPLETED | OUTPATIENT
Start: 2021-05-16 | End: 2021-05-16

## 2021-05-16 RX ADMIN — PIPERACILLIN AND TAZOBACTAM 200 GRAM(S): 4; .5 INJECTION, POWDER, LYOPHILIZED, FOR SOLUTION INTRAVENOUS at 21:40

## 2021-05-16 RX ADMIN — Medication 1 TABLET(S): at 11:49

## 2021-05-16 RX ADMIN — LATANOPROST 1 DROP(S): 0.05 SOLUTION/ DROPS OPHTHALMIC; TOPICAL at 21:41

## 2021-05-16 RX ADMIN — BRIMONIDINE TARTRATE 1 DROP(S): 2 SOLUTION/ DROPS OPHTHALMIC at 21:40

## 2021-05-16 RX ADMIN — PANTOPRAZOLE SODIUM 40 MILLIGRAM(S): 20 TABLET, DELAYED RELEASE ORAL at 06:08

## 2021-05-16 RX ADMIN — Medication 20 MILLIEQUIVALENT(S): at 09:13

## 2021-05-16 RX ADMIN — DORZOLAMIDE HYDROCHLORIDE 1 DROP(S): 20 SOLUTION/ DROPS OPHTHALMIC at 17:30

## 2021-05-16 RX ADMIN — PIPERACILLIN AND TAZOBACTAM 200 GRAM(S): 4; .5 INJECTION, POWDER, LYOPHILIZED, FOR SOLUTION INTRAVENOUS at 04:22

## 2021-05-16 RX ADMIN — ATENOLOL 25 MILLIGRAM(S): 25 TABLET ORAL at 06:08

## 2021-05-16 RX ADMIN — MEGESTROL ACETATE 800 MILLIGRAM(S): 40 SUSPENSION ORAL at 11:49

## 2021-05-16 RX ADMIN — DORZOLAMIDE HYDROCHLORIDE 1 DROP(S): 20 SOLUTION/ DROPS OPHTHALMIC at 07:50

## 2021-05-16 RX ADMIN — APIXABAN 10 MILLIGRAM(S): 2.5 TABLET, FILM COATED ORAL at 17:30

## 2021-05-16 RX ADMIN — BRIMONIDINE TARTRATE 1 DROP(S): 2 SOLUTION/ DROPS OPHTHALMIC at 13:02

## 2021-05-16 RX ADMIN — PIPERACILLIN AND TAZOBACTAM 200 GRAM(S): 4; .5 INJECTION, POWDER, LYOPHILIZED, FOR SOLUTION INTRAVENOUS at 17:29

## 2021-05-16 RX ADMIN — Medication 200 MILLIGRAM(S): at 06:08

## 2021-05-16 RX ADMIN — PIPERACILLIN AND TAZOBACTAM 200 GRAM(S): 4; .5 INJECTION, POWDER, LYOPHILIZED, FOR SOLUTION INTRAVENOUS at 11:48

## 2021-05-16 RX ADMIN — OXYCODONE HYDROCHLORIDE 5 MILLIGRAM(S): 5 TABLET ORAL at 06:39

## 2021-05-16 RX ADMIN — ATORVASTATIN CALCIUM 20 MILLIGRAM(S): 80 TABLET, FILM COATED ORAL at 21:40

## 2021-05-16 RX ADMIN — OXYCODONE HYDROCHLORIDE 5 MILLIGRAM(S): 5 TABLET ORAL at 06:09

## 2021-05-16 RX ADMIN — BRIMONIDINE TARTRATE 1 DROP(S): 2 SOLUTION/ DROPS OPHTHALMIC at 06:12

## 2021-05-16 NOTE — PROGRESS NOTE ADULT - SUBJECTIVE AND OBJECTIVE BOX
OVERNIGHT EVENTS: FORTUNATO    SUBJECTIVE / INTERVAL HPI: Patient seen and examined at bedside. witnessed to be sleeping comfortably prior to waking up for exam. She denies fevers/chills, nausea, vomiting. Denies chest pain. Denies shortness of breath.      VITAL SIGNS:  Vital Signs Last 24 Hrs  T(C): 36.8 (16 May 2021 05:10), Max: 36.8 (16 May 2021 05:10)  T(F): 98.3 (16 May 2021 05:10), Max: 98.3 (16 May 2021 05:10)  HR: 83 (16 May 2021 05:10) (68 - 89)  BP: 125/78 (16 May 2021 05:10) (124/79 - 132/70)  BP(mean): --  RR: 17 (16 May 2021 05:10) (17 - 18)  SpO2: 95% (16 May 2021 05:10) (95% - 96%)    PHYSICAL EXAM:    General: elderly woman in NAD sleeping without NC O2  HEENT: NC/AT; dry MM  Cardiovascular: +S1/S2; RRR  Respiratory: lungs CTA bilaterally, mostly from frontal fields, sounding much improved from 5/14  Gastrointestinal: soft, NT/ND; +BSx4  Extremities: WWP; no edema, clubbing or cyanosis  Vascular: 2+ radial, DP pulses B/L  Neurological: AAOx3; no focal deficits    MEDICATIONS:  MEDICATIONS  (STANDING):  ATENolol  Tablet 25 milliGRAM(s) Oral daily  atorvastatin 20 milliGRAM(s) Oral at bedtime  brimonidine 0.2% Ophthalmic Solution 1 Drop(s) Both EYES three times a day  dorzolamide 2% Ophthalmic Solution 1 Drop(s) Both EYES <User Schedule>  latanoprost 0.005% Ophthalmic Solution 1 Drop(s) Both EYES at bedtime  megestrol Suspension 800 milliGRAM(s) Oral daily  multivitamin 1 Tablet(s) Oral daily  pantoprazole    Tablet 40 milliGRAM(s) Oral before breakfast  piperacillin/tazobactam IVPB.. 3.375 Gram(s) IV Intermittent every 6 hours  potassium chloride   Powder 20 milliEquivalent(s) Oral once    MEDICATIONS  (PRN):  ALBUTerol    90 MICROgram(s) HFA Inhaler 2 Puff(s) Inhalation every 4 hours PRN Shortness of Breath  benzonatate 100 milliGRAM(s) Oral three times a day PRN Cough  guaiFENesin Oral Liquid (Sugar-Free) 200 milliGRAM(s) Oral every 6 hours PRN Cough  oxyCODONE    IR 5 milliGRAM(s) Oral every 6 hours PRN Severe Pain (7 - 10)      ALLERGIES:  Allergies    No Known Allergies    Intolerances        LABS:                        10.9   10.23 )-----------( 266      ( 16 May 2021 06:26 )             33.0     05-16    137  |  108  |  16  ----------------------------<  107<H>  3.8   |  20<L>  |  0.95    Ca    8.7      16 May 2021 06:26  Phos  2.1     05-16  Mg     2.2     05-16          CAPILLARY BLOOD GLUCOSE          RADIOLOGY & ADDITIONAL TESTS: Reviewed.

## 2021-05-16 NOTE — PROGRESS NOTE ADULT - PROBLEM SELECTOR PLAN 1
CTA chest s/f pulmonary emboli in the right lower lobe pulmonary artery as well as the lingular branch of the left upper lobe pulmonary artery. CT suggestion of right heart strain. Formal TTE 5/13 did not show any evidence of RHS  - Dr. Sinclair to perform thoracentesis today 5/16; therefore eliquis changed to lovenox which was held this AM in anticipation of thora  - f/u with dr. Sinclair regarding restarting AC after procedure  - HOLDING eliquis 10mg BID x 7 days, followed by 5mg BID (will restart after thora)

## 2021-05-16 NOTE — PROGRESS NOTE ADULT - PROBLEM SELECTOR PLAN 3
CXR with evidence of right basal pleural effusion.  - was started on eliquis loading, transitioned to lovenox for thoracentesis, now being held 5/16 as above  - f/u pulm recs (Dr. Sinclair)

## 2021-05-17 ENCOUNTER — TRANSCRIPTION ENCOUNTER (OUTPATIENT)
Age: 86
End: 2021-05-17

## 2021-05-17 LAB
ANION GAP SERPL CALC-SCNC: 11 MMOL/L — SIGNIFICANT CHANGE UP (ref 5–17)
APTT BLD: 31.2 SEC — SIGNIFICANT CHANGE UP (ref 27.5–35.5)
BUN SERPL-MCNC: 15 MG/DL — SIGNIFICANT CHANGE UP (ref 7–23)
CALCIUM SERPL-MCNC: 9.4 MG/DL — SIGNIFICANT CHANGE UP (ref 8.4–10.5)
CHLORIDE SERPL-SCNC: 107 MMOL/L — SIGNIFICANT CHANGE UP (ref 96–108)
CO2 SERPL-SCNC: 21 MMOL/L — LOW (ref 22–31)
CREAT SERPL-MCNC: 0.97 MG/DL — SIGNIFICANT CHANGE UP (ref 0.5–1.3)
CULTURE RESULTS: SIGNIFICANT CHANGE UP
CULTURE RESULTS: SIGNIFICANT CHANGE UP
GLUCOSE SERPL-MCNC: 103 MG/DL — HIGH (ref 70–99)
HCT VFR BLD CALC: 35.2 % — SIGNIFICANT CHANGE UP (ref 34.5–45)
HGB BLD-MCNC: 11.5 G/DL — SIGNIFICANT CHANGE UP (ref 11.5–15.5)
MAGNESIUM SERPL-MCNC: 2.1 MG/DL — SIGNIFICANT CHANGE UP (ref 1.6–2.6)
MCHC RBC-ENTMCNC: 29.4 PG — SIGNIFICANT CHANGE UP (ref 27–34)
MCHC RBC-ENTMCNC: 32.7 GM/DL — SIGNIFICANT CHANGE UP (ref 32–36)
MCV RBC AUTO: 90 FL — SIGNIFICANT CHANGE UP (ref 80–100)
NRBC # BLD: 0 /100 WBCS — SIGNIFICANT CHANGE UP (ref 0–0)
PHOSPHATE SERPL-MCNC: 2.2 MG/DL — LOW (ref 2.5–4.5)
PLATELET # BLD AUTO: 296 K/UL — SIGNIFICANT CHANGE UP (ref 150–400)
POTASSIUM SERPL-MCNC: 4.1 MMOL/L — SIGNIFICANT CHANGE UP (ref 3.5–5.3)
POTASSIUM SERPL-SCNC: 4.1 MMOL/L — SIGNIFICANT CHANGE UP (ref 3.5–5.3)
RBC # BLD: 3.91 M/UL — SIGNIFICANT CHANGE UP (ref 3.8–5.2)
RBC # FLD: 16.8 % — HIGH (ref 10.3–14.5)
SODIUM SERPL-SCNC: 139 MMOL/L — SIGNIFICANT CHANGE UP (ref 135–145)
SPECIMEN SOURCE: SIGNIFICANT CHANGE UP
SPECIMEN SOURCE: SIGNIFICANT CHANGE UP
WBC # BLD: 11.74 K/UL — HIGH (ref 3.8–10.5)
WBC # FLD AUTO: 11.74 K/UL — HIGH (ref 3.8–10.5)

## 2021-05-17 PROCEDURE — 71045 X-RAY EXAM CHEST 1 VIEW: CPT | Mod: 26

## 2021-05-17 PROCEDURE — 99232 SBSQ HOSP IP/OBS MODERATE 35: CPT | Mod: GC

## 2021-05-17 RX ORDER — APIXABAN 2.5 MG/1
2 TABLET, FILM COATED ORAL
Qty: 120 | Refills: 0
Start: 2021-05-17 | End: 2021-06-15

## 2021-05-17 RX ORDER — ACETAMINOPHEN 500 MG
650 TABLET ORAL ONCE
Refills: 0 | Status: COMPLETED | OUTPATIENT
Start: 2021-05-17 | End: 2021-05-17

## 2021-05-17 RX ADMIN — BRIMONIDINE TARTRATE 1 DROP(S): 2 SOLUTION/ DROPS OPHTHALMIC at 14:17

## 2021-05-17 RX ADMIN — PIPERACILLIN AND TAZOBACTAM 200 GRAM(S): 4; .5 INJECTION, POWDER, LYOPHILIZED, FOR SOLUTION INTRAVENOUS at 06:10

## 2021-05-17 RX ADMIN — OXYCODONE HYDROCHLORIDE 5 MILLIGRAM(S): 5 TABLET ORAL at 08:47

## 2021-05-17 RX ADMIN — APIXABAN 10 MILLIGRAM(S): 2.5 TABLET, FILM COATED ORAL at 06:11

## 2021-05-17 RX ADMIN — DORZOLAMIDE HYDROCHLORIDE 1 DROP(S): 20 SOLUTION/ DROPS OPHTHALMIC at 07:22

## 2021-05-17 RX ADMIN — ATORVASTATIN CALCIUM 20 MILLIGRAM(S): 80 TABLET, FILM COATED ORAL at 21:56

## 2021-05-17 RX ADMIN — PIPERACILLIN AND TAZOBACTAM 200 GRAM(S): 4; .5 INJECTION, POWDER, LYOPHILIZED, FOR SOLUTION INTRAVENOUS at 18:16

## 2021-05-17 RX ADMIN — BRIMONIDINE TARTRATE 1 DROP(S): 2 SOLUTION/ DROPS OPHTHALMIC at 21:57

## 2021-05-17 RX ADMIN — Medication 1 TABLET(S): at 11:43

## 2021-05-17 RX ADMIN — DORZOLAMIDE HYDROCHLORIDE 1 DROP(S): 20 SOLUTION/ DROPS OPHTHALMIC at 19:14

## 2021-05-17 RX ADMIN — PANTOPRAZOLE SODIUM 40 MILLIGRAM(S): 20 TABLET, DELAYED RELEASE ORAL at 06:11

## 2021-05-17 RX ADMIN — BRIMONIDINE TARTRATE 1 DROP(S): 2 SOLUTION/ DROPS OPHTHALMIC at 06:11

## 2021-05-17 RX ADMIN — PIPERACILLIN AND TAZOBACTAM 200 GRAM(S): 4; .5 INJECTION, POWDER, LYOPHILIZED, FOR SOLUTION INTRAVENOUS at 11:42

## 2021-05-17 RX ADMIN — PIPERACILLIN AND TAZOBACTAM 200 GRAM(S): 4; .5 INJECTION, POWDER, LYOPHILIZED, FOR SOLUTION INTRAVENOUS at 23:20

## 2021-05-17 RX ADMIN — LATANOPROST 1 DROP(S): 0.05 SOLUTION/ DROPS OPHTHALMIC; TOPICAL at 21:57

## 2021-05-17 RX ADMIN — OXYCODONE HYDROCHLORIDE 5 MILLIGRAM(S): 5 TABLET ORAL at 09:15

## 2021-05-17 RX ADMIN — ATENOLOL 25 MILLIGRAM(S): 25 TABLET ORAL at 06:11

## 2021-05-17 RX ADMIN — APIXABAN 10 MILLIGRAM(S): 2.5 TABLET, FILM COATED ORAL at 18:16

## 2021-05-17 RX ADMIN — MEGESTROL ACETATE 800 MILLIGRAM(S): 40 SUSPENSION ORAL at 11:43

## 2021-05-17 NOTE — PROGRESS NOTE ADULT - PROBLEM SELECTOR PLAN 1
CTA chest s/f pulmonary emboli in the right lower lobe pulmonary artery as well as the lingular branch of the left upper lobe pulmonary artery. CT suggestion of right heart strain. Formal TTE 5/13 did not show any evidence of RHS  - u/s performed yesterday revealed only consolidation; no clear pocket for thoracentesis  - eliquis 10mg BID restarted (pt initially received this on 5/13 and 5/14) therefore to be d/c on 5/19 after evening dose and changed to 5mg BID starting 5/20

## 2021-05-17 NOTE — PROGRESS NOTE ADULT - PROBLEM SELECTOR PLAN 1
continue on the antibiotic She is responding with decrease in the wbc and improvement in the clinical condition.  Duration of antibiotic 7 days

## 2021-05-17 NOTE — PROGRESS NOTE ADULT - SUBJECTIVE AND OBJECTIVE BOX
INTERVAL HPI/OVERNIGHT EVENTS:  More awake; Appetite improved;       MEDICATIONS  (STANDING):  acetaminophen   Tablet .. 650 milliGRAM(s) Oral once  apixaban 10 milliGRAM(s) Oral every 12 hours  ATENolol  Tablet 25 milliGRAM(s) Oral daily  atorvastatin 20 milliGRAM(s) Oral at bedtime  brimonidine 0.2% Ophthalmic Solution 1 Drop(s) Both EYES three times a day  dorzolamide 2% Ophthalmic Solution 1 Drop(s) Both EYES <User Schedule>  latanoprost 0.005% Ophthalmic Solution 1 Drop(s) Both EYES at bedtime  megestrol Suspension 800 milliGRAM(s) Oral daily  multivitamin 1 Tablet(s) Oral daily  pantoprazole    Tablet 40 milliGRAM(s) Oral before breakfast  piperacillin/tazobactam IVPB.. 3.375 Gram(s) IV Intermittent every 6 hours    MEDICATIONS  (PRN):  ALBUTerol    90 MICROgram(s) HFA Inhaler 2 Puff(s) Inhalation every 4 hours PRN Shortness of Breath  benzonatate 100 milliGRAM(s) Oral three times a day PRN Cough  guaiFENesin Oral Liquid (Sugar-Free) 200 milliGRAM(s) Oral every 6 hours PRN Cough  oxyCODONE    IR 5 milliGRAM(s) Oral every 6 hours PRN Severe Pain (7 - 10)      Allergies    No Known Allergies    Intolerances        Vital Signs Last 24 Hrs  T(C): 36.4 (17 May 2021 08:44), Max: 37.1 (16 May 2021 20:00)  T(F): 97.6 (17 May 2021 08:44), Max: 98.8 (16 May 2021 20:00)  HR: 84 (17 May 2021 08:44) (83 - 101)  BP: 148/84 (17 May 2021 08:44) (118/68 - 150/85)  BP(mean): 105 (17 May 2021 08:44) (105 - 105)  RR: 16 (17 May 2021 08:44) (16 - 18)  SpO2: 96% (17 May 2021 08:44) (95% - 96%)          Constitutional:  Awake    Eyes: BENY    ENMT: Negative    Neck: Supple    Back:  no tenderness     Respiratory:  clear improved    Cardiovascular: S1 S2    Gastrointestinal: soft    Genitourinary:    Extremities: no edema    Vascular:    Neurological:    Skin:    Lymph Nodes:            LABS:                        11.5   11.74 )-----------( 296      ( 17 May 2021 05:54 )             35.2     05-17    139  |  107  |  15  ----------------------------<  103<H>  4.1   |  21<L>  |  0.97    Ca    9.4      17 May 2021 05:54  Phos  2.2     05-17  Mg     2.1     05-17      PTT - ( 17 May 2021 05:54 )  PTT:31.2 sec      RADIOLOGY & ADDITIONAL TESTS:
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
## 2021-05-17 NOTE — PROGRESS NOTE ADULT - SUBJECTIVE AND OBJECTIVE BOX
OVERNIGHT EVENTS: FORTUNATO    SUBJECTIVE / INTERVAL HPI: Patient seen and examined at bedside. Never has any complaints, still without complaint of shortness of breath or chest pain. No fevers/chills, nausea, vomiting. Yesterday, plum ultrasounded to see if thoracentesis could be performed, however, per their report, there was no pocket; only consolidation.       VITAL SIGNS:  Vital Signs Last 24 Hrs  T(C): 36.9 (17 May 2021 04:58), Max: 37.1 (16 May 2021 20:00)  T(F): 98.5 (17 May 2021 04:58), Max: 98.8 (16 May 2021 20:00)  HR: 101 (17 May 2021 04:58) (77 - 101)  BP: 150/85 (17 May 2021 04:58) (118/68 - 150/85)  BP(mean): --  RR: 18 (17 May 2021 04:58) (16 - 18)  SpO2: 95% (17 May 2021 04:58) (95% - 96%)    PHYSICAL EXAM:    General: elderly woman in NAD asleep comfortably in bed before being awoken for exam  HEENT: NC/AT; anicteric sclera; MMM  Cardiovascular: +S1/S2; RRR  Respiratory: slight ronchi scattered diffusely bilaterally; improved from prior day  Gastrointestinal: soft, NT/ND; +BSx4  Extremities: WWP; no edema, clubbing or cyanosis  Vascular: 2+ radial, DP pulses B/L  Neurological: AAOx3; no focal deficits    MEDICATIONS:  MEDICATIONS  (STANDING):  apixaban 10 milliGRAM(s) Oral every 12 hours  ATENolol  Tablet 25 milliGRAM(s) Oral daily  atorvastatin 20 milliGRAM(s) Oral at bedtime  brimonidine 0.2% Ophthalmic Solution 1 Drop(s) Both EYES three times a day  dorzolamide 2% Ophthalmic Solution 1 Drop(s) Both EYES <User Schedule>  latanoprost 0.005% Ophthalmic Solution 1 Drop(s) Both EYES at bedtime  megestrol Suspension 800 milliGRAM(s) Oral daily  multivitamin 1 Tablet(s) Oral daily  pantoprazole    Tablet 40 milliGRAM(s) Oral before breakfast  piperacillin/tazobactam IVPB.. 3.375 Gram(s) IV Intermittent every 6 hours    MEDICATIONS  (PRN):  ALBUTerol    90 MICROgram(s) HFA Inhaler 2 Puff(s) Inhalation every 4 hours PRN Shortness of Breath  benzonatate 100 milliGRAM(s) Oral three times a day PRN Cough  guaiFENesin Oral Liquid (Sugar-Free) 200 milliGRAM(s) Oral every 6 hours PRN Cough  oxyCODONE    IR 5 milliGRAM(s) Oral every 6 hours PRN Severe Pain (7 - 10)      ALLERGIES:  Allergies    No Known Allergies    Intolerances        LABS:                        11.5   11.74 )-----------( 296      ( 17 May 2021 05:54 )             35.2     05-17    139  |  107  |  15  ----------------------------<  103<H>  4.1   |  21<L>  |  0.97    Ca    9.4      17 May 2021 05:54  Phos  2.2     05-17  Mg     2.1     05-17      PTT - ( 17 May 2021 05:54 )  PTT:31.2 sec    CAPILLARY BLOOD GLUCOSE          RADIOLOGY & ADDITIONAL TESTS: Reviewed.   OVERNIGHT EVENTS: FORTUNATO    SUBJECTIVE / INTERVAL HPI: Patient seen and examined at bedside. Never has any complaints, still without complaint of shortness of breath or chest pain. No fevers/chills, nausea, vomiting. Yesterday, plum ultrasounded to see if thoracentesis could be performed, however, per their report, there was no pocket; only consolidation.     Of note: patient will need a wheelchair for home use due to her diagnosis of inability to ambulate. The patient has mobility limitations that can't be sufficiently resolved by the use of an appropriately fitted cane or walker. Without the wheelchair, the patient will not be able to participate in MRADLs. They have expressed a willingness to use the wheelchair in the home. The beneficiary has a family member who is willing and able to provide assistance with the wheelchair.      VITAL SIGNS:  Vital Signs Last 24 Hrs  T(C): 36.9 (17 May 2021 04:58), Max: 37.1 (16 May 2021 20:00)  T(F): 98.5 (17 May 2021 04:58), Max: 98.8 (16 May 2021 20:00)  HR: 101 (17 May 2021 04:58) (77 - 101)  BP: 150/85 (17 May 2021 04:58) (118/68 - 150/85)  BP(mean): --  RR: 18 (17 May 2021 04:58) (16 - 18)  SpO2: 95% (17 May 2021 04:58) (95% - 96%)    PHYSICAL EXAM:    General: elderly woman in NAD asleep comfortably in bed before being awoken for exam  HEENT: NC/AT; anicteric sclera; MMM  Cardiovascular: +S1/S2; RRR  Respiratory: slight ronchi scattered diffusely bilaterally; improved from prior day  Gastrointestinal: soft, NT/ND; +BSx4  Extremities: WWP; no edema, clubbing or cyanosis  Vascular: 2+ radial, DP pulses B/L  Neurological: AAOx3; no focal deficits    MEDICATIONS:  MEDICATIONS  (STANDING):  apixaban 10 milliGRAM(s) Oral every 12 hours  ATENolol  Tablet 25 milliGRAM(s) Oral daily  atorvastatin 20 milliGRAM(s) Oral at bedtime  brimonidine 0.2% Ophthalmic Solution 1 Drop(s) Both EYES three times a day  dorzolamide 2% Ophthalmic Solution 1 Drop(s) Both EYES <User Schedule>  latanoprost 0.005% Ophthalmic Solution 1 Drop(s) Both EYES at bedtime  megestrol Suspension 800 milliGRAM(s) Oral daily  multivitamin 1 Tablet(s) Oral daily  pantoprazole    Tablet 40 milliGRAM(s) Oral before breakfast  piperacillin/tazobactam IVPB.. 3.375 Gram(s) IV Intermittent every 6 hours    MEDICATIONS  (PRN):  ALBUTerol    90 MICROgram(s) HFA Inhaler 2 Puff(s) Inhalation every 4 hours PRN Shortness of Breath  benzonatate 100 milliGRAM(s) Oral three times a day PRN Cough  guaiFENesin Oral Liquid (Sugar-Free) 200 milliGRAM(s) Oral every 6 hours PRN Cough  oxyCODONE    IR 5 milliGRAM(s) Oral every 6 hours PRN Severe Pain (7 - 10)      ALLERGIES:  Allergies    No Known Allergies    Intolerances        LABS:                        11.5   11.74 )-----------( 296      ( 17 May 2021 05:54 )             35.2     05-17    139  |  107  |  15  ----------------------------<  103<H>  4.1   |  21<L>  |  0.97    Ca    9.4      17 May 2021 05:54  Phos  2.2     05-17  Mg     2.1     05-17      PTT - ( 17 May 2021 05:54 )  PTT:31.2 sec    CAPILLARY BLOOD GLUCOSE          RADIOLOGY & ADDITIONAL TESTS: Reviewed.

## 2021-05-17 NOTE — PROGRESS NOTE ADULT - PROBLEM SELECTOR PLAN 3
CXR with evidence of right basal pleural effusion.  - eliquis as abkita  - f/u pulm recs (Dr. Sinclair)

## 2021-05-17 NOTE — PROGRESS NOTE ADULT - PROBLEM SELECTOR PLAN 4
Patient with admission Cr 1.05 with baseline noted to be 0.6-0.7.  Likely prerenal i/s/o infection.  - improving

## 2021-05-17 NOTE — PROGRESS NOTE ADULT - SUBJECTIVE AND OBJECTIVE BOX
Interval Events: Reviewed  Patient seen and examined at bedside.    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    Essential hypertension  HTN (hypertension)    History of pneumonia    Asthma, unspecified asthma severity, unspecified whether complicated, unspecified whether persistent    Cerebrovascular accident (CVA), unspecified mechanism  remote hx    Hyperlipidemia, unspecified hyperlipidemia type    Afib    Lung cancer  s/p resection in 2000, no chemo/RT    History of cholecystectomy    Cardiac pacemaker    H/O abdominal hysterectomy    Lung cancer  s/p resection    Cataract of right eye        MEDICATIONS:  Pulmonary:  ALBUTerol    90 MICROgram(s) HFA Inhaler 2 Puff(s) Inhalation every 4 hours PRN  benzonatate 100 milliGRAM(s) Oral three times a day PRN  guaiFENesin Oral Liquid (Sugar-Free) 200 milliGRAM(s) Oral every 6 hours PRN    Antimicrobials:  piperacillin/tazobactam IVPB.. 3.375 Gram(s) IV Intermittent every 6 hours    Anticoagulants:  apixaban 10 milliGRAM(s) Oral every 12 hours    Cardiac:  ATENolol  Tablet 25 milliGRAM(s) Oral daily      Allergies    No Known Allergies    Intolerances        Vital Signs Last 24 Hrs  T(C): 36.3 (17 May 2021 15:36), Max: 37.1 (16 May 2021 20:00)  T(F): 97.3 (17 May 2021 15:36), Max: 98.8 (16 May 2021 20:00)  HR: 79 (17 May 2021 15:36) (79 - 101)  BP: 131/61 (17 May 2021 15:36) (131/61 - 150/85)  BP(mean): 105 (17 May 2021 08:44) (105 - 105)  RR: 18 (17 May 2021 15:36) (16 - 18)  SpO2: 97% (17 May 2021 15:36) (95% - 97%)        Review of Systems:   •	General: negative  •	Skin/Breast: negative  •	Ophthalmologic: negative  •	ENMT: negative  •	Respiratory and Thorax: negative  •	Cardiovascular: negative  •	Gastrointestinal: negative  •	Genitourinary: negative  •	Musculoskeletal: negative  •	Neurological: negative  •	Psychiatric: negative  •	Hematology/Lymphatics: negative  •	Endocrine: negative  •	Allergic/Immunologic: negative    Physical Exam:   • Constitutional:	Well-developed, well nourished  • Eyes:	EOMI; PERRL; no drainage or redness  • ENMT:	No oral lesions; no gross abnormalities  • Neck	No bruits; no thyromegaly or nodules  • Breasts:	not examined  • Back:	No deformity or limitation of movement  • Respiratory:	Breath Sounds equal & clear to percussion & auscultation, no accessory muscle use  • Cardiovascular:	Regular rate & rhythm, normal S1, S2; no murmurs, gallops or rubs; no S3, S4  • Gastrointestinal:	Soft, non-tender, no hepatosplenomegaly, normal bowel sounds  • Genitourinary:	not examined  • Rectal: not examined  • Extremities:	No cyanosis, clubbing or edema  • Vascular:	Equal and normal pulses (carotid, femoral, dorsalis pedis)  • Neurologica:l	not examined  • Skin:	No lesions; no rash  • Lymph Nodes:	No lymphadedenopathy  • Musculoskeletal:	No joint pain, swelling or deformity; no limitation of movement        LABS:      CBC Full  -  ( 17 May 2021 05:54 )  WBC Count : 11.74 K/uL  RBC Count : 3.91 M/uL  Hemoglobin : 11.5 g/dL  Hematocrit : 35.2 %  Platelet Count - Automated : 296 K/uL  Mean Cell Volume : 90.0 fl  Mean Cell Hemoglobin : 29.4 pg  Mean Cell Hemoglobin Concentration : 32.7 gm/dL  Auto Neutrophil # : x  Auto Lymphocyte # : x  Auto Monocyte # : x  Auto Eosinophil # : x  Auto Basophil # : x  Auto Neutrophil % : x  Auto Lymphocyte % : x  Auto Monocyte % : x  Auto Eosinophil % : x  Auto Basophil % : x    05-17    139  |  107  |  15  ----------------------------<  103<H>  4.1   |  21<L>  |  0.97    Ca    9.4      17 May 2021 05:54  Phos  2.2     05-17  Mg     2.1     05-17      PTT - ( 17 May 2021 05:54 )  PTT:31.2 sec        CXR unchanged            RADIOLOGY & ADDITIONAL STUDIES (The following images were personally reviewed):  Nuñez:                                     No  Urine output:                       adequate  DVT prophylaxis:                 Yes  Flattus:                                  Yes  Bowel movement:              No

## 2021-05-17 NOTE — DISCHARGE NOTE NURSING/CASE MANAGEMENT/SOCIAL WORK - PATIENT PORTAL LINK FT
You can access the FollowMyHealth Patient Portal offered by [facility] by registering at the following website: [email or web address]. By joining [facility]s FollowMyHealth portal, you will also be able to view your health information using other applications (apps) compatible with our system.

## 2021-05-18 VITALS
TEMPERATURE: 98 F | OXYGEN SATURATION: 96 % | DIASTOLIC BLOOD PRESSURE: 90 MMHG | SYSTOLIC BLOOD PRESSURE: 150 MMHG | RESPIRATION RATE: 14 BRPM | HEART RATE: 82 BPM

## 2021-05-18 LAB
ANION GAP SERPL CALC-SCNC: 9 MMOL/L — SIGNIFICANT CHANGE UP (ref 5–17)
BUN SERPL-MCNC: 14 MG/DL — SIGNIFICANT CHANGE UP (ref 7–23)
CALCIUM SERPL-MCNC: 9.2 MG/DL — SIGNIFICANT CHANGE UP (ref 8.4–10.5)
CHLORIDE SERPL-SCNC: 107 MMOL/L — SIGNIFICANT CHANGE UP (ref 96–108)
CO2 SERPL-SCNC: 19 MMOL/L — LOW (ref 22–31)
CREAT SERPL-MCNC: 0.79 MG/DL — SIGNIFICANT CHANGE UP (ref 0.5–1.3)
GLUCOSE SERPL-MCNC: 107 MG/DL — HIGH (ref 70–99)
HCT VFR BLD CALC: 33.4 % — LOW (ref 34.5–45)
HGB BLD-MCNC: 11 G/DL — LOW (ref 11.5–15.5)
MAGNESIUM SERPL-MCNC: 2 MG/DL — SIGNIFICANT CHANGE UP (ref 1.6–2.6)
MCHC RBC-ENTMCNC: 29.6 PG — SIGNIFICANT CHANGE UP (ref 27–34)
MCHC RBC-ENTMCNC: 32.9 GM/DL — SIGNIFICANT CHANGE UP (ref 32–36)
MCV RBC AUTO: 89.8 FL — SIGNIFICANT CHANGE UP (ref 80–100)
NRBC # BLD: 0 /100 WBCS — SIGNIFICANT CHANGE UP (ref 0–0)
PHOSPHATE SERPL-MCNC: 2.7 MG/DL — SIGNIFICANT CHANGE UP (ref 2.5–4.5)
PLATELET # BLD AUTO: 239 K/UL — SIGNIFICANT CHANGE UP (ref 150–400)
POTASSIUM SERPL-MCNC: 3.9 MMOL/L — SIGNIFICANT CHANGE UP (ref 3.5–5.3)
POTASSIUM SERPL-SCNC: 3.9 MMOL/L — SIGNIFICANT CHANGE UP (ref 3.5–5.3)
RBC # BLD: 3.72 M/UL — LOW (ref 3.8–5.2)
RBC # FLD: 16.7 % — HIGH (ref 10.3–14.5)
SODIUM SERPL-SCNC: 135 MMOL/L — SIGNIFICANT CHANGE UP (ref 135–145)
WBC # BLD: 10.97 K/UL — HIGH (ref 3.8–10.5)
WBC # FLD AUTO: 10.97 K/UL — HIGH (ref 3.8–10.5)

## 2021-05-18 PROCEDURE — 97530 THERAPEUTIC ACTIVITIES: CPT

## 2021-05-18 PROCEDURE — 85025 COMPLETE CBC W/AUTO DIFF WBC: CPT

## 2021-05-18 PROCEDURE — 80053 COMPREHEN METABOLIC PANEL: CPT

## 2021-05-18 PROCEDURE — 84484 ASSAY OF TROPONIN QUANT: CPT

## 2021-05-18 PROCEDURE — 87641 MR-STAPH DNA AMP PROBE: CPT

## 2021-05-18 PROCEDURE — 99232 SBSQ HOSP IP/OBS MODERATE 35: CPT | Mod: GC

## 2021-05-18 PROCEDURE — 85027 COMPLETE CBC AUTOMATED: CPT

## 2021-05-18 PROCEDURE — 97161 PT EVAL LOW COMPLEX 20 MIN: CPT

## 2021-05-18 PROCEDURE — U0005: CPT

## 2021-05-18 PROCEDURE — 87640 STAPH A DNA AMP PROBE: CPT

## 2021-05-18 PROCEDURE — 84145 PROCALCITONIN (PCT): CPT

## 2021-05-18 PROCEDURE — 97110 THERAPEUTIC EXERCISES: CPT

## 2021-05-18 PROCEDURE — 71275 CT ANGIOGRAPHY CHEST: CPT

## 2021-05-18 PROCEDURE — 84132 ASSAY OF SERUM POTASSIUM: CPT

## 2021-05-18 PROCEDURE — 87086 URINE CULTURE/COLONY COUNT: CPT

## 2021-05-18 PROCEDURE — 70450 CT HEAD/BRAIN W/O DYE: CPT

## 2021-05-18 PROCEDURE — 82728 ASSAY OF FERRITIN: CPT

## 2021-05-18 PROCEDURE — 84100 ASSAY OF PHOSPHORUS: CPT

## 2021-05-18 PROCEDURE — 81003 URINALYSIS AUTO W/O SCOPE: CPT

## 2021-05-18 PROCEDURE — 84295 ASSAY OF SERUM SODIUM: CPT

## 2021-05-18 PROCEDURE — 87040 BLOOD CULTURE FOR BACTERIA: CPT

## 2021-05-18 PROCEDURE — 36415 COLL VENOUS BLD VENIPUNCTURE: CPT

## 2021-05-18 PROCEDURE — 85730 THROMBOPLASTIN TIME PARTIAL: CPT

## 2021-05-18 PROCEDURE — 82803 BLOOD GASES ANY COMBINATION: CPT

## 2021-05-18 PROCEDURE — 83735 ASSAY OF MAGNESIUM: CPT

## 2021-05-18 PROCEDURE — 83880 ASSAY OF NATRIURETIC PEPTIDE: CPT

## 2021-05-18 PROCEDURE — 86769 SARS-COV-2 COVID-19 ANTIBODY: CPT

## 2021-05-18 PROCEDURE — 86140 C-REACTIVE PROTEIN: CPT

## 2021-05-18 PROCEDURE — 82330 ASSAY OF CALCIUM: CPT

## 2021-05-18 PROCEDURE — 80048 BASIC METABOLIC PNL TOTAL CA: CPT

## 2021-05-18 PROCEDURE — U0003: CPT

## 2021-05-18 PROCEDURE — 99285 EMERGENCY DEPT VISIT HI MDM: CPT | Mod: 25

## 2021-05-18 PROCEDURE — 71045 X-RAY EXAM CHEST 1 VIEW: CPT

## 2021-05-18 PROCEDURE — 93306 TTE W/DOPPLER COMPLETE: CPT

## 2021-05-18 RX ORDER — CEFPODOXIME PROXETIL 100 MG
1 TABLET ORAL
Qty: 4 | Refills: 0
Start: 2021-05-18 | End: 2021-05-19

## 2021-05-18 RX ORDER — APIXABAN 2.5 MG/1
2 TABLET, FILM COATED ORAL
Qty: 120 | Refills: 0
Start: 2021-05-18 | End: 2021-06-16

## 2021-05-18 RX ADMIN — ATENOLOL 25 MILLIGRAM(S): 25 TABLET ORAL at 06:40

## 2021-05-18 RX ADMIN — PIPERACILLIN AND TAZOBACTAM 200 GRAM(S): 4; .5 INJECTION, POWDER, LYOPHILIZED, FOR SOLUTION INTRAVENOUS at 06:39

## 2021-05-18 RX ADMIN — Medication 1 TABLET(S): at 09:38

## 2021-05-18 RX ADMIN — PANTOPRAZOLE SODIUM 40 MILLIGRAM(S): 20 TABLET, DELAYED RELEASE ORAL at 06:40

## 2021-05-18 RX ADMIN — APIXABAN 10 MILLIGRAM(S): 2.5 TABLET, FILM COATED ORAL at 06:40

## 2021-05-18 RX ADMIN — BRIMONIDINE TARTRATE 1 DROP(S): 2 SOLUTION/ DROPS OPHTHALMIC at 06:40

## 2021-05-18 RX ADMIN — OXYCODONE HYDROCHLORIDE 5 MILLIGRAM(S): 5 TABLET ORAL at 06:40

## 2021-05-18 RX ADMIN — DORZOLAMIDE HYDROCHLORIDE 1 DROP(S): 20 SOLUTION/ DROPS OPHTHALMIC at 07:08

## 2021-05-18 RX ADMIN — OXYCODONE HYDROCHLORIDE 5 MILLIGRAM(S): 5 TABLET ORAL at 07:08

## 2021-05-18 RX ADMIN — MEGESTROL ACETATE 800 MILLIGRAM(S): 40 SUSPENSION ORAL at 09:38

## 2021-05-18 NOTE — PROGRESS NOTE ADULT - PROBLEM SELECTOR PROBLEM 2
Afib
Pleural effusion
Pleural effusion
Afib
Pleural effusion
Afib
Pleural effusion
Afib
PNA (pneumonia)
Afib
PNA (pneumonia)

## 2021-05-18 NOTE — PROGRESS NOTE ADULT - TIME-BASED BILLING (NON-CRITICAL CARE)
Time-based billing (NON-critical care)

## 2021-05-18 NOTE — PROGRESS NOTE ADULT - PROBLEM SELECTOR PROBLEM 6
Essential hypertension
Anorexia
Essential hypertension

## 2021-05-18 NOTE — PROGRESS NOTE ADULT - PROBLEM SELECTOR PLAN 5
Patient with hx of lung ca s/p resection in 2000, no chemo/RT.  Follows with Dr. Greenfield and Dr. Sinclair outpatient.
eye drops
eye drops
Patient with hx of lung ca s/p resection in 2000, no chemo/RT.  Follows with Dr. Greenfield and Dr. Sinclair outpatient.
eye drops
Patient with hx of lung ca s/p resection in 2000, no chemo/RT.  Follows with Dr. Greenfield and Dr. Sinclair outpatient.

## 2021-05-18 NOTE — PROGRESS NOTE ADULT - SUBJECTIVE AND OBJECTIVE BOX
Interval Events: Reviewed  Patient seen and examined at bedside.    Patient is a 85y old  Female who presents with a chief complaint of   she is feeling better  PAST MEDICAL & SURGICAL HISTORY:  Glaucoma  blind in L eye, 2% vision in R eye    Essential hypertension  HTN (hypertension)    History of pneumonia    Asthma, unspecified asthma severity, unspecified whether complicated, unspecified whether persistent    Cerebrovascular accident (CVA), unspecified mechanism  remote hx    Hyperlipidemia, unspecified hyperlipidemia type    Afib    Lung cancer  s/p resection in 2000, no chemo/RT    History of cholecystectomy    Cardiac pacemaker    H/O abdominal hysterectomy    Lung cancer  s/p resection    Cataract of right eye        MEDICATIONS:  Pulmonary:    Antimicrobials:    Anticoagulants:    Cardiac:      Allergies    No Known Allergies    Intolerances        Vital Signs Last 24 Hrs  T(C): 36.4 (18 May 2021 08:37), Max: 36.4 (18 May 2021 08:37)  T(F): 97.5 (18 May 2021 08:37), Max: 97.5 (18 May 2021 08:37)  HR: 82 (18 May 2021 08:37) (82 - 82)  BP: 150/90 (18 May 2021 08:37) (150/90 - 150/90)  BP(mean): --  RR: 14 (18 May 2021 08:37) (14 - 14)  SpO2: 96% (18 May 2021 08:37) (96% - 96%)        Review of Systems:   •	General: negative  •	Skin/Breast: negative  •	Ophthalmologic: negative  •	ENMT: negative  •	Respiratory and Thorax: negative  •	Cardiovascular: negative  •	Gastrointestinal: negative  •	Genitourinary: negative  •	Musculoskeletal: negative  •	Neurological: negative  •	Psychiatric: negative  •	Hematology/Lymphatics: negative  •	Endocrine: negative  •	Allergic/Immunologic: negative    Physical Exam:   • Constitutional:	Well-developed, well nourished  • Eyes:	EOMI; PERRL; no drainage or redness  • ENMT:	No oral lesions; no gross abnormalities  • Neck	No bruits; no thyromegaly or nodules  • Breasts:	not examined  • Back:	No deformity or limitation of movement  • Respiratory:	Breath Sounds equal & clear to percussion & auscultation, no accessory muscle use  • Cardiovascular:	Regular rate & rhythm, normal S1, S2; no murmurs, gallops or rubs; no S3, S4  • Gastrointestinal:	Soft, non-tender, no hepatosplenomegaly, normal bowel sounds  • Genitourinary:	not examined  • Rectal: not examined  • Extremities:	No cyanosis, clubbing or edema  • Vascular:	Equal and normal pulses (carotid, femoral, dorsalis pedis)  • Neurologica:l	not examined  • Skin:	No lesions; no rash  • Lymph Nodes:	No lymphadedenopathy  • Musculoskeletal:	No joint pain, swelling or deformity; no limitation of movement        LABS:      CBC Full  -  ( 18 May 2021 08:36 )  WBC Count : 10.97 K/uL  RBC Count : 3.72 M/uL  Hemoglobin : 11.0 g/dL  Hematocrit : 33.4 %  Platelet Count - Automated : 239 K/uL  Mean Cell Volume : 89.8 fl  Mean Cell Hemoglobin : 29.6 pg  Mean Cell Hemoglobin Concentration : 32.9 gm/dL  Auto Neutrophil # : x  Auto Lymphocyte # : x  Auto Monocyte # : x  Auto Eosinophil # : x  Auto Basophil # : x  Auto Neutrophil % : x  Auto Lymphocyte % : x  Auto Monocyte % : x  Auto Eosinophil % : x  Auto Basophil % : x    05-18    135  |  107  |  14  ----------------------------<  107<H>  3.9   |  19<L>  |  0.79    Ca    9.2      18 May 2021 08:36  Phos  2.7     05-18  Mg     2.0     05-18                          RADIOLOGY & ADDITIONAL STUDIES (The following images were personally reviewed):  Nuñez:                                     No  Urine output:                       adequate  DVT prophylaxis:                 Yes  Flattus:                                  Yes  Bowel movement:              No

## 2021-05-18 NOTE — PROGRESS NOTE ADULT - PROBLEM SELECTOR PLAN 1
CTA chest s/f pulmonary emboli in the right lower lobe pulmonary artery as well as the lingular branch of the left upper lobe pulmonary artery. Formal TTE 5/13 did not show any evidence of RHS  - eliquis 10mg BID restarted (pt initially received this on 5/13 and 5/14) therefore to be d/c on 5/19 after evening dose and changed to 5mg BID starting 5/20

## 2021-05-18 NOTE — PROGRESS NOTE ADULT - NUTRITIONAL ASSESSMENT
This patient has been assessed with a concern for Malnutrition and has been determined to have a diagnosis/diagnoses of Severe protein-calorie malnutrition and Underweight (BMI < 19).      
This patient has been assessed with a concern for Malnutrition and has been determined to have a diagnosis/diagnoses of Severe protein-calorie malnutrition and Underweight (BMI < 19).    This patient is being managed with:   Diet DASH/TLC-  Sodium & Cholesterol Restricted  Supplement Feeding Modality:  Oral  Ensure Enlive Cans or Servings Per Day:  1       Frequency:  Two Times a day  Entered: May 14 2021 10:56AM    
This patient has been assessed with a concern for Malnutrition and has been determined to have a diagnosis/diagnoses of Severe protein-calorie malnutrition and Underweight (BMI < 19).    This patient is being managed with:   Diet DASH/TLC-  Sodium & Cholesterol Restricted  Entered: May 12 2021  5:11PM    
This patient has been assessed with a concern for Malnutrition and has been determined to have a diagnosis/diagnoses of Severe protein-calorie malnutrition and Underweight (BMI < 19).    This patient is being managed with:   Diet DASH/TLC-  Sodium & Cholesterol Restricted  Supplement Feeding Modality:  Oral  Ensure Enlive Cans or Servings Per Day:  1       Frequency:  Two Times a day  Entered: May 14 2021 10:56AM    

## 2021-05-18 NOTE — ED PROVIDER NOTE - NEUROLOGICAL MOTOR
normal Odomzo Counseling- I discussed with the patient the risks of Odomzo including but not limited to nausea, vomiting, diarrhea, constipation, weight loss, changes in the sense of taste, decreased appetite, muscle spasms, and hair loss.  The patient verbalized understanding of the proper use and possible adverse effects of Odomzo.  All of the patient's questions and concerns were addressed.

## 2021-05-18 NOTE — PROGRESS NOTE ADULT - ASSESSMENT
86 yo Northern Irish-speaking F PMHx Lung CA (s/p resection in 2000, no chemo/RT), Afib (s/p PPM on eliquis), HTN, HLD, Asthma, Glaucoma (blind in L eye, 2% vision R eye), CVA (remote, no deficits), and recently treated for PNA 3 weeks ago presents for back pain X4 weeks found to have RLL PNA, R pleural effusion, and pulmonary emboli.
84 yo Mongolian-speaking F PMHx Lung CA (s/p resection in 2000, no chemo/RT), Afib (s/p PPM on eliquis), HTN, HLD, Asthma, Glaucoma (blind in L eye, 2% vision R eye), CVA (remote, no deficits), and recently treated for PNA 3 weeks ago presents for back pain X4 weeks found to have RLL PNA, R pleural effusion, and pulmonary emboli.
84 yo Samoan-speaking F PMHx Lung CA (s/p resection in 2000, no chemo/RT), Afib (s/p PPM on eliquis), HTN, HLD, Asthma, Glaucoma (blind in L eye, 2% vision R eye), CVA (remote, no deficits), and recently treated for PNA 3 weeks ago presents for back pain X4 weeks found to have RLL PNA, R pleural effusion, and pulmonary emboli.
84 yo Bulgarian-speaking F PMHx Lung CA (s/p resection in 2000, no chemo/RT), Afib (s/p PPM on eliquis), HTN, HLD, Asthma, Glaucoma (blind in L eye, 2% vision R eye), CVA (remote, no deficits), and recently treated for PNA 3 weeks ago presents for back pain X4 weeks found to have RLL PNA, R pleural effusion, and pulmonary emboli.
84 yo Macedonian-speaking F PMHx Lung CA (s/p resection in 2000, no chemo/RT), Afib (s/p PPM on eliquis), HTN, HLD, Asthma, Glaucoma (blind in L eye, 2% vision R eye), CVA (remote, no deficits), and recently treated for PNA 3 weeks ago presents for back pain X4 weeks found to have RLL PNA, R pleural effusion, and pulmonary emboli.

## 2021-05-18 NOTE — PROGRESS NOTE ADULT - PROBLEM SELECTOR PROBLEM 1
Bacterial pneumonia
Bacterial pneumonia
PNA (pneumonia)
Bacterial pneumonia
PNA (pneumonia)
Bacterial pneumonia
Pulmonary embolism
PNA (pneumonia)
PNA (pneumonia)
Pulmonary embolism
PNA (pneumonia)

## 2021-05-18 NOTE — PROGRESS NOTE ADULT - TIME BILLING
Patient seen and examined;  Continue antibiotics;  No tap;
Patient seen and examined;   Home today
Patient seen and examined with house-staff during bedside rounds.  Resident note read, including vitals, physical findings, laboratory data, and radiological reports.   Revisions included below.  Direct personal management at bed side and extensive interpretation of the data.  Plan was outlined and discussed in details with the housestaff.  Decision making of high complexity  Action taken for acute disease activity to reflect the level of care provided:  - medication reconciliation  - review laboratory data
As above discussed with patients daughter; Not eating; Daughter has toforce her to eat;  Appears depressed tome;  She wants neuro to see patient;  Will repeat Chest X-ray  Continue antibiotics
Patient seen and examined; Improved;   Probably discharge soon
Patient seen and examined with house-staff during bedside rounds.  Resident note read, including vitals, physical findings, laboratory data, and radiological reports.   Revisions included below.  Direct personal management at bed side and extensive interpretation of the data.  Plan was outlined and discussed in details with the housestaff.  Decision making of high complexity  Action taken for acute disease activity to reflect the level of care provided:  - medication reconciliation  - review laboratory data  the US of the chest revealed more consolidation rather than effusion.  She is afebrile with decrease in wbc.  No indication for thoracentesis.  OOB in chair.  Continue antiiotic.  sat OK.  she is hemodynamically stable.

## 2021-05-18 NOTE — PROGRESS NOTE ADULT - SUBJECTIVE AND OBJECTIVE BOX
OVERNIGHT EVENTS: Alonso    SUBJECTIVE / INTERVAL HPI: Patient seen and examined at bedside. Appears more awake, looks better, was able to eat yesterday. States that she feels a little better. Does not complain of any fevers, chills, nausea, vomiting, or pain in R-sided back that she was complaining of yesterday.      VITAL SIGNS:  Vital Signs Last 24 Hrs  T(C): 37 (18 May 2021 06:15), Max: 37 (18 May 2021 06:15)  T(F): 98.6 (18 May 2021 06:15), Max: 98.6 (18 May 2021 06:15)  HR: 91 (18 May 2021 06:15) (79 - 91)  BP: 161/94 (18 May 2021 06:15) (131/61 - 161/94)  BP(mean): 105 (17 May 2021 08:44) (105 - 105)  RR: 18 (18 May 2021 06:15) (16 - 18)  SpO2: 95% (18 May 2021 06:15) (95% - 97%)    PHYSICAL EXAM:    General: elderly woman in NAD sitting up awake in bed  HEENT: NC/AT; anicteric sclera; MMM  Cardiovascular: +S1/S2; RRR  Respiratory: slight ronchi  in lower lung fields bilaterally; some coughing with deep breathing  Gastrointestinal: soft, NT/ND; +BSx4  Extremities: WWP; no edema, clubbing or cyanosis  Vascular: 2+ radial, DP pulses B/L  Neurological: AAOx3; no focal deficits      MEDICATIONS:  MEDICATIONS  (STANDING):  apixaban 10 milliGRAM(s) Oral every 12 hours  ATENolol  Tablet 25 milliGRAM(s) Oral daily  atorvastatin 20 milliGRAM(s) Oral at bedtime  brimonidine 0.2% Ophthalmic Solution 1 Drop(s) Both EYES three times a day  dorzolamide 2% Ophthalmic Solution 1 Drop(s) Both EYES <User Schedule>  latanoprost 0.005% Ophthalmic Solution 1 Drop(s) Both EYES at bedtime  megestrol Suspension 800 milliGRAM(s) Oral daily  multivitamin 1 Tablet(s) Oral daily  pantoprazole    Tablet 40 milliGRAM(s) Oral before breakfast  piperacillin/tazobactam IVPB.. 3.375 Gram(s) IV Intermittent every 6 hours    MEDICATIONS  (PRN):  ALBUTerol    90 MICROgram(s) HFA Inhaler 2 Puff(s) Inhalation every 4 hours PRN Shortness of Breath  benzonatate 100 milliGRAM(s) Oral three times a day PRN Cough  guaiFENesin Oral Liquid (Sugar-Free) 200 milliGRAM(s) Oral every 6 hours PRN Cough  oxyCODONE    IR 5 milliGRAM(s) Oral every 6 hours PRN Severe Pain (7 - 10)      ALLERGIES:  Allergies    No Known Allergies    Intolerances        LABS:                        11.5   11.74 )-----------( 296      ( 17 May 2021 05:54 )             35.2     05-17    139  |  107  |  15  ----------------------------<  103<H>  4.1   |  21<L>  |  0.97    Ca    9.4      17 May 2021 05:54  Phos  2.2     05-17  Mg     2.1     05-17      PTT - ( 17 May 2021 05:54 )  PTT:31.2 sec    CAPILLARY BLOOD GLUCOSE          RADIOLOGY & ADDITIONAL TESTS: Reviewed.

## 2021-05-18 NOTE — PROGRESS NOTE ADULT - NSICDXPILOT_GEN_ALL_CORE
[place]

## 2021-05-18 NOTE — PROGRESS NOTE ADULT - PROBLEM SELECTOR PLAN 10
Chief Complaint   Patient presents with   • Well Child     3 yr       Richard Grewal male 3  y.o. 1  m.o.    History was provided by the parents.          There is no immunization history on file for this patient.    The following portions of the patient's history were reviewed and updated as appropriate: allergies, current medications, past family history, past medical history, past social history, past surgical history and problem list.    No current outpatient medications on file.     No current facility-administered medications for this visit.        No Known Allergies    History reviewed. No pertinent past medical history.    Current Issues:  Current concerns include fell a few months ago, hit head on the corner of a table, did not loose consciousness, no vomiting, no excessive fatigue. No behavioral changes since that time.    Toilet trained? yes  Concerns regarding hearing? no    Review of Nutrition:  Balanced diet? yes variety of foods, including meats, fruits, vegetables, and grains. Does not like to drink milk, but does get dairy from other sources. Drinks tea, juices, sprite, water.   Exercise:  Active   Screen Time:  Unlimited   Dentist: No dental home, brushes teeth daily.     Social Screening:  Current child-care arrangements: in home: primary caregiver is zunilda/  Sibling relations: only child  Concerns regarding behavior with peers? no  : not enrolled   Secondhand smoke exposure? yes - family members smoke outside  Helmet use:  yes  Car Seat:  yes  Smoke Detectors: yes      Developmental History:    Speaks in 3-4 word sentences: yes  Speech is 75% understandable:   yes  Asks who and what questions:  yes  Can use plurals: yes  Counts 3 objects:  yes  Knows age and sex:  yes  Copies a [place]: yes  Can turn pages in a book:  yes  Fantasy play:  yes  Helps to dress or dresses self:  yes  Jumps with 2 feet off the ground:  yes  Balances briefly on 1 foot:  yes  Goes up stairs 
"alternating feet:  yes  Pedals  a tricycle:  yes             BP 88/50   Ht 99.1 cm (39\")   Wt 15.9 kg (35 lb)   BMI 16.18 kg/m²     Growth parameters are noted and are appropriate for age.    Physical Exam   Constitutional: He appears well-developed and well-nourished. He is active, easily engaged and cooperative. He does not appear ill. No distress.   HENT:   Head: Atraumatic.   Right Ear: Tympanic membrane normal.   Left Ear: Tympanic membrane normal.   Nose: Nose normal.   Mouth/Throat: Mucous membranes are moist. Oropharynx is clear.   Eyes: Conjunctivae and lids are normal. Red reflex is present bilaterally. Pupils are equal, round, and reactive to light.   Neck: Normal range of motion. Neck supple. No neck rigidity.   Cardiovascular: Normal rate and regular rhythm.   Pulmonary/Chest: Effort normal and breath sounds normal. No accessory muscle usage, nasal flaring, stridor or grunting. No respiratory distress. Air movement is not decreased. No transmitted upper airway sounds. He has no decreased breath sounds. He has no wheezes. He has no rhonchi. He has no rales. He exhibits no retraction.   Abdominal: Soft. Bowel sounds are normal. He exhibits no mass. There is no tenderness. There is no rigidity, no rebound and no guarding.   Musculoskeletal: Normal range of motion.   Lymphadenopathy:     He has no cervical adenopathy.   Neurological: He is alert and oriented for age. He has normal reflexes. He exhibits normal muscle tone.   Skin: Skin is warm and dry. No rash noted. No pallor.   Nursing note and vitals reviewed.              Healthy 3 y.o. well child.       1. Anticipatory guidance discussed.  Gave handout on well-child issues at this age.    The patient and parent(s) were instructed in water safety, burn safety, firearm safety, street safety, and stranger safety.  Helmet use was indicated for any bike riding, scooter, rollerblades, skateboards, or skiing.  They were instructed that a car seat should be "
F: tolerating PO, no IVF  E: replete K<4, Mg<2  N: Dash/TLC  VTE Prophylaxis: holding now, will restart pending procedure  C: Full Code  D: UMA
F: tolerating PO, no IVF  E: replete K<4, Mg<2  N: Dash/TLC  VTE Prophylaxis: holding now, will restart pending procedure  C: Full Code  D: UMA
facing forward in the back seat, and  is recommended until 4 years of age.  Booster seat is recommended after that, in the back seat, until age 8-12 and 57 inches.  They were instructed that children should sit  in the back seat of the car, if there is an air bag, until age 13.  They were instructed that  and medications should be locked up and out of reach, and a poison control sticker available if needed.  It was recommended that  plastic bags be ripped up and thrown out.  Firearms should be stored in a locked place such as a gunsafe.  Discussed discipline tactics such as time out and loss of privileges.  Limit screen time to <2hrs daily. Encouraged dental hygiene with children's fluoride toothpaste and regular dental visits.  Encouraged sharing books in the home.    2.  Weight management:  The patient was counseled regarding nutrition and physical activity.    3. Immunizations UTD. Declines influenza.     No orders of the defined types were placed in this encounter.        Return in about 1 year (around 12/11/2019), or if symptoms worsen or fail to improve, for Annual physical.    
F: tolerating PO, no IVF  E: replete K<4, Mg<2  N: Dash/TLC  VTE Prophylaxis: eliquis  C: Full Code  D: F
F: tolerating PO, no IVF  E: replete K<4, Mg<2  N: Dash/TLC  VTE Prophylaxis: holding now, will restart pending procedure  C: Full Code  D: UMA
F: tolerating PO, no IVF  E: replete K<4, Mg<2  N: Dash/TLC  VTE Prophylaxis: holding for procedure, was on eliquis at home  C: Full Code  D: CATHLEEN

## 2021-05-18 NOTE — PROGRESS NOTE ADULT - PROBLEM SELECTOR PROBLEM 4
AL (acute kidney injury)
Pulmonary embolism
AL (acute kidney injury)
Asthma, unspecified asthma severity, unspecified whether complicated, unspecified whether persistent
Pulmonary embolism
Pulmonary embolism
Asthma, unspecified asthma severity, unspecified whether complicated, unspecified whether persistent
Pulmonary embolism
Asthma, unspecified asthma severity, unspecified whether complicated, unspecified whether persistent
Asthma, unspecified asthma severity, unspecified whether complicated, unspecified whether persistent

## 2021-05-18 NOTE — PROGRESS NOTE ADULT - PROBLEM SELECTOR PLAN 2
CXR revealed increase in the right effusion and will perform thoracentesis
no indication for thoracentesis
Patient with recurrent PNA, most recently treated 3 weeks ago.  Presented with leukocytosis of 10.95 with neutrophilic predominance, elevated CRP, and elevated procal.  CXR showing opacification/consolidation in R base.    - received 3g unasyn in ED  - broadened to Vanc and Zosyn, as patient recently treated outpatient for PNA with antibiotics  - MRSA swab negative therefore dc vanc  - albuterol inhaler prn sob
US revealed small effusion and predominately consiolidation
rate is controlled and she is on Lovenox for thoracentesis in am
US revealed small effusion and predominately consolidation
Patient with recurrent PNA, most recently treated 3 weeks ago.  Presented with leukocytosis of 10.95 with neutrophilic predominance, elevated CRP, and elevated procal.  CXR showing opacification/consolidation in R base.    - received 3g unasyn in ED  - broadened to Vanc and Zosyn, as patient recently treated outpatient for PNA with antibiotics  - MRSA swab negative therefore dc vanc  - albuterol inhaler prn sob     #Respiratory acidosis  Likely 2/2 both chronic and acute lung pathology
Patient with recurrent PNA, most recently treated 3 weeks ago.  Presented with leukocytosis of 10.95 with neutrophilic predominance, elevated CRP, and elevated procal.  CXR showing opacification/consolidation in R base.    - received 3g unasyn in ED  - broadened to Vanc and Zosyn, as patient recently treated outpatient for PNA with antibiotics  - MRSA swab negative therefore dc vanc  - albuterol inhaler prn sob
Patient with recurrent PNA, most recently treated 3 weeks ago.  Presents today with leukocytosis of 10.95 with neutrophilic predominance, elevated CRP, and elevated procal.  In addition, patient with increasing sputum production and change in color.  CXR showing opacification/consolidation in R base.    - received 3g unasyn in ED  - broadened to Vanc and Zosyn, as patient recently treated outpatient for PNA with antibiotics  - MRSA swab negative therefore dc vanc  - albuterol inhaler prn sob     #Respiratory acidosis  Likely 2/2 both chronic and acute lung pathology
Patient with recurrent PNA, most recently treated 3 weeks ago.  Presents today with leukocytosis of 10.95 with neutrophilic predominance, elevated CRP, and elevated procal.  In addition, patient with increasing sputum production and change in color.  CXR showing opacification/consolidation in R base.    - received 3g unasyn in ED  - broadened to Vanc and Zosyn, as patient recently treated outpatient for PNA with antibiotics  - MRSA swab negative therefore will dc vanc  - albuterol inhaler prn sob     #Respiratory acidosis  Likely 2/2 both chronic and acute lung pathology

## 2021-05-18 NOTE — PROGRESS NOTE ADULT - SUBJECTIVE AND OBJECTIVE BOX
INTERVAL HPI/OVERNIGHT EVENTS:  Awake and appears more awake;         MEDICATIONS  (STANDING):  apixaban 10 milliGRAM(s) Oral every 12 hours  ATENolol  Tablet 25 milliGRAM(s) Oral daily  atorvastatin 20 milliGRAM(s) Oral at bedtime  brimonidine 0.2% Ophthalmic Solution 1 Drop(s) Both EYES three times a day  dorzolamide 2% Ophthalmic Solution 1 Drop(s) Both EYES <User Schedule>  latanoprost 0.005% Ophthalmic Solution 1 Drop(s) Both EYES at bedtime  megestrol Suspension 800 milliGRAM(s) Oral daily  multivitamin 1 Tablet(s) Oral daily  pantoprazole    Tablet 40 milliGRAM(s) Oral before breakfast  piperacillin/tazobactam IVPB.. 3.375 Gram(s) IV Intermittent every 6 hours    MEDICATIONS  (PRN):  ALBUTerol    90 MICROgram(s) HFA Inhaler 2 Puff(s) Inhalation every 4 hours PRN Shortness of Breath  benzonatate 100 milliGRAM(s) Oral three times a day PRN Cough  guaiFENesin Oral Liquid (Sugar-Free) 200 milliGRAM(s) Oral every 6 hours PRN Cough  oxyCODONE    IR 5 milliGRAM(s) Oral every 6 hours PRN Severe Pain (7 - 10)      Allergies    No Known Allergies    Intolerances        Vital Signs Last 24 Hrs  T(C): 36.4 (18 May 2021 08:37), Max: 37 (18 May 2021 06:15)  T(F): 97.5 (18 May 2021 08:37), Max: 98.6 (18 May 2021 06:15)  HR: 82 (18 May 2021 08:37) (79 - 91)  BP: 150/90 (18 May 2021 08:37) (131/61 - 161/94)  BP(mean): --  RR: 14 (18 May 2021 08:37) (14 - 18)  SpO2: 96% (18 May 2021 08:37) (95% - 97%)          Constitutional: Awake    Eyes: BENY    ENMT: Negative    Neck: Supple    Back:  no tenderness     Respiratory:  clear    Cardiovascular: S1 S2    Gastrointestinal: soft    Genitourinary:    Extremities: no edema    Vascular:    Neurological:    Skin:    Lymph Nodes:            LABS:                        11.0   10.97 )-----------( 239      ( 18 May 2021 08:36 )             33.4     05-18    135  |  107  |  14  ----------------------------<  107<H>  3.9   |  19<L>  |  0.79    Ca    9.2      18 May 2021 08:36  Phos  2.7     05-18  Mg     2.0     05-18      PTT - ( 17 May 2021 05:54 )  PTT:31.2 sec      RADIOLOGY & ADDITIONAL TESTS:

## 2021-05-18 NOTE — PROGRESS NOTE ADULT - PROBLEM SELECTOR PLAN 7
Patient with hx of afib on eliquis 5mg qd, s/p PPM.  Admission EKG with NSR.  - hold eliquis i/s/o possible thoracentesis    #Prolonged QTc  Admission EKG with QTc 509.  Received 1 dose of azithromycin in ED.  - will obtain repeat EKG prior to giving any QTc prolonging medications
Patient with hx of afib on eliquis 5mg qd, s/p PPM.  Admission EKG with NSR.  - c/w eliquis    #Prolonged QTc  Admission EKG with QTc 509.  Received 1 dose of azithromycin in ED.  - will obtain repeat EKG prior to giving any QTc prolonging medications
Patient with hx of afib on eliquis 5mg qd, s/p PPM.  Admission EKG with NSR.  - thought is that pt may not have been taking eliquis has prescribed therefore restarted eliquis loading dose    #Prolonged QTc  Admission EKG with QTc 509.  Received 1 dose of azithromycin in ED.  - will obtain repeat EKG prior to giving any QTc prolonging medications

## 2021-05-18 NOTE — PROGRESS NOTE ADULT - PROBLEM SELECTOR PROBLEM 3
PE (pulmonary thromboembolism)
Pleural effusion
PE (pulmonary thromboembolism)
PE (pulmonary thromboembolism)
Lung cancer
Lung cancer
Pleural effusion
Lung cancer
PE (pulmonary thromboembolism)
Lung cancer

## 2021-05-18 NOTE — PROGRESS NOTE ADULT - PROBLEM SELECTOR PLAN 9
- c/w home albuterol prn

## 2021-05-18 NOTE — PROGRESS NOTE ADULT - PROBLEM SELECTOR PLAN 1
continue on the antibiotic She is responding with decrease in the wbc and improvement in the clinical condition.  Duration of antibiotic 7 days  will follow in office and will repeat the chest US and CXR in 2-3 weeks.

## 2021-05-25 DIAGNOSIS — E78.5 HYPERLIPIDEMIA, UNSPECIFIED: ICD-10-CM

## 2021-05-25 DIAGNOSIS — Z86.73 PERSONAL HISTORY OF TRANSIENT ISCHEMIC ATTACK (TIA), AND CEREBRAL INFARCTION WITHOUT RESIDUAL DEFICITS: ICD-10-CM

## 2021-05-25 DIAGNOSIS — R06.02 SHORTNESS OF BREATH: ICD-10-CM

## 2021-05-25 DIAGNOSIS — Z90.710 ACQUIRED ABSENCE OF BOTH CERVIX AND UTERUS: ICD-10-CM

## 2021-05-25 DIAGNOSIS — N17.9 ACUTE KIDNEY FAILURE, UNSPECIFIED: ICD-10-CM

## 2021-05-25 DIAGNOSIS — E43 UNSPECIFIED SEVERE PROTEIN-CALORIE MALNUTRITION: ICD-10-CM

## 2021-05-25 DIAGNOSIS — I10 ESSENTIAL (PRIMARY) HYPERTENSION: ICD-10-CM

## 2021-05-25 DIAGNOSIS — E87.2 ACIDOSIS: ICD-10-CM

## 2021-05-25 DIAGNOSIS — J45.909 UNSPECIFIED ASTHMA, UNCOMPLICATED: ICD-10-CM

## 2021-05-25 DIAGNOSIS — Z85.118 PERSONAL HISTORY OF OTHER MALIGNANT NEOPLASM OF BRONCHUS AND LUNG: ICD-10-CM

## 2021-05-25 DIAGNOSIS — R94.31 ABNORMAL ELECTROCARDIOGRAM [ECG] [EKG]: ICD-10-CM

## 2021-05-25 DIAGNOSIS — R63.0 ANOREXIA: ICD-10-CM

## 2021-05-25 DIAGNOSIS — I48.91 UNSPECIFIED ATRIAL FIBRILLATION: ICD-10-CM

## 2021-05-25 DIAGNOSIS — Z95.0 PRESENCE OF CARDIAC PACEMAKER: ICD-10-CM

## 2021-05-25 DIAGNOSIS — H40.9 UNSPECIFIED GLAUCOMA: ICD-10-CM

## 2021-05-25 DIAGNOSIS — J90 PLEURAL EFFUSION, NOT ELSEWHERE CLASSIFIED: ICD-10-CM

## 2021-05-25 DIAGNOSIS — J18.9 PNEUMONIA, UNSPECIFIED ORGANISM: ICD-10-CM

## 2021-05-25 DIAGNOSIS — I26.99 OTHER PULMONARY EMBOLISM WITHOUT ACUTE COR PULMONALE: ICD-10-CM

## 2021-06-02 ENCOUNTER — EMERGENCY (EMERGENCY)
Facility: HOSPITAL | Age: 86
LOS: 1 days | Discharge: HOME | End: 2021-06-02
Attending: EMERGENCY MEDICINE | Admitting: EMERGENCY MEDICINE
Payer: MEDICARE

## 2021-06-02 ENCOUNTER — APPOINTMENT (OUTPATIENT)
Dept: INTERNAL MEDICINE | Facility: CLINIC | Age: 86
End: 2021-06-02

## 2021-06-02 VITALS
RESPIRATION RATE: 18 BRPM | TEMPERATURE: 99 F | SYSTOLIC BLOOD PRESSURE: 137 MMHG | DIASTOLIC BLOOD PRESSURE: 77 MMHG | OXYGEN SATURATION: 98 % | HEART RATE: 97 BPM

## 2021-06-02 VITALS
HEIGHT: 64 IN | DIASTOLIC BLOOD PRESSURE: 85 MMHG | TEMPERATURE: 98 F | SYSTOLIC BLOOD PRESSURE: 160 MMHG | WEIGHT: 138.01 LBS | HEART RATE: 101 BPM | OXYGEN SATURATION: 96 % | RESPIRATION RATE: 18 BRPM

## 2021-06-02 DIAGNOSIS — H40.9 UNSPECIFIED GLAUCOMA: ICD-10-CM

## 2021-06-02 DIAGNOSIS — Z20.822 CONTACT WITH AND (SUSPECTED) EXPOSURE TO COVID-19: ICD-10-CM

## 2021-06-02 DIAGNOSIS — C34.90 MALIGNANT NEOPLASM OF UNSPECIFIED PART OF UNSPECIFIED BRONCHUS OR LUNG: Chronic | ICD-10-CM

## 2021-06-02 DIAGNOSIS — I48.91 UNSPECIFIED ATRIAL FIBRILLATION: ICD-10-CM

## 2021-06-02 DIAGNOSIS — Z98.890 OTHER SPECIFIED POSTPROCEDURAL STATES: Chronic | ICD-10-CM

## 2021-06-02 DIAGNOSIS — R05 COUGH: ICD-10-CM

## 2021-06-02 DIAGNOSIS — Z79.01 LONG TERM (CURRENT) USE OF ANTICOAGULANTS: ICD-10-CM

## 2021-06-02 DIAGNOSIS — F03.90 UNSPECIFIED DEMENTIA WITHOUT BEHAVIORAL DISTURBANCE: ICD-10-CM

## 2021-06-02 DIAGNOSIS — H26.9 UNSPECIFIED CATARACT: Chronic | ICD-10-CM

## 2021-06-02 DIAGNOSIS — Z86.73 PERSONAL HISTORY OF TRANSIENT ISCHEMIC ATTACK (TIA), AND CEREBRAL INFARCTION WITHOUT RESIDUAL DEFICITS: ICD-10-CM

## 2021-06-02 DIAGNOSIS — J45.909 UNSPECIFIED ASTHMA, UNCOMPLICATED: ICD-10-CM

## 2021-06-02 DIAGNOSIS — R00.0 TACHYCARDIA, UNSPECIFIED: ICD-10-CM

## 2021-06-02 DIAGNOSIS — Z95.0 PRESENCE OF CARDIAC PACEMAKER: Chronic | ICD-10-CM

## 2021-06-02 DIAGNOSIS — R06.02 SHORTNESS OF BREATH: ICD-10-CM

## 2021-06-02 DIAGNOSIS — Z85.118 PERSONAL HISTORY OF OTHER MALIGNANT NEOPLASM OF BRONCHUS AND LUNG: ICD-10-CM

## 2021-06-02 DIAGNOSIS — I10 ESSENTIAL (PRIMARY) HYPERTENSION: ICD-10-CM

## 2021-06-02 DIAGNOSIS — E78.5 HYPERLIPIDEMIA, UNSPECIFIED: ICD-10-CM

## 2021-06-02 PROBLEM — I63.9 CEREBRAL INFARCTION, UNSPECIFIED: Chronic | Status: ACTIVE | Noted: 2018-03-12

## 2021-06-02 LAB
ALBUMIN SERPL ELPH-MCNC: 3.4 G/DL — SIGNIFICANT CHANGE UP (ref 3.3–5)
ALP SERPL-CCNC: 102 U/L — SIGNIFICANT CHANGE UP (ref 40–120)
ALT FLD-CCNC: 16 U/L — SIGNIFICANT CHANGE UP (ref 10–45)
ANION GAP SERPL CALC-SCNC: 10 MMOL/L — SIGNIFICANT CHANGE UP (ref 5–17)
APTT BLD: 34.4 SEC — SIGNIFICANT CHANGE UP (ref 27.5–35.5)
AST SERPL-CCNC: 18 U/L — SIGNIFICANT CHANGE UP (ref 10–40)
BASOPHILS # BLD AUTO: 0.03 K/UL — SIGNIFICANT CHANGE UP (ref 0–0.2)
BASOPHILS NFR BLD AUTO: 0.5 % — SIGNIFICANT CHANGE UP (ref 0–2)
BILIRUB SERPL-MCNC: 0.4 MG/DL — SIGNIFICANT CHANGE UP (ref 0.2–1.2)
BUN SERPL-MCNC: 14 MG/DL — SIGNIFICANT CHANGE UP (ref 7–23)
CALCIUM SERPL-MCNC: 10.4 MG/DL — SIGNIFICANT CHANGE UP (ref 8.4–10.5)
CHLORIDE SERPL-SCNC: 108 MMOL/L — SIGNIFICANT CHANGE UP (ref 96–108)
CK SERPL-CCNC: 40 U/L — SIGNIFICANT CHANGE UP (ref 25–170)
CO2 SERPL-SCNC: 22 MMOL/L — SIGNIFICANT CHANGE UP (ref 22–31)
CREAT SERPL-MCNC: 1.02 MG/DL — SIGNIFICANT CHANGE UP (ref 0.5–1.3)
EOSINOPHIL # BLD AUTO: 0.04 K/UL — SIGNIFICANT CHANGE UP (ref 0–0.5)
EOSINOPHIL NFR BLD AUTO: 0.6 % — SIGNIFICANT CHANGE UP (ref 0–6)
GLUCOSE SERPL-MCNC: 95 MG/DL — SIGNIFICANT CHANGE UP (ref 70–99)
HCT VFR BLD CALC: 38 % — SIGNIFICANT CHANGE UP (ref 34.5–45)
HGB BLD-MCNC: 11.9 G/DL — SIGNIFICANT CHANGE UP (ref 11.5–15.5)
IMM GRANULOCYTES NFR BLD AUTO: 0.5 % — SIGNIFICANT CHANGE UP (ref 0–1.5)
INR BLD: 1.97 — HIGH (ref 0.88–1.16)
LYMPHOCYTES # BLD AUTO: 2.23 K/UL — SIGNIFICANT CHANGE UP (ref 1–3.3)
LYMPHOCYTES # BLD AUTO: 34.1 % — SIGNIFICANT CHANGE UP (ref 13–44)
MCHC RBC-ENTMCNC: 29 PG — SIGNIFICANT CHANGE UP (ref 27–34)
MCHC RBC-ENTMCNC: 31.3 GM/DL — LOW (ref 32–36)
MCV RBC AUTO: 92.5 FL — SIGNIFICANT CHANGE UP (ref 80–100)
MONOCYTES # BLD AUTO: 0.72 K/UL — SIGNIFICANT CHANGE UP (ref 0–0.9)
MONOCYTES NFR BLD AUTO: 11 % — SIGNIFICANT CHANGE UP (ref 2–14)
NEUTROPHILS # BLD AUTO: 3.49 K/UL — SIGNIFICANT CHANGE UP (ref 1.8–7.4)
NEUTROPHILS NFR BLD AUTO: 53.3 % — SIGNIFICANT CHANGE UP (ref 43–77)
NRBC # BLD: 0 /100 WBCS — SIGNIFICANT CHANGE UP (ref 0–0)
NT-PROBNP SERPL-SCNC: 273 PG/ML — SIGNIFICANT CHANGE UP (ref 0–300)
PLATELET # BLD AUTO: 397 K/UL — SIGNIFICANT CHANGE UP (ref 150–400)
POTASSIUM SERPL-MCNC: 5.4 MMOL/L — HIGH (ref 3.5–5.3)
POTASSIUM SERPL-SCNC: 5.4 MMOL/L — HIGH (ref 3.5–5.3)
PROT SERPL-MCNC: 8.2 G/DL — SIGNIFICANT CHANGE UP (ref 6–8.3)
PROTHROM AB SERPL-ACNC: 22.9 SEC — HIGH (ref 10.6–13.6)
RBC # BLD: 4.11 M/UL — SIGNIFICANT CHANGE UP (ref 3.8–5.2)
RBC # FLD: 16.2 % — HIGH (ref 10.3–14.5)
SARS-COV-2 RNA SPEC QL NAA+PROBE: NEGATIVE — SIGNIFICANT CHANGE UP
SODIUM SERPL-SCNC: 140 MMOL/L — SIGNIFICANT CHANGE UP (ref 135–145)
TROPONIN T SERPL-MCNC: 0.01 NG/ML — SIGNIFICANT CHANGE UP (ref 0–0.01)
WBC # BLD: 6.54 K/UL — SIGNIFICANT CHANGE UP (ref 3.8–10.5)
WBC # FLD AUTO: 6.54 K/UL — SIGNIFICANT CHANGE UP (ref 3.8–10.5)

## 2021-06-02 PROCEDURE — 36415 COLL VENOUS BLD VENIPUNCTURE: CPT

## 2021-06-02 PROCEDURE — 93010 ELECTROCARDIOGRAM REPORT: CPT

## 2021-06-02 PROCEDURE — 85610 PROTHROMBIN TIME: CPT

## 2021-06-02 PROCEDURE — 99284 EMERGENCY DEPT VISIT MOD MDM: CPT | Mod: 25

## 2021-06-02 PROCEDURE — 71045 X-RAY EXAM CHEST 1 VIEW: CPT

## 2021-06-02 PROCEDURE — 93005 ELECTROCARDIOGRAM TRACING: CPT

## 2021-06-02 PROCEDURE — 87635 SARS-COV-2 COVID-19 AMP PRB: CPT

## 2021-06-02 PROCEDURE — 83880 ASSAY OF NATRIURETIC PEPTIDE: CPT

## 2021-06-02 PROCEDURE — 71045 X-RAY EXAM CHEST 1 VIEW: CPT | Mod: 26

## 2021-06-02 PROCEDURE — 99285 EMERGENCY DEPT VISIT HI MDM: CPT | Mod: CS

## 2021-06-02 PROCEDURE — 85730 THROMBOPLASTIN TIME PARTIAL: CPT

## 2021-06-02 PROCEDURE — 85025 COMPLETE CBC W/AUTO DIFF WBC: CPT

## 2021-06-02 PROCEDURE — 80053 COMPREHEN METABOLIC PANEL: CPT

## 2021-06-02 PROCEDURE — 84484 ASSAY OF TROPONIN QUANT: CPT

## 2021-06-02 PROCEDURE — 82550 ASSAY OF CK (CPK): CPT

## 2021-06-02 NOTE — ED PROVIDER NOTE - PATIENT PORTAL LINK FT
You can access the FollowMyHealth Patient Portal offered by [facility] by registering at the following website: [email or web address]. By joining [facility]’s FollowMyHealth portal, you will also be able to view your health information using other applications (apps) compatible with our system.

## 2021-06-02 NOTE — ED PROVIDER NOTE - CONSTITUTIONAL, MLM
Well appearing, awake, alert, oriented to person, place, time/situation and in no apparent distress. normal... Well appearing, awake, alert, oriented and in no apparent distress. Well appearing, awake, alert and in no apparent distress.

## 2021-06-02 NOTE — ED ADULT NURSE NOTE - PMH
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

## 2021-06-02 NOTE — ED ADULT NURSE NOTE - OTHER COMPLAINTS
Discharged from [facility] 5/18 for R PE and pneumonia. Hx of afib, asthma, lung Ca, recurrent pna.

## 2021-06-02 NOTE — ED PROVIDER NOTE - NSFOLLOWUPINSTRUCTIONS_ED_ALL_ED_FT
Please follow up with Dr. Greenfield in a couple of days. Return to the ER if you develop any concerning symptoms.     Shortness of breath    Shortness of breath (dyspnea) means you have trouble breathing and could indicate a medical problem. Causes include lung disease, heart disease, low amount of red blood cells (anemia), poor physical fitness, being overweight, smoking, etc. Your health care provider today may not be able to find a cause for your shortness of breath after your exam. In this case, it is important to have a follow-up exam with your primary care physician as instructed. If medicines were prescribed, take them as directed for the full length of time directed. Refrain from tobacco products.    SEEK IMMEDIATE MEDICAL CARE IF YOU HAVE ANY OF THE FOLLOWING SYMPTOMS: worsening shortness of breath, chest pain, back pain, abdominal pain, fever, coughing up blood, lightheadedness/dizziness.

## 2021-06-02 NOTE — ED ADULT TRIAGE NOTE - PAIN: PRESENCE, MLM
Occupational Therapy  Daily Treatment     Patient Name: Carmenza Bennett  Age:  75 y.o., Sex:  female  Medical Record #: [id number]  Today's Date: 7/1/2020     Precautions  Precautions: (P) Fall Risk  Comments: PD; dementia    Assessment    Pt has made little to no functional progress over there past 14 visits, and is not benifiting from OT services at this point.    Plan    Discharge secondary to no likely benefit.    Discharge recommendations:  Long term care         07/01/20 1057   Cognition    Cognition / Consciousness X   Speech/ Communication Delayed Responses   Level of Consciousness Confused   Ability To Follow Commands 1 Step   Safety Awareness Impaired   New Learning Impaired   Attention Impaired   Sequencing Impaired   Initiation Impaired   Activities of Daily Living   Grooming Maximal Assist   Upper Body Dressing Maximal Assist   Lower Body Dressing Total Assist   Toileting Maximal Assist   Functional Mobility   Sit to Stand Maximal Assist   Bed, Chair, Wheelchair Transfer Unable to Participate   Short Term Goals   Short Term Goal # 1 pt will demo functional transfer with Lashonda   Goal Outcome # 1 Goal not met   Session Information   Priority 0          complains of pain/discomfort

## 2021-06-02 NOTE — ED ADULT TRIAGE NOTE - CHIEF COMPLAINT QUOTE
SOB x2 days, cough x5 days productive of yellow phlegm, and worsening "right lung pain." Per EMS SpO2 96% RA.

## 2021-06-02 NOTE — ED PROVIDER NOTE - CLINICAL SUMMARY MEDICAL DECISION MAKING FREE TEXT BOX
87 y/o F, Kenyan speaking, hx LungCA, Afib, HTN, HLD, Asthma, CVA, prior PNA, dementia, p/w with daughter who translates for SOB and dry cough x2d with associated back pain. Noted pleural effusion diagnosed during last admission 3w ago. On exam with crackles to R base of lung with good air movement. In ED with stable vitals,O2 sat 95-98% on RA, patient in NAD, not home O2 dependent. Labs performed including cardiac w/u, CXR, EKG, unremarkable other than CXR which is noted to have a small R sided pleural effusion, improved from prior CXR 2 weeks ago. Rest of labs unremarkable. Daughter of patient also states she had an appointment with Dr. Michaela Greenfield. Findings of results discussed with daughter and daughter reassured. Dr. Greenfield consulted and case discussed with him, will f/u outpatient. 87 y/o F, Guatemalan speaking, hx LungCA, Afib, HTN, HLD, Asthma, CVA, prior PNA, dementia, p/w with daughter who translates for SOB and dry cough x2d with associated back pain. Noted pleural effusion diagnosed during last admission 3w ago. On exam with crackles to R base of lung with good air movement. In ED with stable vitals,O2 sat 95-98% on RA, patient in NAD, not home O2 dependent. Plan labs, CXR, EKG.     ED Course: Labs performed including cardiac w/u, CXR, EKG, unremarkable other than CXR which is noted to have a small R sided pleural effusion, improved from prior CXR 2 weeks ago. Rest of labs unremarkable. Daughter of patient also states she had an appointment with Dr. Michaela Greenfield. Findings of results discussed with daughter and daughter reassured. Dr. Greenfield consulted and case discussed with him, will f/u outpatient. 87 y/o F, Iraqi speaking, hx LungCA, Afib, HTN, HLD, Asthma, CVA, prior PNA, dementia, p/w with daughter who translates, for SOB and dry cough x2d with associated back pain. Noted pleural effusion diagnosed during last admission 3w ago. On exam with crackles to R base of lung with good air movement. In ED with stable vitals,O2 sat 95-98% on RA, patient in NAD, not home O2 dependent. Plan labs, CXR, EKG.     ED Course: Labs performed including cardiac w/u, CXR, EKG, unremarkable other than CXR which is noted to have a small R sided pleural effusion, improved from prior CXR 2 weeks ago. Rest of labs unremarkable. Daughter of patient also states she had an appointment with Dr. Michaela Greenfield later today. Findings of results discussed with pt and daughter and daughter reassured. Dr. Greenfield consulted and case discussed with him, will f/u outpatient.

## 2021-06-02 NOTE — ED PROVIDER NOTE - PSYCHIATRIC, MLM
Alert and oriented to person, place, time/situation. normal mood and affect. no apparent risk to self or others. Alert and oriented. normal mood and affect. no apparent risk to self or others. Normal mood and affect. no apparent risk to self or others.

## 2021-06-02 NOTE — ED PROVIDER NOTE - OBJECTIVE STATEMENT
87 y/o Togolese-speaking F PMHx Lung CA (s/p resection in 2000, no chemo/RT), Afib (s/p PPM on eliquis), HTN, HLD, Asthma, Glaucoma (blind in L eye, 2% vision R eye), CVA (remote, no deficits), past diagnoses of PNA, mild dementia, prefers daughter who is at bedside to translate and provide history. Patient presents today for SOB and dry cough that has been ongoing for the past 2 days as per daughter. Patient also with associated back pain. Daughter states pts visiting nurse came to provide service yesterday and was concerned of pts state, recommended ED evaluation. Daughter notes that patient had a pleural effusion when she was admitted 3 weeks ago, discharged 2w ago, now presents as concerned for possible worsening pleural effusion. Denies fever, chills, unusual leg swelling, current chest pain. Fully vaccinated w/ COVID19 vaccine >2mo ago. 85 y/o Nepalese-speaking F PMHx Lung CA (s/p resection in 2000, no chemo/RT), Afib (s/p PPM on eliquis), HTN, HLD, Asthma, Glaucoma (blind in L eye, 2% vision R eye), CVA (remote, no deficits), past diagnoses of pneumonia, mild dementia, prefers daughter who is at bedside to translate and provide history presents today for SOB and dry cough that has been ongoing for the past 2 days as per daughter. Patient also with associated back pain. Daughter states pt's visiting nurse came to provide service yesterday and was concerned of pt's state, recommended ED evaluation. Daughter notes that patient had a pleural effusion when she was admitted 3 weeks ago, discharged 2w ago, now presents as concerned for possible worsening pleural effusion. Denies fever, chills, unusual leg swelling, current chest pain. Fully vaccinated w/ COVID19 vaccine >2mo ago.

## 2021-06-02 NOTE — ED ADULT NURSE NOTE - NSIMPLEMENTINTERV_GEN_ALL_ED
Implemented All Fall with Harm Risk Interventions:  [place] to call system. Call bell, personal items and telephone within reach. Instruct patient to call for assistance. Room bathroom lighting operational. Non-slip footwear when patient is off stretcher. Physically safe environment: no spills, clutter or unnecessary equipment. Stretcher in lowest position, wheels locked, appropriate side rails in place. Provide visual cue, wrist band, yellow gown, etc. Monitor gait and stability. Monitor for mental status changes and reorient to person, place, and time. Review medications for side effects contributing to fall risk. Reinforce activity limits and safety measures with patient and family. Provide visual clues: red socks.

## 2021-06-02 NOTE — ED ADULT NURSE NOTE - PSH
Cardiac pacemaker    Cataract of right eye    H/O abdominal hysterectomy    History of cholecystectomy    Lung cancer  s/p resection

## 2021-06-02 NOTE — ED ADULT TRIAGE NOTE - OTHER COMPLAINTS
Discharged from [facility] 5/18 for R PE and pneumonia. Hx of afib, asthma, lung Ca, recurrent pna.

## 2021-06-02 NOTE — ED PROVIDER NOTE - CARE PROVIDER_API CALL
Michaela Greenfield)  Critical Care Medicine; Internal Medicine  [address], [address]  [place], [place] [address]  Phone: [phone]  Fax: [phone]  Follow Up Time:

## 2021-06-02 NOTE — ED ADULT NURSE NOTE - OBJECTIVE STATEMENT
Pt reports R sided back pain and shortness of breath. Pt states dx with PE 2 weeks ago and started on eliquis. Pt c/o R sided back pain "where to PE was,": rated 8/10 without radiation. Pt reports SOB worse with laying down and exertion. Pt has productive cough of yellow sputum. Pt reports chills, no fever noted, no CP, no N/V, no dizziness/fatigue. Pt able to speak in full sentences, O2 sat 96% on RA, airway patent, no known exposure to covid, pt reports she covid vaccinated. AT baseline pt wheelchair bound.

## 2021-06-11 ENCOUNTER — APPOINTMENT (OUTPATIENT)
Dept: INTERNAL MEDICINE | Facility: CLINIC | Age: 86
End: 2021-06-11
Payer: MEDICARE

## 2021-06-11 VITALS
WEIGHT: 130 LBS | TEMPERATURE: 97.6 F | OXYGEN SATURATION: 94 % | BODY MASS INDEX: 20.4 KG/M2 | HEART RATE: 84 BPM | HEIGHT: 67 IN | SYSTOLIC BLOOD PRESSURE: 126 MMHG | DIASTOLIC BLOOD PRESSURE: 75 MMHG

## 2021-06-11 PROCEDURE — 99213 OFFICE O/P EST LOW 20 MIN: CPT

## 2021-06-11 NOTE — HEALTH RISK ASSESSMENT
[No] : No [No falls in past year] : Patient reported no falls in the past year [0] : 2) Feeling down, depressed, or hopeless: Not at all (0) [] : No [[id number]] : 0

## 2021-06-11 NOTE — HISTORY OF PRESENT ILLNESS
[FreeTextEntry1] : Patient post hospital discharge;\par Chief complaint diarrhea;\par Also sleepy;  [de-identified] : Appetite poor;\par Has no appetite\par Needs more help at home;\par Using Lidoderm patches.below breast

## 2021-06-11 NOTE — ASSESSMENT
[FreeTextEntry1] : Patient at visit with her daughter who answered qll questions;\par Appetite poor; Will try Megace again\par Remains on Apixaban

## 2021-06-14 ENCOUNTER — APPOINTMENT (OUTPATIENT)
Dept: PULMONOLOGY | Facility: CLINIC | Age: 86
End: 2021-06-14
Payer: MEDICARE

## 2021-06-14 VITALS
OXYGEN SATURATION: 97 % | WEIGHT: 117 LBS | RESPIRATION RATE: 12 BRPM | HEIGHT: 67 IN | TEMPERATURE: 97 F | DIASTOLIC BLOOD PRESSURE: 75 MMHG | SYSTOLIC BLOOD PRESSURE: 119 MMHG | BODY MASS INDEX: 18.36 KG/M2 | HEART RATE: 87 BPM

## 2021-06-14 PROCEDURE — 99214 OFFICE O/P EST MOD 30 MIN: CPT | Mod: 25

## 2021-06-14 PROCEDURE — 71046 X-RAY EXAM CHEST 2 VIEWS: CPT

## 2021-06-14 RX ORDER — CIPROFLOXACIN HYDROCHLORIDE 500 MG/1
500 TABLET, FILM COATED ORAL
Qty: 10 | Refills: 1 | Status: COMPLETED | COMMUNITY
Start: 2021-04-28 | End: 2021-06-14

## 2021-06-14 NOTE — REVIEW OF SYSTEMS
[Fatigue] : fatigue [Poor Appetite] : poor appetite [Abdominal Pain] : abdominal pain [Negative] : Endocrine [Fever] : no fever [Recent Wt Gain (___ Lbs)] : ~T no recent weight gain [Chills] : no chills [Recent Wt Loss (___ Lbs)] : ~T no recent weight loss [GERD] : no gerd [Nausea] : no nausea [Vomiting] : no vomiting [Diarrhea] : no diarrhea [Constipation] : no constipation [Dysphagia] : no dysphagia [Bleeding] : no bleeding [Food Intolerance] : no food intolerance [Hepatic Disease] : no hepatic disease

## 2021-06-14 NOTE — ASSESSMENT
[FreeTextEntry1] : Bacterial pneumonia\par The patient was admitted with bacterial pneumonia and was treated with IV antibiotics.  The patient improved with resolution of the RLL infiltrate.  The pleural effusion also resolved.  She is off antibiotics.  The patient had multiple episodes of Pneumonia involving right middle lobe related to the bronchiectasis.  At this point the patient is clinically stable.  The chest x-ray revealed improvement compared to the previous 1.  I compared the chest x-ray during her last hospitalization.\par \par Bronchiectasis\par \par Stable\par \par Lung cancer.  No clinical evidence of recurrence\par \par I discussed the case with Dr. Greenfield who referred the patient to Dr. Pinedo for GI evaluation

## 2021-06-30 ENCOUNTER — LABORATORY RESULT (OUTPATIENT)
Age: 86
End: 2021-06-30

## 2021-06-30 ENCOUNTER — APPOINTMENT (OUTPATIENT)
Dept: INTERNAL MEDICINE | Facility: CLINIC | Age: 86
End: 2021-06-30
Payer: MEDICARE

## 2021-06-30 VITALS
OXYGEN SATURATION: 98 % | WEIGHT: 130 LBS | BODY MASS INDEX: 20.4 KG/M2 | HEART RATE: 72 BPM | DIASTOLIC BLOOD PRESSURE: 77 MMHG | TEMPERATURE: 97.9 F | HEIGHT: 67 IN | SYSTOLIC BLOOD PRESSURE: 118 MMHG | RESPIRATION RATE: 13 BRPM

## 2021-06-30 PROCEDURE — 99213 OFFICE O/P EST LOW 20 MIN: CPT | Mod: 25

## 2021-06-30 PROCEDURE — 36415 COLL VENOUS BLD VENIPUNCTURE: CPT

## 2021-06-30 NOTE — HISTORY OF PRESENT ILLNESS
[FreeTextEntry1] : Needs more hours of care at night [de-identified] : Seems more awake today\par Appetite improved with Megace\par May have gained more weight; \par \par Needs eye Clinic

## 2021-07-01 LAB
ALBUMIN SERPL ELPH-MCNC: 4 G/DL
ALP BLD-CCNC: 79 U/L
ALT SERPL-CCNC: 14 U/L
ANION GAP SERPL CALC-SCNC: 8 MMOL/L
AST SERPL-CCNC: 18 U/L
BASOPHILS # BLD AUTO: 0.04 K/UL
BASOPHILS NFR BLD AUTO: 0.8 %
BILIRUB SERPL-MCNC: 0.5 MG/DL
BUN SERPL-MCNC: 17 MG/DL
CALCIUM SERPL-MCNC: 10 MG/DL
CHLORIDE SERPL-SCNC: 105 MMOL/L
CO2 SERPL-SCNC: 24 MMOL/L
CREAT SERPL-MCNC: 1.12 MG/DL
EOSINOPHIL # BLD AUTO: 0.18 K/UL
EOSINOPHIL NFR BLD AUTO: 3.5 %
ESTIMATED AVERAGE GLUCOSE: 120 MG/DL
GLUCOSE SERPL-MCNC: 99 MG/DL
HBA1C MFR BLD HPLC: 5.8 %
HCT VFR BLD CALC: 38.8 %
HGB BLD-MCNC: 11.9 G/DL
IMM GRANULOCYTES NFR BLD AUTO: 0.2 %
LYMPHOCYTES # BLD AUTO: 1.91 K/UL
LYMPHOCYTES NFR BLD AUTO: 36.9 %
MAN DIFF?: NORMAL
MCHC RBC-ENTMCNC: 30.5 PG
MCHC RBC-ENTMCNC: 30.7 GM/DL
MCV RBC AUTO: 99.5 FL
MONOCYTES # BLD AUTO: 0.52 K/UL
MONOCYTES NFR BLD AUTO: 10 %
NEUTROPHILS # BLD AUTO: 2.52 K/UL
NEUTROPHILS NFR BLD AUTO: 48.6 %
PLATELET # BLD AUTO: 251 K/UL
POTASSIUM SERPL-SCNC: 5.4 MMOL/L
PROT SERPL-MCNC: 7 G/DL
RBC # BLD: 3.9 M/UL
RBC # FLD: 18.4 %
SODIUM SERPL-SCNC: 138 MMOL/L
WBC # FLD AUTO: 5.18 K/UL

## 2021-07-19 ENCOUNTER — APPOINTMENT (OUTPATIENT)
Dept: PULMONOLOGY | Facility: CLINIC | Age: 86
End: 2021-07-19
Payer: MEDICARE

## 2021-07-19 VITALS
HEART RATE: 79 BPM | BODY MASS INDEX: 21.19 KG/M2 | SYSTOLIC BLOOD PRESSURE: 119 MMHG | DIASTOLIC BLOOD PRESSURE: 77 MMHG | OXYGEN SATURATION: 98 % | HEIGHT: 67 IN | TEMPERATURE: 97.6 F | WEIGHT: 135 LBS

## 2021-07-19 PROCEDURE — 71046 X-RAY EXAM CHEST 2 VIEWS: CPT

## 2021-07-19 PROCEDURE — 99214 OFFICE O/P EST MOD 30 MIN: CPT | Mod: 25

## 2021-07-19 NOTE — PHYSICAL EXAM
[No Acute Distress] : no acute distress [Normal Oropharynx] : normal oropharynx [Normal Appearance] : normal appearance [No Neck Mass] : no neck mass [Normal Rate/Rhythm] : normal rate/rhythm [Normal S1, S2] : normal s1, s2 [No Murmurs] : no murmurs [No Resp Distress] : no resp distress [No Abnormalities] : no abnormalities [Benign] : benign [Normal Gait] : normal gait [No Clubbing] : no clubbing [No Cyanosis] : no cyanosis [No Edema] : no edema [FROM] : FROM [Normal Color/ Pigmentation] : normal color/ pigmentation [No Focal Deficits] : no focal deficits [Oriented x3] : oriented x3 [Normal Affect] : normal affect [TextBox_68] : scattered rales

## 2021-07-19 NOTE — HISTORY OF PRESENT ILLNESS
[Never] : never [TextBox_4] : she is doing lot better after she left the hospital.  She is stiill coughing but the sputum is less and clear.  No fever,wheezing, and SOB is better. Appetite is better.  Sleep is on and off.  No coughing blood.  She uses the nebulizer once a week.  The pump she uses it sometinmes 3 times a day

## 2021-07-19 NOTE — ASSESSMENT
[FreeTextEntry1] : Bacterial pneumonia\par \par Right pleural effusion\par \par The patient was admitted recently with bacterial pneumonia and right pleural effusion.  The patient was treated with antibiotic.  The patient clinically improved.  The repeat chest x-ray revealed resolution of the pleural effusion and the right lower lobe infiltrate.  The patient has chronic changes were bronchiectasis involving the right middle lobe.  I reviewed the images with the daughter.\par \par Bronchiectasis\par \par Stable no indication for antibiotic\par \par Lung cancer\par \par There is no indication of recurrence and will follow up with repeat CT scan\par \par Sarcoidosis\par \par Stable no indication for systemic steroids\par \par Pulmonary emboli\par \par The CT scan from May revealed possible underlying pulmonary emboli.\par \par We will continue anticoagulation for a total of 6 months but the patient had limited activity and will need prophylaxis after.  The 6 months will end in November

## 2021-07-21 ENCOUNTER — APPOINTMENT (OUTPATIENT)
Dept: CT IMAGING | Facility: CLINIC | Age: 86
End: 2021-07-21

## 2021-07-22 ENCOUNTER — APPOINTMENT (OUTPATIENT)
Dept: CT IMAGING | Facility: HOSPITAL | Age: 86
End: 2021-07-22

## 2021-07-29 ENCOUNTER — APPOINTMENT (OUTPATIENT)
Dept: INTERNAL MEDICINE | Facility: CLINIC | Age: 86
End: 2021-07-29
Payer: MEDICARE

## 2021-07-29 VITALS
HEART RATE: 77 BPM | TEMPERATURE: 97.3 F | OXYGEN SATURATION: 100 % | SYSTOLIC BLOOD PRESSURE: 120 MMHG | WEIGHT: 132 LBS | BODY MASS INDEX: 20.72 KG/M2 | DIASTOLIC BLOOD PRESSURE: 75 MMHG | HEIGHT: 67 IN

## 2021-07-29 PROCEDURE — 99213 OFFICE O/P EST LOW 20 MIN: CPT

## 2021-07-29 NOTE — HISTORY OF PRESENT ILLNESS
[FreeTextEntry1] : Appetite improved\par Will get CT of Abdomen as per Dr. Conner\par Will follow up with Dr. Burns [de-identified] : No change in current medicine\par Hours increased with home care\par

## 2021-07-29 NOTE — HEALTH RISK ASSESSMENT
[No] : No [No falls in past year] : Patient reported no falls in the past year [0] : 2) Feeling down, depressed, or hopeless: Not at all (0) [] : No [[id number]] : 0

## 2021-08-16 ENCOUNTER — APPOINTMENT (OUTPATIENT)
Dept: OPHTHALMOLOGY | Facility: CLINIC | Age: 86
End: 2021-08-16
Payer: MEDICARE

## 2021-08-16 ENCOUNTER — NON-APPOINTMENT (OUTPATIENT)
Age: 86
End: 2021-08-16

## 2021-08-16 PROCEDURE — 92020 GONIOSCOPY: CPT

## 2021-08-16 PROCEDURE — 92004 COMPRE OPH EXAM NEW PT 1/>: CPT

## 2021-08-16 PROCEDURE — 92133 CPTRZD OPH DX IMG PST SGM ON: CPT

## 2021-08-16 PROCEDURE — 76514 ECHO EXAM OF EYE THICKNESS: CPT

## 2021-09-02 ENCOUNTER — APPOINTMENT (OUTPATIENT)
Dept: INTERNAL MEDICINE | Facility: CLINIC | Age: 86
End: 2021-09-02
Payer: MEDICARE

## 2021-09-02 VITALS — DIASTOLIC BLOOD PRESSURE: 70 MMHG | SYSTOLIC BLOOD PRESSURE: 120 MMHG

## 2021-09-02 PROCEDURE — 99213 OFFICE O/P EST LOW 20 MIN: CPT

## 2021-09-02 NOTE — HISTORY OF PRESENT ILLNESS
[FreeTextEntry1] :  used for visit;\par Daughters are not present'\par Aides present;\par \par  [de-identified] : Eating well and sleeping well;\par No problems with medications\par Xrays studies reviewed with patient; All normal

## 2021-09-02 NOTE — ASSESSMENT
[FreeTextEntry1] : Looks better; \par Appetite improved;\par Labs next visit\par Will see other consultants

## 2021-09-03 ENCOUNTER — APPOINTMENT (OUTPATIENT)
Dept: NEPHROLOGY | Facility: CLINIC | Age: 86
End: 2021-09-03
Payer: MEDICARE

## 2021-09-03 VITALS — DIASTOLIC BLOOD PRESSURE: 68 MMHG | RESPIRATION RATE: 14 BRPM | HEART RATE: 75 BPM | SYSTOLIC BLOOD PRESSURE: 122 MMHG

## 2021-09-03 DIAGNOSIS — R60.0 LOCALIZED EDEMA: ICD-10-CM

## 2021-09-03 PROCEDURE — 99214 OFFICE O/P EST MOD 30 MIN: CPT

## 2021-09-03 NOTE — PHYSICAL EXAM
[General Appearance - Alert] : alert [Sclera] : the sclera and conjunctiva were normal [General Appearance - In No Acute Distress] : in no acute distress [PERRL With Normal Accommodation] : pupils were equal in size, round, and reactive to light [Extraocular Movements] : extraocular movements were intact [Outer Ear] : the ears and nose were normal in appearance [Oropharynx] : the oropharynx was normal [Neck Appearance] : the appearance of the neck was normal [Jugular Venous Distention Increased] : there was no jugular-venous distention [Respiration, Rhythm And Depth] : normal respiratory rhythm and effort [Exaggerated Use Of Accessory Muscles For Inspiration] : no accessory muscle use [Heart Rate And Rhythm] : heart rate was normal and rhythm regular [Heart Sounds] : normal S1 and S2 [Full Pulse] : the pedal pulses are present [Bowel Sounds] : normal bowel sounds [Abdomen Soft] : soft [Abdomen Tenderness] : non-tender [No CVA Tenderness] : no ~M costovertebral angle tenderness [Involuntary Movements] : no involuntary movements were seen [Musculoskeletal - Swelling] : no joint swelling seen [Skin Color & Pigmentation] : normal skin color and pigmentation [Skin Turgor] : normal skin turgor [] : no rash [Cranial Nerves] : cranial nerves 2-12 were intact [No Focal Deficits] : no focal deficits [Oriented To Time, Place, And Person] : oriented to person, place, and time [Impaired Insight] : insight and judgment were intact [Affect] : the affect was normal [FreeTextEntry1] : cane to ambulate

## 2021-09-03 NOTE — ASSESSMENT
[FreeTextEntry1] : 87yo [place] speaking F accompanied by home aid with h/o CVA, copd/asthma, lung ca (s/p resection in remission), ischemic cardiomyopathy w/PPM EF 45%, vertigo, preDM, ?sarcoid here for f/u of CKD 3, HTN, hypercalcemia and hyperkalemia:  \par \par # CKD II/III - Cr stable\par bland u/a. Likey CKD 2/2 age, HTN, ICM\par Hyperkalemia resolved\par \par # Hypercalcemia - resolved. ?primary hyperpara, less likely sarcoid. s/p reclast oct or nov 2020 per pt's aide \par \par # HTN -  well controlled\par \par # ICM EF 45% with Afib/PPM - compensated\par \par \par \par

## 2021-09-03 NOTE — HISTORY OF PRESENT ILLNESS
[FreeTextEntry1] : 85yo Ghanaian speaking F accompanied by home aid with h/o CVA, copd/asthma, lung ca (s/p resection in remission), ischemic cardiomyopathy w/PPM EF 45%, vertigo, preDM, ?sarcoid here for f/u of CKD 3, HTN, hypercalcemia and hyperkalemia:  \par \par EP [facility] Dr. Martine Arrieta\par Cardiologist: pt doesn't remember name. \par \par Her home aide provides most of the history. She's been doing well. joint and back pain are similar to prior. Doesn't take nsaids. Improved appetite. \par Denies urinary hesitancy, dysuria, frequency. \par

## 2021-09-21 ENCOUNTER — EMERGENCY (EMERGENCY)
Facility: HOSPITAL | Age: 86
LOS: 1 days | Discharge: HOME | End: 2021-09-21
Attending: EMERGENCY MEDICINE | Admitting: EMERGENCY MEDICINE
Payer: MEDICARE

## 2021-09-21 VITALS
TEMPERATURE: 98 F | HEART RATE: 76 BPM | HEIGHT: 64 IN | DIASTOLIC BLOOD PRESSURE: 82 MMHG | OXYGEN SATURATION: 99 % | SYSTOLIC BLOOD PRESSURE: 162 MMHG | RESPIRATION RATE: 18 BRPM

## 2021-09-21 VITALS
RESPIRATION RATE: 18 BRPM | OXYGEN SATURATION: 99 % | SYSTOLIC BLOOD PRESSURE: 156 MMHG | DIASTOLIC BLOOD PRESSURE: 80 MMHG | HEART RATE: 72 BPM | TEMPERATURE: 98 F

## 2021-09-21 DIAGNOSIS — Z98.890 OTHER SPECIFIED POSTPROCEDURAL STATES: Chronic | ICD-10-CM

## 2021-09-21 DIAGNOSIS — C34.90 MALIGNANT NEOPLASM OF UNSPECIFIED PART OF UNSPECIFIED BRONCHUS OR LUNG: Chronic | ICD-10-CM

## 2021-09-21 DIAGNOSIS — R50.9 FEVER, UNSPECIFIED: ICD-10-CM

## 2021-09-21 DIAGNOSIS — H26.9 UNSPECIFIED CATARACT: Chronic | ICD-10-CM

## 2021-09-21 DIAGNOSIS — Z86.718 PERSONAL HISTORY OF OTHER VENOUS THROMBOSIS AND EMBOLISM: ICD-10-CM

## 2021-09-21 DIAGNOSIS — Z20.822 CONTACT WITH AND (SUSPECTED) EXPOSURE TO COVID-19: ICD-10-CM

## 2021-09-21 DIAGNOSIS — I10 ESSENTIAL (PRIMARY) HYPERTENSION: ICD-10-CM

## 2021-09-21 DIAGNOSIS — J45.909 UNSPECIFIED ASTHMA, UNCOMPLICATED: ICD-10-CM

## 2021-09-21 DIAGNOSIS — R05 COUGH: ICD-10-CM

## 2021-09-21 DIAGNOSIS — Z79.01 LONG TERM (CURRENT) USE OF ANTICOAGULANTS: ICD-10-CM

## 2021-09-21 DIAGNOSIS — Z95.0 PRESENCE OF CARDIAC PACEMAKER: Chronic | ICD-10-CM

## 2021-09-21 LAB
ALBUMIN SERPL ELPH-MCNC: 3.7 G/DL — SIGNIFICANT CHANGE UP (ref 3.3–5)
ALP SERPL-CCNC: SIGNIFICANT CHANGE UP (ref 40–120)
ALT FLD-CCNC: SIGNIFICANT CHANGE UP (ref 10–45)
AST SERPL-CCNC: SIGNIFICANT CHANGE UP (ref 10–40)
BASOPHILS # BLD AUTO: 0.02 K/UL — SIGNIFICANT CHANGE UP (ref 0–0.2)
BASOPHILS NFR BLD AUTO: 0.4 % — SIGNIFICANT CHANGE UP (ref 0–2)
BILIRUB SERPL-MCNC: 0.4 MG/DL — SIGNIFICANT CHANGE UP (ref 0.2–1.2)
BUN SERPL-MCNC: 23 MG/DL — SIGNIFICANT CHANGE UP (ref 7–23)
CALCIUM SERPL-MCNC: 9.6 MG/DL — SIGNIFICANT CHANGE UP (ref 8.4–10.5)
CHLORIDE SERPL-SCNC: 108 MMOL/L — SIGNIFICANT CHANGE UP (ref 96–108)
CO2 SERPL-SCNC: SIGNIFICANT CHANGE UP (ref 22–31)
CREAT SERPL-MCNC: 1.11 MG/DL — SIGNIFICANT CHANGE UP (ref 0.5–1.3)
EOSINOPHIL # BLD AUTO: 0.04 K/UL — SIGNIFICANT CHANGE UP (ref 0–0.5)
EOSINOPHIL NFR BLD AUTO: 0.7 % — SIGNIFICANT CHANGE UP (ref 0–6)
GLUCOSE SERPL-MCNC: 112 MG/DL — HIGH (ref 70–99)
HCT VFR BLD CALC: 43 % — SIGNIFICANT CHANGE UP (ref 34.5–45)
HGB BLD-MCNC: 13.9 G/DL — SIGNIFICANT CHANGE UP (ref 11.5–15.5)
IMM GRANULOCYTES NFR BLD AUTO: 0.4 % — SIGNIFICANT CHANGE UP (ref 0–1.5)
LYMPHOCYTES # BLD AUTO: 2.03 K/UL — SIGNIFICANT CHANGE UP (ref 1–3.3)
LYMPHOCYTES # BLD AUTO: 36.4 % — SIGNIFICANT CHANGE UP (ref 13–44)
MCHC RBC-ENTMCNC: 31.7 PG — SIGNIFICANT CHANGE UP (ref 27–34)
MCHC RBC-ENTMCNC: 32.3 GM/DL — SIGNIFICANT CHANGE UP (ref 32–36)
MCV RBC AUTO: 97.9 FL — SIGNIFICANT CHANGE UP (ref 80–100)
MONOCYTES # BLD AUTO: 0.56 K/UL — SIGNIFICANT CHANGE UP (ref 0–0.9)
MONOCYTES NFR BLD AUTO: 10 % — SIGNIFICANT CHANGE UP (ref 2–14)
NEUTROPHILS # BLD AUTO: 2.91 K/UL — SIGNIFICANT CHANGE UP (ref 1.8–7.4)
NEUTROPHILS NFR BLD AUTO: 52.1 % — SIGNIFICANT CHANGE UP (ref 43–77)
NRBC # BLD: 0 /100 WBCS — SIGNIFICANT CHANGE UP (ref 0–0)
PLATELET # BLD AUTO: 209 K/UL — SIGNIFICANT CHANGE UP (ref 150–400)
POTASSIUM SERPL-MCNC: SIGNIFICANT CHANGE UP (ref 3.5–5.3)
POTASSIUM SERPL-SCNC: SIGNIFICANT CHANGE UP (ref 3.5–5.3)
PROT SERPL-MCNC: 7.8 G/DL — SIGNIFICANT CHANGE UP (ref 6–8.3)
RBC # BLD: 4.39 M/UL — SIGNIFICANT CHANGE UP (ref 3.8–5.2)
RBC # FLD: 15.3 % — HIGH (ref 10.3–14.5)
SARS-COV-2 RNA SPEC QL NAA+PROBE: NEGATIVE — SIGNIFICANT CHANGE UP
SODIUM SERPL-SCNC: 131 MMOL/L — LOW (ref 135–145)
WBC # BLD: 5.58 K/UL — SIGNIFICANT CHANGE UP (ref 3.8–10.5)
WBC # FLD AUTO: 5.58 K/UL — SIGNIFICANT CHANGE UP (ref 3.8–10.5)

## 2021-09-21 PROCEDURE — 80053 COMPREHEN METABOLIC PANEL: CPT

## 2021-09-21 PROCEDURE — 71045 X-RAY EXAM CHEST 1 VIEW: CPT

## 2021-09-21 PROCEDURE — 99283 EMERGENCY DEPT VISIT LOW MDM: CPT | Mod: CS

## 2021-09-21 PROCEDURE — 87635 SARS-COV-2 COVID-19 AMP PRB: CPT

## 2021-09-21 PROCEDURE — 99283 EMERGENCY DEPT VISIT LOW MDM: CPT | Mod: 25

## 2021-09-21 PROCEDURE — 36415 COLL VENOUS BLD VENIPUNCTURE: CPT

## 2021-09-21 PROCEDURE — 85025 COMPLETE CBC W/AUTO DIFF WBC: CPT

## 2021-09-21 PROCEDURE — 71045 X-RAY EXAM CHEST 1 VIEW: CPT | Mod: 26

## 2021-09-21 RX ORDER — AZITHROMYCIN 500 MG/1
500 TABLET, FILM COATED ORAL ONCE
Refills: 0 | Status: COMPLETED | OUTPATIENT
Start: 2021-09-21 | End: 2021-09-21

## 2021-09-21 RX ORDER — AZITHROMYCIN 500 MG/1
1 TABLET, FILM COATED ORAL
Qty: 4 | Refills: 0
Start: 2021-09-21

## 2021-09-21 RX ADMIN — AZITHROMYCIN 500 MILLIGRAM(S): 500 TABLET, FILM COATED ORAL at 02:59

## 2021-09-21 NOTE — ED ADULT NURSE NOTE - OBJECTIVE STATEMENT
received [place] speaking 86years old female pt with c/o of " coughing for 4 days." pt's sputum is yellow. pt has been coughing for 4 days. no fever, SOB, chest pain. the daughter is concerned the pt might have bronchitis.

## 2021-09-21 NOTE — ED PROVIDER NOTE - ATTENDING CONTRIBUTION TO CARE
85 y/o f hx asthma, HTN, DVT on AC presents c/o cough, fever over the past 3 days.  Pt's daughter stating she wants to make sure she doesn't have a pneumonia.  Pt has felt feverish but hasn't taken her temperature.  Denies CP, SOB, all other ROS negative  check cxr 87 y/o f pmh brca HTN, DVT on AC presents c/o cough, fever over the past 3 days.  Pt's daughter stating she wants to make sure she doesn't have a pneumonia.  Pt has felt feverish but hasn't taken her temperature.  Denies CP, SOB, all other ROS negative  cxr no sig infiltrate  lungs cta rr 14-16 nml resp effort  IMP- Cough  no sob, no increased sob  d/c home, zpack fu w pmh if not improving in 2-3 days

## 2021-09-21 NOTE — ED PROVIDER NOTE - PATIENT PORTAL LINK FT
You can access the FollowMyHealth Patient Portal offered by [facility] by registering at the following website: [email or web address]. By joining [employer]’s FollowMyHealth portal, you will also be able to view your health information using other applications (apps) compatible with our system.

## 2021-09-21 NOTE — ED PROVIDER NOTE - NSFOLLOWUPINSTRUCTIONS_ED_ALL_ED_FT
Acute Cough    WHAT YOU NEED TO KNOW:    An acute cough can last up to 3 weeks. Common causes of an acute cough include a cold, allergies, or a lung infection.    DISCHARGE INSTRUCTIONS:    Return to the emergency department if:   •You have trouble breathing or feel short of breath.      •You cough up blood, or you see blood in your mucus.      •You faint or feel weak or dizzy.      •You have chest pain when you cough or take a deep breath.      •You have new wheezing.      Contact your healthcare provider if:   •You have a fever.      •Your cough lasts longer than 4 weeks.      •Your symptoms do not improve with treatment.      •You have questions or concerns about your condition or care.      Medicines:   •Medicines may be needed to stop the cough, decrease swelling in your airways, or help open your airways. Medicine may also be given to help you cough up mucus. Ask your healthcare provider what over-the-counter medicines you can take. If you have an infection caused by bacteria, you may need antibiotics.      •Take your medicine as directed. Contact your healthcare provider if you think your medicine is not helping or if you have side effects. Tell him or her if you are allergic to any medicine. Keep a list of the medicines, vitamins, and herbs you take. Include the amounts, and when and why you take them. Bring the list or the pill bottles to follow-up visits. Carry your medicine list with you in case of an emergency.      Manage your symptoms:   •Do not smoke and stay away from others who smoke. Nicotine and other chemicals in cigarettes and cigars can cause lung damage and make your cough worse. Ask your healthcare provider for information if you currently smoke and need help to quit. E-cigarettes or smokeless tobacco still contain nicotine. Talk to your healthcare provider before you use these products.      •Drink extra liquids as directed. Liquids will help thin and loosen mucus so you can cough it up. Liquids will also help prevent dehydration. Examples of good liquids to drink include water, fruit juice, and broth. Do not drink liquids that contain caffeine. Caffeine can increase your risk for dehydration. Ask your healthcare provider how much liquid to drink each day.      •Rest as directed. Do not do activities that make your cough worse, such as exercise.      •Use a humidifier or vaporizer. Use a cool mist humidifier or a vaporizer to increase air moisture in your home. This may make it easier for you to breathe and help decrease your cough.      •Eat 2 to 5 mL of honey 2 times each day. Honey can help thin mucus and decrease your cough.      •Use cough drops or lozenges. These can help decrease throat irritation and your cough.      Follow up with your healthcare provider as directed: Write down your questions so you remember to ask them during your visits.

## 2021-09-21 NOTE — ED PROVIDER NOTE - CLINICAL SUMMARY MEDICAL DECISION MAKING FREE TEXT BOX
87 y/o f hx asthma, HTN, DVT on AC presents c/o cough, fever over the past 3 days; pt afebrile in ED, vss, lungs clear, cxr shows no acute infiltrate.  Will cover with azithromycin given recent fever.  Pt to f/u with pmd, return to ED if sx worsen.

## 2021-09-21 NOTE — ED PROVIDER NOTE - OBJECTIVE STATEMENT
87 y/o f hx asthma, HTN, DVT on AC presents c/o cough, fever over the past 3 days.  Pt's daughter stating she wants to make sure she doesn't have a pneumonia.  Pt has felt feverish but hasn't taken her temperature.  Denies CP, SOB, all other ROS negative.

## 2021-09-26 ENCOUNTER — EMERGENCY (EMERGENCY)
Facility: HOSPITAL | Age: 86
LOS: 1 days | Discharge: HOME | End: 2021-09-26
Attending: EMERGENCY MEDICINE | Admitting: EMERGENCY MEDICINE
Payer: MEDICARE

## 2021-09-26 VITALS
SYSTOLIC BLOOD PRESSURE: 156 MMHG | HEART RATE: 80 BPM | OXYGEN SATURATION: 98 % | RESPIRATION RATE: 20 BRPM | TEMPERATURE: 98 F | DIASTOLIC BLOOD PRESSURE: 86 MMHG

## 2021-09-26 VITALS
SYSTOLIC BLOOD PRESSURE: 128 MMHG | DIASTOLIC BLOOD PRESSURE: 77 MMHG | WEIGHT: 149.91 LBS | HEART RATE: 88 BPM | TEMPERATURE: 99 F | OXYGEN SATURATION: 96 % | RESPIRATION RATE: 20 BRPM | HEIGHT: 64 IN

## 2021-09-26 DIAGNOSIS — Z98.890 OTHER SPECIFIED POSTPROCEDURAL STATES: Chronic | ICD-10-CM

## 2021-09-26 DIAGNOSIS — H26.9 UNSPECIFIED CATARACT: Chronic | ICD-10-CM

## 2021-09-26 DIAGNOSIS — Z95.0 PRESENCE OF CARDIAC PACEMAKER: Chronic | ICD-10-CM

## 2021-09-26 DIAGNOSIS — C34.90 MALIGNANT NEOPLASM OF UNSPECIFIED PART OF UNSPECIFIED BRONCHUS OR LUNG: Chronic | ICD-10-CM

## 2021-09-26 LAB
ALBUMIN SERPL ELPH-MCNC: 4 G/DL — SIGNIFICANT CHANGE UP (ref 3.3–5)
ALP SERPL-CCNC: 93 U/L — SIGNIFICANT CHANGE UP (ref 40–120)
ALT FLD-CCNC: 18 U/L — SIGNIFICANT CHANGE UP (ref 10–45)
ANION GAP SERPL CALC-SCNC: 9 MMOL/L — SIGNIFICANT CHANGE UP (ref 5–17)
AST SERPL-CCNC: 19 U/L — SIGNIFICANT CHANGE UP (ref 10–40)
BASOPHILS # BLD AUTO: 0.02 K/UL — SIGNIFICANT CHANGE UP (ref 0–0.2)
BASOPHILS NFR BLD AUTO: 0.3 % — SIGNIFICANT CHANGE UP (ref 0–2)
BILIRUB SERPL-MCNC: 0.5 MG/DL — SIGNIFICANT CHANGE UP (ref 0.2–1.2)
BUN SERPL-MCNC: 18 MG/DL — SIGNIFICANT CHANGE UP (ref 7–23)
CALCIUM SERPL-MCNC: 10.4 MG/DL — SIGNIFICANT CHANGE UP (ref 8.4–10.5)
CHLORIDE SERPL-SCNC: 108 MMOL/L — SIGNIFICANT CHANGE UP (ref 96–108)
CO2 SERPL-SCNC: 23 MMOL/L — SIGNIFICANT CHANGE UP (ref 22–31)
CREAT SERPL-MCNC: 1.21 MG/DL — SIGNIFICANT CHANGE UP (ref 0.5–1.3)
EOSINOPHIL # BLD AUTO: 0.06 K/UL — SIGNIFICANT CHANGE UP (ref 0–0.5)
EOSINOPHIL NFR BLD AUTO: 1 % — SIGNIFICANT CHANGE UP (ref 0–6)
GLUCOSE SERPL-MCNC: 82 MG/DL — SIGNIFICANT CHANGE UP (ref 70–99)
HCT VFR BLD CALC: 43 % — SIGNIFICANT CHANGE UP (ref 34.5–45)
HGB BLD-MCNC: 14 G/DL — SIGNIFICANT CHANGE UP (ref 11.5–15.5)
IMM GRANULOCYTES NFR BLD AUTO: 0.2 % — SIGNIFICANT CHANGE UP (ref 0–1.5)
LYMPHOCYTES # BLD AUTO: 1.97 K/UL — SIGNIFICANT CHANGE UP (ref 1–3.3)
LYMPHOCYTES # BLD AUTO: 33.3 % — SIGNIFICANT CHANGE UP (ref 13–44)
MCHC RBC-ENTMCNC: 32.2 PG — SIGNIFICANT CHANGE UP (ref 27–34)
MCHC RBC-ENTMCNC: 32.6 GM/DL — SIGNIFICANT CHANGE UP (ref 32–36)
MCV RBC AUTO: 98.9 FL — SIGNIFICANT CHANGE UP (ref 80–100)
MONOCYTES # BLD AUTO: 0.76 K/UL — SIGNIFICANT CHANGE UP (ref 0–0.9)
MONOCYTES NFR BLD AUTO: 12.8 % — SIGNIFICANT CHANGE UP (ref 2–14)
NEUTROPHILS # BLD AUTO: 3.1 K/UL — SIGNIFICANT CHANGE UP (ref 1.8–7.4)
NEUTROPHILS NFR BLD AUTO: 52.4 % — SIGNIFICANT CHANGE UP (ref 43–77)
NRBC # BLD: 0 /100 WBCS — SIGNIFICANT CHANGE UP (ref 0–0)
PLATELET # BLD AUTO: 226 K/UL — SIGNIFICANT CHANGE UP (ref 150–400)
POTASSIUM SERPL-MCNC: 4.4 MMOL/L — SIGNIFICANT CHANGE UP (ref 3.5–5.3)
POTASSIUM SERPL-SCNC: 4.4 MMOL/L — SIGNIFICANT CHANGE UP (ref 3.5–5.3)
PROT SERPL-MCNC: 7.6 G/DL — SIGNIFICANT CHANGE UP (ref 6–8.3)
RBC # BLD: 4.35 M/UL — SIGNIFICANT CHANGE UP (ref 3.8–5.2)
RBC # FLD: 15.2 % — HIGH (ref 10.3–14.5)
SARS-COV-2 RNA SPEC QL NAA+PROBE: NEGATIVE — SIGNIFICANT CHANGE UP
SODIUM SERPL-SCNC: 140 MMOL/L — SIGNIFICANT CHANGE UP (ref 135–145)
WBC # BLD: 5.92 K/UL — SIGNIFICANT CHANGE UP (ref 3.8–10.5)
WBC # FLD AUTO: 5.92 K/UL — SIGNIFICANT CHANGE UP (ref 3.8–10.5)

## 2021-09-26 PROCEDURE — 71045 X-RAY EXAM CHEST 1 VIEW: CPT | Mod: 26

## 2021-09-26 PROCEDURE — 87040 BLOOD CULTURE FOR BACTERIA: CPT

## 2021-09-26 PROCEDURE — 36415 COLL VENOUS BLD VENIPUNCTURE: CPT

## 2021-09-26 PROCEDURE — 71275 CT ANGIOGRAPHY CHEST: CPT | Mod: 26,MA

## 2021-09-26 PROCEDURE — 80053 COMPREHEN METABOLIC PANEL: CPT

## 2021-09-26 PROCEDURE — 99285 EMERGENCY DEPT VISIT HI MDM: CPT | Mod: 25

## 2021-09-26 PROCEDURE — 83880 ASSAY OF NATRIURETIC PEPTIDE: CPT

## 2021-09-26 PROCEDURE — 71045 X-RAY EXAM CHEST 1 VIEW: CPT

## 2021-09-26 PROCEDURE — 84484 ASSAY OF TROPONIN QUANT: CPT

## 2021-09-26 PROCEDURE — 93005 ELECTROCARDIOGRAM TRACING: CPT

## 2021-09-26 PROCEDURE — 71275 CT ANGIOGRAPHY CHEST: CPT | Mod: MA

## 2021-09-26 PROCEDURE — 94640 AIRWAY INHALATION TREATMENT: CPT

## 2021-09-26 PROCEDURE — 85025 COMPLETE CBC W/AUTO DIFF WBC: CPT

## 2021-09-26 PROCEDURE — 96374 THER/PROPH/DIAG INJ IV PUSH: CPT | Mod: XU

## 2021-09-26 PROCEDURE — 87635 SARS-COV-2 COVID-19 AMP PRB: CPT

## 2021-09-26 RX ORDER — IPRATROPIUM/ALBUTEROL SULFATE 18-103MCG
3 AEROSOL WITH ADAPTER (GRAM) INHALATION
Refills: 0 | Status: COMPLETED | OUTPATIENT
Start: 2021-09-26 | End: 2021-09-26

## 2021-09-26 RX ADMIN — Medication 125 MILLIGRAM(S): at 15:18

## 2021-09-26 RX ADMIN — Medication 3 MILLILITER(S): at 17:02

## 2021-09-26 RX ADMIN — Medication 3 MILLILITER(S): at 15:50

## 2021-09-26 RX ADMIN — Medication 3 MILLILITER(S): at 15:18

## 2021-09-26 NOTE — ED ADULT NURSE NOTE - NSIMPLEMENTINTERV_GEN_ALL_ED
Implemented All Fall Risk Interventions:  [place] to call system. Call bell, personal items and telephone within reach. Instruct patient to call for assistance. Room bathroom lighting operational. Non-slip footwear when patient is off stretcher. Physically safe environment: no spills, clutter or unnecessary equipment. Stretcher in lowest position, wheels locked, appropriate side rails in place. Provide visual cue, wrist band, yellow gown, etc. Monitor gait and stability. Monitor for mental status changes and reorient to person, place, and time. Review medications for side effects contributing to fall risk. Reinforce activity limits and safety measures with patient and family.

## 2021-09-26 NOTE — ED PROVIDER NOTE - PATIENT PORTAL LINK FT
You can access the FollowMyHealth Patient Portal offered by [facility] by registering at the following website: [email or web address]. By joining [employer]’s FollowMyHealth portal, you will also be able to view your health information using other applications (apps) compatible with our system.

## 2021-09-26 NOTE — ED ADULT NURSE NOTE - CADM POA URETHRAL CATHETER
[facility] EMERGENCY DEPT 
[address] [place] [id number] 
[phone] Work/School Note Date: 4/9/2017 To Whom It May concern: 
 
Zulema Bishop was seen and treated today in the emergency room by the following provider(s): 
Attending Provider: Roque Abdullahi MD 
Nurse Practitioner: Dominique Arias NP. Zulema Bishop may return to work on Thursday or as directed by family md. 
 
Sincerely, Dominique Arias NP 
 
 
 

No

## 2021-09-26 NOTE — ED PROVIDER NOTE - NSFOLLOWUPINSTRUCTIONS_ED_ALL_ED_FT
You were evaluated in the ED for cough, back pain. Your chest xray showed no pneumonia. Your blood work did not show any acute findings. Your CT of the chest showed improved findings. There are no new blood clots, you have residual blood clot. CONTINUE YOUR ELIQUIS. There is improvement in inflammation. You just completed a course of Azithromycin. No further antibiotics will be prescribed today.     START the Prednisone you are being prescribed tomorrow.     CALL Dr Greenfield for follow up this week. Your care was discussed with him.       Asthma    WHAT YOU NEED TO KNOW:    Asthma is a lung disease that makes breathing difficult. Chronic inflammation and reactions to triggers narrow the airways in the lungs. Asthma can become life-threatening if it is not managed.    Normal vs Asthmatic Bronchioles Adult         DISCHARGE INSTRUCTIONS:    Call your local emergency number (911 in the [place]) if:   •You have severe shortness of breath.      •The skin around your neck and ribs pulls in with each breath.      •Your peak flow numbers are in the red zone of your AAP.      Return to the emergency department if:   •You have shortness of breath, even after you take your short-term medicine as directed.      •Your lips or nails turn blue or gray.      Call your doctor or asthma specialist if:   •You run out of medicine before your next refill is due.      •Your symptoms get worse.      •You need to take more medicine than usual to control your symptoms.      •You have questions or concerns about your condition or care.      Medicines:   •Medicines may be used to decrease inflammation, open airways, and make it easier to breathe. Medicines may be inhaled, taken as a pill, or injected. Short-term medicines relieve your symptoms quickly. Long-term medicines are used to prevent future asthma attacks. Other medicines may be needed if your regular medicines are not able to prevent attacks. You may also need medicine to help control your allergies. Ask your healthcare provider for more information about any medicine you are given and how to take it safely.      •Take your medicine as directed. Contact your healthcare provider if you think your medicine is not helping or if you have side effects. Tell him of her if you are allergic to any medicine. Keep a list of the medicines, vitamins, and herbs you take. Include the amounts, and when and why you take them. Bring the list or the pill bottles to follow-up visits. Carry your medicine list with you in case of an emergency.      Manage your symptoms and prevent future attacks:   •Follow your Asthma Action Plan (AAP). This is a written plan that you and your healthcare provider create. It explains which medicine you need and when to change doses if necessary. It also explains how you can monitor symptoms and use a peak flow meter. The meter measures how well your lungs are working.      •Manage other health conditions, such as allergies, acid reflux, and sleep apnea.      •Identify and avoid triggers. These may include pets, dust mites, mold, and cockroaches.      •Do not smoke or be around others who smoke. Nicotine and other chemicals in cigarettes and cigars can cause lung damage. Ask your healthcare provider for information if you currently smoke and need help to quit. E-cigarettes or smokeless tobacco still contain nicotine. Talk to your healthcare provider before you use these products.      •Ask about the flu vaccine. The flu can make your asthma worse. You may need a yearly flu shot.      Follow up with your healthcare provider as directed: You will need to return to make sure your medicine is working and your symptoms are controlled. You may be referred to an asthma or allergy specialist. You may be asked to keep a record of your peak flow values and bring it with you to your appointments. Write down your questions so you remember to ask them during your visits.

## 2021-09-26 NOTE — ED PROVIDER NOTE - WR INTERPRETATION 1
CXR negative - No CHF, No cardiomegaly, No pleural effusionsCXR negative - No infiltrates, No consolidation, No atelectasis seenCXR negative - No pneumothorax, No opacities, No free air

## 2021-09-26 NOTE — ED PROVIDER NOTE - CLINICAL SUMMARY MEDICAL DECISION MAKING FREE TEXT BOX
Pt p/w cough, pleuritic back pain, SOB. + wheezing and diffuse coarse BS. DDx includes but not limited to asthma exacerbation +/- PNA, URI, less likely recurrent PE, decompensated HF, other pathology. Start duonebs, steroids, CXR, consider CT. Dispo pending w/u and clinical status

## 2021-09-26 NOTE — ED PROVIDER NOTE - PROGRESS NOTE DETAILS
Pt feels better and wants to go home. no further wheezing. Residual PE, but no new PE. Residual ground glass improved. D/w Dr Greenfield, d/c home w/ steroids and he will f/u w/ pt this week. No further abx at this time

## 2021-09-26 NOTE — ED PROVIDER NOTE - OBJECTIVE STATEMENT
Pt w/ PMHx Lung CA (s/p resection in 2000, no chemo/RT), Afib (s/p PPM on eliquis), HTN, HLD, Asthma? 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 She reports to me cough w/o phlegm, but reports to RN cough w/ phlegm. She denies f/c, however came to this ED on 9/21 w/ the same complaints and had reported fever at that time, but was not febrile in the ED. Pt's daughter is requesting abx, however pt was prescribed a Z-pac given the fever, and another daughter states she thinks the home attendant stated she was taking abx. Daughter reports pts takes all her medications. She reports pain in the R upper back. no CP. She reports she feels SOB sometimes. Pt is using her nebulizer at home w/o relief. Pt is vaccinated against COVID19

## 2021-09-26 NOTE — ED PROVIDER NOTE - PHYSICAL EXAMINATION
Constitutional: Well appearing, awake, alert, oriented to person, place, time/situation and in no apparent distress.  ENMT: Airway patent. Normal MM  Eyes: Clear bilaterally  Cardiac: Normal rate, regular rhythm.  Heart sounds S1, S2.  No murmurs, rubs or gallops. NO JVD or LE edema  Respiratory: B/l rhonchi w/ mild diffuse exp wheezing. No increased WOB, tachypnea, hypoxia, or accessory mm use. Pt speaks in full sentences. SP02 98% RA  Gastrointestinal: Abd soft, NT, ND, NABS. No guarding, rebound, or rigidity. No pulsatile abdominal masses. No organomegaly appreciated.  Musculoskeletal: Range of motion is not limited. no calf ttp  Neuro: Alert and oriented x 3, face symmetric and speech fluent. Strength 5/5 x 4 ext and symmetric, nml gross motor movement, nml gait. No focal deficits noted.  Skin: Skin normal color for race, warm, dry and intact. No evidence of rash.  Psych: Alert and oriented to person, place, time/situation. normal mood and affect. no apparent risk to self or others.

## 2021-09-26 NOTE — ED ADULT TRIAGE NOTE - ISOLATION AIRBORNE CONTACT OTHER
[facility]  Phone: [phone] Fax: [phone]       Physical Therapy Daily Treatment Note  Date:  2021    Patient Name:  Rikki Garcia    :  1998  MRN: [id number]    Restrictions/Precautions:    Diagnosis:  Left Ankle Sprain  Treatment Diagnosis:    Insurance/Certification information: [employer] (18 visits thru 2021)  Referring Physician: Dr. Nano Galvez of care signed (Y/N):    Visit# / total visits: 10/18  Pain level: /10  Time In: 13:20     Time Out: 13:55        Subjective: Pt reports no left ankle pain today. Exercises:  Exercise/Equipment Resistance/Repetitions Other comments   Bike  10 min    Calf stretch 10\" 5 x @ incline   DF/Inv/EV str w/flexband 5 x each    Toe raises 10 x 2    Step to disc fwd 5\" 10 x                           lateral 5\" 10 x Close S for stability   Resisted side stepping   OTB 20' x 2 B nt   LLE stand/R heel fwd touch down 6\" 10 x 2                                Unil stand (L) reach med ball   3 kg 10 x                Diagonal reach 3 kg 10 x B            Throw @ rebounder 2 kg 20 x                                  Other Therapeutic Activities:      Home Exercise Program:  (3/11/2021) Instructed pt in light strengthening using tband. Gave pt GTB for home. Manual Treatments:      Modalities:-  NT per pt-no pain    Timed Code Treatment Minutes:  35    Total Treatment Minutes:  35    Treatment/Activity Tolerance:  [x] Patient tolerated treatment well [] Patient limited by fatigue  [] Patient limited by pain  [] Patient limited by other medical complications  [x] Other: Client is tolerating gradual progression of exercises well. No recent report of pain. Increased stability noted with stepping to disc.          Plan:   [x] Continue per plan of care [] Alter current plan (see comments)  [] Plan of care initiated [] Hold pending MD visit [] Discharge  Plan for Next Session:         Treatment Charges: Mins Units   Initial Evaluation     Re-Evaluation     Ther Exercise         TE 35 2   Manual Therapy     MT     Ther Activities        TA     Gait Training          GT     Neuro Re-education NR     Modalities     Non-Billable Service Time     Other     Total Time/Units 35 2     Electronically signed by:   Puma Elmore covid

## 2021-09-26 NOTE — ED ADULT TRIAGE NOTE - OTHER COMPLAINTS
biba from home c/o worsening cough with upper back pain, pt diagnosed with bronchitis few days ago.  Pt reports she isn't getting better.  denies cp, sob at rest.

## 2021-09-29 ENCOUNTER — APPOINTMENT (OUTPATIENT)
Dept: GASTROENTEROLOGY | Facility: CLINIC | Age: 86
End: 2021-09-29

## 2021-09-30 DIAGNOSIS — Z85.118 PERSONAL HISTORY OF OTHER MALIGNANT NEOPLASM OF BRONCHUS AND LUNG: ICD-10-CM

## 2021-09-30 DIAGNOSIS — I48.91 UNSPECIFIED ATRIAL FIBRILLATION: ICD-10-CM

## 2021-09-30 DIAGNOSIS — Z87.891 PERSONAL HISTORY OF NICOTINE DEPENDENCE: ICD-10-CM

## 2021-09-30 DIAGNOSIS — H54.1213: ICD-10-CM

## 2021-09-30 DIAGNOSIS — Z20.822 CONTACT WITH AND (SUSPECTED) EXPOSURE TO COVID-19: ICD-10-CM

## 2021-09-30 DIAGNOSIS — M54.9 DORSALGIA, UNSPECIFIED: ICD-10-CM

## 2021-09-30 DIAGNOSIS — I10 ESSENTIAL (PRIMARY) HYPERTENSION: ICD-10-CM

## 2021-09-30 DIAGNOSIS — E78.5 HYPERLIPIDEMIA, UNSPECIFIED: ICD-10-CM

## 2021-09-30 DIAGNOSIS — Z79.01 LONG TERM (CURRENT) USE OF ANTICOAGULANTS: ICD-10-CM

## 2021-09-30 DIAGNOSIS — R05 COUGH: ICD-10-CM

## 2021-09-30 DIAGNOSIS — J45.901 UNSPECIFIED ASTHMA WITH (ACUTE) EXACERBATION: ICD-10-CM

## 2021-09-30 DIAGNOSIS — H40.9 UNSPECIFIED GLAUCOMA: ICD-10-CM

## 2021-10-13 ENCOUNTER — APPOINTMENT (OUTPATIENT)
Dept: INTERNAL MEDICINE | Facility: CLINIC | Age: 86
End: 2021-10-13

## 2021-10-16 ENCOUNTER — INPATIENT (INPATIENT)
Facility: HOSPITAL | Age: 86
LOS: 4 days | Discharge: NURSING, REHAB OR LONG-TERM CARE | DRG: 281 | End: 2021-10-21
Attending: INTERNAL MEDICINE | Admitting: INTERNAL MEDICINE
Payer: MEDICARE

## 2021-10-16 VITALS
HEART RATE: 69 BPM | RESPIRATION RATE: 18 BRPM | HEIGHT: 65 IN | SYSTOLIC BLOOD PRESSURE: 143 MMHG | WEIGHT: 137.79 LBS | DIASTOLIC BLOOD PRESSURE: 72 MMHG | OXYGEN SATURATION: 97 %

## 2021-10-16 DIAGNOSIS — D86.9 SARCOIDOSIS, UNSPECIFIED: ICD-10-CM

## 2021-10-16 DIAGNOSIS — K21.9 GASTRO-ESOPHAGEAL REFLUX DISEASE WITHOUT ESOPHAGITIS: ICD-10-CM

## 2021-10-16 DIAGNOSIS — C34.90 MALIGNANT NEOPLASM OF UNSPECIFIED PART OF UNSPECIFIED BRONCHUS OR LUNG: Chronic | ICD-10-CM

## 2021-10-16 DIAGNOSIS — I21.4 NON-ST ELEVATION (NSTEMI) MYOCARDIAL INFARCTION: ICD-10-CM

## 2021-10-16 DIAGNOSIS — I49.5 SICK SINUS SYNDROME: ICD-10-CM

## 2021-10-16 DIAGNOSIS — I26.99 OTHER PULMONARY EMBOLISM WITHOUT ACUTE COR PULMONALE: ICD-10-CM

## 2021-10-16 DIAGNOSIS — Z95.0 PRESENCE OF CARDIAC PACEMAKER: Chronic | ICD-10-CM

## 2021-10-16 DIAGNOSIS — Z98.890 OTHER SPECIFIED POSTPROCEDURAL STATES: Chronic | ICD-10-CM

## 2021-10-16 DIAGNOSIS — H26.9 UNSPECIFIED CATARACT: Chronic | ICD-10-CM

## 2021-10-16 DIAGNOSIS — I10 ESSENTIAL (PRIMARY) HYPERTENSION: ICD-10-CM

## 2021-10-16 DIAGNOSIS — E78.5 HYPERLIPIDEMIA, UNSPECIFIED: ICD-10-CM

## 2021-10-16 LAB
ALBUMIN SERPL ELPH-MCNC: 3.3 G/DL — SIGNIFICANT CHANGE UP (ref 3.3–5)
ALP SERPL-CCNC: 79 U/L — SIGNIFICANT CHANGE UP (ref 40–120)
ALT FLD-CCNC: 21 U/L — SIGNIFICANT CHANGE UP (ref 10–45)
ANION GAP SERPL CALC-SCNC: 9 MMOL/L — SIGNIFICANT CHANGE UP (ref 5–17)
APTT BLD: 28 SEC — SIGNIFICANT CHANGE UP (ref 27.5–35.5)
AST SERPL-CCNC: 23 U/L — SIGNIFICANT CHANGE UP (ref 10–40)
BILIRUB SERPL-MCNC: 0.5 MG/DL — SIGNIFICANT CHANGE UP (ref 0.2–1.2)
BUN SERPL-MCNC: 25 MG/DL — HIGH (ref 7–23)
CALCIUM SERPL-MCNC: 9.3 MG/DL — SIGNIFICANT CHANGE UP (ref 8.4–10.5)
CHLORIDE SERPL-SCNC: 109 MMOL/L — HIGH (ref 96–108)
CO2 SERPL-SCNC: 20 MMOL/L — LOW (ref 22–31)
CREAT SERPL-MCNC: 0.88 MG/DL — SIGNIFICANT CHANGE UP (ref 0.5–1.3)
GLUCOSE SERPL-MCNC: 115 MG/DL — HIGH (ref 70–99)
HCT VFR BLD CALC: 39.9 % — SIGNIFICANT CHANGE UP (ref 34.5–45)
HGB BLD-MCNC: 13.1 G/DL — SIGNIFICANT CHANGE UP (ref 11.5–15.5)
INR BLD: 1.44 — HIGH (ref 0.88–1.16)
MCHC RBC-ENTMCNC: 32.3 PG — SIGNIFICANT CHANGE UP (ref 27–34)
MCHC RBC-ENTMCNC: 32.8 GM/DL — SIGNIFICANT CHANGE UP (ref 32–36)
MCV RBC AUTO: 98.3 FL — SIGNIFICANT CHANGE UP (ref 80–100)
NRBC # BLD: 0 /100 WBCS — SIGNIFICANT CHANGE UP (ref 0–0)
PLATELET # BLD AUTO: 234 K/UL — SIGNIFICANT CHANGE UP (ref 150–400)
POTASSIUM SERPL-MCNC: 4 MMOL/L — SIGNIFICANT CHANGE UP (ref 3.5–5.3)
POTASSIUM SERPL-SCNC: 4 MMOL/L — SIGNIFICANT CHANGE UP (ref 3.5–5.3)
PROT SERPL-MCNC: 6.8 G/DL — SIGNIFICANT CHANGE UP (ref 6–8.3)
PROTHROM AB SERPL-ACNC: 17 SEC — HIGH (ref 10.6–13.6)
RBC # BLD: 4.06 M/UL — SIGNIFICANT CHANGE UP (ref 3.8–5.2)
RBC # FLD: 14.4 % — SIGNIFICANT CHANGE UP (ref 10.3–14.5)
SODIUM SERPL-SCNC: 138 MMOL/L — SIGNIFICANT CHANGE UP (ref 135–145)
TROPONIN T SERPL-MCNC: 0.04 NG/ML — CRITICAL HIGH (ref 0–0.01)
WBC # BLD: 7.02 K/UL — SIGNIFICANT CHANGE UP (ref 3.8–10.5)
WBC # FLD AUTO: 7.02 K/UL — SIGNIFICANT CHANGE UP (ref 3.8–10.5)

## 2021-10-16 PROCEDURE — 71045 X-RAY EXAM CHEST 1 VIEW: CPT | Mod: 26

## 2021-10-16 RX ORDER — AMLODIPINE BESYLATE 2.5 MG/1
5 TABLET ORAL DAILY
Refills: 0 | Status: DISCONTINUED | OUTPATIENT
Start: 2021-10-16 | End: 2021-10-21

## 2021-10-16 RX ORDER — FOLIC ACID 0.8 MG
1 TABLET ORAL DAILY
Refills: 0 | Status: DISCONTINUED | OUTPATIENT
Start: 2021-10-16 | End: 2021-10-17

## 2021-10-16 RX ORDER — LANOLIN ALCOHOL/MO/W.PET/CERES
3 CREAM (GRAM) TOPICAL AT BEDTIME
Refills: 0 | Status: DISCONTINUED | OUTPATIENT
Start: 2021-10-16 | End: 2021-10-21

## 2021-10-16 RX ORDER — PANTOPRAZOLE SODIUM 20 MG/1
40 TABLET, DELAYED RELEASE ORAL
Refills: 0 | Status: DISCONTINUED | OUTPATIENT
Start: 2021-10-16 | End: 2021-10-21

## 2021-10-16 RX ORDER — LOSARTAN POTASSIUM 100 MG/1
25 TABLET, FILM COATED ORAL DAILY
Refills: 0 | Status: DISCONTINUED | OUTPATIENT
Start: 2021-10-16 | End: 2021-10-17

## 2021-10-16 RX ORDER — ASPIRIN/CALCIUM CARB/MAGNESIUM 324 MG
81 TABLET ORAL DAILY
Refills: 0 | Status: DISCONTINUED | OUTPATIENT
Start: 2021-10-16 | End: 2021-10-20

## 2021-10-16 RX ORDER — ATORVASTATIN CALCIUM 80 MG/1
80 TABLET, FILM COATED ORAL AT BEDTIME
Refills: 0 | Status: DISCONTINUED | OUTPATIENT
Start: 2021-10-16 | End: 2021-10-21

## 2021-10-16 RX ORDER — ATENOLOL 25 MG/1
25 TABLET ORAL
Refills: 0 | Status: DISCONTINUED | OUTPATIENT
Start: 2021-10-16 | End: 2021-10-19

## 2021-10-16 RX ORDER — ALBUTEROL 90 UG/1
2 AEROSOL, METERED ORAL EVERY 6 HOURS
Refills: 0 | Status: DISCONTINUED | OUTPATIENT
Start: 2021-10-16 | End: 2021-10-18

## 2021-10-16 RX ADMIN — Medication 3 MILLIGRAM(S): at 23:00

## 2021-10-16 RX ADMIN — ATORVASTATIN CALCIUM 80 MILLIGRAM(S): 80 TABLET, FILM COATED ORAL at 23:00

## 2021-10-16 NOTE — H&P ADULT - HISTORY OF PRESENT ILLNESS
Patient is a 86 year old female with PMHx of B/L eye blindness, HTN, HLD. CAD, Lung CA in the past, Sarcoidosis, Bronchiectasis, PE in May 2021 on Eliquis, dementia, SSS S/P PPM who presented to [facility] 10/12/21 after sustaining a fall Vs Syncope and crawling out of her apartment and being found by a neighbor. On arrival to [facility] patient complained of abdominal pain. Sharp in the RUQ which had been going on for about a week. CT Abd and pelvis showed colonic diverticulosis with no diverticulitis. She was noted to have  an elevated troponin level of 0.05-0.297-0.478. C was recommended and patient requested transfer to [facility]. On arrival she denies chest pain, sob, orthopnea, pnd, dizziness. 12 LEad EKG shows SR with AS-

## 2021-10-16 NOTE — H&P ADULT - NSICDXPASTMEDICALHX_GEN_ALL_CORE_FT
PAST MEDICAL HISTORY:  Dementia     GERD (gastroesophageal reflux disease)     Hyperlipidemia     Mild HTN     Pulmonary embolism     Sarcoidosis     SSS (sick sinus syndrome)

## 2021-10-16 NOTE — H&P ADULT - ASSESSMENT
Patient is an 86 year old female who presented to [facility] with questionable fall Vs syncopal episode found to R/I for NSTEMI and transferred to [facility] for planned cardiac catheterization.

## 2021-10-16 NOTE — H&P ADULT - PROBLEM SELECTOR PLAN 1
Admit tele  Check Troponin now  -Chest pain free  -Cont ASA  -Cont PO BB  Plan for [facility]

## 2021-10-16 NOTE — PATIENT PROFILE ADULT - PATIENT/CAREGIVER OFFERED  INTERPRETER SERVICES
patient unable to provide information with interpreting services. Granddaughter at bedside and able to provide some information./yes

## 2021-10-16 NOTE — H&P ADULT - NSHPLABSRESULTS_GEN_ALL_CORE
13.1   7.02  )-----------( 234      ( 16 Oct 2021 21:19 )             39.9   10-16    138  |  109<H>  |  25<H>  ----------------------------<  115<H>  4.0   |  20<L>  |  0.88    Ca    9.3      16 Oct 2021 21:19    TPro  6.8  /  Alb  3.3  /  TBili  0.5  /  DBili  x   /  AST  23  /  ALT  21  /  AlkPhos  79  10-16

## 2021-10-17 DIAGNOSIS — J98.4 OTHER DISORDERS OF LUNG: ICD-10-CM

## 2021-10-17 DIAGNOSIS — I10 ESSENTIAL (PRIMARY) HYPERTENSION: ICD-10-CM

## 2021-10-17 LAB
ANION GAP SERPL CALC-SCNC: 11 MMOL/L — SIGNIFICANT CHANGE UP (ref 5–17)
APTT BLD: >200 SEC — CRITICAL HIGH (ref 27.5–35.5)
APTT BLD: >200 SEC — CRITICAL HIGH (ref 27.5–35.5)
BUN SERPL-MCNC: 20 MG/DL — SIGNIFICANT CHANGE UP (ref 7–23)
CALCIUM SERPL-MCNC: 10 MG/DL — SIGNIFICANT CHANGE UP (ref 8.4–10.5)
CHLORIDE SERPL-SCNC: 110 MMOL/L — HIGH (ref 96–108)
CHOLEST SERPL-MCNC: 150 MG/DL — SIGNIFICANT CHANGE UP
CO2 SERPL-SCNC: 19 MMOL/L — LOW (ref 22–31)
COVID-19 SPIKE DOMAIN AB INTERP: POSITIVE
COVID-19 SPIKE DOMAIN ANTIBODY RESULT: 85.2 U/ML — HIGH
CREAT SERPL-MCNC: 0.78 MG/DL — SIGNIFICANT CHANGE UP (ref 0.5–1.3)
GLUCOSE SERPL-MCNC: 82 MG/DL — SIGNIFICANT CHANGE UP (ref 70–99)
HCT VFR BLD CALC: 44.3 % — SIGNIFICANT CHANGE UP (ref 34.5–45)
HCT VFR BLD CALC: 45.9 % — HIGH (ref 34.5–45)
HDLC SERPL-MCNC: 61 MG/DL — SIGNIFICANT CHANGE UP
HGB BLD-MCNC: 14.2 G/DL — SIGNIFICANT CHANGE UP (ref 11.5–15.5)
HGB BLD-MCNC: 14.5 G/DL — SIGNIFICANT CHANGE UP (ref 11.5–15.5)
LIPID PNL WITH DIRECT LDL SERPL: 78 MG/DL — SIGNIFICANT CHANGE UP
MCHC RBC-ENTMCNC: 30.9 GM/DL — LOW (ref 32–36)
MCHC RBC-ENTMCNC: 31.9 PG — SIGNIFICANT CHANGE UP (ref 27–34)
MCHC RBC-ENTMCNC: 32.1 PG — SIGNIFICANT CHANGE UP (ref 27–34)
MCHC RBC-ENTMCNC: 32.7 GM/DL — SIGNIFICANT CHANGE UP (ref 32–36)
MCV RBC AUTO: 103.6 FL — HIGH (ref 80–100)
MCV RBC AUTO: 97.6 FL — SIGNIFICANT CHANGE UP (ref 80–100)
NON HDL CHOLESTEROL: 89 MG/DL — SIGNIFICANT CHANGE UP
NRBC # BLD: 0 /100 WBCS — SIGNIFICANT CHANGE UP (ref 0–0)
NRBC # BLD: 0 /100 WBCS — SIGNIFICANT CHANGE UP (ref 0–0)
PLATELET # BLD AUTO: 193 K/UL — SIGNIFICANT CHANGE UP (ref 150–400)
PLATELET # BLD AUTO: 258 K/UL — SIGNIFICANT CHANGE UP (ref 150–400)
POTASSIUM SERPL-MCNC: 4.6 MMOL/L — SIGNIFICANT CHANGE UP (ref 3.5–5.3)
POTASSIUM SERPL-SCNC: 4.6 MMOL/L — SIGNIFICANT CHANGE UP (ref 3.5–5.3)
RBC # BLD: 4.43 M/UL — SIGNIFICANT CHANGE UP (ref 3.8–5.2)
RBC # BLD: 4.54 M/UL — SIGNIFICANT CHANGE UP (ref 3.8–5.2)
RBC # FLD: 14.2 % — SIGNIFICANT CHANGE UP (ref 10.3–14.5)
RBC # FLD: 14.2 % — SIGNIFICANT CHANGE UP (ref 10.3–14.5)
SARS-COV-2 IGG+IGM SERPL QL IA: 85.2 U/ML — HIGH
SARS-COV-2 IGG+IGM SERPL QL IA: POSITIVE
SARS-COV-2 RNA SPEC QL NAA+PROBE: SIGNIFICANT CHANGE UP
SODIUM SERPL-SCNC: 140 MMOL/L — SIGNIFICANT CHANGE UP (ref 135–145)
TRIGL SERPL-MCNC: 57 MG/DL — SIGNIFICANT CHANGE UP
TROPONIN T SERPL-MCNC: 0.04 NG/ML — CRITICAL HIGH (ref 0–0.01)
TSH SERPL-MCNC: 0.76 UIU/ML — SIGNIFICANT CHANGE UP (ref 0.27–4.2)
WBC # BLD: 6.05 K/UL — SIGNIFICANT CHANGE UP (ref 3.8–10.5)
WBC # BLD: 6.77 K/UL — SIGNIFICANT CHANGE UP (ref 3.8–10.5)
WBC # FLD AUTO: 6.05 K/UL — SIGNIFICANT CHANGE UP (ref 3.8–10.5)
WBC # FLD AUTO: 6.77 K/UL — SIGNIFICANT CHANGE UP (ref 3.8–10.5)

## 2021-10-17 PROCEDURE — 93010 ELECTROCARDIOGRAM REPORT: CPT

## 2021-10-17 PROCEDURE — 99233 SBSQ HOSP IP/OBS HIGH 50: CPT

## 2021-10-17 RX ORDER — DORZOLAMIDE HYDROCHLORIDE 20 MG/ML
1 SOLUTION/ DROPS OPHTHALMIC EVERY 8 HOURS
Refills: 0 | Status: DISCONTINUED | OUTPATIENT
Start: 2021-10-17 | End: 2021-10-21

## 2021-10-17 RX ORDER — HEPARIN SODIUM 5000 [USP'U]/ML
2500 INJECTION INTRAVENOUS; SUBCUTANEOUS EVERY 6 HOURS
Refills: 0 | Status: DISCONTINUED | OUTPATIENT
Start: 2021-10-17 | End: 2021-10-18

## 2021-10-17 RX ORDER — HEPARIN SODIUM 5000 [USP'U]/ML
INJECTION INTRAVENOUS; SUBCUTANEOUS
Qty: 25000 | Refills: 0 | Status: DISCONTINUED | OUTPATIENT
Start: 2021-10-17 | End: 2021-10-18

## 2021-10-17 RX ORDER — BRIMONIDINE TARTRATE 2 MG/MG
1 SOLUTION/ DROPS OPHTHALMIC
Refills: 0 | Status: DISCONTINUED | OUTPATIENT
Start: 2021-10-17 | End: 2021-10-21

## 2021-10-17 RX ORDER — HEPARIN SODIUM 5000 [USP'U]/ML
5000 INJECTION INTRAVENOUS; SUBCUTANEOUS ONCE
Refills: 0 | Status: COMPLETED | OUTPATIENT
Start: 2021-10-17 | End: 2021-10-17

## 2021-10-17 RX ORDER — CLOPIDOGREL BISULFATE 75 MG/1
600 TABLET, FILM COATED ORAL ONCE
Refills: 0 | Status: COMPLETED | OUTPATIENT
Start: 2021-10-18 | End: 2021-10-18

## 2021-10-17 RX ORDER — LATANOPROST 0.05 MG/ML
1 SOLUTION/ DROPS OPHTHALMIC; TOPICAL AT BEDTIME
Refills: 0 | Status: DISCONTINUED | OUTPATIENT
Start: 2021-10-17 | End: 2021-10-21

## 2021-10-17 RX ORDER — HEPARIN SODIUM 5000 [USP'U]/ML
5000 INJECTION INTRAVENOUS; SUBCUTANEOUS EVERY 6 HOURS
Refills: 0 | Status: DISCONTINUED | OUTPATIENT
Start: 2021-10-17 | End: 2021-10-18

## 2021-10-17 RX ORDER — BNT162B2 0.23 MG/2.25ML
0.3 INJECTION, SUSPENSION INTRAMUSCULAR ONCE
Refills: 0 | Status: DISCONTINUED | OUTPATIENT
Start: 2021-10-17 | End: 2021-10-17

## 2021-10-17 RX ADMIN — LOSARTAN POTASSIUM 25 MILLIGRAM(S): 100 TABLET, FILM COATED ORAL at 06:35

## 2021-10-17 RX ADMIN — HEPARIN SODIUM 5000 UNIT(S): 5000 INJECTION INTRAVENOUS; SUBCUTANEOUS at 07:49

## 2021-10-17 RX ADMIN — DORZOLAMIDE HYDROCHLORIDE 1 DROP(S): 20 SOLUTION/ DROPS OPHTHALMIC at 14:20

## 2021-10-17 RX ADMIN — PANTOPRAZOLE SODIUM 40 MILLIGRAM(S): 20 TABLET, DELAYED RELEASE ORAL at 06:35

## 2021-10-17 RX ADMIN — AMLODIPINE BESYLATE 5 MILLIGRAM(S): 2.5 TABLET ORAL at 06:35

## 2021-10-17 RX ADMIN — HEPARIN SODIUM 700 UNIT(S)/HR: 5000 INJECTION INTRAVENOUS; SUBCUTANEOUS at 23:56

## 2021-10-17 RX ADMIN — ATENOLOL 25 MILLIGRAM(S): 25 TABLET ORAL at 17:34

## 2021-10-17 RX ADMIN — Medication 3 MILLIGRAM(S): at 21:34

## 2021-10-17 RX ADMIN — BRIMONIDINE TARTRATE 1 DROP(S): 2 SOLUTION/ DROPS OPHTHALMIC at 17:35

## 2021-10-17 RX ADMIN — HEPARIN SODIUM 1100 UNIT(S)/HR: 5000 INJECTION INTRAVENOUS; SUBCUTANEOUS at 07:53

## 2021-10-17 RX ADMIN — HEPARIN SODIUM 0 UNIT(S)/HR: 5000 INJECTION INTRAVENOUS; SUBCUTANEOUS at 14:10

## 2021-10-17 RX ADMIN — LATANOPROST 1 DROP(S): 0.05 SOLUTION/ DROPS OPHTHALMIC; TOPICAL at 21:34

## 2021-10-17 RX ADMIN — ATENOLOL 25 MILLIGRAM(S): 25 TABLET ORAL at 06:35

## 2021-10-17 RX ADMIN — ATORVASTATIN CALCIUM 80 MILLIGRAM(S): 80 TABLET, FILM COATED ORAL at 21:34

## 2021-10-17 RX ADMIN — Medication 81 MILLIGRAM(S): at 11:52

## 2021-10-17 RX ADMIN — Medication 1 MILLIGRAM(S): at 11:52

## 2021-10-17 RX ADMIN — DORZOLAMIDE HYDROCHLORIDE 1 DROP(S): 20 SOLUTION/ DROPS OPHTHALMIC at 21:34

## 2021-10-17 RX ADMIN — HEPARIN SODIUM 0 UNIT(S)/HR: 5000 INJECTION INTRAVENOUS; SUBCUTANEOUS at 22:52

## 2021-10-17 RX ADMIN — ALBUTEROL 2 PUFF(S): 90 AEROSOL, METERED ORAL at 12:46

## 2021-10-17 RX ADMIN — HEPARIN SODIUM 900 UNIT(S)/HR: 5000 INJECTION INTRAVENOUS; SUBCUTANEOUS at 15:40

## 2021-10-17 NOTE — PROGRESS NOTE ADULT - PROBLEM SELECTOR PLAN 3
S/P PPM (EP Dr. Martine Arrieta @ [facility])  -EP kelsey for device interrogation Hx of Lung CA s/p resection in 2000 (no chemo/RT, in remission), Sarcoidosis, Bronchiectasis, recurrent PNA, follows with Dr. Sinclair  -patient with mild/wet cough x2-3 weeks. Afebrile, no leukocytosis. SPO2 94% RA  -CXR at [facility] shows small right effusion, discoid change and/or infiltrate right lung base (appears chronic relative to chest imaging in the past)  -albuterol PRN

## 2021-10-17 NOTE — PROGRESS NOTE ADULT - PROBLEM SELECTOR PLAN 2
-Last dose of eliquis 10/16/21 in AM  -heparin gtt in lieu of eliquis SSS/?Afib, s/p PPM (EP Dr. Martine Arrieta @ [facility])  -consider EP eval for device interrogation

## 2021-10-17 NOTE — PROGRESS NOTE ADULT - PROBLEM SELECTOR PLAN 6
Follows with Dr Sinclair  -Albuterol MDI PRN -LDL 78  -home atorvastatin 20mg increased to 80mg at [facility]    #DVT PPx- heparin gtt    Case d/w Dr. Hinton -LDL 78  -home atorvastatin 20mg increased to 80mg at [facility]    #DVT PPx- heparin gtt    #Dispo- pending above  Pt from home with HHA 8hrs/day 7days/week and family support  PT kesley Pierson d/w Dr. Hinton

## 2021-10-17 NOTE — PROGRESS NOTE ADULT - PROBLEM SELECTOR PLAN 4
Cont Atenolol  Cont Losartan   Cont Amlodipine -Last dose of eliquis 10/16/21 in AM  -heparin gtt in lieu of eliquis  -follows with Dr. Sinclair

## 2021-10-17 NOTE — PROGRESS NOTE ADULT - PROBLEM SELECTOR PLAN 1
-No known hx of CAD per pt's daughter and no cardiologist  -Currently without anginal symptoms  -EKG shows SR with AS-, trop 0.04-0.04 at [facility]  -telemetry monitoring  -echo  -continue heparin gtt  -continue aspirin 81mg daily  -plavix load 600mg 10/18 AM  -NPO after midnight for cath with Dr. Arshad. Consent obtained and placed in chart -No known hx of CAD per pt's daughter and no cardiologist  -Currently without anginal symptoms  -EKG shows SR with AS-, trop 0.04-0.04 at [facility]  -telemetry monitoring  -echo  -continue heparin gtt  -continue aspirin 81mg daily, atenolol 25mg daily, atorvastatin 80mg daily  -plavix load 600mg 10/18 AM  -NPO after midnight for cath with Dr. Arshad. Consent obtained and placed in chart    -ASA III, Mallampati III  -suitable candidate for moderate sedation  -cath consent obtained    Risks & benefits of procedure and alternative therapy have been explained to the patient including but not limited to: allergic reaction, bleeding w/possible need for blood transfusion, infection, renal and vascular compromise, limb damage, arrhythmia, stroke, vessel dissection/perforation, Myocardial infarction, emergent CABG. -No known hx of CAD per pt's daughter and no cardiologist  -Currently without anginal symptoms  -EKG shows SR with AS-, trop 0.04-0.04 at [facility]  -telemetry monitoring  -echo  -continue heparin gtt  -continue aspirin 81mg daily, atenolol 25mg daily, atorvastatin 80mg daily  -plavix load 600mg 10/18 AM  -NPO after midnight for cath with Dr. Arshad. Consent obtained and placed in chart    -ASA III, Mallampati IV  -suitable candidate for moderate sedation  -cath consent obtained    Risks & benefits of procedure and alternative therapy have been explained to the patient including but not limited to: allergic reaction, bleeding w/possible need for blood transfusion, infection, renal and vascular compromise, limb damage, arrhythmia, stroke, vessel dissection/perforation, Myocardial infarction, emergent CABG.

## 2021-10-17 NOTE — PROGRESS NOTE ADULT - SUBJECTIVE AND OBJECTIVE BOX
OVERNIGHT EVENTS:  No acute events overnight.    SUBJECTIVE / INTERVAL HPI: Patient seen and examined at bedside. Comfortable, denies CP, SOB, dizziness/lightheadedness, palpitations. RN reports patient has wet cough.     VITAL SIGNS:  Vital Signs Last 24 Hrs  T(C): 36.1 (17 Oct 2021 10:02), Max: 37 (16 Oct 2021 21:38)  T(F): 97 (17 Oct 2021 10:02), Max: 98.6 (16 Oct 2021 21:38)  HR: 75 (17 Oct 2021 08:50) (64 - 85)  BP: 165/85 (17 Oct 2021 08:50) (140/90 - 165/85)  BP(mean): 106 (16 Oct 2021 21:38) (106 - 106)  RR: 20 (17 Oct 2021 08:50) (14 - 20)  SpO2: 91% (17 Oct 2021 08:50) (91% - 97%)      I&O's Summary    16 Oct 2021 07:01  -  17 Oct 2021 07:00  --------------------------------------------------------  IN: 0 mL / OUT: 900 mL / NET: -900 mL      Height (cm): 165.1 (10-16 @ 20:35)  Weight (kg): 62.5 (10-16 @ 20:35)  BMI (kg/m2): 22.9 (10-16 @ 20:35)  BSA (m2): 1.69 (10-16 @ 20:35)    PHYSICAL EXAM:    General: sitting up in bed, NAD  HEENT: conjunctiva clear; MMM  Neck: supple, no JVD  Cardiovascular: RRR, no murmurs  Respiratory: non-labored, coarse RLL  Gastrointestinal: soft, NT/ND, +BS  Extremities: WWP, no edema or cyanosis  Neurological: AAOx3, no focal deficits    MEDICATIONS:  MEDICATIONS  (STANDING):  amLODIPine   Tablet 5 milliGRAM(s) Oral daily  aspirin enteric coated 81 milliGRAM(s) Oral daily  ATENolol  Tablet 25 milliGRAM(s) Oral two times a day  atorvastatin 80 milliGRAM(s) Oral at bedtime  brimonidine 0.2% Ophthalmic Solution 1 Drop(s) Both EYES two times a day  dorzolamide 2% Ophthalmic Solution 1 Drop(s) Both EYES every 8 hours  folic acid 1 milliGRAM(s) Oral daily  heparin  Infusion.  Unit(s)/Hr (11 mL/Hr) IV Continuous <Continuous>  latanoprost 0.005% Ophthalmic Solution 1 Drop(s) Both EYES at bedtime  losartan 25 milliGRAM(s) Oral daily  melatonin 3 milliGRAM(s) Oral at bedtime  pantoprazole    Tablet 40 milliGRAM(s) Oral before breakfast    MEDICATIONS  (PRN):  ALBUTerol    90 MICROgram(s) HFA Inhaler 2 Puff(s) Inhalation every 6 hours PRN Bronchospasm  heparin   Injectable 5000 Unit(s) IV Push every 6 hours PRN For aPTT less than 40  heparin   Injectable 2500 Unit(s) IV Push every 6 hours PRN For aPTT between 40 - 57      LABS:                        14.2   6.77  )-----------( 193      ( 17 Oct 2021 06:14 )             45.9       10-17    140  |  110<H>  |  20  ----------------------------<  82  4.6   |  19<L>  |  0.78    Ca    10.0      17 Oct 2021 06:14    TPro  6.8  /  Alb  3.3  /  TBili  0.5  /  DBili  x   /  AST  23  /  ALT  21  /  AlkPhos  79  10-16      PT/INR - ( 16 Oct 2021 21:19 )   PT: 17.0 sec;   INR: 1.44          PTT - ( 16 Oct 2021 21:19 )  PTT:28.0 sec    CARDIAC MARKERS ( 17 Oct 2021 06:14 )  x     / 0.04 ng/mL / x     / x     / x      CARDIAC MARKERS ( 16 Oct 2021 21:19 )  x     / 0.04 ng/mL / x     / x     / x        RADIOLOGY & ADDITIONAL TESTS: Reviewed. OVERNIGHT EVENTS:  No acute events overnight.    SUBJECTIVE / INTERVAL HPI: Patient seen and examined at bedside with patient's daughter Kiya. Patient comfortable, denies CP, SOB, dizziness/lightheadedness, palpitations. RN reports patient has wet cough but per daughter this is chronic. Daughter states pt looks best she has in days.     VITAL SIGNS:  Vital Signs Last 24 Hrs  T(C): 36.1 (17 Oct 2021 10:02), Max: 37 (16 Oct 2021 21:38)  T(F): 97 (17 Oct 2021 10:02), Max: 98.6 (16 Oct 2021 21:38)  HR: 75 (17 Oct 2021 08:50) (64 - 85)  BP: 165/85 (17 Oct 2021 08:50) (140/90 - 165/85)  BP(mean): 106 (16 Oct 2021 21:38) (106 - 106)  RR: 20 (17 Oct 2021 08:50) (14 - 20)  SpO2: 91% (17 Oct 2021 08:50) (91% - 97%)      I&O's Summary    16 Oct 2021 07:01  -  17 Oct 2021 07:00  --------------------------------------------------------  IN: 0 mL / OUT: 900 mL / NET: -900 mL      Height (cm): 165.1 (10-16 @ 20:35)  Weight (kg): 62.5 (10-16 @ 20:35)  BMI (kg/m2): 22.9 (10-16 @ 20:35)  BSA (m2): 1.69 (10-16 @ 20:35)    PHYSICAL EXAM:    General: sitting up in bed, NAD  HEENT: conjunctiva clear; MMM  Neck: supple, no JVD  Cardiovascular: RRR, no murmurs  Respiratory: non-labored, coarse RLL  Gastrointestinal: soft, NT/ND, +BS  Extremities: WWP, no edema or cyanosis  Neurological: AAOx3, no focal deficits    MEDICATIONS:  MEDICATIONS  (STANDING):  amLODIPine   Tablet 5 milliGRAM(s) Oral daily  aspirin enteric coated 81 milliGRAM(s) Oral daily  ATENolol  Tablet 25 milliGRAM(s) Oral two times a day  atorvastatin 80 milliGRAM(s) Oral at bedtime  brimonidine 0.2% Ophthalmic Solution 1 Drop(s) Both EYES two times a day  dorzolamide 2% Ophthalmic Solution 1 Drop(s) Both EYES every 8 hours  folic acid 1 milliGRAM(s) Oral daily  heparin  Infusion.  Unit(s)/Hr (11 mL/Hr) IV Continuous <Continuous>  latanoprost 0.005% Ophthalmic Solution 1 Drop(s) Both EYES at bedtime  losartan 25 milliGRAM(s) Oral daily  melatonin 3 milliGRAM(s) Oral at bedtime  pantoprazole    Tablet 40 milliGRAM(s) Oral before breakfast    MEDICATIONS  (PRN):  ALBUTerol    90 MICROgram(s) HFA Inhaler 2 Puff(s) Inhalation every 6 hours PRN Bronchospasm  heparin   Injectable 5000 Unit(s) IV Push every 6 hours PRN For aPTT less than 40  heparin   Injectable 2500 Unit(s) IV Push every 6 hours PRN For aPTT between 40 - 57      LABS:                        14.2   6.77  )-----------( 193      ( 17 Oct 2021 06:14 )             45.9       10-17    140  |  110<H>  |  20  ----------------------------<  82  4.6   |  19<L>  |  0.78    Ca    10.0      17 Oct 2021 06:14    TPro  6.8  /  Alb  3.3  /  TBili  0.5  /  DBili  x   /  AST  23  /  ALT  21  /  AlkPhos  79  10-16      PT/INR - ( 16 Oct 2021 21:19 )   PT: 17.0 sec;   INR: 1.44          PTT - ( 16 Oct 2021 21:19 )  PTT:28.0 sec    CARDIAC MARKERS ( 17 Oct 2021 06:14 )  x     / 0.04 ng/mL / x     / x     / x      CARDIAC MARKERS ( 16 Oct 2021 21:19 )  x     / 0.04 ng/mL / x     / x     / x        RADIOLOGY & ADDITIONAL TESTS: Reviewed. OVERNIGHT EVENTS:  No acute events overnight.    SUBJECTIVE / INTERVAL HPI: Patient seen and examined at bedside with patient's daughter Kiya. Patient comfortable, denies CP, SOB, dizziness/lightheadedness, palpitations. RN reports patient has wet cough but per daughter this is chronic. Daughter states pt looks best she has in days.     VITAL SIGNS:  Vital Signs Last 24 Hrs  T(C): 36.1 (17 Oct 2021 10:02), Max: 37 (16 Oct 2021 21:38)  T(F): 97 (17 Oct 2021 10:02), Max: 98.6 (16 Oct 2021 21:38)  HR: 75 (17 Oct 2021 08:50) (64 - 85)  BP: 165/85 (17 Oct 2021 08:50) (140/90 - 165/85)  BP(mean): 106 (16 Oct 2021 21:38) (106 - 106)  RR: 20 (17 Oct 2021 08:50) (14 - 20)  SpO2: 91% (17 Oct 2021 08:50) (91% - 97%)      I&O's Summary    16 Oct 2021 07:01  -  17 Oct 2021 07:00  --------------------------------------------------------  IN: 0 mL / OUT: 900 mL / NET: -900 mL      Height (cm): 165.1 (10-16 @ 20:35)  Weight (kg): 62.5 (10-16 @ 20:35)  BMI (kg/m2): 22.9 (10-16 @ 20:35)  BSA (m2): 1.69 (10-16 @ 20:35)    PHYSICAL EXAM:    General: sitting up in bed, NAD  HEENT: conjunctiva clear; MMM  Neck: supple, no JVD  Cardiovascular: RRR, no murmurs  Respiratory: non-labored, coarse RLL  Gastrointestinal: soft, NT/ND, +BS  Extremities: WWP, no edema or cyanosis  Neurological: AAOx3, no focal deficits    MEDICATIONS:  MEDICATIONS  (STANDING):  amLODIPine   Tablet 5 milliGRAM(s) Oral daily  aspirin enteric coated 81 milliGRAM(s) Oral daily  ATENolol  Tablet 25 milliGRAM(s) Oral two times a day  atorvastatin 80 milliGRAM(s) Oral at bedtime  brimonidine 0.2% Ophthalmic Solution 1 Drop(s) Both EYES two times a day  dorzolamide 2% Ophthalmic Solution 1 Drop(s) Both EYES every 8 hours  folic acid 1 milliGRAM(s) Oral daily  heparin  Infusion.  Unit(s)/Hr (11 mL/Hr) IV Continuous <Continuous>  latanoprost 0.005% Ophthalmic Solution 1 Drop(s) Both EYES at bedtime  losartan 25 milliGRAM(s) Oral daily  melatonin 3 milliGRAM(s) Oral at bedtime  pantoprazole    Tablet 40 milliGRAM(s) Oral before breakfast    MEDICATIONS  (PRN):  ALBUTerol    90 MICROgram(s) HFA Inhaler 2 Puff(s) Inhalation every 6 hours PRN Bronchospasm  heparin   Injectable 5000 Unit(s) IV Push every 6 hours PRN For aPTT less than 40  heparin   Injectable 2500 Unit(s) IV Push every 6 hours PRN For aPTT between 40 - 57      LABS:                        14.2   6.77  )-----------( 193      ( 17 Oct 2021 06:14 )             45.9       10-17    140  |  110<H>  |  20  ----------------------------<  82  4.6   |  19<L>  |  0.78    Ca    10.0      17 Oct 2021 06:14    TPro  6.8  /  Alb  3.3  /  TBili  0.5  /  DBili  x   /  AST  23  /  ALT  21  /  AlkPhos  79  10-16      PT/INR - ( 16 Oct 2021 21:19 )   PT: 17.0 sec;   INR: 1.44          PTT - ( 16 Oct 2021 21:19 )  PTT:28.0 sec    CARDIAC MARKERS ( 17 Oct 2021 06:14 )  x     / 0.04 ng/mL / x     / x     / x      CARDIAC MARKERS ( 16 Oct 2021 21:19 )  x     / 0.04 ng/mL / x     / x     / x        COVID negative 10/16    RADIOLOGY & ADDITIONAL TESTS: Reviewed. OVERNIGHT EVENTS:  No acute events overnight.    SUBJECTIVE / INTERVAL HPI: Patient seen and examined at bedside with patient's daughter Kiya. Patient comfortable, denies CP, SOB, dizziness/lightheadedness, palpitations. RN reports patient has wet cough but per daughter this is chronic. Daughter states pt looks best she has in days.     VITAL SIGNS:  Vital Signs Last 24 Hrs  T(C): 36.1 (17 Oct 2021 10:02), Max: 37 (16 Oct 2021 21:38)  T(F): 97 (17 Oct 2021 10:02), Max: 98.6 (16 Oct 2021 21:38)  HR: 75 (17 Oct 2021 08:50) (64 - 85)  BP: 165/85 (17 Oct 2021 08:50) (140/90 - 165/85)  BP(mean): 106 (16 Oct 2021 21:38) (106 - 106)  RR: 20 (17 Oct 2021 08:50) (14 - 20)  SpO2: 91% (17 Oct 2021 08:50) (91% - 97%)      I&O's Summary    16 Oct 2021 07:01  -  17 Oct 2021 07:00  --------------------------------------------------------  IN: 0 mL / OUT: 900 mL / NET: -900 mL      Height (cm): 165.1 (10-16 @ 20:35)  Weight (kg): 62.5 (10-16 @ 20:35)  BMI (kg/m2): 22.9 (10-16 @ 20:35)  BSA (m2): 1.69 (10-16 @ 20:35)    PHYSICAL EXAM:    General: sitting up in bed, NAD  HEENT: conjunctiva clear; MMM  Neck: supple, no JVD  Cardiovascular: RRR, no murmurs  Respiratory: non-labored, coarse RLL  Gastrointestinal: soft, NT/ND, +BS  Extremities: WWP, no edema or cyanosis.   Vascular: carotid 2+ b/l, no bruits; radial 2+ b/l; femoral 2+ b/l no bruits; DP 1+, PT faint  Neurological: AAOx3, no focal deficits    MEDICATIONS:  MEDICATIONS  (STANDING):  amLODIPine   Tablet 5 milliGRAM(s) Oral daily  aspirin enteric coated 81 milliGRAM(s) Oral daily  ATENolol  Tablet 25 milliGRAM(s) Oral two times a day  atorvastatin 80 milliGRAM(s) Oral at bedtime  brimonidine 0.2% Ophthalmic Solution 1 Drop(s) Both EYES two times a day  dorzolamide 2% Ophthalmic Solution 1 Drop(s) Both EYES every 8 hours  folic acid 1 milliGRAM(s) Oral daily  heparin  Infusion.  Unit(s)/Hr (11 mL/Hr) IV Continuous <Continuous>  latanoprost 0.005% Ophthalmic Solution 1 Drop(s) Both EYES at bedtime  losartan 25 milliGRAM(s) Oral daily  melatonin 3 milliGRAM(s) Oral at bedtime  pantoprazole    Tablet 40 milliGRAM(s) Oral before breakfast    MEDICATIONS  (PRN):  ALBUTerol    90 MICROgram(s) HFA Inhaler 2 Puff(s) Inhalation every 6 hours PRN Bronchospasm  heparin   Injectable 5000 Unit(s) IV Push every 6 hours PRN For aPTT less than 40  heparin   Injectable 2500 Unit(s) IV Push every 6 hours PRN For aPTT between 40 - 57      LABS:                        14.2   6.77  )-----------( 193      ( 17 Oct 2021 06:14 )             45.9       10-17    140  |  110<H>  |  20  ----------------------------<  82  4.6   |  19<L>  |  0.78    Ca    10.0      17 Oct 2021 06:14    TPro  6.8  /  Alb  3.3  /  TBili  0.5  /  DBili  x   /  AST  23  /  ALT  21  /  AlkPhos  79  10-16      PT/INR - ( 16 Oct 2021 21:19 )   PT: 17.0 sec;   INR: 1.44          PTT - ( 16 Oct 2021 21:19 )  PTT:28.0 sec    CARDIAC MARKERS ( 17 Oct 2021 06:14 )  x     / 0.04 ng/mL / x     / x     / x      CARDIAC MARKERS ( 16 Oct 2021 21:19 )  x     / 0.04 ng/mL / x     / x     / x        COVID negative 10/16    RADIOLOGY & ADDITIONAL TESTS: Reviewed.

## 2021-10-17 NOTE — PROGRESS NOTE ADULT - ASSESSMENT
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 syncope and with c/o abdominal pain, found to have elevated troponin 0.05-0.297-0.478, recommended for cardiac cath, and transferred to [facility] on 10/16PM (at daughter's request) for cath with Dr. Arshad on Monday 10/18.    86 year old Syriac-speaking female, (ALTERNATE MRN [id number]), with PMHx dementia (AOx2), L eye blindness, HTN, HLD, SSS/AFib s/p PPM, Lung CA s/p resection in 2000 (no chemo/RT, in remission), Sarcoidosis, Bronchiectasis, recurrent PNA, PE (on Eliquis), CVA (remote, no deficits), who presented to [facility] 10/12/21 after unwitnessed fall vs. syncope and with c/o abdominal pain (CT abd/pelvis unrevealing), found to have elevated troponin 0.05-0.297-0.478, recommended for cardiac cath, and transferred to [facility] on 10/16PM (at daughter's request) for cath with Dr. Arshad on Monday 10/18.

## 2021-10-17 NOTE — PROGRESS NOTE ADULT - PROBLEM SELECTOR PLAN 5
-LDL 78  -home atorvastatin 20mg increased to 80mg at [facility] -continue home atenolol 25mg daily, amlodipine 5mg daily, losartan 25mg daily -continue home atenolol 25mg daily, losartan 25mg daily (hold on cath day)  -increase amlodipine to 10mg daily

## 2021-10-18 DIAGNOSIS — C34.90 MALIGNANT NEOPLASM OF UNSPECIFIED PART OF UNSPECIFIED BRONCHUS OR LUNG: ICD-10-CM

## 2021-10-18 DIAGNOSIS — J47.9 BRONCHIECTASIS, UNCOMPLICATED: ICD-10-CM

## 2021-10-18 LAB
ANION GAP SERPL CALC-SCNC: 10 MMOL/L — SIGNIFICANT CHANGE UP (ref 5–17)
APTT BLD: >200 SEC — CRITICAL HIGH (ref 27.5–35.5)
BUN SERPL-MCNC: 15 MG/DL — SIGNIFICANT CHANGE UP (ref 7–23)
CALCIUM SERPL-MCNC: 9.6 MG/DL — SIGNIFICANT CHANGE UP (ref 8.4–10.5)
CHLORIDE SERPL-SCNC: 109 MMOL/L — HIGH (ref 96–108)
CO2 SERPL-SCNC: 19 MMOL/L — LOW (ref 22–31)
CREAT SERPL-MCNC: 0.86 MG/DL — SIGNIFICANT CHANGE UP (ref 0.5–1.3)
GLUCOSE SERPL-MCNC: 88 MG/DL — SIGNIFICANT CHANGE UP (ref 70–99)
GRAM STN FLD: SIGNIFICANT CHANGE UP
HCT VFR BLD CALC: 46 % — HIGH (ref 34.5–45)
HGB BLD-MCNC: 15.1 G/DL — SIGNIFICANT CHANGE UP (ref 11.5–15.5)
INR BLD: 1.34 — HIGH (ref 0.88–1.16)
MAGNESIUM SERPL-MCNC: 2 MG/DL — SIGNIFICANT CHANGE UP (ref 1.6–2.6)
MCHC RBC-ENTMCNC: 32.1 PG — SIGNIFICANT CHANGE UP (ref 27–34)
MCHC RBC-ENTMCNC: 32.8 GM/DL — SIGNIFICANT CHANGE UP (ref 32–36)
MCV RBC AUTO: 97.9 FL — SIGNIFICANT CHANGE UP (ref 80–100)
NRBC # BLD: 0 /100 WBCS — SIGNIFICANT CHANGE UP (ref 0–0)
PLATELET # BLD AUTO: 273 K/UL — SIGNIFICANT CHANGE UP (ref 150–400)
POTASSIUM SERPL-MCNC: 4 MMOL/L — SIGNIFICANT CHANGE UP (ref 3.5–5.3)
POTASSIUM SERPL-SCNC: 4 MMOL/L — SIGNIFICANT CHANGE UP (ref 3.5–5.3)
PROTHROM AB SERPL-ACNC: 15.9 SEC — HIGH (ref 10.6–13.6)
RBC # BLD: 4.7 M/UL — SIGNIFICANT CHANGE UP (ref 3.8–5.2)
RBC # FLD: 14 % — SIGNIFICANT CHANGE UP (ref 10.3–14.5)
SODIUM SERPL-SCNC: 138 MMOL/L — SIGNIFICANT CHANGE UP (ref 135–145)
SPECIMEN SOURCE: SIGNIFICANT CHANGE UP
WBC # BLD: 7.53 K/UL — SIGNIFICANT CHANGE UP (ref 3.8–10.5)
WBC # FLD AUTO: 7.53 K/UL — SIGNIFICANT CHANGE UP (ref 3.8–10.5)

## 2021-10-18 PROCEDURE — 99233 SBSQ HOSP IP/OBS HIGH 50: CPT | Mod: GC

## 2021-10-18 PROCEDURE — 99232 SBSQ HOSP IP/OBS MODERATE 35: CPT

## 2021-10-18 PROCEDURE — 93458 L HRT ARTERY/VENTRICLE ANGIO: CPT | Mod: 26

## 2021-10-18 PROCEDURE — 93010 ELECTROCARDIOGRAM REPORT: CPT

## 2021-10-18 PROCEDURE — 99152 MOD SED SAME PHYS/QHP 5/>YRS: CPT

## 2021-10-18 RX ORDER — APIXABAN 2.5 MG/1
5 TABLET, FILM COATED ORAL EVERY 12 HOURS
Refills: 0 | Status: DISCONTINUED | OUTPATIENT
Start: 2021-10-18 | End: 2021-10-21

## 2021-10-18 RX ORDER — ALBUTEROL 90 UG/1
2 AEROSOL, METERED ORAL EVERY 6 HOURS
Refills: 0 | Status: DISCONTINUED | OUTPATIENT
Start: 2021-10-18 | End: 2021-10-21

## 2021-10-18 RX ORDER — SODIUM CHLORIDE 9 MG/ML
500 INJECTION INTRAMUSCULAR; INTRAVENOUS; SUBCUTANEOUS
Refills: 0 | Status: DISCONTINUED | OUTPATIENT
Start: 2021-10-18 | End: 2021-10-21

## 2021-10-18 RX ORDER — SODIUM CHLORIDE 9 MG/ML
250 INJECTION INTRAMUSCULAR; INTRAVENOUS; SUBCUTANEOUS
Refills: 0 | Status: DISCONTINUED | OUTPATIENT
Start: 2021-10-18 | End: 2021-10-18

## 2021-10-18 RX ADMIN — BRIMONIDINE TARTRATE 1 DROP(S): 2 SOLUTION/ DROPS OPHTHALMIC at 17:44

## 2021-10-18 RX ADMIN — HEPARIN SODIUM 500 UNIT(S)/HR: 5000 INJECTION INTRAVENOUS; SUBCUTANEOUS at 09:00

## 2021-10-18 RX ADMIN — AMLODIPINE BESYLATE 5 MILLIGRAM(S): 2.5 TABLET ORAL at 07:11

## 2021-10-18 RX ADMIN — DORZOLAMIDE HYDROCHLORIDE 1 DROP(S): 20 SOLUTION/ DROPS OPHTHALMIC at 14:17

## 2021-10-18 RX ADMIN — SODIUM CHLORIDE 50 MILLILITER(S): 9 INJECTION INTRAMUSCULAR; INTRAVENOUS; SUBCUTANEOUS at 14:17

## 2021-10-18 RX ADMIN — APIXABAN 5 MILLIGRAM(S): 2.5 TABLET, FILM COATED ORAL at 19:32

## 2021-10-18 RX ADMIN — HEPARIN SODIUM 0 UNIT(S)/HR: 5000 INJECTION INTRAVENOUS; SUBCUTANEOUS at 08:00

## 2021-10-18 RX ADMIN — Medication 3 MILLIGRAM(S): at 21:45

## 2021-10-18 RX ADMIN — ATENOLOL 25 MILLIGRAM(S): 25 TABLET ORAL at 17:44

## 2021-10-18 RX ADMIN — Medication 81 MILLIGRAM(S): at 07:11

## 2021-10-18 RX ADMIN — ATENOLOL 25 MILLIGRAM(S): 25 TABLET ORAL at 07:11

## 2021-10-18 RX ADMIN — DORZOLAMIDE HYDROCHLORIDE 1 DROP(S): 20 SOLUTION/ DROPS OPHTHALMIC at 07:12

## 2021-10-18 RX ADMIN — ALBUTEROL 2 PUFF(S): 90 AEROSOL, METERED ORAL at 21:48

## 2021-10-18 RX ADMIN — PANTOPRAZOLE SODIUM 40 MILLIGRAM(S): 20 TABLET, DELAYED RELEASE ORAL at 07:11

## 2021-10-18 RX ADMIN — CLOPIDOGREL BISULFATE 600 MILLIGRAM(S): 75 TABLET, FILM COATED ORAL at 07:11

## 2021-10-18 RX ADMIN — LATANOPROST 1 DROP(S): 0.05 SOLUTION/ DROPS OPHTHALMIC; TOPICAL at 21:47

## 2021-10-18 RX ADMIN — ATORVASTATIN CALCIUM 80 MILLIGRAM(S): 80 TABLET, FILM COATED ORAL at 21:46

## 2021-10-18 RX ADMIN — DORZOLAMIDE HYDROCHLORIDE 1 DROP(S): 20 SOLUTION/ DROPS OPHTHALMIC at 21:47

## 2021-10-18 RX ADMIN — BRIMONIDINE TARTRATE 1 DROP(S): 2 SOLUTION/ DROPS OPHTHALMIC at 07:12

## 2021-10-18 NOTE — PROGRESS NOTE ADULT - SUBJECTIVE AND OBJECTIVE BOX
Interval Events: Reviewed  Patient seen and examined at bedside.    Patient is a 86y old  Female who presents with a chief complaint of NSTEMI (18 Oct 2021 15:56)  she his coughing     PAST MEDICAL & SURGICAL HISTORY:  Pulmonary embolism    Mild HTN    GERD (gastroesophageal reflux disease)    Sarcoidosis    Hyperlipidemia    SSS (sick sinus syndrome)    Dementia    Pacemaker        MEDICATIONS:  Pulmonary:  ALBUTerol    90 MICROgram(s) HFA Inhaler 2 Puff(s) Inhalation every 6 hours    Antimicrobials:    Anticoagulants:  apixaban 5 milliGRAM(s) Oral every 12 hours  aspirin enteric coated 81 milliGRAM(s) Oral daily    Cardiac:  amLODIPine   Tablet 5 milliGRAM(s) Oral daily  ATENolol  Tablet 25 milliGRAM(s) Oral two times a day      Allergies    No Known Allergies    Intolerances        Vital Signs Last 24 Hrs  T(C): 37.5 (18 Oct 2021 21:43), Max: 37.5 (18 Oct 2021 21:43)  T(F): 99.5 (18 Oct 2021 21:43), Max: 99.5 (18 Oct 2021 21:43)  HR: 76 (18 Oct 2021 20:25) (69 - 89)  BP: 136/65 (18 Oct 2021 20:25) (123/60 - 153/72)  BP(mean): 99 (18 Oct 2021 05:52) (97 - 99)  RR: 17 (18 Oct 2021 20:25) (16 - 20)  SpO2: 96% (18 Oct 2021 20:25) (96% - 97%)    10-17 @ 07:01  -  10-18 @ 07:00  --------------------------------------------------------  IN: 180 mL / OUT: 1200 mL / NET: -1020 mL    10-18 @ 07:01  -  10-18 @ 22:08  --------------------------------------------------------  IN: 0 mL / OUT: 350 mL / NET: -350 mL          Review of Systems:   •	General: negative  •	Skin/Breast: negative  •	Ophthalmologic: negative  •	ENMT: negative   •	Respiratory and Thorax: cough  •	Cardiovascular: negative  •	Gastrointestinal: negative  •	Genitourinary: negative  •	Musculoskeletal: negative  •	Neurological: negative  •	Psychiatric: negative  •	Hematology/Lymphatics: negative  •	Endocrine: negative  •	Allergic/Immunologic: negative    Physical Exam:   • Constitutional:	refer to the dietion /Nutritionist note  • Eyes:	EOMI; PERRL; no drainage or redness  • ENMT:	No oral lesions; no gross abnormalities  • Neck	No bruits; no thyromegaly or nodules  • Breasts:	not examined  • Back:	No deformity or limitation of movement  • Respiratory:	Breath Sounds equal & clear to percussion & bl rhonchiauscultation, no accessory muscle use  • Cardiovascular:	Regular rate & rhythm, normal S1, S2; no murmurs, gallops or rubs; no S3, S4  • Gastrointestinal:	Soft, non-tender, no hepatosplenomegaly, normal bowel sounds  • Genitourinary:	not examined  • Rectal: not examined  • Extremities:	No cyanosis, clubbing or edema  • Vascular:	Equal and normal pulses (carotid, femoral, dorsalis pedis)  • Neurologica:l	not examined  • Skin:	No lesions; no rash  • Lymph Nodes:	No lymphadedenopathy  • Musculoskeletal:	No joint pain, swelling or deformity; no limitation of movement        LABS:      CBC Full  -  ( 18 Oct 2021 07:00 )  WBC Count : 7.53 K/uL  RBC Count : 4.70 M/uL  Hemoglobin : 15.1 g/dL  Hematocrit : 46.0 %  Platelet Count - Automated : 273 K/uL  Mean Cell Volume : 97.9 fl  Mean Cell Hemoglobin : 32.1 pg  Mean Cell Hemoglobin Concentration : 32.8 gm/dL  Auto Neutrophil # : x  Auto Lymphocyte # : x  Auto Monocyte # : x  Auto Eosinophil # : x  Auto Basophil # : x  Auto Neutrophil % : x  Auto Lymphocyte % : x  Auto Monocyte % : x  Auto Eosinophil % : x  Auto Basophil % : x    10-18    138  |  109<H>  |  15  ----------------------------<  88  4.0   |  19<L>  |  0.86    Ca    9.6      18 Oct 2021 07:00  Mg     2.0     10-18      PT/INR - ( 18 Oct 2021 07:00 )   PT: 15.9 sec;   INR: 1.34          PTT - ( 18 Oct 2021 07:00 )  PTT:>200.0 sec                    RADIOLOGY & ADDITIONAL STUDIES (The following images were personally reviewed):

## 2021-10-18 NOTE — PHYSICAL THERAPY INITIAL EVALUATION ADULT - PERTINENT HX OF CURRENT PROBLEM, REHAB EVAL
86F kavin presented to [facility] 10/12/21 after sustaining a fall Vs Syncope and crawling out of her apartment and being found by a neighbor. On arrival to [facility] patient complained of abdominal pain. Sharp in the RUQ which had been going on for about a week.

## 2021-10-18 NOTE — PROGRESS NOTE ADULT - SUBJECTIVE AND OBJECTIVE BOX
CARDIOLOGY NP PROGRESS NOTE    Subjective: Pt seen and examined at bedside. Reports feeling okay. No specific complaints, does not remember why she went to the hospital initially.  Remainder ROS otherwise negative.    Overnight Events: NPO s/p MN for cath    TELEMETRY: A paced 70s         VITAL SIGNS:  T(C): 35.9 (10-18-21 @ 09:11), Max: 36.7 (10-18-21 @ 05:01)  HR: 76 (10-18-21 @ 09:50) (72 - 77)  BP: 132/65 (10-18-21 @ 09:50) (131/61 - 161/74)  RR: 18 (10-18-21 @ 08:48) (18 - 20)  SpO2: 96% (10-18-21 @ 09:50) (96% - 97%)  Wt(kg): --    I&O's Summary    17 Oct 2021 07:01  -  18 Oct 2021 07:00  --------------------------------------------------------  IN: 180 mL / OUT: 1200 mL / NET: -1020 mL    18 Oct 2021 07:01  -  18 Oct 2021 12:32  --------------------------------------------------------  IN: 0 mL / OUT: 0 mL / NET: 0 mL          PHYSICAL EXAM:    General: sitting up in bed, NAD  HEENT: conjunctiva clear; MMM  Neck: supple, no JVD  Cardiovascular: RRR, no murmurs  Respiratory: Lungs CTA daniel, non-labored on RA  Gastrointestinal: soft, NT/ND, +BS  Extremities: WWP, no edema or cyanosis  Vascular: carotid 2+ b/l, no bruits; radial 2+ b/l; femoral 2+ b/l no bruits; Daniel DP 1+, PT 1+  Neurological: AAOx2 (name and place), no focal deficits          LABS:                          15.1   7.53  )-----------( 273      ( 18 Oct 2021 07:00 )             46.0                              10-18    138  |  109<H>  |  15  ----------------------------<  88  4.0   |  19<L>  |  0.86    Ca    9.6      18 Oct 2021 07:00  Mg     2.0     10-18    TPro  6.8  /  Alb  3.3  /  TBili  0.5  /  DBili  x   /  AST  23  /  ALT  21  /  AlkPhos  79  10-16    LIVER FUNCTIONS - ( 16 Oct 2021 21:19 )  Alb: 3.3 g/dL / Pro: 6.8 g/dL / ALK PHOS: 79 U/L / ALT: 21 U/L / AST: 23 U/L / GGT: x         PT/INR - ( 18 Oct 2021 07:00 )   PT: 15.9 sec;   INR: 1.34          PTT - ( 18 Oct 2021 07:00 )  PTT:>200.0 sec  CAPILLARY BLOOD GLUCOSE        CARDIAC MARKERS ( 17 Oct 2021 06:14 )  x     / 0.04 ng/mL / x     / x     / x      CARDIAC MARKERS ( 16 Oct 2021 21:19 )  x     / 0.04 ng/mL / x     / x     / x              Allergies:  No Known Allergies    MEDICATIONS  (STANDING):  amLODIPine   Tablet 5 milliGRAM(s) Oral daily  aspirin enteric coated 81 milliGRAM(s) Oral daily  ATENolol  Tablet 25 milliGRAM(s) Oral two times a day  atorvastatin 80 milliGRAM(s) Oral at bedtime  brimonidine 0.2% Ophthalmic Solution 1 Drop(s) Both EYES two times a day  dorzolamide 2% Ophthalmic Solution 1 Drop(s) Both EYES every 8 hours  heparin  Infusion.  Unit(s)/Hr (11 mL/Hr) IV Continuous <Continuous>  latanoprost 0.005% Ophthalmic Solution 1 Drop(s) Both EYES at bedtime  melatonin 3 milliGRAM(s) Oral at bedtime  pantoprazole    Tablet 40 milliGRAM(s) Oral before breakfast  sodium chloride 0.9%. 250 milliLiter(s) (50 mL/Hr) IV Continuous <Continuous>    MEDICATIONS  (PRN):  ALBUTerol    90 MICROgram(s) HFA Inhaler 2 Puff(s) Inhalation every 6 hours PRN Bronchospasm  heparin   Injectable 5000 Unit(s) IV Push every 6 hours PRN For aPTT less than 40  heparin   Injectable 2500 Unit(s) IV Push every 6 hours PRN For aPTT between 40 - 57        DIAGNOSTIC TESTS:

## 2021-10-18 NOTE — PROGRESS NOTE ADULT - SUBJECTIVE AND OBJECTIVE BOX
Electrophysiology Device Interrogation       Indication:     Device model: 	St nolvia			                            Functioning Mode: 		    Underlying Rhythm:      Pacemaker dependency: dependent    Battery status: 8.8 years    Interrogating parameters:   				RA			RV			  Sense:                                       4.8                                >12  Threshold:                                 0.25                                 0.65                                                 Pacing Impedance:                                                                                                          Shock Impedance:                                                                                                                     Events/Alert:      Parameter change: 	 none    Assessment: normally functioning PPM. No events to correlate with                                                                                   Electrophysiology Device Interrogation     86 year old Grenadian-speaking female, (ALTERNATE MRN [id number]), with PMHx dementia (AOx2), L eye blindness, HTN, HLD, SSS/AFib s/p PPM, Lung CA s/p resection in 2000 (no chemo/RT, in remission), Sarcoidosis, Bronchiectasis, recurrent PNA, PE (on Eliquis), CVA (remote, no deficits), who presented to [facility] 10/12/21 after unwitnessed fall vs. syncope and with c/o abdominal pain (CT abd/pelvis unrevealing), found to have elevated troponin 0.05-0.297-0.478, recommended for cardiac cath, and transferred to [facility] on 10/16 (at daughter's request) for cardiac cath with Dr. Arshad on Monday 10/18. Ep called to interrogate device in setting of syncope w/u.    Indication: SSS    Device model: 	St nolvia	[employer]  2240		                            Functioning Mode: DDD		    Underlying Rhythm:  CHB    Pacemaker dependency: dependent AP 15%,  >99%    Battery status: 8.8 years    Interrogating parameters:   				RA			RV			  Sense:                                       4.8                               >12  Threshold:                                 0.5                              0.75                                                 Pacing Impedance:                     360                               430                                                                                                                                                                       Events/Alert:  No events noted in log that correlate with presenting symptoms.    Parameter change:  none    Assessment: normally functioning PPM. No events noted. AF burden <1%

## 2021-10-18 NOTE — PROGRESS NOTE ADULT - PROBLEM SELECTOR PLAN 3
Hx of Lung CA s/p resection in 2000 (no chemo/RT, in remission), Sarcoidosis, Bronchiectasis, recurrent PNA, follows with Pulm Dr. Sinclair.  -patient with mild/wet cough x2-3 weeks. Afebrile, no leukocytosis. SPO2 94% RA  -CXR at [facility] shows small right effusion, discoid change and/or infiltrate right lung base (appears chronic relative to chest imaging in the past)  -Albuterol PRN

## 2021-10-18 NOTE — DIETITIAN INITIAL EVALUATION ADULT. - OTHER INFO
86 year old female with PMHx of B/L eye blindness, HTN, HLD. CAD, Lung CA in the past, Sarcoidosis, Bronchiectasis, PE in May 2021 on Eliquis, dementia, SSS S/P PPM who presented to [facility] 10/12/21 after sustaining a fall Vs Syncope. O/A complained of abdominal pain. Sharp in the RUQ which had been going on for about a week. CT Abd and pelvis showed colonic diverticulosis with no diverticulitis. She was noted to have  an elevated troponin, R/I for NSTEMI and transferred to [facility] for planned cardiac catheterization- pt NPO for cath today (prior to NPO RN reports limited PO intake of DASH Diet, feels to benefit from ONS Use - no issues chewing/swallowing prior to NPO). Pt noted as confused with no family at bedside. Noted from home with HHA 5hrs/day 7days/week and family support, PT eval rec SANIYA, but daughter refusing and requesting for more HHA hours. No pain. Parag 14. 1+BL ankle edema. Stage II sacrum pressure ulcer.   Please see below for nutritions recommendations. Paged Team.

## 2021-10-18 NOTE — DIETITIAN INITIAL EVALUATION ADULT. - PROBLEM SELECTOR PLAN 1
Admit tele  Check Troponin now  -Chest pain free  -Cont ASA  -Cont PO BB  Plan for [facility]

## 2021-10-18 NOTE — PROGRESS NOTE ADULT - PROBLEM SELECTOR PLAN 6
-LDL 78  -home atorvastatin 20mg increased to 80mg at [facility]    #DVT PPx- Heparin gtt  #Dispo- admit to cardiac tele. Pending cath 10/18/21  Pt from home with HHA 5hrs/day 7days/week and family support  PT kelsey KOTHARI, but daughter refusing and requesting for more HHA hours. Social work aware

## 2021-10-18 NOTE — PROGRESS NOTE ADULT - ASSESSMENT
86 year old Upper sorbian-speaking female, (ALTERNATE MRN [id number]), with PMHx dementia (AOx2), L eye blindness, HTN, HLD, SSS/AFib s/p PPM, Lung CA s/p resection in 2000 (no chemo/RT, in remission), Sarcoidosis, Bronchiectasis, recurrent PNA, PE (on Eliquis), CVA (remote, no deficits), who presented to [facility] 10/12/21 after unwitnessed fall vs. syncope and with c/o abdominal pain (CT abd/pelvis unrevealing), found to have elevated troponin 0.05-0.297-0.478, recommended for cardiac cath, and transferred to [facility] on 10/16 (at daughter's request) for cardiac cath with Dr. Arshad on Monday 10/18.

## 2021-10-18 NOTE — PROGRESS NOTE ADULT - PROBLEM SELECTOR PLAN 2
SSS/?Afib, s/p PPM (EP Dr. Martine Arrieta @ [facility])  -Daughter reports frequent falls at home, initially admitted to OSH for unwitnessed fall vs syncope 10/12  -EP consulted for PPM interrogation

## 2021-10-18 NOTE — PROGRESS NOTE ADULT - PROBLEM SELECTOR PLAN 4
-Last dose of Eliquis 10/16/21 in AM  -c/w Heparin gtt in lieu of Eliquis  -follows with Pulm Dr. Sinclair

## 2021-10-18 NOTE — PROVIDER CONTACT NOTE (CRITICAL VALUE NOTIFICATION) - ACTION/TREATMENT ORDERED:
Hep paused for 60 min, to be restarted at 500 units
Heparin on hold for 60 min and then decrease rate to 9ml/hr.
Heparin paused for 60 min, to be restarted @700 units

## 2021-10-18 NOTE — PROGRESS NOTE ADULT - PROBLEM SELECTOR PLAN 1
-No known hx of CAD per pt's daughter and no cardiologist  -Currently without anginal symptoms  -EKG shows SR with AS-  -Trop 0.04 x2 at [facility]  -Pending echo  -continue Heparin gtt  -S/p Plavix load 600mg x1 today 10/18  -continue Aspirin 81mg daily, Atenolol 25mg daily, Atorvastatin 80mg daily  -NPO for cardiac cath with Dr. Arshad. Consent obtained and placed in chart  -start gentle pre-cath NS 50cc/hr x 4hr

## 2021-10-18 NOTE — PHYSICAL THERAPY INITIAL EVALUATION ADULT - GENERAL OBSERVATIONS, REHAB EVAL
Received supine in NAD +IV, EKG, primafit. left as found +RN Ivon aware, lines intact, call [facility]

## 2021-10-18 NOTE — PROGRESS NOTE ADULT - PROBLEM SELECTOR PLAN 5
-continue home atenolol 25mg daily, losartan 25mg daily (hold on cath day)  -c/w Amlodipine 5mg daily. Will titrate up if BP > 150

## 2021-10-18 NOTE — PHYSICAL THERAPY INITIAL EVALUATION ADULT - LEVEL OF INDEPENDENCE: STAIR NEGOTIATION, REHAB EVAL
Vital Signs Last 24 Hrs  T(C): 36.7 (10 Oct 2017 08:39), Max: 37 (09 Oct 2017 20:18)  T(F): 98 (10 Oct 2017 08:39), Max: 98.6 (09 Oct 2017 20:18)  HR: 64 (10 Oct 2017 08:39) (63 - 64)  BP: 123/50 (10 Oct 2017 08:39) (123/50 - 123/50)  BP(mean): --  RR: 14 (10 Oct 2017 08:39) (14 - 16)  SpO2: 100% (10 Oct 2017 08:39) (100% - 100%)
Vital Signs Last 24 Hrs  T(C): 37.1 (11 Oct 2017 08:47), Max: 37.1 (11 Oct 2017 08:47)  T(F): 98.7 (11 Oct 2017 08:47), Max: 98.7 (11 Oct 2017 08:47)  HR: 69 (11 Oct 2017 08:47) (69 - 69)  BP: 134/63 (11 Oct 2017 08:47) (134/63 - 134/63)  BP(mean): --  RR: 12 (11 Oct 2017 08:47) (12 - 12)  SpO2: 99% (11 Oct 2017 08:47) (99% - 99%)
Vital Signs Last 24 Hrs  T(C): 36.5 (12 Oct 2017 07:28), Max: 36.5 (12 Oct 2017 07:28)  T(F): 97.7 (12 Oct 2017 07:28), Max: 97.7 (12 Oct 2017 07:28)  HR: 69 (12 Oct 2017 07:28) (69 - 69)  BP: 117/78 (12 Oct 2017 07:28) (117/78 - 117/78)  BP(mean): --  RR: 16 (12 Oct 2017 07:28) (16 - 16)  SpO2: 99% (12 Oct 2017 07:28) (99% - 99%)
to be assessed

## 2021-10-18 NOTE — DIETITIAN INITIAL EVALUATION ADULT. - OTHER CALCULATIONS
%TLT=770; current body wt used for energy calculations as pt falls within % IBW; adjusted for age and current conditions

## 2021-10-18 NOTE — PHYSICAL THERAPY INITIAL EVALUATION ADULT - ADDITIONAL COMMENTS
unable to obtain full social history as patient confused, AOx2, able to state she has not fallen in past year, apartment, elevator, steps to enter

## 2021-10-19 ENCOUNTER — TRANSCRIPTION ENCOUNTER (OUTPATIENT)
Age: 86
End: 2021-10-19

## 2021-10-19 LAB
ANION GAP SERPL CALC-SCNC: 7 MMOL/L — SIGNIFICANT CHANGE UP (ref 5–17)
BUN SERPL-MCNC: 18 MG/DL — SIGNIFICANT CHANGE UP (ref 7–23)
CALCIUM SERPL-MCNC: 9.9 MG/DL — SIGNIFICANT CHANGE UP (ref 8.4–10.5)
CHLORIDE SERPL-SCNC: 109 MMOL/L — HIGH (ref 96–108)
CO2 SERPL-SCNC: 20 MMOL/L — LOW (ref 22–31)
CREAT SERPL-MCNC: 0.91 MG/DL — SIGNIFICANT CHANGE UP (ref 0.5–1.3)
GLUCOSE SERPL-MCNC: 89 MG/DL — SIGNIFICANT CHANGE UP (ref 70–99)
HCT VFR BLD CALC: 46.4 % — HIGH (ref 34.5–45)
HGB BLD-MCNC: 14.5 G/DL — SIGNIFICANT CHANGE UP (ref 11.5–15.5)
MAGNESIUM SERPL-MCNC: 2 MG/DL — SIGNIFICANT CHANGE UP (ref 1.6–2.6)
MCHC RBC-ENTMCNC: 31.3 GM/DL — LOW (ref 32–36)
MCHC RBC-ENTMCNC: 32.4 PG — SIGNIFICANT CHANGE UP (ref 27–34)
MCV RBC AUTO: 103.8 FL — HIGH (ref 80–100)
NRBC # BLD: 0 /100 WBCS — SIGNIFICANT CHANGE UP (ref 0–0)
PLATELET # BLD AUTO: 207 K/UL — SIGNIFICANT CHANGE UP (ref 150–400)
POTASSIUM SERPL-MCNC: 5 MMOL/L — SIGNIFICANT CHANGE UP (ref 3.5–5.3)
POTASSIUM SERPL-SCNC: 5 MMOL/L — SIGNIFICANT CHANGE UP (ref 3.5–5.3)
RBC # BLD: 4.47 M/UL — SIGNIFICANT CHANGE UP (ref 3.8–5.2)
RBC # FLD: 14.1 % — SIGNIFICANT CHANGE UP (ref 10.3–14.5)
SODIUM SERPL-SCNC: 136 MMOL/L — SIGNIFICANT CHANGE UP (ref 135–145)
WBC # BLD: 7.27 K/UL — SIGNIFICANT CHANGE UP (ref 3.8–10.5)
WBC # FLD AUTO: 7.27 K/UL — SIGNIFICANT CHANGE UP (ref 3.8–10.5)

## 2021-10-19 PROCEDURE — 99232 SBSQ HOSP IP/OBS MODERATE 35: CPT

## 2021-10-19 PROCEDURE — 99232 SBSQ HOSP IP/OBS MODERATE 35: CPT | Mod: GC

## 2021-10-19 PROCEDURE — 93306 TTE W/DOPPLER COMPLETE: CPT | Mod: 26

## 2021-10-19 RX ORDER — LOSARTAN POTASSIUM 100 MG/1
25 TABLET, FILM COATED ORAL DAILY
Refills: 0 | Status: DISCONTINUED | OUTPATIENT
Start: 2021-10-19 | End: 2021-10-21

## 2021-10-19 RX ORDER — METOPROLOL TARTRATE 50 MG
25 TABLET ORAL DAILY
Refills: 0 | Status: DISCONTINUED | OUTPATIENT
Start: 2021-10-19 | End: 2021-10-21

## 2021-10-19 RX ADMIN — Medication 81 MILLIGRAM(S): at 13:16

## 2021-10-19 RX ADMIN — BRIMONIDINE TARTRATE 1 DROP(S): 2 SOLUTION/ DROPS OPHTHALMIC at 05:25

## 2021-10-19 RX ADMIN — DORZOLAMIDE HYDROCHLORIDE 1 DROP(S): 20 SOLUTION/ DROPS OPHTHALMIC at 05:25

## 2021-10-19 RX ADMIN — ATENOLOL 25 MILLIGRAM(S): 25 TABLET ORAL at 05:22

## 2021-10-19 RX ADMIN — Medication 25 MILLIGRAM(S): at 19:07

## 2021-10-19 RX ADMIN — ALBUTEROL 2 PUFF(S): 90 AEROSOL, METERED ORAL at 21:22

## 2021-10-19 RX ADMIN — APIXABAN 5 MILLIGRAM(S): 2.5 TABLET, FILM COATED ORAL at 05:24

## 2021-10-19 RX ADMIN — LOSARTAN POTASSIUM 25 MILLIGRAM(S): 100 TABLET, FILM COATED ORAL at 13:16

## 2021-10-19 RX ADMIN — ATORVASTATIN CALCIUM 80 MILLIGRAM(S): 80 TABLET, FILM COATED ORAL at 21:21

## 2021-10-19 RX ADMIN — ALBUTEROL 2 PUFF(S): 90 AEROSOL, METERED ORAL at 05:23

## 2021-10-19 RX ADMIN — ALBUTEROL 2 PUFF(S): 90 AEROSOL, METERED ORAL at 10:36

## 2021-10-19 RX ADMIN — PANTOPRAZOLE SODIUM 40 MILLIGRAM(S): 20 TABLET, DELAYED RELEASE ORAL at 05:24

## 2021-10-19 RX ADMIN — LATANOPROST 1 DROP(S): 0.05 SOLUTION/ DROPS OPHTHALMIC; TOPICAL at 21:22

## 2021-10-19 RX ADMIN — DORZOLAMIDE HYDROCHLORIDE 1 DROP(S): 20 SOLUTION/ DROPS OPHTHALMIC at 14:41

## 2021-10-19 RX ADMIN — ALBUTEROL 2 PUFF(S): 90 AEROSOL, METERED ORAL at 17:51

## 2021-10-19 RX ADMIN — APIXABAN 5 MILLIGRAM(S): 2.5 TABLET, FILM COATED ORAL at 19:02

## 2021-10-19 RX ADMIN — Medication 3 MILLIGRAM(S): at 21:21

## 2021-10-19 RX ADMIN — DORZOLAMIDE HYDROCHLORIDE 1 DROP(S): 20 SOLUTION/ DROPS OPHTHALMIC at 21:22

## 2021-10-19 RX ADMIN — BRIMONIDINE TARTRATE 1 DROP(S): 2 SOLUTION/ DROPS OPHTHALMIC at 17:54

## 2021-10-19 RX ADMIN — AMLODIPINE BESYLATE 5 MILLIGRAM(S): 2.5 TABLET ORAL at 05:23

## 2021-10-19 NOTE — DISCHARGE NOTE PROVIDER - NSDCMRMEDTOKEN_GEN_ALL_CORE_FT
Albuterol (Eqv-ProAir HFA) 90 mcg/inh inhalation aerosol: 2 puff(s) inhaled every 6 hours  amLODIPine 5 mg oral tablet: 1 tab(s) orally once a day  atenolol 25 mg oral tablet: 1 tab(s) orally 2 times a day  atorvastatin 20 mg oral tablet: 1 tab(s) orally once a day  brimonidine 0.15% ophthalmic solution: 1 drop(s) to each affected eye 2 times a day  Eliquis 5 mg oral tablet: 1 tab(s) orally 2 times a day  latanoprost 0.005% ophthalmic solution: 1 drop(s) to each affected eye once a day (in the evening)  losartan 25 mg oral tablet: 1 tab(s) orally once a day   Albuterol (Eqv-ProAir HFA) 90 mcg/inh inhalation aerosol: 2 puff(s) inhaled every 6 hours  amLODIPine 5 mg oral tablet: 1 tab(s) orally once a day  atorvastatin 20 mg oral tablet: 1 tab(s) orally once a day  brimonidine 0.15% ophthalmic solution: 1 drop(s) to each affected eye 2 times a day  Eliquis 5 mg oral tablet: 1 tab(s) orally 2 times a day  latanoprost 0.005% ophthalmic solution: 1 drop(s) to each affected eye once a day (in the evening)  losartan 25 mg oral tablet: 1 tab(s) orally once a day  metoprolol succinate 25 mg oral tablet, extended release: 1 tab(s) orally once a day  pantoprazole 40 mg oral delayed release tablet: 1 tab(s) orally once a day (before a meal)   Albuterol (Eqv-ProAir HFA) 90 mcg/inh inhalation aerosol: 2 puff(s) inhaled every 6 hours  amLODIPine 5 mg oral tablet: 1 tab(s) orally once a day  atorvastatin 20 mg oral tablet: 1 tab(s) orally once a day  brimonidine 0.15% ophthalmic solution: 1 drop(s) to each affected eye 2 times a day  Eliquis 5 mg oral tablet: 1 tab(s) orally 2 times a day  latanoprost 0.005% ophthalmic solution: 1 drop(s) to each affected eye once a day (in the evening)  losartan 25 mg oral tablet: 1 tab(s) orally once a day  metoprolol succinate 50 mg oral tablet, extended release: 1 tab(s) orally once a day  pantoprazole 40 mg oral delayed release tablet: 1 tab(s) orally once a day (before a meal)

## 2021-10-19 NOTE — PROGRESS NOTE ADULT - PROBLEM SELECTOR PLAN 6
-LDL 78  -home atorvastatin 20mg increased to 80mg at [facility]    #DVT PPx- Heparin gtt  #Dispo- admit to cardiac tele. Pending cath 10/18/21  Pt from home with HHA 5hrs/day 7days/week and family support  PT kelsey KOTHARI, but daughter refusing and requesting for more HHA hours. Social work aware -LDL 78  -home atorvastatin 20mg increased to 80mg at [facility]    #DVT PPx- Eliquis  #Dispo- Admit to cardiac tele.    Pt from home with HHA 5hrs/day 7days/week and family support  PT kelsey KOTHARI, initially daughter refusing and requesting for more HHA hours, now agreeable for SANIYA. Awaiting placement

## 2021-10-19 NOTE — PROGRESS NOTE ADULT - PROBLEM SELECTOR PLAN 1
-No known hx of CAD per pt's daughter and no cardiologist  -Currently without anginal symptoms  -EKG shows SR with AS-  -Trop 0.04 x2 at [facility]  -Pending echo  -continue Heparin gtt  -S/p Plavix load 600mg x1 today 10/18  -continue Aspirin 81mg daily, Atenolol 25mg daily, Atorvastatin 80mg daily  -NPO for cardiac cath with Dr. Arshad. Consent obtained and placed in chart  -start gentle pre-cath NS 50cc/hr x 4hr -No known hx of CAD per pt's daughter and no cardiologist  -Currently without anginal symptoms  -EKG shows SR with AS-  -Trop 0.04 x2 at [facility]  -Pending Echo  -continue Heparin gtt  -S/p Plavix load 600mg x1 10/18  -continue Aspirin 81mg daily, Atenolol 25mg daily, Atorvastatin 80mg daily  -s/p cardiac cath w/ Dr Arshad 10/18/2021: Nonobstructive CAD, Anteroseptal hypoconstricts with stress induced cardiomyopathy. EF: 45% EDP: 3. No AS. Right groin PC. -No known hx of CAD per pt's daughter and no cardiologist  -Currently without anginal symptoms  -EKG shows SR with AS-  -Trop 0.04 x2 at [facility]  -Pending Echo  -S/p Plavix load 600mg x1 10/18  -continue Aspirin 81mg daily, Atorvastatin 80mg daily  -Switch home Atenolol 25mg daily to Toprol 25mg qd given stress induced CM  -s/p diagnostic cardiac cath w/ Dr Arshad 10/18/2021: Nonobstructive CAD (luminal irregularities. Apical Ballooning concerning for stress induced cardiomyopathy. EF: 45% EDP: 3. No AS. Right groin PC.

## 2021-10-19 NOTE — PROGRESS NOTE ADULT - PROBLEM SELECTOR PLAN 2
SSS/?Afib, s/p PPM (EP Dr. Martine Arrieta @ [facility])  -Daughter reports frequent falls at home, initially admitted to OSH for unwitnessed fall vs syncope 10/12  -EP consulted for PPM interrogation SSS/?Afib, s/p PPM (EP Dr. Martine Arrieta @ [facility])  -Daughter reports frequent falls at home, initially admitted to OSH for unwitnessed fall vs syncope 10/12  -EP consulted for PPM interrogation revealed no events, normal device function

## 2021-10-19 NOTE — PROGRESS NOTE ADULT - PROBLEM SELECTOR PLAN 3
Hx of Lung CA s/p resection in 2000 (no chemo/RT, in remission), Sarcoidosis, Bronchiectasis, recurrent PNA, follows with Pulm Dr. Sinclair.  -patient with mild/wet cough x2-3 weeks. Afebrile, no leukocytosis. SPO2 94% RA  -CXR at [facility] shows small right effusion, discoid change and/or infiltrate right lung base (appears chronic relative to chest imaging in the past)  -Albuterol PRN Hx of Lung CA s/p resection in 2000 (no chemo/RT, in remission), Sarcoidosis, Bronchiectasis, recurrent PNA, follows with Pulm Dr. Sinclair.  -patient with mild/wet cough x2-3 weeks. Afebrile, no leukocytosis. SPO2 94% RA  -CXR at [facility] shows small right effusion, discoid change and/or infiltrate right lung base (appears chronic relative to chest imaging in the past)  -Pulm Dr Sinclair consulted. Reports Hx of RML bronchiectasis that is currently stable  -c/w Albuterol MDI q6hr standing per Pulm

## 2021-10-19 NOTE — DISCHARGE NOTE PROVIDER - PROVIDER TOKENS
PROVIDER:[TOKEN:[[id number]],FOLLOWUP:[2 weeks]],PROVIDER:[TOKEN:[[id number]],FOLLOWUP:[Routine],ESTABLISHEDPATIENT:[T]],PROVIDER:[TOKEN:[[id number]],FOLLOWUP:[Routine],ESTABLISHEDPATIENT:[T]]

## 2021-10-19 NOTE — DISCHARGE NOTE PROVIDER - NSDCCPTREATMENT_GEN_ALL_CORE_FT
PRINCIPAL PROCEDURE  Procedure: 2D echocardiography  Findings and Treatment: CONCLUSIONS:   1. Mildly reduced left ventricular systolic function.   2. Normal right ventricular size and systolic function.   3. Device leads are noted in the right heart.   4. Aortic leaflets are thickened and calcified with reduced systolic leaflet excursion. The LV stroke volume index is low; calculated left ventricular stroke volume index is 25.00 ml/m² (normal >35 ml/m2). There is no more than moderate aortic stenosis.   5. Mild mitral regurgitation.   6. No evidence of pulmonary hypertension, pulmonary artery systolic pressure is 23 mmHg.   7. Trivial pericardial effusion.   8. No prior echo is available for comparison.

## 2021-10-19 NOTE — DISCHARGE NOTE PROVIDER - NSDCDCMDCOMP_GEN_ALL_CORE
How Severe Is Your Cyst?: mild
Is This A New Presentation, Or A Follow-Up?: Cyst
This document is complete and the patient is ready for discharge.

## 2021-10-19 NOTE — PROGRESS NOTE ADULT - PROBLEM SELECTOR PLAN 5
-continue home atenolol 25mg daily, losartan 25mg daily (hold on cath day)  -c/w Amlodipine 5mg daily. Will titrate up if BP > 150 -continue home losartan 25mg daily   -Switch home Atenolol 25mg daily to Toprol 25mg qd given stress induced CM  -c/w Amlodipine 5mg daily. Will titrate up if BP > 150

## 2021-10-19 NOTE — DISCHARGE NOTE PROVIDER - CARE PROVIDER_API CALL
[place])  Cardiology; Internal Medicine  [address], [address] [place] [address]  Phone: [phone]  Fax: [phone]  Follow Up Time: 2 weeks    [place] Sinclair)  Critical Care Medicine; Pulmonary Disease  [address], [address] [place] [address]  Phone: [phone]  Fax: [phone]  Established Patient  Follow Up Time: Routine    Michaela Greenfield)  Critical Care Medicine; Internal Medicine  [address], [address]  [place] [address]  Phone: [phone]  Fax: [phone]  Established Patient  Follow Up Time: Routine

## 2021-10-19 NOTE — PROGRESS NOTE ADULT - PROBLEM SELECTOR PLAN 4
-Last dose of Eliquis 10/16/21 in AM  -c/w Heparin gtt in lieu of Eliquis  -follows with Pulm Dr. Sinclair -D/c'ed Heparin post cath  -Started Eliquis 5mg BID  -follows with Pulm Dr. Sinclair

## 2021-10-19 NOTE — PROGRESS NOTE ADULT - ASSESSMENT
86 year old Portuguese-speaking female, (ALTERNATE MRN [id number]), with PMHx dementia (AOx2), L eye blindness, HTN, HLD, SSS/AFib s/p PPM, Lung CA s/p resection in 2000 (no chemo/RT, in remission), Sarcoidosis, Bronchiectasis, recurrent PNA, PE (on Eliquis), CVA (remote, no deficits), who presented to [facility] 10/12/21 after unwitnessed fall vs. syncope and with c/o abdominal pain (CT abd/pelvis unrevealing), found to have elevated troponin 0.05-0.297-0.478, recommended for cardiac cath, and transferred to [facility] on 10/16 (at daughter's request) for cardiac cath with Dr. Arshad 10/18.  86 year old Malay-speaking female, (ALTERNATE MRN [id number]), with PMHx dementia (AOx2), L eye blindness, HTN, HLD, SSS/AFib s/p PPM, Lung CA s/p resection in 2000 (no chemo/RT, in remission), Sarcoidosis, Bronchiectasis, recurrent PNA, PE (on Eliquis), CVA (remote, no deficits), who presented to [facility] 10/12/21 after unwitnessed fall vs. syncope and with c/o abdominal pain (CT abd/pelvis unrevealing), found to have elevated troponin 0.05-0.297-0.478. Transferred to [facility] on 10/16 (at daughter's request) for cardiac cath. S/p diagnostic cath revealing non-obstructive CAD and possible stress induced CM. Pending echo. PT kelsey KOTHARI.

## 2021-10-19 NOTE — DISCHARGE NOTE PROVIDER - CARE PROVIDERS DIRECT ADDRESSES
,DirectAddress_Unknown,[email or web address]

## 2021-10-19 NOTE — PROGRESS NOTE ADULT - ATTENDING COMMENTS
Patient seen and examined. Case discussed with ACP.  85yo W with dementia, Lt eye blindness, HTN, HL, SSS/Afib s/p PPM, lung ca s/p resection, sarcoid, bronchiectasis, pna, PE, CVA who presented to OSH with fall/syncope and with rising troponin concerning for NSTEMI, transferred for C with Dr. Arshad on 10/18  Seen post LHC -- appears well, no complaints  LHC with nonobs CAD, concern for stress induced cmpy with EF 45%  -TTE  -continue asa, statin, atenolol. If Cr stable post cath, reinitiate ARB  -apixaban for PE, afib when able from procedural perspective   -cough, hx of bronchiectasis - f/u sputum cx, update pulm   -HTN: BPs overall improved today   -d/c planning     Le Hinton MD
Patient seen and examined. Case discussed with ACP.  83yo W with dementia, Lt eye blindness, HTN, HL, SSS/Afib s/p PPM, lung ca s/p resection, sarcoid, bronchiectasis, pna, PE, CVA who presented to OSH with fall/syncope and with rising troponin concerning for NSTEMI, transferred for C with Dr. Arshad on 10/18. LHC with no sig CAD (luminal irregularities), concern for stress induced cmpy with EF 45%. TTE with mildly reduced LVEF, at most moderate AS  -Cr stable, reinitiate ARB  -continue statin, beta blocker--change to cardioprotective BB. Will hold off on asa given no sig CAD and already on apixaban for PE  -HTN: trend, adjust regimen as needed--overall have been better controlled than on admission and should improve with reinitiation of home losartan  -SW, d/c planning - pending SANIYA Hinton MD .
Patient seen and examined. Case discussed with ACP.  83yo W with dementia, Lt eye blindness, HTN, HL, SSS/Afib s/p PPM, lung ca s/p resection, sarcoid, bronchiectasis, pna, PE, CVA who presented to OSH with fall/syncope and with rising troponin concerning for NSTEMI, transferred for C with Dr. Arshad on 10/18  Appears well on exam. Crackles on rt base. Imaging at OSH noted right base bronchiectasis.   Chest pain free.   Continue aspirin, statin, atenolol. Planned for LHC on 10/18. On hep gtt for afib, PE.   Has had cough--sats normal. No other signs of volume overload. Update pulm. F/u sputum cx.   HTN: on home regimen, if BPs remain eleveated here, increase amlodipine  TTE    Le Hinton MD

## 2021-10-19 NOTE — DISCHARGE NOTE PROVIDER - NSDCCPCAREPLAN_GEN_ALL_CORE_FT
PRINCIPAL DISCHARGE DIAGNOSIS  Diagnosis: Takotsubo cardiomyopathy  Assessment and Plan of Treatment: You presented to the hospital with a syncopal episode. You had a cardiac catheterization on 10/18/21 which did not show any blocked arteries but it showed that you have stress cardiomyopathy. Stress cardiomyopathy, also known as "broken heart syndrome," or "takotsubo cardiomyopathy is a heart condition that causes sudden chest pain, trouble breathing, or fainting (passing out). It is often triggered by intense physical or emotional stress, but it sometimes happens with no known trigger. Even though this condition causes similar symptoms as a heart attack, it is different from a heart attack. People with stress cardiomyopathy do not usually have coronary heart disease. Most people get completely better in 1 to 4 weeks. But sometimes stress cardiomyopathy leads to serious heart problems, such as heart failure, heart rhythm problems or heart valve problems. It is important that you continue to follow closely with your cardiologist for further management.      SECONDARY DISCHARGE DIAGNOSES  Diagnosis: Pulmonary embolism  Assessment and Plan of Treatment:     Diagnosis: Hypertension  Assessment and Plan of Treatment: You have a history of elevated blood pressure and you should continue your blood pressure medications as prescribed.      Diagnosis: Hyperlipidemia  Assessment and Plan of Treatment:      PRINCIPAL DISCHARGE DIAGNOSIS  Diagnosis: NICM (nonischemic cardiomyopathy)  Assessment and Plan of Treatment: You came into the hospital      SECONDARY DISCHARGE DIAGNOSES  Diagnosis: Pulmonary embolism  Assessment and Plan of Treatment: Continue taking blood thinning medication Eliquis to treat history of blood clot in the lung.    Diagnosis: Hypertension  Assessment and Plan of Treatment: You have a history of elevated blood pressure and you should continue your blood pressure medications as prescribed.      Diagnosis: Hyperlipidemia  Assessment and Plan of Treatment: Continue taking your cholesterol medication Lipitor.     PRINCIPAL DISCHARGE DIAGNOSIS  Diagnosis: NICM (nonischemic cardiomyopathy)  Assessment and Plan of Treatment: You were transferred from [facility] to [facility] for abnormal cardiac enzymes after a fall. You underwent cardiac evaluation including cardiac catheterization that was normal. You had an echocardiogram that shows your heart muscle strength is borderline low (ejection fraction 40-45%). Due to the heart being weak, your home medication Atenolol was SWITCHED to Metoprolol Succinate 25mg once a day. CONTINUE taking home medication Losartan as this can also help the heart muscle improve. Your pacemaker was checked while in the hospital and showed normal device function.      SECONDARY DISCHARGE DIAGNOSES  Diagnosis: Pulmonary embolism  Assessment and Plan of Treatment: Continue taking blood thinning medication Eliquis to treat history of blood clot in the lung.    Diagnosis: Hypertension  Assessment and Plan of Treatment: You have a history of elevated blood pressure and you should continue your blood pressure medications as prescribed.      Diagnosis: Hyperlipidemia  Assessment and Plan of Treatment: Continue taking your cholesterol medication Lipitor.

## 2021-10-19 NOTE — PROGRESS NOTE ADULT - SUBJECTIVE AND OBJECTIVE BOX
CARDIOLOGY NP PROGRESS NOTE    Subjective:   Remainder ROS otherwise negative.    Overnight Events:     TELEMETRY:    EKG:      VITAL SIGNS:  T(C): 36.2 (10-19-21 @ 13:20), Max: 37.5 (10-18-21 @ 21:43)  HR: 74 (10-19-21 @ 12:00) (71 - 81)  BP: 141/64 (10-19-21 @ 12:00) (128/61 - 152/70)  RR: 16 (10-19-21 @ 12:00) (16 - 17)  SpO2: 94% (10-19-21 @ 12:00) (94% - 98%)  Wt(kg): --    I&O's Summary    18 Oct 2021 07:01  -  19 Oct 2021 07:00  --------------------------------------------------------  IN: 180 mL / OUT: 600 mL / NET: -420 mL    19 Oct 2021 07:01  -  19 Oct 2021 15:35  --------------------------------------------------------  IN: 180 mL / OUT: 0 mL / NET: 180 mL          PHYSICAL EXAM:    General: A/ox 3, No acute Distress  Neck: Supple, NO JVD  Cardiac: S1 S2, No M/R/G  Pulmonary: CTAB, Breathing unlabored, No Rhonchi/Rales/Wheezing  Abdomen: Soft, Non -tender, +BS x 4 quads  Extremities: No Rashes, No edema  Neuro: A/o x 3, No focal deficits          LABS:                          14.5   7.27  )-----------( 207      ( 19 Oct 2021 06:55 )             46.4                              10-19    136  |  109<H>  |  18  ----------------------------<  89  5.0   |  20<L>  |  0.91    Ca    9.9      19 Oct 2021 06:55  Mg     2.0     10-19                              PT/INR - ( 18 Oct 2021 07:00 )   PT: 15.9 sec;   INR: 1.34          PTT - ( 18 Oct 2021 07:00 )  PTT:>200.0 sec  CAPILLARY BLOOD GLUCOSE          Allergies:  No Known Allergies    MEDICATIONS  (STANDING):  ALBUTerol    90 MICROgram(s) HFA Inhaler 2 Puff(s) Inhalation every 6 hours  amLODIPine   Tablet 5 milliGRAM(s) Oral daily  apixaban 5 milliGRAM(s) Oral every 12 hours  aspirin enteric coated 81 milliGRAM(s) Oral daily  ATENolol  Tablet 25 milliGRAM(s) Oral two times a day  atorvastatin 80 milliGRAM(s) Oral at bedtime  brimonidine 0.2% Ophthalmic Solution 1 Drop(s) Both EYES two times a day  dorzolamide 2% Ophthalmic Solution 1 Drop(s) Both EYES every 8 hours  latanoprost 0.005% Ophthalmic Solution 1 Drop(s) Both EYES at bedtime  losartan 25 milliGRAM(s) Oral daily  melatonin 3 milliGRAM(s) Oral at bedtime  pantoprazole    Tablet 40 milliGRAM(s) Oral before breakfast  sodium chloride 0.9%. 500 milliLiter(s) (50 mL/Hr) IV Continuous <Continuous>    MEDICATIONS  (PRN):        DIAGNOSTIC TESTS:        CARDIOLOGY NP PROGRESS NOTE    Subjective: Pt seen and examined at bedside. Reports feeling well. Denies chest pain, sob, lightheadedness, dizziness, palpitations, fever, chills.  Remainder ROS otherwise negative.    Overnight Events: s/p diagnostic cardiac cath yesterday    TELEMETRY: AV paced 70s        VITAL SIGNS:  T(C): 36.2 (10-19-21 @ 13:20), Max: 37.5 (10-18-21 @ 21:43)  HR: 74 (10-19-21 @ 12:00) (71 - 81)  BP: 141/64 (10-19-21 @ 12:00) (128/61 - 152/70)  RR: 16 (10-19-21 @ 12:00) (16 - 17)  SpO2: 94% (10-19-21 @ 12:00) (94% - 98%)  Wt(kg): --    I&O's Summary    18 Oct 2021 07:01  -  19 Oct 2021 07:00  --------------------------------------------------------  IN: 180 mL / OUT: 600 mL / NET: -420 mL    19 Oct 2021 07:01  -  19 Oct 2021 15:35  --------------------------------------------------------  IN: 180 mL / OUT: 0 mL / NET: 180 mL          PHYSICAL EXAM:    General: sitting up in bed, NAD  HEENT: conjunctiva clear; MMM  Neck: supple, no JVD  Cardiovascular: RRR, no murmurs  Respiratory: Lungs CTA daniel, non-labored on RA  Gastrointestinal: soft, NT/ND, +BS  Extremities: WWP, no edema or cyanosis. R groin site CDI w/o hematoma  Vascular: carotid 2+ b/l, no bruits; radial 2+ b/l; femoral 2+ b/l no bruits; Daniel DP 1+, PT 1+  Neurological: AAOx2 (name and place), no focal deficits          LABS:                          14.5   7.27  )-----------( 207      ( 19 Oct 2021 06:55 )             46.4                              10-19    136  |  109<H>  |  18  ----------------------------<  89  5.0   |  20<L>  |  0.91    Ca    9.9      19 Oct 2021 06:55  Mg     2.0     10-19      PT/INR - ( 18 Oct 2021 07:00 )   PT: 15.9 sec;   INR: 1.34          PTT - ( 18 Oct 2021 07:00 )  PTT:>200.0 sec  CAPILLARY BLOOD GLUCOSE          Allergies:  No Known Allergies    MEDICATIONS  (STANDING):  ALBUTerol    90 MICROgram(s) HFA Inhaler 2 Puff(s) Inhalation every 6 hours  amLODIPine   Tablet 5 milliGRAM(s) Oral daily  apixaban 5 milliGRAM(s) Oral every 12 hours  aspirin enteric coated 81 milliGRAM(s) Oral daily  ATENolol  Tablet 25 milliGRAM(s) Oral two times a day  atorvastatin 80 milliGRAM(s) Oral at bedtime  brimonidine 0.2% Ophthalmic Solution 1 Drop(s) Both EYES two times a day  dorzolamide 2% Ophthalmic Solution 1 Drop(s) Both EYES every 8 hours  latanoprost 0.005% Ophthalmic Solution 1 Drop(s) Both EYES at bedtime  losartan 25 milliGRAM(s) Oral daily  melatonin 3 milliGRAM(s) Oral at bedtime  pantoprazole    Tablet 40 milliGRAM(s) Oral before breakfast  sodium chloride 0.9%. 500 milliLiter(s) (50 mL/Hr) IV Continuous <Continuous>    MEDICATIONS  (PRN):        DIAGNOSTIC TESTS:

## 2021-10-19 NOTE — DISCHARGE NOTE PROVIDER - HOSPITAL COURSE
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 syncope and with c/o abdominal pain (CT abd/pelvis unrevealing), found to have elevated troponin 0.05-0.297-0.478, recommended for cardiac cath, and transferred to [facility] on 10/16 (at daughter's request) for cardiac cath with Dr. Arshad.     s/p cardiac cath 10/18/2021: Nonobstructive CAD, Anteroseptal hypoconstriction with stress induced cardiomyopathy. EF: 45% EDP: 3. No AS. Right groin PC.   EP was consulted for PPM interrogation with no events noted.  ECHO:   Pulmonary team consulted for h/o Lung CA, bronchiectasis and symptoms of a mild wet cough. CXR 10/16/21: Small right effusion. Discoid change and/or infiltrate right lung base. Possible nodular density right lung base which may reflect overlapping of bony and soft tissue structures.     Today pt was seen and examined at bedside, denies complaints of chest pain, dizziness, SOB, palpitations, pain, LE edema, fever, chills. Right groin access site soft, no bleeding or swelling at site, DP/PT pulses at baseline. No events on tele overnight, VSS, Labs unremarkable/stable, Physical exam WNL. Pt was seen and examined by cardiology attending as well and is deemed stable for discharge per .__ Pt is to continue _______. Pt is to follow up with cardiologist __ in 1-2 weeks for post discharge check-up. PT recommended SANIYA however pt's family refused. All medications needing refills were e-prescribed to pt’s preferred pharmacy.   86 year old Danish-speaking female, (ALTERNATE MRN [id number]), with PMHx dementia (AOx2), L eye blindness, HTN, HLD, SSS/AFib s/p PPM, Lung CA s/p resection in 2000 (no chemo/RT, in remission), Sarcoidosis, Bronchiectasis, recurrent PNA, PE (on Eliquis), CVA (remote, no deficits), who presented to [facility] 10/12/21 after unwitnessed fall vs. syncope and with c/o abdominal pain (CT abd/pelvis unrevealing), found to have elevated troponin 0.05-0.297-0.478. Transferred to [facility] cardiac tele on 10/16 (at daughter's request) for cardiac cath with Dr. Arshad. Pt uis s/p diagnostic cardiac cath 10/18/2021: Nonobstructive CAD (luminal irregularities), apical ballooning suggestive of stress induced cardiomyopathy, EF: 45% EDP: 3. No AS. Right groin PC site CDI stable w/o hematoma. EP was consulted for PPM interrogation with normal device function and no events noted. ECHO 10/19/21 revealed EF 40-45%, global hypokinesis, mod AS, mild MR. Given cardiomyopathy, outpt Atenolol was switched to Toprol 25mg qd and continued on home Losartan 25mg qd.    Pulmonary Dr Sinclair was consulted for h/o Lung CA, bronchiectasis and symptoms of a mild wet cough x few weeks. CXR 10/16/21: Small right effusion. Discoid change and/or infiltrate right lung base. Possible nodular density right lung base which may reflect overlapping of bony and soft tissue structures. OSH CT Chest noting R bronchiectasis. Per Dr Sinclair, pt w/ known Hx of RML bronchiectasis that is currently stable.    On day of discharge pt seen and examined at bedside. Denies complaints of chest pain, dizziness, SOB, palpitations, pain, LE edema, fever, chills. Right groin access site remained soft, no bleeding or swelling at site, DP/PT pulses at baseline. No events on tele overnight, VSS, Labs unremarkable/stable, Physical exam WNL. Pt was seen and examined by cardiology attending as well and is deemed stable for discharge per Dr Hinton. PT recommended SANIYA, pt's family initially refused but agreed after further discussion w/ social work and case management. 86 year old Pashto-speaking female, (ALTERNATE MRN [id number]), with PMHx dementia (AOx2), L eye blindness, HTN, HLD, SSS/AFib s/p PPM, Lung CA s/p resection in 2000 (no chemo/RT, in remission), Sarcoidosis, Bronchiectasis, recurrent PNA, PE (on Eliquis), CVA (remote, no deficits), who presented to [facility] 10/12/21 after unwitnessed fall vs. syncope and with c/o abdominal pain (CT abd/pelvis unrevealing), found to have elevated troponin 0.05-0.297-0.478. Transferred to [facility] cardiac tele on 10/16 (at daughter's request) for cardiac cath with Dr. Arshad. Pt uis s/p diagnostic cardiac cath 10/18/2021: Nonobstructive CAD (luminal irregularities), apical ballooning suggestive of stress induced cardiomyopathy, EF: 45% EDP: 3. No AS. Right groin PC site CDI stable w/o hematoma. EP was consulted for PPM interrogation with normal device function and no events noted. ECHO 10/19/21 revealed EF 40-45%, global hypokinesis, mod AS, mild MR. Given cardiomyopathy, outpt Atenolol was switched to Toprol 25mg qd and continued on home Losartan 25mg qd.    Pulmonary Dr Sinclair was consulted for h/o Lung CA, bronchiectasis and symptoms of a mild wet cough x few weeks. CXR 10/16/21: Small right effusion. Discoid change and/or infiltrate right lung base. Possible nodular density right lung base which may reflect overlapping of bony and soft tissue structures. OSH CT Chest noting R bronchiectasis. Per Dr Sinclair, pt w/ known Hx of RML bronchiectasis that is currently stable.    On day of discharge pt seen and examined at bedside. Denies complaints of chest pain, dizziness, SOB, palpitations, pain, LE edema, fever, chills. Right groin access site remained soft, no bleeding or swelling at site, DP/PT pulses at baseline. No events on tele overnight, VSS, Labs unremarkable/stable, Physical exam WNL. Pt was seen and examined by cardiology attending as well and is deemed stable for discharge per Dr Hinton and will f/u with her in 1-2 week. PT recommended SANIYA, pt's family initially refused but agreed after further discussion w/ social work and case management.     Pt. supposed to be d/c 10/20; however 2/2 transportation issues; pt. being d/c 10/21. home atenolol 25 BID changed to toprol 25 mg daily this admission has been increased to 50 mg on d/c day.

## 2021-10-19 NOTE — PROGRESS NOTE ADULT - SUBJECTIVE AND OBJECTIVE BOX
Interval Events: Reviewed  Patient seen and examined at bedside.    Patient is a 86y old  Female who presents with a chief complaint of NSTEMI (19 Oct 2021 15:35)    she is better   PAST MEDICAL & SURGICAL HISTORY:  Pulmonary embolism    Mild HTN    GERD (gastroesophageal reflux disease)    Sarcoidosis    Hyperlipidemia    SSS (sick sinus syndrome)    Dementia    Pacemaker        MEDICATIONS:  Pulmonary:  ALBUTerol    90 MICROgram(s) HFA Inhaler 2 Puff(s) Inhalation every 6 hours    Antimicrobials:    Anticoagulants:  apixaban 5 milliGRAM(s) Oral every 12 hours  aspirin enteric coated 81 milliGRAM(s) Oral daily    Cardiac:  amLODIPine   Tablet 5 milliGRAM(s) Oral daily  losartan 25 milliGRAM(s) Oral daily  metoprolol succinate ER 25 milliGRAM(s) Oral daily      Allergies    No Known Allergies    Intolerances        Vital Signs Last 24 Hrs  T(C): 37 (19 Oct 2021 18:15), Max: 37 (19 Oct 2021 18:15)  T(F): 98.6 (19 Oct 2021 18:15), Max: 98.6 (19 Oct 2021 18:15)  HR: 73 (19 Oct 2021 20:16) (71 - 81)  BP: 143/67 (19 Oct 2021 20:16) (128/61 - 152/70)  BP(mean): --  RR: 16 (19 Oct 2021 20:16) (16 - 16)  SpO2: 96% (19 Oct 2021 20:16) (94% - 98%)    10-18 @ 07:01  -  10-19 @ 07:00  --------------------------------------------------------  IN: 180 mL / OUT: 600 mL / NET: -420 mL    10-19 @ 07:01  -  10-19 @ 21:53  --------------------------------------------------------  IN: 180 mL / OUT: 640 mL / NET: -460 mL          Review of Systems:   •	General: negative  •	Skin/Breast: negative  •	Ophthalmologic: negative  •	ENMT: negative  •	Respiratory and Thorax: negative  •	Cardiovascular: negative  •	Gastrointestinal: negative  •	Genitourinary: negative  •	Musculoskeletal: negative  •	Neurological: negative  •	Psychiatric: negative  •	Hematology/Lymphatics: negative  •	Endocrine: negative  •	Allergic/Immunologic: negative    Physical Exam:   • Constitutional:	refer to the dietion /Nutritionist note  • Eyes:	EOMI; PERRL; no drainage or redness  • ENMT:	No oral lesions; no gross abnormalities  • Neck	No bruits; no thyromegaly or nodules  • Breasts:	not examined  • Back:	No deformity or limitation of movement  • Respiratory:	Breath Sounds equal & clear to percussion & auscultation, no accessory muscle use  • Cardiovascular:	Regular rate & rhythm, normal S1, S2; no murmurs, gallops or rubs; no S3, S4  • Gastrointestinal:	Soft, non-tender, no hepatosplenomegaly, normal bowel sounds  • Genitourinary:	not examined  • Rectal: not examined  • Extremities:	No cyanosis, clubbing or edema  • Vascular:	Equal and normal pulses (carotid, femoral, dorsalis pedis)  • Neurologica:l	not examined  • Skin:	No lesions; no rash  • Lymph Nodes:	No lymphadedenopathy  • Musculoskeletal:	No joint pain, swelling or deformity; no limitation of movement        LABS:      CBC Full  -  ( 19 Oct 2021 06:55 )  WBC Count : 7.27 K/uL  RBC Count : 4.47 M/uL  Hemoglobin : 14.5 g/dL  Hematocrit : 46.4 %  Platelet Count - Automated : 207 K/uL  Mean Cell Volume : 103.8 fl  Mean Cell Hemoglobin : 32.4 pg  Mean Cell Hemoglobin Concentration : 31.3 gm/dL  Auto Neutrophil # : x  Auto Lymphocyte # : x  Auto Monocyte # : x  Auto Eosinophil # : x  Auto Basophil # : x  Auto Neutrophil % : x  Auto Lymphocyte % : x  Auto Monocyte % : x  Auto Eosinophil % : x  Auto Basophil % : x    10-19    136  |  109<H>  |  18  ----------------------------<  89  5.0   |  20<L>  |  0.91    Ca    9.9      19 Oct 2021 06:55  Mg     2.0     10-19      PT/INR - ( 18 Oct 2021 07:00 )   PT: 15.9 sec;   INR: 1.34          PTT - ( 18 Oct 2021 07:00 )  PTT:>200.0 sec                Culture Results:   Normal Respiratory Zena present to date (10-18 @ 18:56)      RADIOLOGY & ADDITIONAL STUDIES (The following images were personally reviewed):

## 2021-10-20 ENCOUNTER — TRANSCRIPTION ENCOUNTER (OUTPATIENT)
Age: 86
End: 2021-10-20

## 2021-10-20 LAB
ANION GAP SERPL CALC-SCNC: 9 MMOL/L — SIGNIFICANT CHANGE UP (ref 5–17)
BUN SERPL-MCNC: 12 MG/DL — SIGNIFICANT CHANGE UP (ref 7–23)
CALCIUM SERPL-MCNC: 9.8 MG/DL — SIGNIFICANT CHANGE UP (ref 8.4–10.5)
CHLORIDE SERPL-SCNC: 109 MMOL/L — HIGH (ref 96–108)
CO2 SERPL-SCNC: 22 MMOL/L — SIGNIFICANT CHANGE UP (ref 22–31)
CREAT SERPL-MCNC: 0.92 MG/DL — SIGNIFICANT CHANGE UP (ref 0.5–1.3)
GLUCOSE SERPL-MCNC: 93 MG/DL — SIGNIFICANT CHANGE UP (ref 70–99)
HCT VFR BLD CALC: 46.3 % — HIGH (ref 34.5–45)
HGB BLD-MCNC: 15.3 G/DL — SIGNIFICANT CHANGE UP (ref 11.5–15.5)
MAGNESIUM SERPL-MCNC: 2 MG/DL — SIGNIFICANT CHANGE UP (ref 1.6–2.6)
MCHC RBC-ENTMCNC: 32.7 PG — SIGNIFICANT CHANGE UP (ref 27–34)
MCHC RBC-ENTMCNC: 33 GM/DL — SIGNIFICANT CHANGE UP (ref 32–36)
MCV RBC AUTO: 98.9 FL — SIGNIFICANT CHANGE UP (ref 80–100)
NRBC # BLD: 0 /100 WBCS — SIGNIFICANT CHANGE UP (ref 0–0)
PLATELET # BLD AUTO: 269 K/UL — SIGNIFICANT CHANGE UP (ref 150–400)
POTASSIUM SERPL-MCNC: 4.4 MMOL/L — SIGNIFICANT CHANGE UP (ref 3.5–5.3)
POTASSIUM SERPL-SCNC: 4.4 MMOL/L — SIGNIFICANT CHANGE UP (ref 3.5–5.3)
RBC # BLD: 4.68 M/UL — SIGNIFICANT CHANGE UP (ref 3.8–5.2)
RBC # FLD: 14.3 % — SIGNIFICANT CHANGE UP (ref 10.3–14.5)
SODIUM SERPL-SCNC: 140 MMOL/L — SIGNIFICANT CHANGE UP (ref 135–145)
WBC # BLD: 6.22 K/UL — SIGNIFICANT CHANGE UP (ref 3.8–10.5)
WBC # FLD AUTO: 6.22 K/UL — SIGNIFICANT CHANGE UP (ref 3.8–10.5)

## 2021-10-20 PROCEDURE — 99232 SBSQ HOSP IP/OBS MODERATE 35: CPT | Mod: GC

## 2021-10-20 RX ORDER — METOPROLOL TARTRATE 50 MG
1 TABLET ORAL
Qty: 0 | Refills: 0 | DISCHARGE
Start: 2021-10-20

## 2021-10-20 RX ORDER — PANTOPRAZOLE SODIUM 20 MG/1
1 TABLET, DELAYED RELEASE ORAL
Qty: 0 | Refills: 0 | DISCHARGE
Start: 2021-10-20

## 2021-10-20 RX ADMIN — APIXABAN 5 MILLIGRAM(S): 2.5 TABLET, FILM COATED ORAL at 17:03

## 2021-10-20 RX ADMIN — ALBUTEROL 2 PUFF(S): 90 AEROSOL, METERED ORAL at 05:10

## 2021-10-20 RX ADMIN — ATORVASTATIN CALCIUM 80 MILLIGRAM(S): 80 TABLET, FILM COATED ORAL at 21:13

## 2021-10-20 RX ADMIN — APIXABAN 5 MILLIGRAM(S): 2.5 TABLET, FILM COATED ORAL at 06:11

## 2021-10-20 RX ADMIN — DORZOLAMIDE HYDROCHLORIDE 1 DROP(S): 20 SOLUTION/ DROPS OPHTHALMIC at 05:12

## 2021-10-20 RX ADMIN — LATANOPROST 1 DROP(S): 0.05 SOLUTION/ DROPS OPHTHALMIC; TOPICAL at 21:13

## 2021-10-20 RX ADMIN — ALBUTEROL 2 PUFF(S): 90 AEROSOL, METERED ORAL at 10:39

## 2021-10-20 RX ADMIN — DORZOLAMIDE HYDROCHLORIDE 1 DROP(S): 20 SOLUTION/ DROPS OPHTHALMIC at 15:10

## 2021-10-20 RX ADMIN — BRIMONIDINE TARTRATE 1 DROP(S): 2 SOLUTION/ DROPS OPHTHALMIC at 05:12

## 2021-10-20 RX ADMIN — Medication 3 MILLIGRAM(S): at 21:13

## 2021-10-20 RX ADMIN — AMLODIPINE BESYLATE 5 MILLIGRAM(S): 2.5 TABLET ORAL at 05:11

## 2021-10-20 RX ADMIN — Medication 25 MILLIGRAM(S): at 05:11

## 2021-10-20 RX ADMIN — DORZOLAMIDE HYDROCHLORIDE 1 DROP(S): 20 SOLUTION/ DROPS OPHTHALMIC at 21:13

## 2021-10-20 RX ADMIN — ALBUTEROL 2 PUFF(S): 90 AEROSOL, METERED ORAL at 17:04

## 2021-10-20 RX ADMIN — LOSARTAN POTASSIUM 25 MILLIGRAM(S): 100 TABLET, FILM COATED ORAL at 05:11

## 2021-10-20 RX ADMIN — PANTOPRAZOLE SODIUM 40 MILLIGRAM(S): 20 TABLET, DELAYED RELEASE ORAL at 06:11

## 2021-10-20 RX ADMIN — ALBUTEROL 2 PUFF(S): 90 AEROSOL, METERED ORAL at 21:13

## 2021-10-20 RX ADMIN — BRIMONIDINE TARTRATE 1 DROP(S): 2 SOLUTION/ DROPS OPHTHALMIC at 17:05

## 2021-10-20 NOTE — PROVIDER CONTACT NOTE (OTHER) - ACTION/TREATMENT ORDERED:
As per TAURUS Trammell, no need to re-initiate IV access and pt can remain off telemetry for the night.

## 2021-10-20 NOTE — PROGRESS NOTE ADULT - SUBJECTIVE AND OBJECTIVE BOX
Interval Events: Reviewed  Patient seen and examined at bedside.    Patient is a 86y old  Female who presents with a chief complaint of NSTEMI (19 Oct 2021 21:53)      PAST MEDICAL & SURGICAL HISTORY:  Pulmonary embolism    Mild HTN    GERD (gastroesophageal reflux disease)    Sarcoidosis    Hyperlipidemia    SSS (sick sinus syndrome)    Dementia    Pacemaker        MEDICATIONS:  Pulmonary:  ALBUTerol    90 MICROgram(s) HFA Inhaler 2 Puff(s) Inhalation every 6 hours    Antimicrobials:    Anticoagulants:  apixaban 5 milliGRAM(s) Oral every 12 hours    Cardiac:  amLODIPine   Tablet 5 milliGRAM(s) Oral daily  losartan 25 milliGRAM(s) Oral daily  metoprolol succinate ER 25 milliGRAM(s) Oral daily      Allergies    No Known Allergies    Intolerances        Vital Signs Last 24 Hrs  T(C): 36 (20 Oct 2021 08:56), Max: 37 (19 Oct 2021 18:15)  T(F): 96.8 (20 Oct 2021 08:56), Max: 98.6 (19 Oct 2021 18:15)  HR: 80 (20 Oct 2021 12:08) (73 - 109)  BP: 119/56 (20 Oct 2021 12:08) (119/56 - 143/67)  BP(mean): --  RR: 16 (20 Oct 2021 12:08) (15 - 16)  SpO2: 98% (20 Oct 2021 12:08) (95% - 98%)    10-19 @ 07:01  -  10-20 @ 07:00  --------------------------------------------------------  IN: 180 mL / OUT: 1190 mL / NET: -1010 mL    10-20 @ 07:01  -  10-20 @ 12:15  --------------------------------------------------------  IN: 180 mL / OUT: 0 mL / NET: 180 mL          Review of Systems:   •	General: negative  •	Skin/Breast: negative  •	Ophthalmologic: negative  •	ENMT: negative  •	Respiratory and Thorax: negative  •	Cardiovascular: negative  •	Gastrointestinal: negative  •	Genitourinary: negative  •	Musculoskeletal: negative  •	Neurological: negative  •	Psychiatric: negative  •	Hematology/Lymphatics: negative  •	Endocrine: negative  •	Allergic/Immunologic: negative    Physical Exam:   • Constitutional:	refer to the dietion /Nutritionist note  • Eyes:	EOMI; PERRL; no drainage or redness  • ENMT:	No oral lesions; no gross abnormalities  • Neck	No bruits; no thyromegaly or nodules  • Breasts:	not examined  • Back:	No deformity or limitation of movement  • Respiratory:	Breath Sounds equal & clear to percussion & auscultation, no accessory muscle use  • Cardiovascular:	Regular rate & rhythm, normal S1, S2; no murmurs, gallops or rubs; no S3, S4  • Gastrointestinal:	Soft, non-tender, no hepatosplenomegaly, normal bowel sounds  • Genitourinary:	not examined  • Rectal: not examined  • Extremities:	No cyanosis, clubbing or edema  • Vascular:	Equal and normal pulses (carotid, femoral, dorsalis pedis)  • Neurologica:l	not examined  • Skin:	No lesions; no rash  • Lymph Nodes:	No lymphadedenopathy  • Musculoskeletal:	No joint pain, swelling or deformity; no limitation of movement        LABS:      CBC Full  -  ( 20 Oct 2021 08:18 )  WBC Count : 6.22 K/uL  RBC Count : 4.68 M/uL  Hemoglobin : 15.3 g/dL  Hematocrit : 46.3 %  Platelet Count - Automated : 269 K/uL  Mean Cell Volume : 98.9 fl  Mean Cell Hemoglobin : 32.7 pg  Mean Cell Hemoglobin Concentration : 33.0 gm/dL  Auto Neutrophil # : x  Auto Lymphocyte # : x  Auto Monocyte # : x  Auto Eosinophil # : x  Auto Basophil # : x  Auto Neutrophil % : x  Auto Lymphocyte % : x  Auto Monocyte % : x  Auto Eosinophil % : x  Auto Basophil % : x    10-20    140  |  109<H>  |  12  ----------------------------<  93  4.4   |  22  |  0.92    Ca    9.8      20 Oct 2021 08:18  Mg     2.0     10-20                      Culture Results:   Numerous Achromobacter xylosoxidans  Susceptibility to follow.  Accompanied by normal respiratory jory (10-18 @ 18:56)      RADIOLOGY & ADDITIONAL STUDIES (The following images were personally reviewed):

## 2021-10-20 NOTE — PROGRESS NOTE ADULT - TIME BILLING
Patient seen and examined with house-staff during bedside rounds.  Resident note read, including vitals, physical findings, laboratory data, and radiological reports.   Revisions included below.  Direct personal management at bed side and extensive interpretation of the data.  Plan was outlined and discussed in details with the housestaff.  Decision making of high complexity  Action taken for acute disease activity to reflect the level of care provided:  - medication reconciliation  - review laboratory data
Patient seen and examined with house-staff during bedside rounds.  Resident note read, including vitals, physical findings, laboratory data, and radiological reports.   Revisions included below.  Direct personal management at bed side and extensive interpretation of the data.  Plan was outlined and discussed in details with the housestaff.  Decision making of high complexity  Action taken for acute disease activity to reflect the level of care provided:  - medication reconciliation  - review laboratory data  pulmonary wise stable for DC
Patient seen and examined with house-staff during bedside rounds.  Resident note read, including vitals, physical findings, laboratory data, and radiological reports.   Revisions included below.  Direct personal management at bed side and extensive interpretation of the data.  Plan was outlined and discussed in details with the housestaff.  Decision making of high complexity  Action taken for acute disease activity to reflect the level of care provided:  - medication reconciliation  - review laboratory data
management and coordination
as above

## 2021-10-20 NOTE — DISCHARGE NOTE NURSING/CASE MANAGEMENT/SOCIAL WORK - PATIENT PORTAL LINK FT
You can access the FollowMyHealth Patient Portal offered by [facility] by registering at the following website: [email or web address]. By joining [employer]’s FollowMyHealth portal, you will also be able to view your health information using other applications (apps) compatible with our system.

## 2021-10-20 NOTE — PROGRESS NOTE ADULT - SUBJECTIVE AND OBJECTIVE BOX
CARDIOLOGY NP PROGRESS NOTE    Subjective: Pt seen and examined at bedside. Reports feeling well. Denies chest pain, sob, lighthead  Remainder ROS otherwise negative.    Overnight Events: None    TELEMETRY: A paced 70s         VITAL SIGNS:  T(C): 36.1 (10-20-21 @ 13:01), Max: 37 (10-19-21 @ 18:15)  HR: 80 (10-20-21 @ 12:08) (73 - 109)  BP: 119/56 (10-20-21 @ 12:08) (119/56 - 143/67)  RR: 16 (10-20-21 @ 12:08) (15 - 16)  SpO2: 98% (10-20-21 @ 12:08) (95% - 98%)  Wt(kg): --    I&O's Summary    19 Oct 2021 07:01  -  20 Oct 2021 07:00  --------------------------------------------------------  IN: 180 mL / OUT: 1190 mL / NET: -1010 mL    20 Oct 2021 07:01  -  20 Oct 2021 15:45  --------------------------------------------------------  IN: 180 mL / OUT: 0 mL / NET: 180 mL          PHYSICAL EXAM:    General: A/ox 3, No acute Distress  Neck: Supple, NO JVD  Cardiac: S1 S2, No M/R/G  Pulmonary: CTAB, Breathing unlabored, No Rhonchi/Rales/Wheezing  Abdomen: Soft, Non -tender, +BS x 4 quads  Extremities: No Rashes, No edema  Neuro: A/o x 3, No focal deficits          LABS:                          15.3   6.22  )-----------( 269      ( 20 Oct 2021 08:18 )             46.3                              10-20    140  |  109<H>  |  12  ----------------------------<  93  4.4   |  22  |  0.92    Ca    9.8      20 Oct 2021 08:18  Mg     2.0     10-20                                CAPILLARY BLOOD GLUCOSE                Allergies:  No Known Allergies    MEDICATIONS  (STANDING):  ALBUTerol    90 MICROgram(s) HFA Inhaler 2 Puff(s) Inhalation every 6 hours  amLODIPine   Tablet 5 milliGRAM(s) Oral daily  apixaban 5 milliGRAM(s) Oral every 12 hours  atorvastatin 80 milliGRAM(s) Oral at bedtime  brimonidine 0.2% Ophthalmic Solution 1 Drop(s) Both EYES two times a day  dorzolamide 2% Ophthalmic Solution 1 Drop(s) Both EYES every 8 hours  latanoprost 0.005% Ophthalmic Solution 1 Drop(s) Both EYES at bedtime  losartan 25 milliGRAM(s) Oral daily  melatonin 3 milliGRAM(s) Oral at bedtime  metoprolol succinate ER 25 milliGRAM(s) Oral daily  pantoprazole    Tablet 40 milliGRAM(s) Oral before breakfast  sodium chloride 0.9%. 500 milliLiter(s) (50 mL/Hr) IV Continuous <Continuous>    MEDICATIONS  (PRN):        DIAGNOSTIC TESTS:

## 2021-10-20 NOTE — PROVIDER CONTACT NOTE (OTHER) - BACKGROUND
Pt s/p diagnostic cardiac cath via R tirsoin 10/18. Pt was supposed to be discharged home tonight via ambulance at 7pm, but ambulance is now coming tomorrow.

## 2021-10-20 NOTE — PROVIDER CONTACT NOTE (OTHER) - ASSESSMENT
Pt has no IV access and is off telemetry. VSS. Discharge was already completed by day shift RN with IV access site removed and telemetry removed.

## 2021-10-20 NOTE — PROGRESS NOTE ADULT - PROBLEM SELECTOR PROBLEM 1
NSTEMI (non-ST elevation myocardial infarction)
Pulmonary embolism
NSTEMI (non-ST elevation myocardial infarction)

## 2021-10-20 NOTE — CHART NOTE - NSCHARTNOTEFT_GEN_A_CORE
Cardiology Attending Brief Note  Pt seen and examined. Case discussed with CAITLIN Rojas.   83yo W with dementia, Lt eye blindness, HTN, HL, SSS/Afib s/p PPM, lung ca s/p resection, sarcoid, bronchiectasis, pna, PE, CVA who presented to OSH with fall/syncope and with rising troponin concerning for NSTEMI, transferred for C with Dr. Arshad on 10/18. LHC with no sig CAD (luminal irregularities), concern for stress induced cmpy with EF 45%. TTE with EF 40-45%, at most moderate AS.  -Cr stable with initiation of ARB  -cpmy: on beta blocker (changed to cardioprotective) and ARB  -continue statin, will hold off on asa given no sig CAD and already on apixaban for PE  -HTN: improved with readdition of losartan   -planned for SANIYA d/c    Le Hinton MD.

## 2021-10-21 VITALS — TEMPERATURE: 98 F

## 2021-10-21 LAB
-  AMIKACIN: SIGNIFICANT CHANGE UP
-  AZTREONAM: SIGNIFICANT CHANGE UP
-  CEFEPIME: SIGNIFICANT CHANGE UP
-  CEFTRIAXONE: SIGNIFICANT CHANGE UP
-  CIPROFLOXACIN: SIGNIFICANT CHANGE UP
-  GENTAMICIN: SIGNIFICANT CHANGE UP
-  LEVOFLOXACIN: SIGNIFICANT CHANGE UP
-  MEROPENEM: SIGNIFICANT CHANGE UP
-  PIPERACILLIN/TAZOBACTAM: SIGNIFICANT CHANGE UP
-  TOBRAMYCIN: SIGNIFICANT CHANGE UP
-  TRIMETHOPRIM/SULFAMETHOXAZOLE: SIGNIFICANT CHANGE UP
CULTURE RESULTS: SIGNIFICANT CHANGE UP
METHOD TYPE: SIGNIFICANT CHANGE UP
ORGANISM # SPEC MICROSCOPIC CNT: SIGNIFICANT CHANGE UP
ORGANISM # SPEC MICROSCOPIC CNT: SIGNIFICANT CHANGE UP
SPECIMEN SOURCE: SIGNIFICANT CHANGE UP

## 2021-10-21 PROCEDURE — 99239 HOSP IP/OBS DSCHRG MGMT >30: CPT

## 2021-10-21 RX ORDER — METOPROLOL TARTRATE 50 MG
50 TABLET ORAL DAILY
Refills: 0 | Status: DISCONTINUED | OUTPATIENT
Start: 2021-10-22 | End: 2021-10-21

## 2021-10-21 RX ORDER — METOPROLOL TARTRATE 50 MG
1 TABLET ORAL
Qty: 0 | Refills: 0 | DISCHARGE
Start: 2021-10-21

## 2021-10-21 RX ORDER — METOPROLOL TARTRATE 50 MG
25 TABLET ORAL ONCE
Refills: 0 | Status: COMPLETED | OUTPATIENT
Start: 2021-10-21 | End: 2021-10-21

## 2021-10-21 RX ADMIN — AMLODIPINE BESYLATE 5 MILLIGRAM(S): 2.5 TABLET ORAL at 06:27

## 2021-10-21 RX ADMIN — APIXABAN 5 MILLIGRAM(S): 2.5 TABLET, FILM COATED ORAL at 06:27

## 2021-10-21 RX ADMIN — LOSARTAN POTASSIUM 25 MILLIGRAM(S): 100 TABLET, FILM COATED ORAL at 06:27

## 2021-10-21 RX ADMIN — DORZOLAMIDE HYDROCHLORIDE 1 DROP(S): 20 SOLUTION/ DROPS OPHTHALMIC at 06:28

## 2021-10-21 RX ADMIN — Medication 25 MILLIGRAM(S): at 06:27

## 2021-10-21 RX ADMIN — PANTOPRAZOLE SODIUM 40 MILLIGRAM(S): 20 TABLET, DELAYED RELEASE ORAL at 06:27

## 2021-10-21 RX ADMIN — ALBUTEROL 2 PUFF(S): 90 AEROSOL, METERED ORAL at 06:27

## 2021-10-21 RX ADMIN — ALBUTEROL 2 PUFF(S): 90 AEROSOL, METERED ORAL at 12:55

## 2021-10-21 RX ADMIN — BRIMONIDINE TARTRATE 1 DROP(S): 2 SOLUTION/ DROPS OPHTHALMIC at 06:28

## 2021-10-21 RX ADMIN — Medication 25 MILLIGRAM(S): at 10:31

## 2021-10-25 ENCOUNTER — APPOINTMENT (OUTPATIENT)
Dept: PULMONOLOGY | Facility: CLINIC | Age: 86
End: 2021-10-25

## 2021-10-28 DIAGNOSIS — Z85.118 PERSONAL HISTORY OF OTHER MALIGNANT NEOPLASM OF BRONCHUS AND LUNG: ICD-10-CM

## 2021-10-28 DIAGNOSIS — I48.91 UNSPECIFIED ATRIAL FIBRILLATION: ICD-10-CM

## 2021-10-28 DIAGNOSIS — K21.9 GASTRO-ESOPHAGEAL REFLUX DISEASE WITHOUT ESOPHAGITIS: ICD-10-CM

## 2021-10-28 DIAGNOSIS — I21.4 NON-ST ELEVATION (NSTEMI) MYOCARDIAL INFARCTION: ICD-10-CM

## 2021-10-28 DIAGNOSIS — I25.10 ATHEROSCLEROTIC HEART DISEASE OF NATIVE CORONARY ARTERY WITHOUT ANGINA PECTORIS: ICD-10-CM

## 2021-10-28 DIAGNOSIS — I10 ESSENTIAL (PRIMARY) HYPERTENSION: ICD-10-CM

## 2021-10-28 DIAGNOSIS — Z79.01 LONG TERM (CURRENT) USE OF ANTICOAGULANTS: ICD-10-CM

## 2021-10-28 DIAGNOSIS — F03.90 UNSPECIFIED DEMENTIA WITHOUT BEHAVIORAL DISTURBANCE: ICD-10-CM

## 2021-10-28 DIAGNOSIS — E78.5 HYPERLIPIDEMIA, UNSPECIFIED: ICD-10-CM

## 2021-10-28 DIAGNOSIS — I51.81 TAKOTSUBO SYNDROME: ICD-10-CM

## 2021-10-28 DIAGNOSIS — Z86.73 PERSONAL HISTORY OF TRANSIENT ISCHEMIC ATTACK (TIA), AND CEREBRAL INFARCTION WITHOUT RESIDUAL DEFICITS: ICD-10-CM

## 2021-10-28 DIAGNOSIS — Z95.0 PRESENCE OF CARDIAC PACEMAKER: ICD-10-CM

## 2021-10-28 DIAGNOSIS — D86.9 SARCOIDOSIS, UNSPECIFIED: ICD-10-CM

## 2021-10-28 DIAGNOSIS — J47.9 BRONCHIECTASIS, UNCOMPLICATED: ICD-10-CM

## 2021-10-28 DIAGNOSIS — Z86.711 PERSONAL HISTORY OF PULMONARY EMBOLISM: ICD-10-CM

## 2021-11-11 ENCOUNTER — APPOINTMENT (OUTPATIENT)
Dept: ENDOCRINOLOGY | Facility: CLINIC | Age: 86
End: 2021-11-11
Payer: MEDICARE

## 2021-11-11 VITALS
DIASTOLIC BLOOD PRESSURE: 79 MMHG | BODY MASS INDEX: 20.36 KG/M2 | SYSTOLIC BLOOD PRESSURE: 134 MMHG | HEART RATE: 80 BPM | WEIGHT: 130 LBS

## 2021-11-11 PROCEDURE — 99214 OFFICE O/P EST MOD 30 MIN: CPT

## 2021-11-11 NOTE — HISTORY OF PRESENT ILLNESS
[FreeTextEntry1] : Ms. Kim is an 86 year-old woman with a history of multiple medical problems including lung cancer status post resection and in remission, ischemic cardiomyopathy, stage 3 chronic kidney disease presenting for follow-up of primary hyperparathyroidism. She is accompanied by her home health aide. The visit was conducted in Swedish.\par \par Primary hyperparathyroidism. Compression fracture.\par She has a history of hypercalcemia within 1 mg/dL above the upper limit of normal in our system since June 2020; previously calcium at the upper end of normal. PTH was inappropriately normal, consistent with primary hyperparathyroidism. Renal function has been stable within stage 3 chronic kidney disease. Vitamin D has been replete.\par No history of kidney stones. Renal imaging in October 2020 without nephrolithiasis.\par T7 fracture noted on vertebral fracture assessment in October 2020. No known precipitating trauma; fracture not noted on previous chest imaging. \par She received zoledronic acid 5 mg IV in November 2020. \par She has up to half a glass of milk in her coffee and has an Ensure daily. She is taking a prescription multivitamin; unknown dose of calcium and vitamin D. She is not taking calcium or vitamin D supplements. \par No known family history of calcium disorders. No parental history of hip fracture.\par \par Interim History \par She received zoledronic acid 5 mg IV in November 2020. She had symptoms of an acute phase reaction after the infusion with resolution within a few days.\par She was in the emergency department in September for cough, pleuritic back pain, and shortness of breath. \par She may have had a fall a few weeks ago. \par She has no acute symptoms today. \par Medical and surgical history, medications, allergies, social and family history reviewed and updated as needed.

## 2021-11-11 NOTE — ASSESSMENT
[FreeTextEntry1] : Hypercalcemia/primary hyperparathyroidism. T7 compression fracture. She was first noted to have mild hypercalcemia in June 2020; serum calcium previously at the upper end of normal. She had hypercalcemia with inappropriately normal PTH concentrations consistent with primary hyperparathyroidism. She has a history of lung cancer and possible sarcoidosis, however, evaluation for other causes of hypercalcemia unremarkable, including PTHrP and 1,25-dihydroxyvitamin D levels. Serum calcium most recently at the upper end of normal. We discussed the complications of primary hyperparathyroidism, including but not limited to hypercalcemia, nephrolithiasis, and osteoporosis. She was noted to have a T7 compression fracture without known precipitating trauma. While her bone density is within range at all sites, an atraumatic compression fracture is indicative of osteoporosis. Without including her bone density, her 10 year fracture risk calculated by FRAX is 22% for major osteoporotic fracture and 12% for hip fracture, above the treatment thresholds. We discussed the potential benefits and risks of antiresorptive osteoporosis therapy at length, including but not limited to osteonecrosis of the jaw and atypical femoral fracture. She is amenable to a second dose of zoledronic acid. The below instructions were given regarding zoledronic acid infusion. We discussed the recommendations for calcium and vitamin D intake; there is no indication to restrict calcium intake in patients with primary hyperparathyroidism.\par Zoledronic acid 5 mg IV, second dose due\par Calcium 4025-7830 mg daily from diet and supplements (to be taken in divided doses as no more than 500-600 mg can be absorbed at one time); advised dietary calcium\par Continue current vitamin D regimen\par Diet, exercise, and fall prevention reviewed\par Physical therapy for balance and bone health; will inquire into home services\par \par Instructions for zoledronic acid:\par Drink at least 32 oz (~4 glasses) of water the day you have the infusion. If this is your first infusion, take acetaminophen (Tylenol) 500-1000 mg every 8 hours the day of and the day after your infusion, starting in the morning before coming to the hospital for your infusion. Be careful if you are taking other products with acetaminophen that you do not exceed the daily recommended dose.

## 2021-11-11 NOTE — PHYSICAL EXAM
[Alert] : alert [Healthy Appearance] : healthy appearance [No Acute Distress] : no acute distress [Normal Sclera/Conjunctiva] : normal sclera/conjunctiva [No Respiratory Distress] : no respiratory distress [No Spinal Tenderness] : no spinal tenderness [No Stigmata of Cushings Syndrome] : no stigmata of Cushings Syndrome [Normal Insight/Judgement] : insight and judgment were intact [Normal Hearing] : hearing was normal [Kyphosis] : no kyphosis present [Scoliosis] : no scoliosis [Acanthosis Nigricans] : no acanthosis nigricans [de-identified] : no moon facies, no supraclavicular fat pads [de-identified] : narrow-based gait with cane and assistance from her aide [de-identified] : + difficulty balancing on one leg bilaterally

## 2021-11-12 ENCOUNTER — APPOINTMENT (OUTPATIENT)
Dept: INTERNAL MEDICINE | Facility: CLINIC | Age: 86
End: 2021-11-12
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
## 2021-11-30 NOTE — ED ADULT NURSE NOTE - [facility] ED NUR SYMPTOMS POS
From: Sofia Dang  To: Reema Maloney  Sent: 11/30/2021 12:41 PM CST  Subject: Biopsy results     I received your voicemail but after reading the lab report is says that further biopsy is recommended because of the size of the speciman. Am I misreading that?    PAIN/FEVER/NAUSEA

## 2021-12-06 ENCOUNTER — APPOINTMENT (OUTPATIENT)
Dept: PULMONOLOGY | Facility: CLINIC | Age: 86
End: 2021-12-06

## 2021-12-10 ENCOUNTER — NON-APPOINTMENT (OUTPATIENT)
Age: 86
End: 2021-12-10

## 2021-12-10 ENCOUNTER — APPOINTMENT (OUTPATIENT)
Dept: OPHTHALMOLOGY | Facility: CLINIC | Age: 86
End: 2021-12-10
Payer: MEDICARE

## 2021-12-10 PROCEDURE — 92133 CPTRZD OPH DX IMG PST SGM ON: CPT

## 2021-12-10 PROCEDURE — 92014 COMPRE OPH EXAM EST PT 1/>: CPT

## 2021-12-14 ENCOUNTER — APPOINTMENT (OUTPATIENT)
Dept: INTERNAL MEDICINE | Facility: CLINIC | Age: 86
End: 2021-12-14
Payer: MEDICARE

## 2021-12-14 VITALS
WEIGHT: 130 LBS | BODY MASS INDEX: 20.4 KG/M2 | RESPIRATION RATE: 14 BRPM | OXYGEN SATURATION: 99 % | DIASTOLIC BLOOD PRESSURE: 74 MMHG | SYSTOLIC BLOOD PRESSURE: 120 MMHG | HEART RATE: 100 BPM | TEMPERATURE: 97.6 F | HEIGHT: 67 IN

## 2021-12-14 PROCEDURE — 99214 OFFICE O/P EST MOD 30 MIN: CPT | Mod: 25

## 2021-12-14 PROCEDURE — 36415 COLL VENOUS BLD VENIPUNCTURE: CPT

## 2021-12-14 NOTE — PHYSICAL EXAM
[Normal] : normal gait, coordination grossly intact, no focal deficits and deep tendon reflexes were 2+ and symmetric [FreeTextEntry1] : wound noted in anal area

## 2021-12-14 NOTE — ASSESSMENT
[FreeTextEntry1] : All medications reviewed with aide using ;\par I renewed three medication;\par Will also get labs;\par Will discuss [facility] regarding wound care

## 2021-12-14 NOTE — HISTORY OF PRESENT ILLNESS
[FreeTextEntry1] : Was on the phone [facility]; APS was at the home;\par There is a wound in the anal area\par  line used for visit [de-identified] : Needs wound care\par Respiratory status has been stable \par Some mucus at night\par Appears awake and alert

## 2021-12-15 LAB
BASOPHILS # BLD AUTO: 0.04 K/UL
BASOPHILS NFR BLD AUTO: 0.6 %
EOSINOPHIL # BLD AUTO: 0.08 K/UL
EOSINOPHIL NFR BLD AUTO: 1.1 %
ESTIMATED AVERAGE GLUCOSE: 114 MG/DL
HBA1C MFR BLD HPLC: 5.6 %
HCT VFR BLD CALC: 40.3 %
HGB BLD-MCNC: 12.5 G/DL
IMM GRANULOCYTES NFR BLD AUTO: 0.1 %
LYMPHOCYTES # BLD AUTO: 1.99 K/UL
LYMPHOCYTES NFR BLD AUTO: 27.5 %
MAN DIFF?: NORMAL
MCHC RBC-ENTMCNC: 31 GM/DL
MCHC RBC-ENTMCNC: 31.7 PG
MCV RBC AUTO: 102.3 FL
MONOCYTES # BLD AUTO: 0.6 K/UL
MONOCYTES NFR BLD AUTO: 8.3 %
NEUTROPHILS # BLD AUTO: 4.51 K/UL
NEUTROPHILS NFR BLD AUTO: 62.4 %
PLATELET # BLD AUTO: 311 K/UL
RBC # BLD: 3.94 M/UL
RBC # FLD: 14.6 %
WBC # FLD AUTO: 7.23 K/UL

## 2021-12-20 ENCOUNTER — APPOINTMENT (OUTPATIENT)
Dept: PULMONOLOGY | Facility: CLINIC | Age: 86
End: 2021-12-20
Payer: MEDICARE

## 2021-12-20 VITALS
TEMPERATURE: 98.6 F | DIASTOLIC BLOOD PRESSURE: 74 MMHG | OXYGEN SATURATION: 98 % | SYSTOLIC BLOOD PRESSURE: 114 MMHG | HEART RATE: 94 BPM | WEIGHT: 124 LBS | BODY MASS INDEX: 19.46 KG/M2 | HEIGHT: 67 IN

## 2021-12-20 PROCEDURE — 99214 OFFICE O/P EST MOD 30 MIN: CPT

## 2021-12-20 NOTE — HISTORY OF PRESENT ILLNESS
[Never] : never [TextBox_4] : The daughter stated that she has a cough with green sputum.  Her breathing is about the same.  She occasionally wheezing.  She is still taken inhalers.

## 2021-12-20 NOTE — REVIEW OF SYSTEMS
[Negative] : Endocrine [Cough] : cough [Hemoptysis] : no hemoptysis [Chest Tightness] : no chest tightness [Frequent URIs] : no frequent URIs [Sputum] : sputum [Dyspnea] : no dyspnea [Pleuritic Pain] : no pleuritic pain [Wheezing] : no wheezing [A.M. Dry Mouth] : no a.m. dry mouth [SOB on Exertion] : sob on exertion

## 2021-12-24 LAB
ALBUMIN SERPL ELPH-MCNC: 4 G/DL
ALP BLD-CCNC: 100 U/L
ALT SERPL-CCNC: 13 U/L
ANION GAP SERPL CALC-SCNC: 17 MMOL/L
AST SERPL-CCNC: 21 U/L
BILIRUB SERPL-MCNC: 0.4 MG/DL
BUN SERPL-MCNC: 14 MG/DL
CALCIUM SERPL-MCNC: 10.6 MG/DL
CHLORIDE SERPL-SCNC: 108 MMOL/L
CO2 SERPL-SCNC: 16 MMOL/L
CREAT SERPL-MCNC: 1.01 MG/DL
GLUCOSE SERPL-MCNC: 95 MG/DL
POTASSIUM SERPL-SCNC: 5.7 MMOL/L
PROT SERPL-MCNC: 7.6 G/DL
SODIUM SERPL-SCNC: 141 MMOL/L

## 2022-01-13 ENCOUNTER — APPOINTMENT (OUTPATIENT)
Dept: INTERNAL MEDICINE | Facility: CLINIC | Age: 87
End: 2022-01-13
Payer: MEDICARE

## 2022-01-13 VITALS
SYSTOLIC BLOOD PRESSURE: 148 MMHG | DIASTOLIC BLOOD PRESSURE: 87 MMHG | OXYGEN SATURATION: 100 % | HEART RATE: 101 BPM | HEIGHT: 67 IN | WEIGHT: 122 LBS | TEMPERATURE: 98.6 F | BODY MASS INDEX: 19.15 KG/M2

## 2022-01-13 DIAGNOSIS — R19.7 DIARRHEA, UNSPECIFIED: ICD-10-CM

## 2022-01-13 DIAGNOSIS — S31.809S UNSPECIFIED OPEN WOUND OF UNSPECIFIED BUTTOCK, SEQUELA: ICD-10-CM

## 2022-01-13 PROCEDURE — 99213 OFFICE O/P EST LOW 20 MIN: CPT

## 2022-01-13 NOTE — HISTORY OF PRESENT ILLNESS
[FreeTextEntry1] : Respiratory status stable  [de-identified] : No shortness of breath\par Appetite improved; \par Stop Folic Acid; Causing Nausea

## 2022-01-13 NOTE — PLAN
[FreeTextEntry1] : Appears stable;\par Will have wound care see patient\par Discuss with [facility]\par Bacitracin ordered for wound

## 2022-01-13 NOTE — PHYSICAL EXAM
[No Acute Distress] : no acute distress [Well Nourished] : well nourished [Well Developed] : well developed [Well-Appearing] : well-appearing [Normal Sclera/Conjunctiva] : normal sclera/conjunctiva [PERRL] : pupils equal round and reactive to light [EOMI] : extraocular movements intact [Normal Outer Ear/Nose] : the outer ears and nose were normal in appearance [Normal Oropharynx] : the oropharynx was normal [No JVD] : no jugular venous distention [No Lymphadenopathy] : no lymphadenopathy [Supple] : supple [Thyroid Normal, No Nodules] : the thyroid was normal and there were no nodules present [No Respiratory Distress] : no respiratory distress  [No Accessory Muscle Use] : no accessory muscle use [Clear to Auscultation] : lungs were clear to auscultation bilaterally [Normal Rate] : normal rate  [Regular Rhythm] : with a regular rhythm [Normal S1, S2] : normal S1 and S2 [No Murmur] : no murmur heard [No Carotid Bruits] : no carotid bruits [No Abdominal Bruit] : a ~M bruit was not heard ~T in the abdomen [No Varicosities] : no varicosities [Pedal Pulses Present] : the pedal pulses are present [No Edema] : there was no peripheral edema [No Palpable Aorta] : no palpable aorta [No Extremity Clubbing/Cyanosis] : no extremity clubbing/cyanosis [Soft] : abdomen soft [Non Tender] : non-tender [Non-distended] : non-distended [No Masses] : no abdominal mass palpated [No HSM] : no HSM [Normal Bowel Sounds] : normal bowel sounds [Normal Posterior Cervical Nodes] : no posterior cervical lymphadenopathy [Normal Anterior Cervical Nodes] : no anterior cervical lymphadenopathy [No CVA Tenderness] : no CVA  tenderness [No Spinal Tenderness] : no spinal tenderness [No Joint Swelling] : no joint swelling [Grossly Normal Strength/Tone] : grossly normal strength/tone [No Rash] : no rash [Coordination Grossly Intact] : coordination grossly intact [No Focal Deficits] : no focal deficits [Normal Gait] : normal gait [Deep Tendon Reflexes (DTR)] : deep tendon reflexes were 2+ and symmetric [Normal Affect] : the affect was normal [Normal Insight/Judgement] : insight and judgment were intact [de-identified] : Wound buttock

## 2022-02-14 ENCOUNTER — APPOINTMENT (OUTPATIENT)
Dept: PULMONOLOGY | Facility: CLINIC | Age: 87
End: 2022-02-14
Payer: MEDICARE

## 2022-02-14 VITALS
HEART RATE: 88 BPM | BODY MASS INDEX: 19.46 KG/M2 | HEIGHT: 67 IN | RESPIRATION RATE: 12 BRPM | WEIGHT: 124 LBS | TEMPERATURE: 97.1 F | SYSTOLIC BLOOD PRESSURE: 142 MMHG | OXYGEN SATURATION: 98 % | DIASTOLIC BLOOD PRESSURE: 94 MMHG

## 2022-02-14 PROCEDURE — 99214 OFFICE O/P EST MOD 30 MIN: CPT

## 2022-02-14 NOTE — HISTORY OF PRESENT ILLNESS
[TextBox_4] : she is coughing and more than before.  The sputum is yellow.  She is not sob.  NO wheezing.  Appetite is good.  No chest pain.  She uses the nebulizer once or twice a week.  She suffers from palpitation with the nebs.

## 2022-02-14 NOTE — REVIEW OF SYSTEMS
[Cough] : cough [Hemoptysis] : no hemoptysis [Chest Tightness] : no chest tightness [Frequent URIs] : no frequent URIs [Sputum] : sputum [Dyspnea] : no dyspnea [Pleuritic Pain] : no pleuritic pain [Wheezing] : no wheezing [A.M. Dry Mouth] : no a.m. dry mouth [SOB on Exertion] : sob on exertion [Negative] : Endocrine

## 2022-02-14 NOTE — ASSESSMENT
[FreeTextEntry1] : Bacterial pneumonia status post acute exacerbation of bronchiectasis\par \par The patient exam was consistent with the increase in the bilateral rhonchi.  Patient had a productive cough after chest PT and had a on 2010 cc of purulent brownish secretion.  The sputum was sent for culture.  I started the patient on antibiotic.  I informed the daughter that I will follow-up on the culture might need to adjust antibiotic we will also indicated to her that she to update me on her condition if she is not improving she will require admission\par \par Lung cancer\par \par No evidence of recurrence at this point\par \par The family will fill DO NOT RESUSCITATE order

## 2022-02-15 RX ORDER — LANOLIN ALCOHOL/MO/W.PET/CERES
1 CREAM (GRAM) TOPICAL
Qty: 0 | Refills: 0 | DISCHARGE

## 2022-02-15 RX ORDER — ATENOLOL 25 MG/1
1 TABLET ORAL
Qty: 0 | Refills: 0 | DISCHARGE

## 2022-02-15 RX ORDER — PANTOPRAZOLE SODIUM 20 MG/1
1 TABLET, DELAYED RELEASE ORAL
Qty: 0 | Refills: 0 | DISCHARGE

## 2022-02-15 RX ORDER — ASPIRIN/CALCIUM CARB/MAGNESIUM 324 MG
1 TABLET ORAL
Qty: 0 | Refills: 0 | DISCHARGE

## 2022-02-15 RX ORDER — ATORVASTATIN CALCIUM 80 MG/1
1 TABLET, FILM COATED ORAL
Qty: 0 | Refills: 0 | DISCHARGE

## 2022-02-15 RX ORDER — APIXABAN 2.5 MG/1
1 TABLET, FILM COATED ORAL
Qty: 0 | Refills: 0 | DISCHARGE

## 2022-02-15 RX ORDER — FOLIC ACID 0.8 MG
1 TABLET ORAL
Qty: 0 | Refills: 0 | DISCHARGE

## 2022-02-15 NOTE — ASSESSMENT
[FreeTextEntry1] : Primary hyperparathyroidism. She was first noted to have mild hypercalcemia in June 2020; serum calcium previously at the upper end of normal. She has hypercalcemia with inappropriately normal PTH concentrations consistent with primary hyperparathyroidism. She has a history of lung cancer and possible sarcoidosis, however, evaluation for other causes of hypercalcemia unremarkable, including PTHrP and 1,25-dihydroxyvitamin D levels. We discussed the complications of primary hyperparathyroidism, including but not limited to hypercalcemia, nephrolithiasis, and osteoporosis. We discussed renal ultrasound and bone density testing for further evaluation. She meets criteria for parathyroid surgery due to decreased renal function, however, she is not an ideal surgical candidate due to comorbidities and declines further consideration of surgery at this time. We discussed the recommendations for calcium and vitamin D intake; there is no indication to restrict calcium intake in patients with primary hyperparathyroidism.\par Renal ultrasound to evaluate for nephrolithiasis\par Bone density testing with vertebral fracture assessment to evaluate for osteoporosis and morphometric vertebral fracture\par Calcium 6891-5321 mg daily from diet and supplements (to be taken in divided doses as no more than 500-600 mg can be absorbed at one time); advised dietary calcium\par Continue current vitamin D regimen
Statement Selected

## 2022-02-16 ENCOUNTER — APPOINTMENT (OUTPATIENT)
Dept: INTERNAL MEDICINE | Facility: CLINIC | Age: 87
End: 2022-02-16
Payer: MEDICARE

## 2022-02-16 VITALS
WEIGHT: 124 LBS | RESPIRATION RATE: 14 BRPM | HEIGHT: 67 IN | SYSTOLIC BLOOD PRESSURE: 149 MMHG | DIASTOLIC BLOOD PRESSURE: 84 MMHG | HEART RATE: 89 BPM | TEMPERATURE: 97.4 F | OXYGEN SATURATION: 99 % | BODY MASS INDEX: 19.46 KG/M2

## 2022-02-16 PROCEDURE — 36415 COLL VENOUS BLD VENIPUNCTURE: CPT

## 2022-02-16 PROCEDURE — 99213 OFFICE O/P EST LOW 20 MIN: CPT | Mod: 25

## 2022-02-16 NOTE — HISTORY OF PRESENT ILLNESS
[FreeTextEntry1] : Dr. Sinclair follow up noted; \par Respiratory status has been stable  [de-identified] : Medication noted\par May need medication refilled\par Some cough \par Sore on Buttock healed \par

## 2022-02-16 NOTE — PHYSICAL EXAM
[No Acute Distress] : no acute distress [Well Nourished] : well nourished [Well Developed] : well developed [Well-Appearing] : well-appearing [Normal Sclera/Conjunctiva] : normal sclera/conjunctiva [PERRL] : pupils equal round and reactive to light [EOMI] : extraocular movements intact [Normal Outer Ear/Nose] : the outer ears and nose were normal in appearance [Normal Oropharynx] : the oropharynx was normal [No JVD] : no jugular venous distention [No Lymphadenopathy] : no lymphadenopathy [Supple] : supple [No Respiratory Distress] : no respiratory distress  [Thyroid Normal, No Nodules] : the thyroid was normal and there were no nodules present [No Accessory Muscle Use] : no accessory muscle use [Clear to Auscultation] : lungs were clear to auscultation bilaterally [Normal Rate] : normal rate  [Regular Rhythm] : with a regular rhythm [Normal S1, S2] : normal S1 and S2 [No Murmur] : no murmur heard [No Carotid Bruits] : no carotid bruits [No Abdominal Bruit] : a ~M bruit was not heard ~T in the abdomen [No Varicosities] : no varicosities [Pedal Pulses Present] : the pedal pulses are present [No Edema] : there was no peripheral edema [No Extremity Clubbing/Cyanosis] : no extremity clubbing/cyanosis [No Palpable Aorta] : no palpable aorta [Soft] : abdomen soft [Non Tender] : non-tender [Non-distended] : non-distended [No Masses] : no abdominal mass palpated [No HSM] : no HSM [Normal Bowel Sounds] : normal bowel sounds [Normal Posterior Cervical Nodes] : no posterior cervical lymphadenopathy [Normal Anterior Cervical Nodes] : no anterior cervical lymphadenopathy [No CVA Tenderness] : no CVA  tenderness [No Spinal Tenderness] : no spinal tenderness [No Joint Swelling] : no joint swelling [Grossly Normal Strength/Tone] : grossly normal strength/tone [No Rash] : no rash [Coordination Grossly Intact] : coordination grossly intact [No Focal Deficits] : no focal deficits [Normal Gait] : normal gait [Deep Tendon Reflexes (DTR)] : deep tendon reflexes were 2+ and symmetric [Normal Affect] : the affect was normal [Normal Insight/Judgement] : insight and judgment were intact

## 2022-02-17 PROBLEM — I10 ESSENTIAL (PRIMARY) HYPERTENSION: Chronic | Status: ACTIVE | Noted: 2021-10-16

## 2022-02-17 PROBLEM — I49.5 SICK SINUS SYNDROME: Chronic | Status: ACTIVE | Noted: 2021-10-16

## 2022-02-17 PROBLEM — E78.5 HYPERLIPIDEMIA, UNSPECIFIED: Chronic | Status: ACTIVE | Noted: 2021-10-16

## 2022-02-17 PROBLEM — K21.9 GASTRO-ESOPHAGEAL REFLUX DISEASE WITHOUT ESOPHAGITIS: Chronic | Status: ACTIVE | Noted: 2021-10-16

## 2022-02-17 PROBLEM — F03.90 UNSPECIFIED DEMENTIA, UNSPECIFIED SEVERITY, WITHOUT BEHAVIORAL DISTURBANCE, PSYCHOTIC DISTURBANCE, MOOD DISTURBANCE, AND ANXIETY: Chronic | Status: ACTIVE | Noted: 2021-10-16

## 2022-02-17 PROBLEM — D86.9 SARCOIDOSIS, UNSPECIFIED: Chronic | Status: ACTIVE | Noted: 2021-10-16

## 2022-02-17 PROBLEM — I26.99 OTHER PULMONARY EMBOLISM WITHOUT ACUTE COR PULMONALE: Chronic | Status: ACTIVE | Noted: 2021-10-16

## 2022-02-17 PROBLEM — F03.90 UNSPECIFIED DEMENTIA WITHOUT BEHAVIORAL DISTURBANCE: Chronic | Status: ACTIVE | Noted: 2021-10-16

## 2022-02-17 LAB
ALBUMIN SERPL ELPH-MCNC: 4 G/DL
ALP BLD-CCNC: 114 U/L
ALT SERPL-CCNC: 16 U/L
ANION GAP SERPL CALC-SCNC: 12 MMOL/L
AST SERPL-CCNC: 22 U/L
BACTERIA SPT CULT: ABNORMAL
BASOPHILS # BLD AUTO: 0.02 K/UL
BASOPHILS NFR BLD AUTO: 0.3 %
BILIRUB SERPL-MCNC: 0.3 MG/DL
BUN SERPL-MCNC: 28 MG/DL
CALCIUM SERPL-MCNC: 10.4 MG/DL
CHLORIDE SERPL-SCNC: 110 MMOL/L
CHOLEST SERPL-MCNC: 139 MG/DL
CO2 SERPL-SCNC: 22 MMOL/L
CREAT SERPL-MCNC: 1.18 MG/DL
EOSINOPHIL # BLD AUTO: 0.05 K/UL
EOSINOPHIL NFR BLD AUTO: 0.8 %
ESTIMATED AVERAGE GLUCOSE: 123 MG/DL
GLUCOSE SERPL-MCNC: 89 MG/DL
HBA1C MFR BLD HPLC: 5.9 %
HCT VFR BLD CALC: 43.8 %
HDLC SERPL-MCNC: 62 MG/DL
HGB BLD-MCNC: 13.7 G/DL
IMM GRANULOCYTES NFR BLD AUTO: 0.2 %
LDLC SERPL CALC-MCNC: 67 MG/DL
LYMPHOCYTES # BLD AUTO: 1.62 K/UL
LYMPHOCYTES NFR BLD AUTO: 25.6 %
MAN DIFF?: NORMAL
MCHC RBC-ENTMCNC: 31.2 PG
MCHC RBC-ENTMCNC: 31.3 GM/DL
MCV RBC AUTO: 99.8 FL
MONOCYTES # BLD AUTO: 0.79 K/UL
MONOCYTES NFR BLD AUTO: 12.5 %
NEUTROPHILS # BLD AUTO: 3.85 K/UL
NEUTROPHILS NFR BLD AUTO: 60.6 %
NONHDLC SERPL-MCNC: 77 MG/DL
PLATELET # BLD AUTO: 277 K/UL
POTASSIUM SERPL-SCNC: 5.7 MMOL/L
PROT SERPL-MCNC: 7.3 G/DL
RBC # BLD: 4.39 M/UL
RBC # FLD: 14.6 %
SODIUM SERPL-SCNC: 144 MMOL/L
T4 FREE SERPL-MCNC: 1.5 NG/DL
TRIGL SERPL-MCNC: 49 MG/DL
WBC # FLD AUTO: 6.34 K/UL

## 2022-02-17 NOTE — ED ADULT NURSE NOTE - NS ED NURSE IV DC DT
PFT PROCEDURE EXPLAINED AND DEMONSTRATED. GOOD PATIENT EFFORT AND COOPERATION. ALBUTEROL ADMINISTERED AND TEST REPEATED. TOLERATED WELL. D/C IN GOOD CONDITION.      12-Mar-2018 17:50

## 2022-02-17 NOTE — REVIEW OF SYSTEMS
Patient seen in follow-up.  Was seen late 2021 for weight loss and screening colonoscopy.  EGD with H. pylori gastritis.  Prescribed antibiotics however patient states he is not sure he took days.  He denies any current upper GI symptoms.  Colonoscopy was diverticulosis, internal hemorrhoids and suboptimal prep.  Repeat colonoscopy in 5 years.  Currently asymptomatic.  Weight is stable [Fatigue] : fatigue [Poor Appetite] : poor appetite [Recent Wt Loss (___ Lbs)] : ~T recent [unfilled] lb weight loss [Cough] : cough [Hemoptysis] : hemoptysis [Sputum] : sputum [Wheezing] : wheezing [SOB on Exertion] : sob on exertion [Negative] : Endocrine [Fever] : no fever [Chills] : no chills [Chest Tightness] : no chest tightness [Frequent URIs] : no frequent URIs [Dyspnea] : no dyspnea [Pleuritic Pain] : no pleuritic pain [A.M. Dry Mouth] : no a.m. dry mouth

## 2022-02-24 NOTE — ED ADULT NURSE NOTE - SUICIDE SCREENING QUESTION 2
No Cheiloplasty (Less Than 50%) Text: A decision was made to reconstruct the defect with a  cheiloplasty.  The defect was undermined extensively.  Additional obicularis oris muscle was excised with a 15c blade scalpel.  The defect was converted into a full thickness wedge, of less than 50% of the vertical height of the lip, to facilite a better cosmetic result.  Small vessels were then tied off with 5-0 monocyrl. The obicularis oris, superficial fascia, adipose and dermis were then reapproximated.  After the deeper layers were approximated the epidermis was reapproximated with particular care given to realign the vermilion border.

## 2022-03-08 NOTE — ED PROVIDER NOTE - CONDITION AT DISCHARGE:
Satisfactory Minoxidil Counseling: Minoxidil is a topical medication which can increase blood flow where it is applied. It is uncertain how this medication increases hair growth. Side effects are uncommon and include stinging and allergic reactions.

## 2022-04-06 ENCOUNTER — APPOINTMENT (OUTPATIENT)
Dept: INTERNAL MEDICINE | Facility: CLINIC | Age: 87
End: 2022-04-06
Payer: MEDICARE

## 2022-04-06 VITALS
TEMPERATURE: 97.5 F | DIASTOLIC BLOOD PRESSURE: 82 MMHG | WEIGHT: 122 LBS | OXYGEN SATURATION: 99 % | HEART RATE: 98 BPM | HEIGHT: 67 IN | SYSTOLIC BLOOD PRESSURE: 123 MMHG | BODY MASS INDEX: 19.15 KG/M2

## 2022-04-06 PROCEDURE — 99213 OFFICE O/P EST LOW 20 MIN: CPT

## 2022-04-06 NOTE — HEALTH RISK ASSESSMENT
[Former] : Former [No] : No [No falls in past year] : Patient reported no falls in the past year [0] : 2) Feeling down, depressed, or hopeless: Not at all (0) [[id number]] : 0

## 2022-04-08 ENCOUNTER — APPOINTMENT (OUTPATIENT)
Dept: OPHTHALMOLOGY | Facility: CLINIC | Age: 87
End: 2022-04-08
Payer: MEDICARE

## 2022-04-08 ENCOUNTER — NON-APPOINTMENT (OUTPATIENT)
Age: 87
End: 2022-04-08

## 2022-04-08 PROCEDURE — 92014 COMPRE OPH EXAM EST PT 1/>: CPT

## 2022-04-08 PROCEDURE — 92133 CPTRZD OPH DX IMG PST SGM ON: CPT

## 2022-04-11 RX ORDER — BACITRACIN 500 [IU]/G
500 OINTMENT TOPICAL 3 TIMES DAILY
Qty: 30 | Refills: 0 | Status: ACTIVE | COMMUNITY
Start: 2022-01-13 | End: 1900-01-01

## 2022-04-29 ENCOUNTER — APPOINTMENT (OUTPATIENT)
Dept: PULMONOLOGY | Facility: CLINIC | Age: 87
End: 2022-04-29
Payer: MEDICARE

## 2022-04-29 PROCEDURE — 99443: CPT | Mod: 95

## 2022-04-29 NOTE — REASON FOR VISIT
[Home] : at home, [unfilled] , at the time of the visit. [Medical Office: ([facility])___] : at the medical office located in  [Family Member] : family member [Verbal consent obtained from patient] : the patient, [unfilled]

## 2022-04-29 NOTE — ASSESSMENT
[FreeTextEntry1] : \par \par Bronchiectasis\par \par The patient had frequent episode of bacterial pneumonia especially involving the right middle lobe.  Patient immunocompromised.  I will start the patient on antibiotic.  Informed the daughter to call me if there is no change in her status

## 2022-04-29 NOTE — HISTORY OF PRESENT ILLNESS
[Never] : never [TextBox_4] : Complaining of increase in the cough and is productive with brown secretion.  She is sleeping well her appetite is about the same no fever and her shortness of breath is at baseline

## 2022-05-18 ENCOUNTER — APPOINTMENT (OUTPATIENT)
Dept: INTERNAL MEDICINE | Facility: CLINIC | Age: 87
End: 2022-05-18

## 2022-05-25 NOTE — DIETITIAN INITIAL EVALUATION ADULT. - PERTINENT MEDS FT
Patient called to make an appointment to see an eye doctor. He does not have a referral. Explained to patient that he has medicaid and that [facility] is out of network if he feels he needs eye glasses and he could end up with a large out of pocket expense. Patient is unsure if he would need eyeglasses, he stated weird things are happening with his eyes. He will see his PCP to see what he thinks, and possibly get a referral for Ophthalmology. He will also check with his insurance to see who is in his network.   MVI, megace

## 2022-06-08 ENCOUNTER — APPOINTMENT (OUTPATIENT)
Dept: INTERNAL MEDICINE | Facility: CLINIC | Age: 87
End: 2022-06-08

## 2022-07-06 ENCOUNTER — APPOINTMENT (OUTPATIENT)
Dept: INTERNAL MEDICINE | Facility: CLINIC | Age: 87
End: 2022-07-06

## 2022-07-06 VITALS
WEIGHT: 126 LBS | HEART RATE: 97 BPM | BODY MASS INDEX: 19.78 KG/M2 | SYSTOLIC BLOOD PRESSURE: 137 MMHG | OXYGEN SATURATION: 99 % | DIASTOLIC BLOOD PRESSURE: 77 MMHG | TEMPERATURE: 97.5 F | HEIGHT: 67 IN

## 2022-07-06 DIAGNOSIS — M53.3 SACROCOCCYGEAL DISORDERS, NOT ELSEWHERE CLASSIFIED: ICD-10-CM

## 2022-07-06 PROCEDURE — 36415 COLL VENOUS BLD VENIPUNCTURE: CPT

## 2022-07-06 PROCEDURE — 99213 OFFICE O/P EST LOW 20 MIN: CPT | Mod: 25

## 2022-07-06 NOTE — HEALTH RISK ASSESSMENT
[Former] : Former [No] : No [No falls in past year] : Patient reported no falls in the past year [0] : 2) Feeling down, depressed, or hopeless: Not at all (0) [[id number]] : 0

## 2022-07-06 NOTE — PHYSICAL EXAM
[Normal] : no joint swelling and grossly normal strength and tone [de-identified] : decreased breath sounds [de-identified] : Murmur

## 2022-07-06 NOTE — ASSESSMENT
[FreeTextEntry1] : exam appears stable at present time;\par All medication renewed;\par Will get labs\par RTC 2 months

## 2022-07-06 NOTE — HISTORY OF PRESENT ILLNESS
[FreeTextEntry1] :  used for visit;\par Has a lot of sputum production;\par Has had two courses of antibiotics as per Dr Sinclair [de-identified] : Presently she is not taking any antibiotics\par Respiratory status is stable;\par

## 2022-07-06 NOTE — REVIEW OF SYSTEMS
[Fatigue] : fatigue [Negative] : Gastrointestinal [FreeTextEntry9] : Buttock pain [de-identified] : Buttock pain

## 2022-07-07 LAB
ALBUMIN SERPL ELPH-MCNC: 4.2 G/DL
ALP BLD-CCNC: 107 U/L
ALT SERPL-CCNC: 15 U/L
ANION GAP SERPL CALC-SCNC: 11 MMOL/L
AST SERPL-CCNC: 19 U/L
BASOPHILS # BLD AUTO: 0.02 K/UL
BASOPHILS NFR BLD AUTO: 0.3 %
BILIRUB SERPL-MCNC: 0.6 MG/DL
BUN SERPL-MCNC: 14 MG/DL
CALCIUM SERPL-MCNC: 10.5 MG/DL
CHLORIDE SERPL-SCNC: 106 MMOL/L
CHOLEST SERPL-MCNC: 139 MG/DL
CO2 SERPL-SCNC: 22 MMOL/L
CREAT SERPL-MCNC: 1.02 MG/DL
EGFR: 53 ML/MIN/1.73M2
EOSINOPHIL # BLD AUTO: 0.06 K/UL
EOSINOPHIL NFR BLD AUTO: 0.9 %
ESTIMATED AVERAGE GLUCOSE: 123 MG/DL
GLUCOSE SERPL-MCNC: 89 MG/DL
HBA1C MFR BLD HPLC: 5.9 %
HCT VFR BLD CALC: 43.2 %
HDLC SERPL-MCNC: 71 MG/DL
HGB BLD-MCNC: 13.5 G/DL
IMM GRANULOCYTES NFR BLD AUTO: 0.3 %
LDLC SERPL CALC-MCNC: 59 MG/DL
LYMPHOCYTES # BLD AUTO: 1.78 K/UL
LYMPHOCYTES NFR BLD AUTO: 28.1 %
MAN DIFF?: NORMAL
MCHC RBC-ENTMCNC: 31.3 GM/DL
MCHC RBC-ENTMCNC: 31.5 PG
MCV RBC AUTO: 100.7 FL
MONOCYTES # BLD AUTO: 0.76 K/UL
MONOCYTES NFR BLD AUTO: 12 %
NEUTROPHILS # BLD AUTO: 3.7 K/UL
NEUTROPHILS NFR BLD AUTO: 58.4 %
NONHDLC SERPL-MCNC: 67 MG/DL
PLATELET # BLD AUTO: 257 K/UL
POTASSIUM SERPL-SCNC: 4.4 MMOL/L
PROT SERPL-MCNC: 7.5 G/DL
RBC # BLD: 4.29 M/UL
RBC # FLD: 16.9 %
SODIUM SERPL-SCNC: 139 MMOL/L
T4 FREE SERPL-MCNC: 1.5 NG/DL
TRIGL SERPL-MCNC: 42 MG/DL
TSH SERPL-ACNC: 1.64 UIU/ML
WBC # FLD AUTO: 6.34 K/UL

## 2022-07-13 NOTE — PATIENT PROFILE ADULT - INTERPRETATION SERVICES DECLINED
Marcy Langford from radiology called stating there is significant findings in pt ct dcan from 7/7 Patient/Caregiver requests family/friend to interpret.

## 2022-07-26 ENCOUNTER — APPOINTMENT (OUTPATIENT)
Dept: INTERNAL MEDICINE | Facility: CLINIC | Age: 87
End: 2022-07-26

## 2022-08-02 ENCOUNTER — INPATIENT (INPATIENT)
Facility: HOSPITAL | Age: 87
LOS: 0 days | Discharge: HOME HEALTH | DRG: 177 | End: 2022-08-03
Attending: INTERNAL MEDICINE | Admitting: INTERNAL MEDICINE
Payer: COMMERCIAL

## 2022-08-02 VITALS
SYSTOLIC BLOOD PRESSURE: 132 MMHG | DIASTOLIC BLOOD PRESSURE: 60 MMHG | WEIGHT: 119.93 LBS | HEART RATE: 85 BPM | RESPIRATION RATE: 17 BRPM | TEMPERATURE: 100 F | HEIGHT: 62 IN

## 2022-08-02 DIAGNOSIS — Z95.0 PRESENCE OF CARDIAC PACEMAKER: Chronic | ICD-10-CM

## 2022-08-02 DIAGNOSIS — Z98.890 OTHER SPECIFIED POSTPROCEDURAL STATES: Chronic | ICD-10-CM

## 2022-08-02 DIAGNOSIS — H26.9 UNSPECIFIED CATARACT: Chronic | ICD-10-CM

## 2022-08-02 DIAGNOSIS — C34.90 MALIGNANT NEOPLASM OF UNSPECIFIED PART OF UNSPECIFIED BRONCHUS OR LUNG: Chronic | ICD-10-CM

## 2022-08-02 LAB
ALBUMIN SERPL ELPH-MCNC: 3.6 G/DL — SIGNIFICANT CHANGE UP (ref 3.3–5)
ALP SERPL-CCNC: 105 U/L — SIGNIFICANT CHANGE UP (ref 40–120)
ALT FLD-CCNC: 13 U/L — SIGNIFICANT CHANGE UP (ref 10–45)
ANION GAP SERPL CALC-SCNC: 9 MMOL/L — SIGNIFICANT CHANGE UP (ref 5–17)
ANISOCYTOSIS BLD QL: SLIGHT — SIGNIFICANT CHANGE UP
APPEARANCE UR: CLEAR — SIGNIFICANT CHANGE UP
APTT BLD: 31.2 SEC — SIGNIFICANT CHANGE UP (ref 27.5–35.5)
AST SERPL-CCNC: 25 U/L — SIGNIFICANT CHANGE UP (ref 10–40)
BACTERIA # UR AUTO: PRESENT /HPF
BASOPHILS # BLD AUTO: 0 K/UL — SIGNIFICANT CHANGE UP (ref 0–0.2)
BASOPHILS NFR BLD AUTO: 0 % — SIGNIFICANT CHANGE UP (ref 0–2)
BILIRUB SERPL-MCNC: 0.6 MG/DL — SIGNIFICANT CHANGE UP (ref 0.2–1.2)
BILIRUB UR-MCNC: NEGATIVE — SIGNIFICANT CHANGE UP
BUN SERPL-MCNC: 15 MG/DL — SIGNIFICANT CHANGE UP (ref 7–23)
BURR CELLS BLD QL SMEAR: PRESENT — SIGNIFICANT CHANGE UP
CALCIUM SERPL-MCNC: 10 MG/DL — SIGNIFICANT CHANGE UP (ref 8.4–10.5)
CHLORIDE SERPL-SCNC: 106 MMOL/L — SIGNIFICANT CHANGE UP (ref 96–108)
CO2 SERPL-SCNC: 22 MMOL/L — SIGNIFICANT CHANGE UP (ref 22–31)
COLOR SPEC: YELLOW — SIGNIFICANT CHANGE UP
CREAT SERPL-MCNC: 1 MG/DL — SIGNIFICANT CHANGE UP (ref 0.5–1.3)
DACRYOCYTES BLD QL SMEAR: SLIGHT — SIGNIFICANT CHANGE UP
DIFF PNL FLD: NEGATIVE — SIGNIFICANT CHANGE UP
EGFR: 55 ML/MIN/1.73M2 — LOW
EOSINOPHIL # BLD AUTO: 0 K/UL — SIGNIFICANT CHANGE UP (ref 0–0.5)
EOSINOPHIL NFR BLD AUTO: 0 % — SIGNIFICANT CHANGE UP (ref 0–6)
EPI CELLS # UR: SIGNIFICANT CHANGE UP /HPF (ref 0–5)
GIANT PLATELETS BLD QL SMEAR: PRESENT — SIGNIFICANT CHANGE UP
GLUCOSE SERPL-MCNC: 99 MG/DL — SIGNIFICANT CHANGE UP (ref 70–99)
GLUCOSE UR QL: NEGATIVE — SIGNIFICANT CHANGE UP
HCT VFR BLD CALC: 39 % — SIGNIFICANT CHANGE UP (ref 34.5–45)
HGB BLD-MCNC: 13 G/DL — SIGNIFICANT CHANGE UP (ref 11.5–15.5)
INR BLD: 1.94 — HIGH (ref 0.88–1.16)
KETONES UR-MCNC: NEGATIVE — SIGNIFICANT CHANGE UP
LEUKOCYTE ESTERASE UR-ACNC: ABNORMAL
LYMPHOCYTES # BLD AUTO: 0.34 K/UL — LOW (ref 1–3.3)
LYMPHOCYTES # BLD AUTO: 7.1 % — LOW (ref 13–44)
MACROCYTES BLD QL: SLIGHT — SIGNIFICANT CHANGE UP
MANUAL SMEAR VERIFICATION: SIGNIFICANT CHANGE UP
MCHC RBC-ENTMCNC: 32.2 PG — SIGNIFICANT CHANGE UP (ref 27–34)
MCHC RBC-ENTMCNC: 33.3 GM/DL — SIGNIFICANT CHANGE UP (ref 32–36)
MCV RBC AUTO: 96.5 FL — SIGNIFICANT CHANGE UP (ref 80–100)
METAMYELOCYTES # FLD: 0.9 % — HIGH (ref 0–0)
MICROCYTES BLD QL: SLIGHT — SIGNIFICANT CHANGE UP
MONOCYTES # BLD AUTO: 0.71 K/UL — SIGNIFICANT CHANGE UP (ref 0–0.9)
MONOCYTES NFR BLD AUTO: 15 % — HIGH (ref 2–14)
NEUTROPHILS # BLD AUTO: 3.65 K/UL — SIGNIFICANT CHANGE UP (ref 1.8–7.4)
NEUTROPHILS NFR BLD AUTO: 77 % — SIGNIFICANT CHANGE UP (ref 43–77)
NITRITE UR-MCNC: NEGATIVE — SIGNIFICANT CHANGE UP
OVALOCYTES BLD QL SMEAR: SLIGHT — SIGNIFICANT CHANGE UP
PH UR: 7 — SIGNIFICANT CHANGE UP (ref 5–8)
PLAT MORPH BLD: ABNORMAL
PLATELET # BLD AUTO: 226 K/UL — SIGNIFICANT CHANGE UP (ref 150–400)
POIKILOCYTOSIS BLD QL AUTO: SIGNIFICANT CHANGE UP
POLYCHROMASIA BLD QL SMEAR: SLIGHT — SIGNIFICANT CHANGE UP
POTASSIUM SERPL-MCNC: 5 MMOL/L — SIGNIFICANT CHANGE UP (ref 3.5–5.3)
POTASSIUM SERPL-SCNC: 5 MMOL/L — SIGNIFICANT CHANGE UP (ref 3.5–5.3)
PROT SERPL-MCNC: 7.4 G/DL — SIGNIFICANT CHANGE UP (ref 6–8.3)
PROT UR-MCNC: NEGATIVE MG/DL — SIGNIFICANT CHANGE UP
PROTHROM AB SERPL-ACNC: 23.2 SEC — HIGH (ref 10.5–13.4)
RBC # BLD: 4.04 M/UL — SIGNIFICANT CHANGE UP (ref 3.8–5.2)
RBC # FLD: 15.1 % — HIGH (ref 10.3–14.5)
RBC BLD AUTO: ABNORMAL
RBC CASTS # UR COMP ASSIST: < 5 /HPF — SIGNIFICANT CHANGE UP
SARS-COV-2 RNA SPEC QL NAA+PROBE: POSITIVE
SCHISTOCYTES BLD QL AUTO: SLIGHT — SIGNIFICANT CHANGE UP
SODIUM SERPL-SCNC: 137 MMOL/L — SIGNIFICANT CHANGE UP (ref 135–145)
SP GR SPEC: 1.01 — SIGNIFICANT CHANGE UP (ref 1–1.03)
SPHEROCYTES BLD QL SMEAR: SLIGHT — SIGNIFICANT CHANGE UP
UROBILINOGEN FLD QL: 0.2 E.U./DL — SIGNIFICANT CHANGE UP
WBC # BLD: 4.74 K/UL — SIGNIFICANT CHANGE UP (ref 3.8–10.5)
WBC # FLD AUTO: 4.74 K/UL — SIGNIFICANT CHANGE UP (ref 3.8–10.5)
WBC UR QL: ABNORMAL /HPF

## 2022-08-02 PROCEDURE — 71046 X-RAY EXAM CHEST 2 VIEWS: CPT | Mod: 26

## 2022-08-02 PROCEDURE — 99285 EMERGENCY DEPT VISIT HI MDM: CPT | Mod: CS,25,GC

## 2022-08-02 PROCEDURE — 93010 ELECTROCARDIOGRAM REPORT: CPT | Mod: GC

## 2022-08-02 RX ORDER — AZITHROMYCIN 500 MG/1
500 TABLET, FILM COATED ORAL ONCE
Refills: 0 | Status: COMPLETED | OUTPATIENT
Start: 2022-08-02 | End: 2022-08-02

## 2022-08-02 RX ORDER — CEFTRIAXONE 500 MG/1
1000 INJECTION, POWDER, FOR SOLUTION INTRAMUSCULAR; INTRAVENOUS ONCE
Refills: 0 | Status: COMPLETED | OUTPATIENT
Start: 2022-08-02 | End: 2022-08-02

## 2022-08-02 RX ADMIN — AZITHROMYCIN 255 MILLIGRAM(S): 500 TABLET, FILM COATED ORAL at 23:17

## 2022-08-02 RX ADMIN — CEFTRIAXONE 100 MILLIGRAM(S): 500 INJECTION, POWDER, FOR SOLUTION INTRAMUSCULAR; INTRAVENOUS at 22:36

## 2022-08-02 NOTE — ED PROVIDER NOTE - NSICDXPASTMEDICALHX_GEN_ALL_CORE_FT
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

## 2022-08-02 NOTE — ED PROVIDER NOTE - HIGHEST TEMPERATURE
patient presents c/o chest discomfort and RLE swelling--reports hx of blood clot in LLE--- denies fevers/chills/SOB--speaking in full, complete sentences 100.6[facility]

## 2022-08-02 NOTE — ED PROVIDER NOTE - CHIEF COMPLAINT
The patient is a 87y Female complaining of abdominal pain. The patient is a 87y Female complaining of fever and cough.

## 2022-08-02 NOTE — H&P ADULT - HISTORY OF PRESENT ILLNESS
REMINGTON AGUDELO [id number] is a 87y yo Female  with PMH of   , who presents to the ED with       Denies fever, chills, chest pain, palpitations, SOB, cough, abdominal pain, nausea, vomiting, diarrhea, constipation, urinary frequency, urgency, or dysuria, headaches, changes in vision, dizziness, numbness, tingling.  Denies recent travel, recent antibiotic use, or sick contacts.      ED vitals: T , HR , BP , RR , O2 % on RA  ED labs:   ED studies:  ED Interventions:      REMINGTON AGUDELO [id number] is a 87y yo Female  with PMH of B/L eye blindness, HTN, HLD. CAD, Lung CA in the past, Sarcoidosis, Bronchiectasis, PE in May 2021 on Eliquis, dementia, SSS S/P PPM who presents with cough for the past couple of weeks but with worsening cough this past week. She is coughing up phlegm with no blood at home, but streak hemoptysis x 1 in ED. No chest pain, n/v, diarrhea, abdo pain, headache, or urinary symptoms. Found to be COVID+.     ED vitals: T 99.9-->100.1, , /60, RR 18, O2 95% on RA  ED labs: cbc wnl cmp wnl  ED studies: CXR: Left ICD in satisfactory position. Cardiomegaly, thoracic  aortic calcification. Right pleural effusion. Stable bony structures.  ED Interventions: s/p Ceftriaxone and Azithromycin    Home Medications:  Lipitor 20  Eliquis 2.5 BID  Losartan 25 mg  Amlodipine 5  Eye Drops twice a day: Dorzolamide 2%, Brimonidine 0.2%

## 2022-08-02 NOTE — ED PROVIDER NOTE - ATTENDING CONTRIBUTION TO CARE
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 temp 100.6, + generealized weakness. no n/v/d. + chronic abodminal pain x several years in ruq, not worse today.   ?covid;    no s/d/d    NAD  airway patent  +S1S2  Course b/l  soft nt nd  no le edema, + right calf ttp; no swelling.  ambulates with cane;    a/p: likely pna, r/o dvt, admit

## 2022-08-02 NOTE — ED PROVIDER NOTE - CLINICAL SUMMARY MEDICAL DECISION MAKING FREE TEXT BOX
88 y/o F with PMHx presented with fever, productive cough, weakness x1 day. No dyspnea or CP. Likely PNA, obtain COVID test, CXR, BCx. +RLE TTP but pt on Eliquis.

## 2022-08-02 NOTE — ED PROVIDER NOTE - GASTROINTESTINAL, MLM
Tenderness to deep palpation of the RUQ; no tenderness to other quadrants or suprapubic area. Abdomen soft, no guarding

## 2022-08-02 NOTE — ED PROVIDER NOTE - NSICDXPASTSURGICALHX_GEN_ALL_CORE_FT
PAST SURGICAL HISTORY:  Cardiac pacemaker     Cataract of right eye     H/O abdominal hysterectomy     History of cholecystectomy     Lung cancer s/p resection    Pacemaker

## 2022-08-02 NOTE — H&P ADULT - ASSESSMENT
87y yo Female  with PMH of B/L eye blindness, HTN, HLD. CAD, Lung CA in the past, Sarcoidosis, Bronchiectasis, PE in May 2021 on Eliquis, dementia, SSS S/P PPM who presents with cough for the past couple of weeks but with worsening cough this past week.    # COVID  COVID-19 Course:  Vaccination status: vaccinated x 3 with pfizer and booster  Exposure: unknown  Sx onset: ~ 7/27  1st positive PCR: 8/2/22  Sx: cough  Oxygen requirement on admission: room air  - discuss if to initiate Decadron with Dr. Greenfield  - f/u crp, d-dimer, ferritin with 10 am labs  - received Azithromycin and Ceftriaxone in ED - would hold off for now. can f/u procal    # weakness  reports walks at baseline with walker around house, but past week not walking at all. likely due to covid infection.  - f/u PT    # h/o PE  - c/w home Eliquis    # HTN  c/w home meds Amlodipine and Losartan  - clarify if patient is taking metoprolol and continue if indicated    # HLD  - c/w lipitor 20    # h/o afib per chart review.  daughter at bedside was not sure if patient has afib  - c/w eliquis 2,.5 bid  - clarify if pt is on metoprolol    # Prophylactic Measures  F: No IVF at this time  E: Replete electrolytes as needed, K>4, M>2  N: regular diet  DVT ppx: on eliquis  GI ppx: na  Dispo: rmf    CODE STATUS: Full Code

## 2022-08-02 NOTE — ED PROVIDER NOTE - NSICDXFAMILYHX_GEN_ALL_CORE_FT
FAMILY HISTORY:  No pertinent family history in first degree relatives    Father  Still living? Unknown  Family history of prostate cancer, Age at diagnosis: Age Unknown    Mother  Still living? Unknown  FH: hypertension, Age at diagnosis: Age Unknown

## 2022-08-02 NOTE — ED PROVIDER NOTE - CARE PLAN
1 Principal Discharge DX:	PNA (pneumonia)  Secondary Diagnosis:	2019 novel coronavirus disease (COVID-19)  Secondary Diagnosis:	Weakness

## 2022-08-02 NOTE — H&P ADULT - NSHPLABSRESULTS_GEN_ALL_CORE
LABS:                        13.0   4.74  )-----------( 226      ( 02 Aug 2022 19:55 )             39.0     08-02    137  |  106  |  15  ----------------------------<  99  5.0   |  22  |  1.00    Ca    10.0      02 Aug 2022 19:55    TPro  7.4  /  Alb  3.6  /  TBili  0.6  /  DBili  x   /  AST  25  /  ALT  13  /  AlkPhos  105  08-02    PT/INR - ( 02 Aug 2022 20:50 )   PT: 23.2 sec;   INR: 1.94          PTT - ( 02 Aug 2022 20:50 )  PTT:31.2 sec  Urinalysis Basic - ( 02 Aug 2022 22:57 )    Color: Yellow / Appearance: Clear / S.015 / pH: x  Gluc: x / Ketone: NEGATIVE  / Bili: Negative / Urobili: 0.2 E.U./dL   Blood: x / Protein: NEGATIVE mg/dL / Nitrite: NEGATIVE   Leuk Esterase: Small / RBC: < 5 /HPF / WBC 5-10 /HPF   Sq Epi: x / Non Sq Epi: 0-5 /HPF / Bacteria: Present /HPF      LIVER FUNCTIONS - ( 02 Aug 2022 19:55 )  Alb: 3.6 g/dL / Pro: 7.4 g/dL / ALK PHOS: 105 U/L / ALT: 13 U/L / AST: 25 U/L / GGT: x

## 2022-08-02 NOTE — H&P ADULT - NSHPPHYSICALEXAM_GEN_ALL_CORE
Vital Signs Last 24 Hrs  T(C): 37.4 (02 Aug 2022 22:41), Max: 37.7 (02 Aug 2022 19:20)  T(F): 99.4 (02 Aug 2022 22:41), Max: 99.9 (02 Aug 2022 19:20)  HR: 97 (02 Aug 2022 22:41) (85 - 105)  BP: 125/67 (02 Aug 2022 22:41) (125/67 - 132/60)  BP(mean): --  RR: 18 (02 Aug 2022 22:41) (17 - 18)  SpO2: 98% (02 Aug 2022 22:41) (96% - 98%)    Parameters below as of 02 Aug 2022 22:41  Patient On (Oxygen Delivery Method): room air        Height (cm): 157.5 (08-02-22 @ 19:20)  Weight (kg): 54.4 (08-02-22 @ 19:20)  BMI (kg/m2): 21.9 (08-02-22 @ 19:20)

## 2022-08-02 NOTE — ED PROVIDER NOTE - OBJECTIVE STATEMENT
86 year old Romanian-speaking female, (ALTERNATE MRN [id number]), with PMHx dementia (AOx2), L eye blindness, HTN, HLD, SSS/AFib s/p PPM, Lung CA s/p resection in 2000 (no chemo/RT, in remission), Sarcoidosis, Bronchiectasis, recurrent PNA, PE (on Eliquis), CVA (remote, no deficits), 86 year old Sinhala-speaking female, (ALTERNATE MRN [id number]), with PMHx dementia (AOx2), L eye blindness, HTN, HLD, SSS/AFib s/p PPM, Lung CA s/p resection in 2000 (no chemo/RT, in remission), Sarcoidosis, Bronchiectasis, recurrent PNA, PE (on Eliquis), CVA (remote, no deficits), presented with fever (Tmax 100.6), productive cough with yellow sputum, and generalized weakness today. Per daughter, pt has a chronic cough with clear sputum and she is unclear when pt's sputum turned yellow. No dyspnea, chills, congestion, rhinorrhea, appetite changes, N/V/D, dysuria, recent sick contacts. 87 year old Welsh-speaking female, (ALTERNATE MRN [id number]), with PMHx dementia (AOx2), L eye blindness, HTN, HLD, SSS/AFib s/p PPM, Lung CA s/p resection in 2000 (no chemo/RT, in remission), Sarcoidosis, Bronchiectasis, recurrent PNA, PE (on Eliquis), CVA (remote, no deficits), presented with fever (Tmax 100.6), productive cough with yellow sputum, and generalized weakness today. Per daughter, pt has a chronic cough with clear sputum and she is unclear when pt's sputum turned yellow. Pt also endorses chronic RUQ abdominal pain for years, which has been unchanged over the past few days. No dyspnea, chills, congestion, rhinorrhea, appetite changes, N/V/D, dysuria, recent sick contacts. COVID vaccinated.

## 2022-08-02 NOTE — ED ADULT NURSE NOTE - NSFALLRSKINDICTYPE_ED_ALL_ED
Caller is Glo-  Caller stating she would like to speak with Haritha to see where to send patient-  Caller can be reached at [phone].   Need for Mobility Assisted Device

## 2022-08-02 NOTE — ED ADULT NURSE NOTE - OBJECTIVE STATEMENT
Pt reports abd pain x 5 days, fever today at home of 100.6F and took tylenol at 3pm. Denies n/v/d, urinary symptoms. Pt has baseline cough, reports worsening cough, denies chest pain, sob.

## 2022-08-02 NOTE — ED ADULT TRIAGE NOTE - CHIEF COMPLAINT QUOTE
brought in by daughter for lower abdominal pain x 5 years and fever x1day . daughter states, "it's a sharp pain that comes and goes. she gets it when she eats." rpts fever of 100.6 this afternoon. denies n/v/d.

## 2022-08-03 ENCOUNTER — TRANSCRIPTION ENCOUNTER (OUTPATIENT)
Age: 87
End: 2022-08-03

## 2022-08-03 VITALS
OXYGEN SATURATION: 95 % | SYSTOLIC BLOOD PRESSURE: 117 MMHG | RESPIRATION RATE: 18 BRPM | HEART RATE: 94 BPM | DIASTOLIC BLOOD PRESSURE: 71 MMHG | TEMPERATURE: 98 F

## 2022-08-03 DIAGNOSIS — I48.20 CHRONIC ATRIAL FIBRILLATION, UNSPECIFIED: ICD-10-CM

## 2022-08-03 DIAGNOSIS — U07.1 COVID-19: ICD-10-CM

## 2022-08-03 DIAGNOSIS — C34.90 MALIGNANT NEOPLASM OF UNSPECIFIED PART OF UNSPECIFIED BRONCHUS OR LUNG: ICD-10-CM

## 2022-08-03 DIAGNOSIS — I10 ESSENTIAL (PRIMARY) HYPERTENSION: ICD-10-CM

## 2022-08-03 DIAGNOSIS — H54.7 UNSPECIFIED VISUAL LOSS: ICD-10-CM

## 2022-08-03 DIAGNOSIS — Z86.2 PERSONAL HISTORY OF DISEASES OF THE BLOOD AND BLOOD-FORMING ORGANS AND CERTAIN DISORDERS INVOLVING THE IMMUNE MECHANISM: ICD-10-CM

## 2022-08-03 LAB
ANION GAP SERPL CALC-SCNC: 11 MMOL/L — SIGNIFICANT CHANGE UP (ref 5–17)
BUN SERPL-MCNC: 10 MG/DL — SIGNIFICANT CHANGE UP (ref 7–23)
CALCIUM SERPL-MCNC: 9.5 MG/DL — SIGNIFICANT CHANGE UP (ref 8.4–10.5)
CHLORIDE SERPL-SCNC: 108 MMOL/L — SIGNIFICANT CHANGE UP (ref 96–108)
CO2 SERPL-SCNC: 20 MMOL/L — LOW (ref 22–31)
CREAT SERPL-MCNC: 0.81 MG/DL — SIGNIFICANT CHANGE UP (ref 0.5–1.3)
CRP SERPL-MCNC: 85.9 MG/L — HIGH (ref 0–4)
D DIMER BLD IA.RAPID-MCNC: 273 NG/ML DDU — HIGH
EGFR: 70 ML/MIN/1.73M2 — SIGNIFICANT CHANGE UP
FERRITIN SERPL-MCNC: 123 NG/ML — SIGNIFICANT CHANGE UP (ref 15–150)
GLUCOSE SERPL-MCNC: 97 MG/DL — SIGNIFICANT CHANGE UP (ref 70–99)
HCT VFR BLD CALC: 38.7 % — SIGNIFICANT CHANGE UP (ref 34.5–45)
HGB BLD-MCNC: 12.7 G/DL — SIGNIFICANT CHANGE UP (ref 11.5–15.5)
MAGNESIUM SERPL-MCNC: 1.7 MG/DL — SIGNIFICANT CHANGE UP (ref 1.6–2.6)
MCHC RBC-ENTMCNC: 31.3 PG — SIGNIFICANT CHANGE UP (ref 27–34)
MCHC RBC-ENTMCNC: 32.8 GM/DL — SIGNIFICANT CHANGE UP (ref 32–36)
MCV RBC AUTO: 95.3 FL — SIGNIFICANT CHANGE UP (ref 80–100)
NRBC # BLD: 0 /100 WBCS — SIGNIFICANT CHANGE UP (ref 0–0)
PHOSPHATE SERPL-MCNC: 3.3 MG/DL — SIGNIFICANT CHANGE UP (ref 2.5–4.5)
PLATELET # BLD AUTO: 184 K/UL — SIGNIFICANT CHANGE UP (ref 150–400)
POTASSIUM SERPL-MCNC: 3.9 MMOL/L — SIGNIFICANT CHANGE UP (ref 3.5–5.3)
POTASSIUM SERPL-SCNC: 3.9 MMOL/L — SIGNIFICANT CHANGE UP (ref 3.5–5.3)
PROCALCITONIN SERPL-MCNC: 0.08 NG/ML — SIGNIFICANT CHANGE UP (ref 0.02–0.1)
RBC # BLD: 4.06 M/UL — SIGNIFICANT CHANGE UP (ref 3.8–5.2)
RBC # FLD: 14.9 % — HIGH (ref 10.3–14.5)
SODIUM SERPL-SCNC: 139 MMOL/L — SIGNIFICANT CHANGE UP (ref 135–145)
WBC # BLD: 3.8 K/UL — SIGNIFICANT CHANGE UP (ref 3.8–10.5)
WBC # FLD AUTO: 3.8 K/UL — SIGNIFICANT CHANGE UP (ref 3.8–10.5)

## 2022-08-03 PROCEDURE — 87086 URINE CULTURE/COLONY COUNT: CPT

## 2022-08-03 PROCEDURE — 85610 PROTHROMBIN TIME: CPT

## 2022-08-03 PROCEDURE — 83735 ASSAY OF MAGNESIUM: CPT

## 2022-08-03 PROCEDURE — 84100 ASSAY OF PHOSPHORUS: CPT

## 2022-08-03 PROCEDURE — 97162 PT EVAL MOD COMPLEX 30 MIN: CPT

## 2022-08-03 PROCEDURE — 84484 ASSAY OF TROPONIN QUANT: CPT

## 2022-08-03 PROCEDURE — 86140 C-REACTIVE PROTEIN: CPT

## 2022-08-03 PROCEDURE — 85730 THROMBOPLASTIN TIME PARTIAL: CPT

## 2022-08-03 PROCEDURE — 85027 COMPLETE CBC AUTOMATED: CPT

## 2022-08-03 PROCEDURE — 71046 X-RAY EXAM CHEST 2 VIEWS: CPT

## 2022-08-03 PROCEDURE — 85025 COMPLETE CBC W/AUTO DIFF WBC: CPT

## 2022-08-03 PROCEDURE — 85379 FIBRIN DEGRADATION QUANT: CPT

## 2022-08-03 PROCEDURE — 80053 COMPREHEN METABOLIC PANEL: CPT

## 2022-08-03 PROCEDURE — 80048 BASIC METABOLIC PNL TOTAL CA: CPT

## 2022-08-03 PROCEDURE — 87635 SARS-COV-2 COVID-19 AMP PRB: CPT

## 2022-08-03 PROCEDURE — 99285 EMERGENCY DEPT VISIT HI MDM: CPT | Mod: 25

## 2022-08-03 PROCEDURE — 99222 1ST HOSP IP/OBS MODERATE 55: CPT | Mod: GC

## 2022-08-03 PROCEDURE — 82728 ASSAY OF FERRITIN: CPT

## 2022-08-03 PROCEDURE — 96374 THER/PROPH/DIAG INJ IV PUSH: CPT

## 2022-08-03 PROCEDURE — 96375 TX/PRO/DX INJ NEW DRUG ADDON: CPT

## 2022-08-03 PROCEDURE — 84145 PROCALCITONIN (PCT): CPT

## 2022-08-03 PROCEDURE — 81001 URINALYSIS AUTO W/SCOPE: CPT

## 2022-08-03 PROCEDURE — 87040 BLOOD CULTURE FOR BACTERIA: CPT

## 2022-08-03 PROCEDURE — 36415 COLL VENOUS BLD VENIPUNCTURE: CPT

## 2022-08-03 RX ORDER — APIXABAN 2.5 MG/1
2.5 TABLET, FILM COATED ORAL EVERY 12 HOURS
Refills: 0 | Status: DISCONTINUED | OUTPATIENT
Start: 2022-08-03 | End: 2022-08-03

## 2022-08-03 RX ORDER — BRIMONIDINE TARTRATE 2 MG/MG
1 SOLUTION/ DROPS OPHTHALMIC
Qty: 0 | Refills: 0 | DISCHARGE

## 2022-08-03 RX ORDER — ALBUTEROL 90 UG/1
2 AEROSOL, METERED ORAL
Qty: 0 | Refills: 0 | DISCHARGE

## 2022-08-03 RX ORDER — LOSARTAN POTASSIUM 100 MG/1
25 TABLET, FILM COATED ORAL DAILY
Refills: 0 | Status: DISCONTINUED | OUTPATIENT
Start: 2022-08-03 | End: 2022-08-03

## 2022-08-03 RX ORDER — BRIMONIDINE TARTRATE 2 MG/MG
1 SOLUTION/ DROPS OPHTHALMIC
Qty: 0 | Refills: 0 | DISCHARGE
Start: 2022-08-03

## 2022-08-03 RX ORDER — PANTOPRAZOLE SODIUM 20 MG/1
40 TABLET, DELAYED RELEASE ORAL
Refills: 0 | Status: DISCONTINUED | OUTPATIENT
Start: 2022-08-03 | End: 2022-08-03

## 2022-08-03 RX ORDER — LATANOPROST 0.05 MG/ML
1 SOLUTION/ DROPS OPHTHALMIC; TOPICAL
Qty: 0 | Refills: 0 | DISCHARGE

## 2022-08-03 RX ORDER — ATORVASTATIN CALCIUM 80 MG/1
1 TABLET, FILM COATED ORAL
Qty: 0 | Refills: 0 | DISCHARGE

## 2022-08-03 RX ORDER — ATORVASTATIN CALCIUM 80 MG/1
20 TABLET, FILM COATED ORAL AT BEDTIME
Refills: 0 | Status: DISCONTINUED | OUTPATIENT
Start: 2022-08-03 | End: 2022-08-03

## 2022-08-03 RX ORDER — ACETAMINOPHEN 500 MG
650 TABLET ORAL EVERY 6 HOURS
Refills: 0 | Status: DISCONTINUED | OUTPATIENT
Start: 2022-08-03 | End: 2022-08-03

## 2022-08-03 RX ORDER — BRIMONIDINE TARTRATE 2 MG/MG
1 SOLUTION/ DROPS OPHTHALMIC
Refills: 0 | Status: DISCONTINUED | OUTPATIENT
Start: 2022-08-03 | End: 2022-08-03

## 2022-08-03 RX ORDER — AMLODIPINE BESYLATE 2.5 MG/1
5 TABLET ORAL DAILY
Refills: 0 | Status: DISCONTINUED | OUTPATIENT
Start: 2022-08-03 | End: 2022-08-03

## 2022-08-03 RX ORDER — LOSARTAN POTASSIUM 100 MG/1
1 TABLET, FILM COATED ORAL
Qty: 0 | Refills: 0 | DISCHARGE

## 2022-08-03 RX ORDER — MEGESTROL ACETATE 40 MG/ML
20 SUSPENSION ORAL
Qty: 0 | Refills: 0 | DISCHARGE

## 2022-08-03 RX ORDER — DORZOLAMIDE HYDROCHLORIDE 20 MG/ML
1 SOLUTION/ DROPS OPHTHALMIC
Refills: 0 | Status: DISCONTINUED | OUTPATIENT
Start: 2022-08-03 | End: 2022-08-03

## 2022-08-03 RX ORDER — ATENOLOL 25 MG/1
1 TABLET ORAL
Qty: 0 | Refills: 0 | DISCHARGE

## 2022-08-03 RX ORDER — AMLODIPINE BESYLATE 2.5 MG/1
1 TABLET ORAL
Qty: 0 | Refills: 0 | DISCHARGE

## 2022-08-03 RX ADMIN — AMLODIPINE BESYLATE 5 MILLIGRAM(S): 2.5 TABLET ORAL at 06:47

## 2022-08-03 RX ADMIN — Medication 650 MILLIGRAM(S): at 06:47

## 2022-08-03 RX ADMIN — APIXABAN 2.5 MILLIGRAM(S): 2.5 TABLET, FILM COATED ORAL at 06:46

## 2022-08-03 RX ADMIN — Medication 650 MILLIGRAM(S): at 07:09

## 2022-08-03 RX ADMIN — DORZOLAMIDE HYDROCHLORIDE 1 DROP(S): 20 SOLUTION/ DROPS OPHTHALMIC at 09:45

## 2022-08-03 RX ADMIN — PANTOPRAZOLE SODIUM 40 MILLIGRAM(S): 20 TABLET, DELAYED RELEASE ORAL at 06:46

## 2022-08-03 RX ADMIN — LOSARTAN POTASSIUM 25 MILLIGRAM(S): 100 TABLET, FILM COATED ORAL at 06:46

## 2022-08-03 RX ADMIN — BRIMONIDINE TARTRATE 1 DROP(S): 2 SOLUTION/ DROPS OPHTHALMIC at 09:45

## 2022-08-03 NOTE — PROVIDER CONTACT NOTE (OTHER) - SITUATION
Pt is COVID positive in isolation room in ED. Bed is assigned on 7 [address] however is pending to be terminally cleaned. Patient's daughter is upset that they have been here since 5pm and is uncomfortable

## 2022-08-03 NOTE — DISCHARGE NOTE PROVIDER - NSDCFUSCHEDAPPT_GEN_ALL_CORE_FT
Caryl Burns  [facility]  Nephro [address]  Scheduled Appointment: 09/09/2022    Michaela Greenfield  [facility]  INTMED [address]  Scheduled Appointment: 09/14/2022    Allen Sheffield  [facility]  Ophthal [address]  Scheduled Appointment: 09/16/2022

## 2022-08-03 NOTE — DISCHARGE NOTE NURSING/CASE MANAGEMENT/SOCIAL WORK - NSDCPEFALRISK_GEN_ALL_CORE
For information on Fall & Injury Prevention, visit: [email or web address] OR  [email or web address] OR  https://www.cdc.gov/steadi/patient.html

## 2022-08-03 NOTE — DISCHARGE NOTE NURSING/CASE MANAGEMENT/SOCIAL WORK - PATIENT PORTAL LINK FT
You can access the FollowMyHealth Patient Portal offered by [facility] by registering at the following website: [email or web address]. By joining [facility]’s FollowMyHealth portal, you will also be able to view your health information using other applications (apps) compatible with our system.

## 2022-08-03 NOTE — PHYSICAL THERAPY INITIAL EVALUATION ADULT - IMPAIRMENTS FOUND, PT EVAL
I just increased Maranda's Lantus to 22 units twice daily.  Can you follow up with her at the end of the week?  I am positive she will need more changes.   Thanks, Dr Aguilar aerobic capacity/endurance/cognitive impairment/muscle strength/poor safety awareness

## 2022-08-03 NOTE — DISCHARGE NOTE PROVIDER - PROVIDER TOKENS
PROVIDER:[TOKEN:[[id number]]] PROVIDER:[TOKEN:[[id number]],SCHEDULEDAPPT:[09/14/2022],SCHEDULEDAPPTTIME:[12:20 PM]]

## 2022-08-03 NOTE — PATIENT PROFILE ADULT - FALL HARM RISK - HARM RISK INTERVENTIONS
Private car Assistance with ambulation/Assistance OOB with selected safe patient handling equipment/Communicate Risk of Fall with Harm to all staff/Discuss with provider need for PT consult/Monitor gait and stability/Provide patient with walking aids - walker, cane, crutches/Reinforce activity limits and safety measures with patient and family/Tailored Fall Risk Interventions/Visual Cue: Yellow wristband and red socks/Bed in lowest position, wheels locked, appropriate side rails in place/Call bell, personal items and telephone in reach/Instruct patient to call for assistance before getting out of bed or chair/Non-slip footwear when patient is out of bed/[place] to call system/Physically safe environment - no spills, clutter or unnecessary equipment/Purposeful Proactive Rounding/Room/bathroom lighting operational, light cord in reach

## 2022-08-03 NOTE — PROGRESS NOTE ADULT - SUBJECTIVE AND OBJECTIVE BOX
INTERVAL HPI/OVERNIGHT EVENTS:  Interim reviewed; Patient well known to me; Patient admitted with increased sputum production; Found to me Covid positive; Chest xray however clear  Medical history as outlined;   This am appears stable and at her baseline status;   Not on Oxygen      MEDICATIONS  (STANDING):  amLODIPine   Tablet 5 milliGRAM(s) Oral daily  apixaban 2.5 milliGRAM(s) Oral every 12 hours  atorvastatin 20 milliGRAM(s) Oral at bedtime  brimonidine 0.2% Ophthalmic Solution 1 Drop(s) Both EYES two times a day  dorzolamide 2% Ophthalmic Solution 1 Drop(s) Both EYES <User Schedule>  losartan 25 milliGRAM(s) Oral daily  pantoprazole    Tablet 40 milliGRAM(s) Oral before breakfast    MEDICATIONS  (PRN):  acetaminophen     Tablet .. 650 milliGRAM(s) Oral every 6 hours PRN Temp greater or equal to 38C (100.4F)      Allergies    No Known Allergies    Intolerances        Vital Signs Last 24 Hrs  T(C): 37.5 (03 Aug 2022 07:09), Max: 38.4 (03 Aug 2022 05:00)  T(F): 99.5 (03 Aug 2022 07:09), Max: 101.2 (03 Aug 2022 05:00)  HR: 92 (03 Aug 2022 05:00) (85 - 105)  BP: 151/64 (03 Aug 2022 05:00) (124/64 - 160/74)  BP(mean): 84 (03 Aug 2022 00:49) (84 - 84)  RR: 20 (03 Aug 2022 05:00) (17 - 20)  SpO2: 95% (03 Aug 2022 05:00) (94% - 98%)    Parameters below as of 03 Aug 2022 05:00  Patient On (Oxygen Delivery Method): room air              Constitutional:  Awake     Eyes: BENY    ENMT: Negative    Neck: Supple    Back:  no tenderness     Respiratory:  clear    Cardiovascular: S1 S2    Gastrointestinal:  soft    Genitourinary:    Extremities:  no edema    Vascular:    Neurological:    Skin:    Lymph Nodes:             @ 07:01  -  - @ 07:00  --------------------------------------------------------  IN: 0 mL / OUT: 200 mL / NET: -200 mL      LABS:                        13.0   4.74  )-----------( 226      ( 02 Aug 2022 19:55 )             39.0     08-02    137  |  106  |  15  ----------------------------<  99  5.0   |  22  |  1.00    Ca    10.0      02 Aug 2022 19:55    TPro  7.4  /  Alb  3.6  /  TBili  0.6  /  DBili  x   /  AST  25  /  ALT  13  /  AlkPhos  105  08-02    PT/INR - ( 02 Aug 2022 20:50 )   PT: 23.2 sec;   INR: 1.94          PTT - ( 02 Aug 2022 20:50 )  PTT:31.2 sec  Urinalysis Basic - ( 02 Aug 2022 22:57 )    Color: Yellow / Appearance: Clear / S.015 / pH: x  Gluc: x / Ketone: NEGATIVE  / Bili: Negative / Urobili: 0.2 E.U./dL   Blood: x / Protein: NEGATIVE mg/dL / Nitrite: NEGATIVE   Leuk Esterase: Small / RBC: < 5 /HPF / WBC 5-10 /HPF   Sq Epi: x / Non Sq Epi: 0-5 /HPF / Bacteria: Present /HPF        RADIOLOGY & ADDITIONAL TESTS:

## 2022-08-03 NOTE — DISCHARGE NOTE PROVIDER - HOSPITAL COURSE
#Discharge: do not delete    87y yo Female  with PMH of B/L eye blindness, HTN, HLD. CAD, Lung CA in the past, Sarcoidosis, Bronchiectasis, PE in May 2021 on Eliquis, dementia, SSS S/P PPM who presents with cough for the past couple of weeks but with worsening cough this past week.    Hospital course (by problem):     # COVID: Vaccination status: vaccinated x 3 with pfizer and booster. Symptoms began 7/27, first + test 8/2/2022. Only sx cough and fevers. Currently on RA at baseline respiratory status. No need for decadron.     #Weakness: Reports walks at baseline with walker around house, but past week not walking at all. likely due to covid infection. PT____    #H/o PE: c/w home Eliquis    #HTN: C/w home meds Amlodipine and Losartan    #HLD: c/w lipitor 20    # h/o afib per chart review.  daughter at bedside was not sure if patient has afib  - c/w eliquis 2,.5 bid  - clarify if pt is on metoprolol    Patient was discharged to:     New medications:   Changes to old medications:  Medications that were stopped:    Items to follow up as outpatient: f/u Dr. Greenfield    Physical exam at the time of discharge:   87y yo Female  with PMH of B/L eye blindness, HTN, HLD. CAD, Lung CA in the past, Sarcoidosis, Bronchiectasis, PE in May 2021 on Eliquis, dementia, SSS S/P PPM who presents with cough for the past couple of weeks but with worsening cough this past week.    Hospital course (by problem):     # COVID: Vaccination status: vaccinated x 3 with pfizer and booster. Symptoms began 7/27, first + test 8/2/2022. Only sx cough and fevers. Currently on RA at baseline respiratory status. No need for decadron.     #Weakness: Reports walks at baseline with walker around house, but past week not walking at all. likely due to covid infection. PT recommends home with home PT and 24hr HHA/ family assist.    #H/o PE: c/w home Eliquis    #HTN: C/w home meds Amlodipine and Losartan    #HLD: c/w lipitor 20    # h/o afib per chart review.  daughter at bedside was not sure if patient has afib  - c/w eliquis 2,.5 bid  - clarify if pt is on metoprolol    Patient was discharged to: Home    New medications: None  Changes to old medications: None  Medications that were stopped: None    Items to follow up as outpatient: f/u Dr. Greenfield for cough and weakness    Physical exam at the time of discharge:    General: NAD, AAOx3 Vincentian speaking.   HEENT: atraumatic, normocephalic  Pulmonary: clear to auscultation bilaterally; No wheeze  Cardiovascular: Regular rate and rhythm; no murmurs, rubs or gallops. Normal S1S2  Gastrointestinal: Soft, nontender, nondistended; bowel sounds present  Musculoskeletal: 2+ peripheral pulses, no clubbing, cyanosis or edema  Neurology: Pt. alert and oriented, fluent speech, able to move all extremities  Skin: no rashes or lesions

## 2022-08-03 NOTE — CONSULT NOTE ADULT - ASSESSMENT
per Internal Medicine    87 y o Female  with PMH of B/L eye blindness, HTN, HLD. CAD, Lung CA in the past, Sarcoidosis, Bronchiectasis, PE in May 2021 on Eliquis, dementia, SSS S/P PPM who presents with cough for the past couple of weeks but with worsening cough this past week.    # COVID  COVID-19 Course:  Vaccination status: vaccinated x 3 with pfizer and booster  Exposure: unknown  Sx onset: ~ 7/27  1st positive PCR: 8/2/22  Sx: cough  Oxygen requirement on admission: room air  - discuss if to initiate Decadron with Dr. Greenfield  - f/u crp, d-dimer, ferritin with 10 am labs  - received Azithromycin and Ceftriaxone in ED - would hold off for now. can f/u procal    # weakness  reports walks at baseline with walker around house, but past week not walking at all. likely due to covid infection.  - f/u PT    # h/o PE  - c/w home Eliquis    # HTN  c/w home meds Amlodipine and Losartan  - clarify if patient is taking metoprolol and continue if indicated    # HLD  - c/w lipitor 20    # h/o afib per chart review.  daughter at bedside was not sure if patient has afib  - c/w eliquis 2,.5 bid  - clarify if pt is on metoprolol    # Prophylactic Measures  F: No IVF at this time  E: Replete electrolytes as needed, K>4, M>2  N: regular diet  DVT ppx: on eliquis  GI ppx: na  Dispo: rmf    CODE STATUS: Full Code

## 2022-08-03 NOTE — PHYSICAL THERAPY INITIAL EVALUATION ADULT - ADDITIONAL COMMENTS
Patient with dx of Dementia, and may be unreliable historian. Patient reports living with her daughter and cousin in an elevator access apartment [facility] with no YADIRA. Patient states she was independent with all ADLs, but her daughter assists with IADLs (groceries and meal prepping). Patient states she primarily ambulates with a SC but owns "other devices that she does not use." (Pt highly agitated and would not provide further details regarding other devices, home health services, etc.)

## 2022-08-03 NOTE — DISCHARGE NOTE PROVIDER - CARE PROVIDER_API CALL
Michaela Greenfield)  Critical Care Medicine; Internal Medicine  [address], [address]  [place], [place] [address]  Phone: [phone]  Fax: [phone]  Follow Up Time:    Michaela Greenfield)  Critical Care Medicine; Internal Medicine  [address], [address]  [place] [address]  Phone: [phone]  Fax: [phone]  Scheduled Appointment: 09/14/2022 12:20 PM

## 2022-08-03 NOTE — DISCHARGE NOTE PROVIDER - NSDCMRMEDTOKEN_GEN_ALL_CORE_FT
Albuterol (Eqv-ProAir HFA) 90 mcg/inh inhalation aerosol: 2 puff(s) inhaled every 6 hours  amLODIPine 5 mg oral tablet: 1 tab(s) orally once a day  amLODIPine 5 mg oral tablet: 1 tab(s) orally once a day  atenolol 25 mg oral tablet: 1 tab(s) orally once a day  atorvastatin 20 mg oral tablet: 1 tab(s) orally once a day (at bedtime)  atorvastatin 20 mg oral tablet: 1 tab(s) orally once a day  azithromycin 250 mg oral tablet: 1 tab(s) orally once a day   brimonidine 0.15% ophthalmic solution: 1 drop(s) to each affected eye 3 times a day, OU  brimonidine 0.15% ophthalmic solution: 1 drop(s) to each affected eye 2 times a day  cefpodoxime 200 mg oral tablet: 1 tab(s) orally 2 times a day   dorzolamide 2% ophthalmic solution: 1 drop to each eye 2 times a day  Eliquis 5 mg oral tablet: 2 tab(s) orally 2 times a day (last dose of 2 pills 5/19 PM). THEN 5/20 AM please take 1 pill (5mg) orally 2 times a day.  Eliquis 5 mg oral tablet: 1 tab(s) orally 2 times a day  latanoprost 0.005% ophthalmic solution: 1 drop(s) to each affected eye once a day (in the evening)  latanoprost 0.005% ophthalmic solution: 1 drop(s) to each affected eye once a day (in the evening)  losartan 25 mg oral tablet: 1 tab(s) orally once a day  losartan 25 mg oral tablet: 1 tab(s) orally once a day  megestrol 40 mg/mL oral suspension: 20 milliliter(s) orally once a day  metoprolol succinate 50 mg oral tablet, extended release: 1 tab(s) orally once a day  Multiple Vitamins oral capsule: 1 cap(s) orally once a day  pantoprazole 40 mg oral delayed release tablet: 1 tab(s) orally once a day (before a meal)  predniSONE 20 mg oral tablet: 2 tab(s) orally once a day   Proventil HFA 90 mcg/inh inhalation aerosol: 2 puff(s) inhaled every 6 hours - for shortness of breath and/or wheezing    Wheelchair due to inability to ambulate (ICD10 R26.2) Size 18x16. Elevating leg rests, seat belt, cushion, back cushion, and antitippers: for 99+months    amLODIPine 5 mg oral tablet: 1 tab(s) orally once a day  atorvastatin 20 mg oral tablet: 1 tab(s) orally once a day (at bedtime)  brimonidine 0.2% ophthalmic solution: 1 drop(s) to each affected eye 2 times a day  dorzolamide 2% ophthalmic solution: 1 drop to each eye 2 times a day  Eliquis 5 mg oral tablet: 1 tab(s) orally 2 times a day  losartan 25 mg oral tablet: 1 tab(s) orally once a day  metoprolol succinate 50 mg oral tablet, extended release: 1 tab(s) orally once a day  Multiple Vitamins oral capsule: 1 cap(s) orally once a day  pantoprazole 40 mg oral delayed release tablet: 1 tab(s) orally once a day (before a meal)

## 2022-08-03 NOTE — PATIENT PROFILE ADULT - FUNCTIONAL ASSESSMENT - BASIC MOBILITY 6.
2-calculated by average/Not able to assess (calculate score using [facility] averaging method)

## 2022-08-03 NOTE — PROGRESS NOTE ADULT - TIME BILLING
Patient seen and examined;  Respiratory status appears stable; No need for steroids;  Continue present meds;  Physical therapy  to see

## 2022-08-03 NOTE — DISCHARGE NOTE PROVIDER - NSDCFUADDAPPT_GEN_ALL_CORE_FT
Please follow up with Dr. Greenfield [phone] Please follow up with Dr. Greenfield on 9/14/2022. You can reach his office at [phone]

## 2022-08-03 NOTE — PHYSICAL THERAPY INITIAL EVALUATION ADULT - PERTINENT HX OF CURRENT PROBLEM, REHAB EVAL
Pt is an 88 y/o Female who was brought in to ED by family due to persisten cough, fatigue, and generalized weakness for the past few weeks. Pt found to be COVID (+) with CXR (-) for PNA, but has R pleural effusion. Pt pertinent PMHx includes: B/L eye blindness, previous lung CA, sarcoidosis, bronchiectasis, PE (May 2021), dementia, SSS s/p PPM.

## 2022-08-03 NOTE — PHYSICAL THERAPY INITIAL EVALUATION ADULT - GENERAL OBSERVATIONS, REHAB EVAL
Patient received in semi-supine with R IV heplock, in no acute distress, confused and agitated. Patient demonstrates generalized weakness, reduced functional endurance, reduced balance, and reduced safety awareness secondary to pt's cognitive limitations and impulsivity. Patient can strongly benefit from skilled PT intervention at this time.

## 2022-08-03 NOTE — DISCHARGE NOTE PROVIDER - NSDCCPCAREPLAN_GEN_ALL_CORE_FT
PRINCIPAL DISCHARGE DIAGNOSIS  Diagnosis: PNA (pneumonia)  Assessment and Plan of Treatment: You were diagnosed with the new COVID-19 coronavirus infection via a nasal swab. The treatment for this infection is supportive care, which includes: rest, maintaining adequate oral intake of food and water, and taking acetaminophen/tylenol for fever. Please maintain a strict home quarantine for 10 days at home, and wear a surgical mask at all times when you need to be in close proximity with another human being. Take tylenol as needed, every 6 hours, with a maximum daily dose of 4,000 mg a day. Please visit your nearest urgent care or emergency department should you start to experience: severe shortness of breath, severe cough/wheezing/difficulty breathing, or fever >103 for 3 days. Please maintain a good healthy diet. If you have any questions, please call your primary care provider.        SECONDARY DISCHARGE DIAGNOSES  Diagnosis: 2019 novel coronavirus disease (COVID-19)  Assessment and Plan of Treatment:     Diagnosis: Weakness  Assessment and Plan of Treatment:      PRINCIPAL DISCHARGE DIAGNOSIS  Diagnosis: PNA (pneumonia)  Assessment and Plan of Treatment: You were diagnosed with the new COVID-19 coronavirus infection via a nasal swab. The treatment for this infection is supportive care, which includes: rest, maintaining adequate oral intake of food and water, and taking acetaminophen/tylenol for fever. Please maintain a strict home quarantine for 10 days at home, and wear a surgical mask at all times when you need to be in close proximity with another human being. Take tylenol as needed, every 6 hours, with a maximum daily dose of 4,000 mg a day. Please visit your nearest urgent care or emergency department should you start to experience: severe shortness of breath, severe cough/wheezing/difficulty breathing, or fever >103 for 3 days. Please maintain a good healthy diet. If you have any questions, please call your primary care provider.

## 2022-08-04 ENCOUNTER — APPOINTMENT (OUTPATIENT)
Dept: CARE COORDINATION | Facility: HOME HEALTH | Age: 87
End: 2022-08-04

## 2022-08-04 DIAGNOSIS — U07.1 COVID-19: ICD-10-CM

## 2022-08-04 LAB
CULTURE RESULTS: SIGNIFICANT CHANGE UP
SPECIMEN SOURCE: SIGNIFICANT CHANGE UP

## 2022-08-04 PROCEDURE — 99347 HOME/RES VST EST SF MDM 20: CPT | Mod: CS,95

## 2022-08-05 PROBLEM — U07.1 COVID-19: Status: ACTIVE | Noted: 2022-08-05

## 2022-08-05 NOTE — ASSESSMENT
[FreeTextEntry1] : Patient is a 88 y/o female enrolled in the [facility] program s/p a recent discharge for PNA  with a PMH of  dementia, PPM who presented with cough admitted for PNA / COVID treated with ABRX, daughter requested visit to TCM RN for evaluation of ABRX. Patient observed via tele health in NAD H&P taken from daughter denies SOB, C?P fever , c/o cough and states discussed with MD and script for ABRX called in

## 2022-08-05 NOTE — PLAN
[FreeTextEntry1] : PNA/ COVID- Complete ABRX\par Adhere to all medications including demonstrating proper use of nebulizers.\par Increase activity as tolerated and maintain optimal activity levels. Continue coughing and deep breathing exercises including use of Incentive Spirometry.\par Receive routine pneumococcal and influenza vaccinations.\par Maintain proper nutrition and adequate hydration.\par Notify NP for worsening symptoms including fever, chills, SOB, CP, increased cough and secretions.\par Follow up with MD within 7 days of discharge.\par \par

## 2022-08-05 NOTE — HISTORY OF PRESENT ILLNESS
[Home] : at home, [unfilled] , at the time of the visit. [Other Location: e.g. Home (Enter Location, City,State)___] : at [unfilled] [FreeTextEntry3] : daughter victorina colon  [FreeTextEntry1] : follow up hospital discharge COVID / PNA [de-identified] :  Patient is a 88 y/o female enrolled in the [facility] program s/p a recent discharge for PNA  with a PMH of  dementia, PPM who presented with cough admitted for PNA / COVID treated with ABRX, daughter requested visit to TCM RN for evaluation of ABRX. Patient observed via tele health in NAD H&P taken from daughter denies SOB, C?P fever , c/o cough and states discussed with MD and script for ABRX called in \par Hospital course  copied from [facility] (by problem): \par # COVID: Vaccination status: vaccinated x 3 with pfizer and booster. Symptoms began 7/27, first + test 8/2/2022. Only sx cough and fevers. Currently on RA at baseline respiratory status. No need for decadron. \par #Weakness: Reports walks at baseline with walker around house, but past week not walking at all. likely due to covid infection. PT recommends home with home PT and 24hr HHA/ family assist.\par #H/o PE: c/w home Eliquis\par #HTN: C/w home meds Amlodipine and Losartan\par #HLD: c/w lipitor 20

## 2022-08-05 NOTE — COUNSELING
[de-identified] : Pt was informed about CN’s role/ STARS program and overview of transitional care reviewed with patient. Pt educated on topics of importance such as compliance with prescribed medication regim PNA action plan and escalation process, adequate hydration, and proper diet. Pt encouraged calling CN with any issues, concerns or questions, also educated to notify CN if experiencing CP, SOB, cough, increased mucus production, increased use of rescue inhaler, fever, chills, fatigue, “chest cold symptoms” dizziness, lightheadedness, n/v/d/c, swelling to extremities and/or any signs of COPD exacerbation/flare as reviewed. Reassurance provided. Will continue to monitor\par \par

## 2022-08-05 NOTE — PHYSICAL EXAM
[No Acute Distress] : no acute distress [No Respiratory Distress] : no respiratory distress  [No Accessory Muscle Use] : no accessory muscle use [No Rash] : no rash [de-identified] : uses alexandria

## 2022-08-08 NOTE — ED PROVIDER NOTE - CHIEF COMPLAINT
LOV 7/18/2022      LAST LAB 7/14/22    LAST RX 6/20/22 30 tab 1 refill     Next OV No future appointments.       PROTOCOL pass
The patient is a 84y Female complaining of chest pain.

## 2022-08-09 DIAGNOSIS — J18.9 PNEUMONIA, UNSPECIFIED ORGANISM: ICD-10-CM

## 2022-08-09 DIAGNOSIS — Z90.710 ACQUIRED ABSENCE OF BOTH CERVIX AND UTERUS: ICD-10-CM

## 2022-08-09 DIAGNOSIS — I10 ESSENTIAL (PRIMARY) HYPERTENSION: ICD-10-CM

## 2022-08-09 DIAGNOSIS — Z86.711 PERSONAL HISTORY OF PULMONARY EMBOLISM: ICD-10-CM

## 2022-08-09 DIAGNOSIS — J47.9 BRONCHIECTASIS, UNCOMPLICATED: ICD-10-CM

## 2022-08-09 DIAGNOSIS — Z79.52 LONG TERM (CURRENT) USE OF SYSTEMIC STEROIDS: ICD-10-CM

## 2022-08-09 DIAGNOSIS — Z85.118 PERSONAL HISTORY OF OTHER MALIGNANT NEOPLASM OF BRONCHUS AND LUNG: ICD-10-CM

## 2022-08-09 DIAGNOSIS — Z99.3 DEPENDENCE ON WHEELCHAIR: ICD-10-CM

## 2022-08-09 DIAGNOSIS — K21.9 GASTRO-ESOPHAGEAL REFLUX DISEASE WITHOUT ESOPHAGITIS: ICD-10-CM

## 2022-08-09 DIAGNOSIS — H54.7 UNSPECIFIED VISUAL LOSS: ICD-10-CM

## 2022-08-09 DIAGNOSIS — R53.1 WEAKNESS: ICD-10-CM

## 2022-08-09 DIAGNOSIS — Z79.899 OTHER LONG TERM (CURRENT) DRUG THERAPY: ICD-10-CM

## 2022-08-09 DIAGNOSIS — Z79.01 LONG TERM (CURRENT) USE OF ANTICOAGULANTS: ICD-10-CM

## 2022-08-09 DIAGNOSIS — U07.1 COVID-19: ICD-10-CM

## 2022-08-09 DIAGNOSIS — Z95.0 PRESENCE OF CARDIAC PACEMAKER: ICD-10-CM

## 2022-08-09 DIAGNOSIS — E78.5 HYPERLIPIDEMIA, UNSPECIFIED: ICD-10-CM

## 2022-08-09 DIAGNOSIS — F03.90 UNSPECIFIED DEMENTIA WITHOUT BEHAVIORAL DISTURBANCE: ICD-10-CM

## 2022-08-09 DIAGNOSIS — I48.20 CHRONIC ATRIAL FIBRILLATION, UNSPECIFIED: ICD-10-CM

## 2022-08-09 DIAGNOSIS — D86.0 SARCOIDOSIS OF LUNG: ICD-10-CM

## 2022-08-09 DIAGNOSIS — J45.909 UNSPECIFIED ASTHMA, UNCOMPLICATED: ICD-10-CM

## 2022-08-09 DIAGNOSIS — I25.10 ATHEROSCLEROTIC HEART DISEASE OF NATIVE CORONARY ARTERY WITHOUT ANGINA PECTORIS: ICD-10-CM

## 2022-08-09 DIAGNOSIS — J12.82 PNEUMONIA DUE TO CORONAVIRUS DISEASE 2019: ICD-10-CM

## 2022-08-09 DIAGNOSIS — I49.5 SICK SINUS SYNDROME: ICD-10-CM

## 2022-08-17 ENCOUNTER — APPOINTMENT (OUTPATIENT)
Dept: INTERNAL MEDICINE | Facility: CLINIC | Age: 87
End: 2022-08-17

## 2022-08-17 VITALS
DIASTOLIC BLOOD PRESSURE: 65 MMHG | SYSTOLIC BLOOD PRESSURE: 115 MMHG | OXYGEN SATURATION: 97 % | WEIGHT: 118 LBS | HEART RATE: 97 BPM | TEMPERATURE: 97.9 F | HEIGHT: 67 IN | BODY MASS INDEX: 18.52 KG/M2

## 2022-08-17 DIAGNOSIS — R63.4 ABNORMAL WEIGHT LOSS: ICD-10-CM

## 2022-08-17 DIAGNOSIS — N39.0 URINARY TRACT INFECTION, SITE NOT SPECIFIED: ICD-10-CM

## 2022-08-17 PROCEDURE — 99214 OFFICE O/P EST MOD 30 MIN: CPT | Mod: 25

## 2022-08-17 PROCEDURE — 36415 COLL VENOUS BLD VENIPUNCTURE: CPT

## 2022-08-17 RX ORDER — ELECTROLYTES/DEXTROSE
SOLUTION, ORAL ORAL DAILY
Qty: 30 | Refills: 5 | Status: ACTIVE | COMMUNITY
Start: 2020-09-24 | End: 1900-01-01

## 2022-08-17 NOTE — HISTORY OF PRESENT ILLNESS
[FreeTextEntry1] : Recent admission for Covid and productive cough \par Used  for visit;\par Has lost  a lot of weight since admiision [de-identified] : Has cough \par Using nebulizer \par Received antibiotic by Dr Yahir Wilder\par

## 2022-08-17 NOTE — PLAN
[FreeTextEntry1] : Patient is quite lethargic; \par However her lung exam is stable\par Will refill the Cefnir for another week;\par Also all her medication was refilled;\par Obtain labs today\par Appetite stimulant\par RTC one month

## 2022-08-17 NOTE — PHYSICAL EXAM
[Normal] : no joint swelling and grossly normal strength and tone [de-identified] : Awake and lethargic  [de-identified] : clear

## 2022-08-18 LAB
ALBUMIN SERPL ELPH-MCNC: 3.7 G/DL
ALP BLD-CCNC: 93 U/L
ALT SERPL-CCNC: 29 U/L
ANION GAP SERPL CALC-SCNC: 12 MMOL/L
AST SERPL-CCNC: 25 U/L
BASOPHILS # BLD AUTO: 0.03 K/UL
BASOPHILS NFR BLD AUTO: 0.5 %
BILIRUB SERPL-MCNC: 0.6 MG/DL
BUN SERPL-MCNC: 14 MG/DL
CALCIUM SERPL-MCNC: 10.5 MG/DL
CHLORIDE SERPL-SCNC: 106 MMOL/L
CO2 SERPL-SCNC: 19 MMOL/L
CREAT SERPL-MCNC: 1.15 MG/DL
EGFR: 46 ML/MIN/1.73M2
EOSINOPHIL # BLD AUTO: 0.02 K/UL
EOSINOPHIL NFR BLD AUTO: 0.3 %
ESTIMATED AVERAGE GLUCOSE: 131 MG/DL
GLUCOSE SERPL-MCNC: 98 MG/DL
HBA1C MFR BLD HPLC: 6.2 %
HCT VFR BLD CALC: 42.6 %
HGB BLD-MCNC: 13.3 G/DL
IMM GRANULOCYTES NFR BLD AUTO: 0.3 %
LYMPHOCYTES # BLD AUTO: 1.63 K/UL
LYMPHOCYTES NFR BLD AUTO: 25.8 %
MAN DIFF?: NORMAL
MCHC RBC-ENTMCNC: 31.2 GM/DL
MCHC RBC-ENTMCNC: 31.4 PG
MCV RBC AUTO: 100.7 FL
MONOCYTES # BLD AUTO: 0.53 K/UL
MONOCYTES NFR BLD AUTO: 8.4 %
NEUTROPHILS # BLD AUTO: 4.1 K/UL
NEUTROPHILS NFR BLD AUTO: 64.7 %
PLATELET # BLD AUTO: 343 K/UL
POTASSIUM SERPL-SCNC: 5.2 MMOL/L
PROT SERPL-MCNC: 7.5 G/DL
RBC # BLD: 4.23 M/UL
RBC # FLD: 14.6 %
SODIUM SERPL-SCNC: 137 MMOL/L
T4 FREE SERPL-MCNC: 1.8 NG/DL
TSH SERPL-ACNC: 1.97 UIU/ML
WBC # FLD AUTO: 6.33 K/UL

## 2022-09-09 ENCOUNTER — APPOINTMENT (OUTPATIENT)
Dept: NEPHROLOGY | Facility: CLINIC | Age: 87
End: 2022-09-09

## 2022-09-14 ENCOUNTER — APPOINTMENT (OUTPATIENT)
Dept: INTERNAL MEDICINE | Facility: CLINIC | Age: 87
End: 2022-09-14

## 2022-09-16 ENCOUNTER — NON-APPOINTMENT (OUTPATIENT)
Age: 87
End: 2022-09-16

## 2022-09-16 ENCOUNTER — APPOINTMENT (OUTPATIENT)
Dept: OPHTHALMOLOGY | Facility: CLINIC | Age: 87
End: 2022-09-16

## 2022-09-16 PROCEDURE — 92014 COMPRE OPH EXAM EST PT 1/>: CPT

## 2022-09-16 PROCEDURE — 92133 CPTRZD OPH DX IMG PST SGM ON: CPT

## 2022-09-19 ENCOUNTER — APPOINTMENT (OUTPATIENT)
Dept: INTERNAL MEDICINE | Facility: CLINIC | Age: 87
End: 2022-09-19

## 2022-09-19 VITALS
HEART RATE: 80 BPM | WEIGHT: 118 LBS | OXYGEN SATURATION: 100 % | DIASTOLIC BLOOD PRESSURE: 75 MMHG | RESPIRATION RATE: 16 BRPM | SYSTOLIC BLOOD PRESSURE: 135 MMHG | BODY MASS INDEX: 18.52 KG/M2 | HEIGHT: 67 IN

## 2022-09-19 PROCEDURE — G0008: CPT

## 2022-09-19 PROCEDURE — 90662 IIV NO PRSV INCREASED AG IM: CPT

## 2022-09-19 PROCEDURE — 99213 OFFICE O/P EST LOW 20 MIN: CPT | Mod: 25

## 2022-09-19 NOTE — HISTORY OF PRESENT ILLNESS
[FreeTextEntry1] : Has a a lot of phlegm \par  used for appointment  \par Respiratory status stable; otherwise;  [de-identified] : Getting physical therapy; \par Pain in knees from arthritis\par with ambulation legs heavy;  Steps difficult

## 2022-10-05 ENCOUNTER — APPOINTMENT (OUTPATIENT)
Dept: NEPHROLOGY | Facility: CLINIC | Age: 87
End: 2022-10-05

## 2022-10-05 VITALS — SYSTOLIC BLOOD PRESSURE: 106 MMHG | OXYGEN SATURATION: 98 % | HEART RATE: 77 BPM | DIASTOLIC BLOOD PRESSURE: 64 MMHG

## 2022-10-05 PROCEDURE — 99214 OFFICE O/P EST MOD 30 MIN: CPT

## 2022-10-05 RX ORDER — BRIMONIDINE TARTRATE 2 MG/MG
0.2 SOLUTION/ DROPS OPHTHALMIC
Qty: 15 | Refills: 0 | Status: DISCONTINUED | COMMUNITY
Start: 2021-08-16 | End: 2022-10-05

## 2022-10-05 RX ORDER — CEFDINIR 300 MG/1
300 CAPSULE ORAL
Qty: 14 | Refills: 0 | Status: DISCONTINUED | COMMUNITY
Start: 2022-02-14 | End: 2022-10-05

## 2022-10-05 RX ORDER — LOSARTAN POTASSIUM 25 MG/1
25 TABLET, FILM COATED ORAL DAILY
Qty: 1 | Refills: 5 | Status: DISCONTINUED | COMMUNITY
End: 2022-10-05

## 2022-10-05 RX ORDER — ASPIRIN ENTERIC COATED TABLETS 81 MG 81 MG/1
81 TABLET, DELAYED RELEASE ORAL
Qty: 30 | Refills: 5 | Status: DISCONTINUED | COMMUNITY
Start: 2021-11-29 | End: 2022-10-05

## 2022-10-05 RX ORDER — ALBUTEROL SULFATE 90 UG/1
108 (90 BASE) INHALANT RESPIRATORY (INHALATION)
Qty: 1 | Refills: 3 | Status: DISCONTINUED | COMMUNITY
Start: 2017-04-24 | End: 2022-10-05

## 2022-10-05 RX ORDER — CEFDINIR 300 MG/1
300 CAPSULE ORAL
Qty: 6 | Refills: 0 | Status: DISCONTINUED | COMMUNITY
Start: 2021-12-20 | End: 2022-10-05

## 2022-10-05 RX ORDER — PREDNISONE 20 MG/1
20 TABLET ORAL DAILY
Qty: 10 | Refills: 2 | Status: DISCONTINUED | COMMUNITY
Start: 2021-09-29 | End: 2022-10-05

## 2022-10-05 RX ORDER — LIDOCAINE 5% 700 MG/1
5 PATCH TOPICAL
Qty: 30 | Refills: 5 | Status: DISCONTINUED | COMMUNITY
Start: 2021-06-11 | End: 2022-10-05

## 2022-10-05 RX ORDER — ASPIRIN 81 MG
81 TABLET, DELAYED RELEASE (ENTERIC COATED) ORAL
Refills: 0 | Status: DISCONTINUED | COMMUNITY
End: 2022-10-05

## 2022-10-05 RX ORDER — DICLOFENAC SODIUM 10 MG/G
1 GEL TOPICAL DAILY
Qty: 1 | Refills: 4 | Status: DISCONTINUED | COMMUNITY
Start: 2020-02-24 | End: 2022-10-05

## 2022-10-05 RX ORDER — TRAMADOL HYDROCHLORIDE AND ACETAMINOPHEN 37.5; 325 MG/1; MG/1
37.5-325 TABLET, FILM COATED ORAL
Qty: 20 | Refills: 0 | Status: DISCONTINUED | COMMUNITY
Start: 2020-12-28 | End: 2022-10-05

## 2022-10-05 NOTE — HISTORY OF PRESENT ILLNESS
[FreeTextEntry1] : 88yo Albanian speaking F accompanied by home aid with h/o CVA, copd/asthma, lung ca (s/p resection in remission), ischemic cardiomyopathy w/PPM EF 45%, vertigo, preDM, ?sarcoid here for f/u of CKD 3, HTN, hypercalcemia and hyperkalemia:  \par \par EP [facility] Dr. Martine Arrieta\par Cardiologist: pt doesn't remember name. \par \par Her home aide provides most of the history.\par She got covid 2 months ago, breathing ok now and no coughing. Chronic congestion per aide. \par Some issues with balance lately. Getting Physical therapy. She's very sedentary per aide, doesn't like to exercise or walk despite aide trying. \par Checks BP at home "normal" but aide doesn't know values. \par

## 2022-10-21 RX ORDER — LIDOCAINE 5% 700 MG/1
5 PATCH TOPICAL
Qty: 30 | Refills: 3 | Status: ACTIVE | COMMUNITY
Start: 2022-10-21 | End: 1900-01-01

## 2022-10-31 ENCOUNTER — APPOINTMENT (OUTPATIENT)
Dept: INTERNAL MEDICINE | Facility: CLINIC | Age: 87
End: 2022-10-31

## 2022-10-31 VITALS
HEIGHT: 67 IN | OXYGEN SATURATION: 98 % | BODY MASS INDEX: 18.52 KG/M2 | SYSTOLIC BLOOD PRESSURE: 138 MMHG | DIASTOLIC BLOOD PRESSURE: 78 MMHG | HEART RATE: 75 BPM | WEIGHT: 118 LBS | TEMPERATURE: 97.7 F

## 2022-10-31 PROCEDURE — 36415 COLL VENOUS BLD VENIPUNCTURE: CPT

## 2022-10-31 PROCEDURE — 99213 OFFICE O/P EST LOW 20 MIN: CPT | Mod: 25

## 2022-10-31 NOTE — ASSESSMENT
[FreeTextEntry1] : Multiple problems discussed and will  start antibiotic with large amount of sputum production\par All  scripts refilled; \par

## 2022-10-31 NOTE — REVIEW OF SYSTEMS
[Shortness Of Breath] : shortness of breath [Cough] : cough [Dyspnea on Exertion] : dyspnea on exertion

## 2022-10-31 NOTE — HEALTH RISK ASSESSMENT
[Former] : Former [No] : No [No falls in past year] : Patient reported no falls in the past year [0] : 2) Feeling down, depressed, or hopeless: Not at all (0) [[id number]] : 0

## 2022-10-31 NOTE — HISTORY OF PRESENT ILLNESS
[FreeTextEntry1] : Has a great s deal of mucus production;\par No fever \par Nebulizer damaged;  [de-identified] : The hose is not working regarding nebulizer; \par Still has anal catie;\par Physical therapist identified arthritis of knees; Needs Physical therapy \par Some SOB

## 2022-11-01 LAB
ALBUMIN SERPL ELPH-MCNC: 3.6 G/DL
ALP BLD-CCNC: 117 U/L
ALT SERPL-CCNC: 26 U/L
ANION GAP SERPL CALC-SCNC: 11 MMOL/L
AST SERPL-CCNC: 21 U/L
BASOPHILS # BLD AUTO: 0.03 K/UL
BASOPHILS NFR BLD AUTO: 0.4 %
BILIRUB SERPL-MCNC: 0.4 MG/DL
BUN SERPL-MCNC: 21 MG/DL
CALCIUM SERPL-MCNC: 10.4 MG/DL
CHLORIDE SERPL-SCNC: 105 MMOL/L
CHOLEST SERPL-MCNC: 122 MG/DL
CO2 SERPL-SCNC: 22 MMOL/L
CREAT SERPL-MCNC: 0.97 MG/DL
EGFR: 57 ML/MIN/1.73M2
EOSINOPHIL # BLD AUTO: 0.03 K/UL
EOSINOPHIL NFR BLD AUTO: 0.4 %
ESTIMATED AVERAGE GLUCOSE: 126 MG/DL
GLUCOSE SERPL-MCNC: 101 MG/DL
HBA1C MFR BLD HPLC: 6 %
HCT VFR BLD CALC: 41.2 %
HDLC SERPL-MCNC: 54 MG/DL
HGB BLD-MCNC: 12.7 G/DL
IMM GRANULOCYTES NFR BLD AUTO: 0.1 %
LDLC SERPL CALC-MCNC: 59 MG/DL
LYMPHOCYTES # BLD AUTO: 2.49 K/UL
LYMPHOCYTES NFR BLD AUTO: 30.4 %
MAN DIFF?: NORMAL
MCHC RBC-ENTMCNC: 30.8 GM/DL
MCHC RBC-ENTMCNC: 30.8 PG
MCV RBC AUTO: 100 FL
MONOCYTES # BLD AUTO: 0.76 K/UL
MONOCYTES NFR BLD AUTO: 9.3 %
NEUTROPHILS # BLD AUTO: 4.87 K/UL
NEUTROPHILS NFR BLD AUTO: 59.4 %
NONHDLC SERPL-MCNC: 68 MG/DL
PLATELET # BLD AUTO: 373 K/UL
POTASSIUM SERPL-SCNC: 5.2 MMOL/L
PROT SERPL-MCNC: 8 G/DL
RBC # BLD: 4.12 M/UL
RBC # FLD: 15.8 %
SODIUM SERPL-SCNC: 139 MMOL/L
TRIGL SERPL-MCNC: 43 MG/DL
WBC # FLD AUTO: 8.19 K/UL

## 2022-11-08 ENCOUNTER — APPOINTMENT (OUTPATIENT)
Dept: ENDOCRINOLOGY | Facility: CLINIC | Age: 87
End: 2022-11-08

## 2022-11-09 RX ORDER — SOFT LENS DISINFECTANT
SOLUTION, NON-ORAL MISCELLANEOUS
Qty: 1 | Refills: 0 | Status: ACTIVE | COMMUNITY
Start: 2022-11-09 | End: 1900-01-01

## 2022-11-18 ENCOUNTER — APPOINTMENT (OUTPATIENT)
Dept: ENDOCRINOLOGY | Facility: CLINIC | Age: 87
End: 2022-11-18

## 2022-11-18 VITALS
HEART RATE: 78 BPM | SYSTOLIC BLOOD PRESSURE: 134 MMHG | BODY MASS INDEX: 18.48 KG/M2 | WEIGHT: 118 LBS | DIASTOLIC BLOOD PRESSURE: 75 MMHG

## 2022-11-18 PROCEDURE — 99214 OFFICE O/P EST MOD 30 MIN: CPT

## 2022-11-18 RX ORDER — AZITHROMYCIN 250 MG/1
250 TABLET, FILM COATED ORAL DAILY
Qty: 10 | Refills: 0 | Status: DISCONTINUED | COMMUNITY
Start: 2022-10-31 | End: 2022-11-18

## 2022-11-18 NOTE — ASSESSMENT
[FreeTextEntry1] : Hypercalcemia/primary hyperparathyroidism. T7 compression fracture. She was first noted to have mild hypercalcemia in June 2020; serum calcium previously at the upper end of normal. She had hypercalcemia with inappropriately normal PTH concentrations consistent with primary hyperparathyroidism. She has a history of lung cancer and possible sarcoidosis, however, biochemical evaluation for other causes of hypercalcemia unremarkable, including PTHrP and 1,25-dihydroxyvitamin D levels. Serum calcium most recently at the upper end of normal. We discussed the complications of primary hyperparathyroidism, including but not limited to hypercalcemia, nephrolithiasis, and osteoporosis. She was noted to have a T7 compression fracture without known precipitating trauma. While her bone density is within range at all sites, an atraumatic compression fracture is indicative of osteoporosis. Without including her bone density, her 10 year fracture risk calculated by FRAX is 22% for major osteoporotic fracture and 12% for hip fracture, above the treatment thresholds. We discussed the potential benefits and risks of antiresorptive osteoporosis therapy at length, including but not limited to osteonecrosis of the jaw and atypical femoral fracture. She is amenable to a second dose of zoledronic acid. The below instructions were given regarding zoledronic acid infusion. We discussed the recommendations for calcium and vitamin D intake; there is no indication to restrict calcium intake in patients with primary hyperparathyroidism.\par Zoledronic acid 5 mg IV, second dose due\par Calcium 9547-6393 mg daily from diet and supplements (to be taken in divided doses as no more than 500-600 mg can be absorbed at one time); advised dietary calcium\par Continue current vitamin D regimen\par Diet, exercise, and fall prevention reviewed\par \par Instructions for zoledronic acid:\par Drink at least 32 oz (~4 glasses) of water the day you have the infusion. If this is your first infusion, take acetaminophen (Tylenol) 500-1000 mg every 8 hours the day of and the day after your infusion, starting in the morning before coming to the hospital for your infusion. Be careful if you are taking other products with acetaminophen that you do not exceed the daily recommended dose. 
none

## 2022-11-18 NOTE — PHYSICAL EXAM
[Alert] : alert [Healthy Appearance] : healthy appearance [No Acute Distress] : no acute distress [Normal Sclera/Conjunctiva] : normal sclera/conjunctiva [Normal Hearing] : hearing was normal [No Respiratory Distress] : no respiratory distress [No Spinal Tenderness] : no spinal tenderness [Kyphosis] : no kyphosis present [Scoliosis] : no scoliosis [No Stigmata of Cushings Syndrome] : no stigmata of Cushings Syndrome [Acanthosis Nigricans] : no acanthosis nigricans [Normal Insight/Judgement] : insight and judgment were intact [de-identified] : no moon facies, no supraclavicular fat pads [de-identified] : narrow-based gait with cane and assistance from her daughter

## 2022-11-18 NOTE — HISTORY OF PRESENT ILLNESS
[FreeTextEntry1] : Ms. Kim is an 87 year-old woman with a history of multiple medical problems including lung cancer status post resection and in remission, ischemic cardiomyopathy, stage 3 chronic kidney disease presenting for follow-up of primary hyperparathyroidism and vertebral fracture. I saw her for an initial visit in November 2020 and last in November 2021. She is accompanied by her daughter, Selma. The visit was conducted in Sinhala.\par \par Primary hyperparathyroidism. Compression fracture.\par She has a history of hypercalcemia within 1 mg/dL above the upper limit of normal in our system since June 2020; previously calcium at the upper end of normal. PTH was inappropriately normal, consistent with primary hyperparathyroidism. Renal function has been stable within stage 3 chronic kidney disease. Vitamin D has been replete.\par No history of kidney stones. Renal imaging in October 2020 without nephrolithiasis.\par T7 fracture noted on vertebral fracture assessment in October 2020. No known precipitating trauma; fracture not noted on previous chest imaging. \par She received zoledronic acid 5 mg IV in November 2020. \par She has up to half a glass of milk in her coffee and has an Ensure daily. She is taking a prescription multivitamin; unknown dose of calcium and vitamin D. She is not taking calcium or vitamin D supplements. \par No known family history of calcium disorders. No parental history of hip fracture.\par \par Interim History \par Her daughter provided history. \par Last visit we discussed a second dose of zoledronic acid infusion but she did not receive for unclear reasons. \par She is participating in physical therapy for balance and endurance. No recent falls. \par She has no acute symptoms today. \par She has seen multiple providers; notes reviewed. Last laboratory results reviewed. Serum calcium 10.4 mg/dL (albumin 3.6 g/dL). eGFR 57 mL/min. \par Medical and surgical history, medications, allergies, social and family history reviewed and updated as needed.

## 2022-12-08 ENCOUNTER — APPOINTMENT (OUTPATIENT)
Dept: INTERNAL MEDICINE | Facility: CLINIC | Age: 87
End: 2022-12-08

## 2022-12-08 VITALS
OXYGEN SATURATION: 96 % | DIASTOLIC BLOOD PRESSURE: 71 MMHG | HEART RATE: 69 BPM | WEIGHT: 117 LBS | TEMPERATURE: 96.7 F | HEIGHT: 67 IN | SYSTOLIC BLOOD PRESSURE: 118 MMHG | BODY MASS INDEX: 18.36 KG/M2

## 2022-12-08 DIAGNOSIS — M25.562 PAIN IN LEFT KNEE: ICD-10-CM

## 2022-12-08 PROCEDURE — 99214 OFFICE O/P EST MOD 30 MIN: CPT

## 2022-12-08 NOTE — ASSESSMENT
[FreeTextEntry1] : - Examination is currently stable\par - All medications recently refilled. \par - Advised to receive a Covid booster at nearby pharmacy. \par - Continue with PT. \par - Will order routine labs in the next visit. \par - RTC in 2 months

## 2022-12-08 NOTE — HISTORY OF PRESENT ILLNESS
[Family Member] : family member [FreeTextEntry1] : Follow up visit  [de-identified] : 86 yo F presenting for a follow up visit. Daughter reports the patient completed antibiotics given by Dr. Sinclair. Denies taking the Azithromycin prescribed from the previous visit. Reports decreased mucus, shortness of breath, and afebrile. Daughter reports she is still participating in PT on Mon/Wed and improvement in coordination and gait using a cane. She know has a working nebulizer machine. Daughter would like Covid booster and has already received flu vaccine in the previous visit. Denies dyspnea, chest pain, fever, and changes in bowel.

## 2022-12-08 NOTE — PHYSICAL EXAM
[Normal Sclera/Conjunctiva] : normal sclera/conjunctiva [EOMI] : extraocular movements intact [Normal Outer Ear/Nose] : the outer ears and nose were normal in appearance [Normal Oropharynx] : the oropharynx was normal [Supple] : supple [No Respiratory Distress] : no respiratory distress  [No Accessory Muscle Use] : no accessory muscle use [Clear to Auscultation] : lungs were clear to auscultation bilaterally [Soft] : abdomen soft [Non Tender] : non-tender [Normal Bowel Sounds] : normal bowel sounds [Normal] : no joint swelling and grossly normal strength and tone [Coordination Grossly Intact] : coordination grossly intact [No Focal Deficits] : no focal deficits [Normal Gait] : normal gait [Normal Affect] : the affect was normal [Normal Mood] : the mood was normal [Normal Insight/Judgement] : insight and judgment were intact

## 2023-01-07 ENCOUNTER — RX RENEWAL (OUTPATIENT)
Age: 88
End: 2023-01-07

## 2023-01-07 RX ORDER — METOPROLOL SUCCINATE 50 MG/1
50 TABLET, EXTENDED RELEASE ORAL
Qty: 30 | Refills: 0 | Status: ACTIVE | COMMUNITY
Start: 2021-12-14 | End: 1900-01-01

## 2023-04-11 NOTE — PHYSICAL THERAPY INITIAL EVALUATION ADULT - FUNCTIONAL LIMITATIONS, PT EVAL
Prescription approved per [facility] Refill Protocol.  
self-care/home management/community/leisure

## 2023-04-14 ENCOUNTER — NON-APPOINTMENT (OUTPATIENT)
Age: 88
End: 2023-04-14

## 2023-04-14 ENCOUNTER — APPOINTMENT (OUTPATIENT)
Dept: OPHTHALMOLOGY | Facility: CLINIC | Age: 88
End: 2023-04-14
Payer: MEDICARE

## 2023-04-14 PROCEDURE — 92014 COMPRE OPH EXAM EST PT 1/>: CPT

## 2023-04-14 PROCEDURE — 92133 CPTRZD OPH DX IMG PST SGM ON: CPT

## 2023-04-17 ENCOUNTER — RX RENEWAL (OUTPATIENT)
Age: 88
End: 2023-04-17

## 2023-04-27 NOTE — DIETITIAN INITIAL EVALUATION ADULT. - PROBLEM SELECTOR PROBLEM 10
Birth Control Pills Counseling: Birth Control Pill Counseling: I discussed with the patient the potential side effects of OCPs including but not limited to increased risk of stroke, heart attack, thrombophlebitis, deep venous thrombosis, hepatic adenomas, breast changes, GI upset, headaches, and depression.  The patient verbalized understanding of the proper use and possible adverse effects of OCPs. All of the patient's questions and concerns were addressed. Nutrition, metabolism, and development symptoms

## 2023-05-03 NOTE — ED ADULT NURSE NOTE - BREATHING, MLM
Routing refill request to provider for review/approval because:  Patient needs to be seen because it has been more than 1 year since last office visit.    Ketan Saleh RN     Spontaneous, unlabored and symmetrical

## 2023-05-13 ENCOUNTER — RX RENEWAL (OUTPATIENT)
Age: 88
End: 2023-05-13

## 2023-05-16 ENCOUNTER — APPOINTMENT (OUTPATIENT)
Dept: INTERNAL MEDICINE | Facility: CLINIC | Age: 88
End: 2023-05-16
Payer: MEDICARE

## 2023-05-16 VITALS
HEIGHT: 67 IN | BODY MASS INDEX: 18.36 KG/M2 | OXYGEN SATURATION: 99 % | DIASTOLIC BLOOD PRESSURE: 76 MMHG | SYSTOLIC BLOOD PRESSURE: 123 MMHG | HEART RATE: 78 BPM | RESPIRATION RATE: 16 BRPM | WEIGHT: 117 LBS | TEMPERATURE: 96.2 F

## 2023-05-16 PROCEDURE — 99213 OFFICE O/P EST LOW 20 MIN: CPT

## 2023-05-16 RX ORDER — APIXABAN 2.5 MG/1
2.5 TABLET, FILM COATED ORAL
Qty: 60 | Refills: 11 | Status: DISCONTINUED | COMMUNITY
Start: 2021-12-20 | End: 2023-05-16

## 2023-05-16 RX ADMIN — METHYLPREDNISOLONE 0 MG: 4 TABLET ORAL at 00:00

## 2023-05-16 NOTE — PLAN
[FreeTextEntry1] : vitals wnl \par \par respiratory status stable \par discussion about using nebulizer \par seek urgent medical care if develops sob \par fu if develops fever \par advised to complete steroids and fu

## 2023-05-16 NOTE — PHYSICAL EXAM
[Normal Sclera/Conjunctiva] : normal sclera/conjunctiva [EOMI] : extraocular movements intact [No Respiratory Distress] : no respiratory distress  [No Accessory Muscle Use] : no accessory muscle use [Clear to Auscultation] : lungs were clear to auscultation bilaterally [Normal Rate] : normal rate  [Regular Rhythm] : with a regular rhythm [Normal S1, S2] : normal S1 and S2 [No Edema] : there was no peripheral edema [Soft] : abdomen soft [Non Tender] : non-tender [Non-distended] : non-distended [Normal Bowel Sounds] : normal bowel sounds [Grossly Normal Strength/Tone] : grossly normal strength/tone [Coordination Grossly Intact] : coordination grossly intact [Normal Gait] : normal gait [Normal] : affect was normal and insight and judgment were intact

## 2023-05-16 NOTE — HISTORY OF PRESENT ILLNESS
[FreeTextEntry1] : fu  [de-identified] : Using Nebulizer about once a day \par Reports increased coughing \par Denies rhinorrhea, fevers, sore throat, and chills\par \par Reports normal appetite and regular BM \par Reports normal urination \par \par At home PT 2 days a week \par Walk with cane \par Denies any recent falls \par \par Inquiring about medication refills \par \par Denies any other acute complaints

## 2023-05-23 RX ORDER — METHYLPREDNISOLONE 4 MG/1
4 TABLET ORAL
Qty: 1 | Refills: 0 | Status: ACTIVE | COMMUNITY
Start: 2023-05-16 | End: 1900-01-01

## 2023-05-29 ENCOUNTER — EMERGENCY (EMERGENCY)
Facility: HOSPITAL | Age: 88
LOS: 1 days | Discharge: HOME | End: 2023-05-29
Attending: STUDENT IN AN ORGANIZED HEALTH CARE EDUCATION/TRAINING PROGRAM | Admitting: STUDENT IN AN ORGANIZED HEALTH CARE EDUCATION/TRAINING PROGRAM
Payer: MEDICARE

## 2023-05-29 VITALS
TEMPERATURE: 98 F | SYSTOLIC BLOOD PRESSURE: 128 MMHG | RESPIRATION RATE: 16 BRPM | OXYGEN SATURATION: 95 % | HEART RATE: 61 BPM | DIASTOLIC BLOOD PRESSURE: 81 MMHG

## 2023-05-29 VITALS
HEIGHT: 62 IN | HEART RATE: 76 BPM | SYSTOLIC BLOOD PRESSURE: 123 MMHG | RESPIRATION RATE: 18 BRPM | OXYGEN SATURATION: 97 % | WEIGHT: 110.01 LBS | DIASTOLIC BLOOD PRESSURE: 76 MMHG | TEMPERATURE: 98 F

## 2023-05-29 DIAGNOSIS — Z95.0 PRESENCE OF CARDIAC PACEMAKER: Chronic | ICD-10-CM

## 2023-05-29 DIAGNOSIS — C34.90 MALIGNANT NEOPLASM OF UNSPECIFIED PART OF UNSPECIFIED BRONCHUS OR LUNG: Chronic | ICD-10-CM

## 2023-05-29 DIAGNOSIS — Z98.890 OTHER SPECIFIED POSTPROCEDURAL STATES: Chronic | ICD-10-CM

## 2023-05-29 DIAGNOSIS — H26.9 UNSPECIFIED CATARACT: Chronic | ICD-10-CM

## 2023-05-29 LAB
RAPID RVP RESULT: SIGNIFICANT CHANGE UP
SARS-COV-2 RNA SPEC QL NAA+PROBE: SIGNIFICANT CHANGE UP

## 2023-05-29 PROCEDURE — 99284 EMERGENCY DEPT VISIT MOD MDM: CPT

## 2023-05-29 PROCEDURE — 0225U NFCT DS DNA&RNA 21 SARSCOV2: CPT

## 2023-05-29 PROCEDURE — 99284 EMERGENCY DEPT VISIT MOD MDM: CPT | Mod: 25

## 2023-05-29 PROCEDURE — 71250 CT THORAX DX C-: CPT | Mod: MA

## 2023-05-29 PROCEDURE — 71250 CT THORAX DX C-: CPT | Mod: 26,MA

## 2023-05-29 RX ORDER — AMOXICILLIN 250 MG/5ML
2 SUSPENSION, RECONSTITUTED, ORAL (ML) ORAL
Qty: 42 | Refills: 0
Start: 2023-05-29 | End: 2023-06-05

## 2023-05-29 RX ORDER — AMOXICILLIN 250 MG/5ML
2 SUSPENSION, RECONSTITUTED, ORAL (ML) ORAL
Qty: 42 | Refills: 0
Start: 2023-05-29 | End: 2023-06-04

## 2023-05-29 NOTE — ED PROVIDER NOTE - PATIENT PORTAL LINK FT
You can access the FollowMyHealth Patient Portal offered by [facility] by registering at the following website: [email or web address]. By joining [facility]’s FollowMyHealth portal, you will also be able to view your health information using other applications (apps) compatible with our system.

## 2023-05-29 NOTE — ED PROVIDER NOTE - CLINICAL SUMMARY MEDICAL DECISION MAKING FREE TEXT BOX
88 year old female presenting with cough X 6 months, sp abx, prednisone, well appearing, lungs CTAB, exam nonfocal. Will do CT Chest given duration of symptoms to better evaluate lung parenchyma, reassess.

## 2023-05-29 NOTE — ED PROVIDER NOTE - NSFOLLOWUPINSTRUCTIONS_ED_ALL_ED_FT
Please take antibiotics as prescribed. Please return for worsening symptoms, chest pain, shortness of breath. Please follow up closely with your pulmonologist who you should review your CT scan findings with.     I hope you feel better soon!    Sincerely,  Paula Chase MD

## 2023-05-29 NOTE — ED PROVIDER NOTE - OBJECTIVE STATEMENT
87 yo Female  with PMH of B/L eye blindness, HTN, HLD. CAD, Lung CA in the past, Sarcoidosis, Bronchiectasis, PE in May 2021 on Eliquis, dementia, SSS S/P PPM presenting with cough X 6 months. Pt with intermittent cough X 6 months, productive of dark green sputum. Has been on prednisone taper, azithromycin, albuterol prn with transient relief. Denies fever, chills, cp, nausea, vomiting, abd pain. No rhinorrhea or nasal congestion, no sore throat, no sob. No leg pain or leg swelling. ROS as above.

## 2023-05-29 NOTE — ED PROVIDER NOTE - CARE PROVIDER_API CALL
[place] Sinclair [facility]  Pulmonary Disease  [address], [address] [place], [place] [address]  Phone: [phone]  Fax: [phone]  Follow Up Time: 4-6 Days

## 2023-05-29 NOTE — ED ADULT NURSE NOTE - CAS TRG GEN SKIN CONDITION
Vaccine Information Statement(s) was given today. This has been reviewed, questions answered, and verbal consent given by Patient for injection(s) and administration of Diphtheria/Tetanus/Pertussis (Dtap).        Patient tolerated without incident. See immunization grid for documentation.      Pt had scrape/cut on right hand index finger   Warm/Dry

## 2023-05-29 NOTE — ED PROVIDER NOTE - PROGRESS NOTE DETAILS
CT with possible neoplasm, known ho lung cancer, sees pulmonologist. Pt advised to fu with pulmonologist and PCP. Also possible infiltrate, took azithro already. Will give PO abx, return precautions.

## 2023-05-29 NOTE — ED ADULT NURSE NOTE - OBJECTIVE STATEMENT
88y female presents to ED c/o productive cough x6 months. Per daughter, pt has had intermittent cough and has been prescribed antibiotics without relief. Productive with green mucous. PMH of lung cancer, pacemaker. Denies SOB. A&Ox4.

## 2023-05-29 NOTE — ED ADULT TRIAGE NOTE - CHIEF COMPLAINT QUOTE
Pt c/o cough x 6 months. Per daughter, "she has been treated with different antibiotics because she has green mucus but it doesn't help". Pt recently finished azithromycin, methylprednisolone. Denies cp, fevers. PMH pacemaker, cancer (in remission x years).

## 2023-05-29 NOTE — ED PROVIDER NOTE - NS ED ROS FT
Constitutional: No fever or chills  Eyes: No discharge or drainage  Ears, Nose, Mouth, Throat: No nasal discharge, no sore throat  Cardiovascular: No chest pain, no palpitations  Respiratory: No shortness of breath, + cough  Gastrointestinal: No nausea or vomiting, no abdominal pain, no diarrhea or constipation  Musculoskeletal: No joint pain, no swelling  Skin: No rashes or lesions  Neurological: No numbness, weakness, tingling, no headache  Psychiatric: No depression

## 2023-05-29 NOTE — ED ADULT NURSE NOTE - NSFALLHARMRISKINTERV_ED_ALL_ED
Satisfactory Communicate risk of Fall with Harm to all staff, patient, and family/Provide visual cue: red socks, yellow wristband, yellow gown, etc/Reinforce activity limits and safety measures with patient and family/Bed in lowest position, wheels locked, appropriate side rails in place/Call bell, personal items and telephone in reach/Instruct patient to call for assistance before getting out of bed/chair/stretcher/Non-slip footwear applied when patient is off stretcher/[place] to call system/Physically safe environment - no spills, clutter or unnecessary equipment/Purposeful Proactive Rounding/Room/bathroom lighting operational, light cord in reach

## 2023-05-31 DIAGNOSIS — R05.9 COUGH, UNSPECIFIED: ICD-10-CM

## 2023-05-31 DIAGNOSIS — F03.90 UNSPECIFIED DEMENTIA WITHOUT BEHAVIORAL DISTURBANCE: ICD-10-CM

## 2023-05-31 DIAGNOSIS — Z79.02 LONG TERM (CURRENT) USE OF ANTITHROMBOTICS/ANTIPLATELETS: ICD-10-CM

## 2023-05-31 DIAGNOSIS — Z85.118 PERSONAL HISTORY OF OTHER MALIGNANT NEOPLASM OF BRONCHUS AND LUNG: ICD-10-CM

## 2023-05-31 DIAGNOSIS — Z86.711 PERSONAL HISTORY OF PULMONARY EMBOLISM: ICD-10-CM

## 2023-05-31 DIAGNOSIS — I10 ESSENTIAL (PRIMARY) HYPERTENSION: ICD-10-CM

## 2023-05-31 DIAGNOSIS — Z87.09 PERSONAL HISTORY OF OTHER DISEASES OF THE RESPIRATORY SYSTEM: ICD-10-CM

## 2023-05-31 DIAGNOSIS — Z79.01 LONG TERM (CURRENT) USE OF ANTICOAGULANTS: ICD-10-CM

## 2023-05-31 DIAGNOSIS — I25.10 ATHEROSCLEROTIC HEART DISEASE OF NATIVE CORONARY ARTERY WITHOUT ANGINA PECTORIS: ICD-10-CM

## 2023-05-31 DIAGNOSIS — Z20.822 CONTACT WITH AND (SUSPECTED) EXPOSURE TO COVID-19: ICD-10-CM

## 2023-05-31 DIAGNOSIS — H54.7 UNSPECIFIED VISUAL LOSS: ICD-10-CM

## 2023-05-31 DIAGNOSIS — E78.5 HYPERLIPIDEMIA, UNSPECIFIED: ICD-10-CM

## 2023-06-12 ENCOUNTER — APPOINTMENT (OUTPATIENT)
Dept: PULMONOLOGY | Facility: CLINIC | Age: 88
End: 2023-06-12
Payer: MEDICARE

## 2023-06-12 VITALS
BODY MASS INDEX: 18.52 KG/M2 | HEIGHT: 67 IN | DIASTOLIC BLOOD PRESSURE: 77 MMHG | WEIGHT: 118 LBS | SYSTOLIC BLOOD PRESSURE: 122 MMHG | OXYGEN SATURATION: 96 % | HEART RATE: 106 BPM | TEMPERATURE: 96.7 F

## 2023-06-12 PROCEDURE — 99214 OFFICE O/P EST MOD 30 MIN: CPT

## 2023-06-12 NOTE — END OF VISIT
[Time Spent: ___ minutes] : I have spent [unfilled] minutes of time on the encounter. [>50% of the face to face encounter time was spent on counseling and/or coordination of care for ___] : Greater than 50% of the face to face encounter time was spent on counseling and/or coordination of care for [unfilled] [] : Fellow [FreeTextEntry3] : Pt seen with CAITLIN Oates and agree on the above plan.   I discussed with daughter follow on sputum cultures she is off antobiotic

## 2023-06-12 NOTE — ASSESSMENT
[FreeTextEntry1] : \par Bronchiectasis\par \par The patient had frequent episode of bacterial pneumonia especially involving the right middle lobe.  Patient immunocompromised. Pt was recently on antibiotics.  Informed the daughter to call me if there is any change in her status.  Given script for sputum culture unable to provide sample in office.

## 2023-06-12 NOTE — HISTORY OF PRESENT ILLNESS
[TextBox_4] : 88 yr old female with PMH bronchiectasis, Asthma, PNA, lung CA, pleural effusion.  Presents today for follow up.  \par \par Pt is complaining of cough with increase sputum production.  She is sleeping well her appetite is about the same no fever and her shortness of breath is at baseline.\par \par Pt was recently on amoxicillin and Dioxyline.  [ESS] : 0

## 2023-06-16 NOTE — PATIENT PROFILE ADULT - ARE SIGNIFICANT INDICATORS COMPLETE.
63 y/o male w/PMHx of multiple myeloma, CVA, HLD presents to ED from [facility] regarding abnormal labs. pt had outpatient labs and found to have a hemoglobin of 6.1. pt was supposed to get transfusion tomorrow at outpatient but is unable to wait due to low levels of hgb, so MD sent pt to ED for transfusion to be done today. pt endorses generalized weakness, dizziness, and chills. adds that today was his second treatment for multiple myeloma.  Admit for pancytopenia, multiple myeloma     Reports "okay," appetite; has been increasingly thirsty since admit (x 1 day) & appears to be persistently spitting out bile. Reports UBW of 170# x 3 mo ; bed scale wt of 134# taken by RD on 6/16/23. Unintentional wt loss of 36# / 21.2% wt loss x 3 mo ; severe & clinically significant. NFPE reveals moderate-severe muscle/ fat wasting - pt appears thin, frail, kiara and malnourished. Continue w/ regular diet as tolerated and LPS TID (Provides 100kcal, 15g protein); denies all shakes. See below for other recommendations.  Yes

## 2023-06-29 ENCOUNTER — APPOINTMENT (OUTPATIENT)
Dept: INTERNAL MEDICINE | Facility: CLINIC | Age: 88
End: 2023-06-29
Payer: MEDICARE

## 2023-06-29 VITALS
RESPIRATION RATE: 16 BRPM | HEART RATE: 80 BPM | DIASTOLIC BLOOD PRESSURE: 78 MMHG | WEIGHT: 118 LBS | HEIGHT: 67 IN | TEMPERATURE: 95.7 F | BODY MASS INDEX: 18.52 KG/M2 | OXYGEN SATURATION: 98 % | SYSTOLIC BLOOD PRESSURE: 131 MMHG

## 2023-06-29 PROCEDURE — 36415 COLL VENOUS BLD VENIPUNCTURE: CPT

## 2023-06-29 PROCEDURE — 99213 OFFICE O/P EST LOW 20 MIN: CPT | Mod: 25

## 2023-06-29 NOTE — HISTORY OF PRESENT ILLNESS
[FreeTextEntry1] : Recent in ED for Cough with increased \par Cough improved\par All medication without change \par Appetite good  [de-identified] : DR Sinclair follow up noted

## 2023-06-30 ENCOUNTER — APPOINTMENT (OUTPATIENT)
Dept: INTERNAL MEDICINE | Facility: CLINIC | Age: 88
End: 2023-06-30
Payer: MEDICARE

## 2023-06-30 LAB
ALBUMIN SERPL ELPH-MCNC: 3.8 G/DL
ALP BLD-CCNC: 126 U/L
ALT SERPL-CCNC: 23 U/L
ANION GAP SERPL CALC-SCNC: 9 MMOL/L
AST SERPL-CCNC: 23 U/L
BILIRUB SERPL-MCNC: 0.5 MG/DL
BUN SERPL-MCNC: 23 MG/DL
CALCIUM SERPL-MCNC: 10.2 MG/DL
CHLORIDE SERPL-SCNC: 104 MMOL/L
CHOLEST SERPL-MCNC: 147 MG/DL
CO2 SERPL-SCNC: 23 MMOL/L
CREAT SERPL-MCNC: 1.27 MG/DL
EGFR: 41 ML/MIN/1.73M2
ESTIMATED AVERAGE GLUCOSE: 128 MG/DL
GLUCOSE SERPL-MCNC: 113 MG/DL
HBA1C MFR BLD HPLC: 6.1 %
HDLC SERPL-MCNC: 65 MG/DL
LDLC SERPL CALC-MCNC: 74 MG/DL
NONHDLC SERPL-MCNC: 82 MG/DL
POTASSIUM SERPL-SCNC: 6.6 MMOL/L
PROT SERPL-MCNC: 7.7 G/DL
SODIUM SERPL-SCNC: 137 MMOL/L
T4 FREE SERPL-MCNC: 1.5 NG/DL
TRIGL SERPL-MCNC: 42 MG/DL
TSH SERPL-ACNC: 2.52 UIU/ML

## 2023-06-30 PROCEDURE — 36415 COLL VENOUS BLD VENIPUNCTURE: CPT

## 2023-07-02 LAB — BACTERIA SPT CULT: NORMAL

## 2023-07-05 ENCOUNTER — RX RENEWAL (OUTPATIENT)
Age: 88
End: 2023-07-05

## 2023-07-05 LAB
ALBUMIN SERPL ELPH-MCNC: 3.6 G/DL
ALP BLD-CCNC: 109 U/L
ALT SERPL-CCNC: 19 U/L
ANION GAP SERPL CALC-SCNC: 10 MMOL/L
AST SERPL-CCNC: 21 U/L
BILIRUB SERPL-MCNC: 0.4 MG/DL
BUN SERPL-MCNC: 18 MG/DL
CALCIUM SERPL-MCNC: 10.1 MG/DL
CHLORIDE SERPL-SCNC: 107 MMOL/L
CO2 SERPL-SCNC: 22 MMOL/L
CREAT SERPL-MCNC: 1.11 MG/DL
EGFR: 48 ML/MIN/1.73M2
GLUCOSE SERPL-MCNC: 66 MG/DL
POTASSIUM SERPL-SCNC: 5.1 MMOL/L
PROT SERPL-MCNC: 7.3 G/DL
SODIUM SERPL-SCNC: 139 MMOL/L

## 2023-07-05 RX ORDER — MEGESTROL ACETATE 40 MG/ML
400 SUSPENSION ORAL TWICE DAILY
Qty: 1 | Refills: 1 | Status: ACTIVE | COMMUNITY
Start: 2021-01-28 | End: 1900-01-01

## 2023-08-01 ENCOUNTER — APPOINTMENT (OUTPATIENT)
Dept: PULMONOLOGY | Facility: CLINIC | Age: 88
End: 2023-08-01
Payer: MEDICARE

## 2023-08-01 VITALS
DIASTOLIC BLOOD PRESSURE: 81 MMHG | WEIGHT: 119 LBS | HEIGHT: 67 IN | BODY MASS INDEX: 18.68 KG/M2 | OXYGEN SATURATION: 98 % | HEART RATE: 78 BPM | SYSTOLIC BLOOD PRESSURE: 123 MMHG

## 2023-08-01 DIAGNOSIS — J15.9 UNSPECIFIED BACTERIAL PNEUMONIA: ICD-10-CM

## 2023-08-01 DIAGNOSIS — R06.02 SHORTNESS OF BREATH: ICD-10-CM

## 2023-08-01 DIAGNOSIS — R05.9 COUGH, UNSPECIFIED: ICD-10-CM

## 2023-08-01 DIAGNOSIS — R93.89 ABNORMAL FINDINGS ON DIAGNOSTIC IMAGING OF OTHER SPECIFIED BODY STRUCTURES: ICD-10-CM

## 2023-08-01 DIAGNOSIS — J90 PLEURAL EFFUSION, NOT ELSEWHERE CLASSIFIED: ICD-10-CM

## 2023-08-01 PROCEDURE — 99214 OFFICE O/P EST MOD 30 MIN: CPT

## 2023-08-01 NOTE — ASSESSMENT
[FreeTextEntry1] : Bronchiectasis  The patient had frequent episode of bacterial pneumonia especially involving the right middle lobe.  Patient immunocompromised. Pt was recently on antibiotics.  Informed the daughter to call me if there is any change in her status. Last sample normal jory.    CT scan 5/2023  Masslike consolidations in the upper lobes with adjacent groundglass opacity in the right upper lobe as above with mediastinal lymphadenopathy, not seen previously. Consolidation at the right lung base not seen previously may be due to atelectasis versus infection. Adjacent mild pleural fluid versus pleural thickening.  Pt was treated with antibiotic for PNA.    Follow up in 3 months.

## 2023-08-01 NOTE — REVIEW OF SYSTEMS
[Sputum] : sputum [Negative] : Endocrine [Cough] : cough [Hemoptysis] : no hemoptysis [Chest Tightness] : no chest tightness [Frequent URIs] : no frequent URIs [Dyspnea] : no dyspnea [Pleuritic Pain] : no pleuritic pain [Wheezing] : no wheezing [A.M. Dry Mouth] : no a.m. dry mouth [SOB on Exertion] : no sob on exertion

## 2023-08-01 NOTE — HISTORY OF PRESENT ILLNESS
[TextBox_4] : 88 yr old female with PMH bronchiectasis, Asthma, sarcoid of the lung, PNA, lung CA, pleural effusion.  Presents today for follow up.    Pt cough and sputum production improved since last visit.  She has cough at night and morning, but not does not interrupt her sleep.  She feels better after antibiotics.   She is sleeping well her appetite is about the same no fever and her shortness of breath is at baseline.  She walks daily but has limitations due to her legs.  She uses the nebulizer daily.  Denies swelling of the leg or calf pain. No new skin rashes or swollen lymph nodes.  [ESS] : 0

## 2023-08-01 NOTE — END OF VISIT
[] : Fellow [Time Spent: ___ minutes] : I have spent [unfilled] minutes of time on the encounter. [>50% of the face to face encounter time was spent on counseling and/or coordination of care for ___] : Greater than 50% of the face to face encounter time was spent on counseling and/or coordination of care for [unfilled] [FreeTextEntry3] : Pt seen with CAITLIN Oates and agree on the above plan.   I discussed with the 2 daughters.  She is stable and reviewed the last CT scan

## 2023-08-03 ENCOUNTER — RX RENEWAL (OUTPATIENT)
Age: 88
End: 2023-08-03

## 2023-08-03 NOTE — ED PROVIDER NOTE - DISPOSITION TYPE
Notified patient of results  Patient agreeable with recheck 6 months.    Patient states when she had her ultrasound done, it was quite tender over that kidney when they did the ultrasound.  Patient wants to know if you have any idea why?   DISCHARGE

## 2023-08-14 NOTE — PHYSICAL THERAPY INITIAL EVALUATION ADULT - PERSONAL SAFETY AND JUDGMENT, REHAB EVAL
89 attempting to get out of bed and verbalizing that she is going to walk out and leave./at risk behaviors demonstrated

## 2023-08-31 ENCOUNTER — MED ADMIN CHARGE (OUTPATIENT)
Age: 88
End: 2023-08-31

## 2023-08-31 ENCOUNTER — APPOINTMENT (OUTPATIENT)
Dept: INTERNAL MEDICINE | Facility: CLINIC | Age: 88
End: 2023-08-31
Payer: MEDICARE

## 2023-08-31 VITALS
TEMPERATURE: 97.6 F | HEART RATE: 75 BPM | WEIGHT: 119 LBS | BODY MASS INDEX: 18.68 KG/M2 | HEIGHT: 67 IN | SYSTOLIC BLOOD PRESSURE: 98 MMHG | DIASTOLIC BLOOD PRESSURE: 67 MMHG | OXYGEN SATURATION: 99 %

## 2023-08-31 DIAGNOSIS — I26.99 OTHER PULMONARY EMBOLISM W/OUT ACUTE COR PULMONALE: ICD-10-CM

## 2023-08-31 DIAGNOSIS — Z23 ENCOUNTER FOR IMMUNIZATION: ICD-10-CM

## 2023-08-31 PROCEDURE — 36415 COLL VENOUS BLD VENIPUNCTURE: CPT

## 2023-08-31 PROCEDURE — 90662 IIV NO PRSV INCREASED AG IM: CPT

## 2023-08-31 PROCEDURE — 99213 OFFICE O/P EST LOW 20 MIN: CPT | Mod: 25

## 2023-08-31 PROCEDURE — G0008: CPT

## 2023-08-31 NOTE — HISTORY OF PRESENT ILLNESS
[FreeTextEntry1] : some coughing otherwise without shortness of breath Dr Sinclair follow up noted;

## 2023-08-31 NOTE — PLAN
"SUBJECTIVE:   Keegan is a 67 year old who presents for Preventive Visit.      6/19/2023     2:37 PM   Additional Questions   Roomed by gwen parada     Are you in the first 12 months of your Medicare coverage?  No    Healthy Habits:     In general, how would you rate your overall health?  Good    Frequency of exercise:  1 day/week    Duration of exercise:  15-30 minutes    Do you usually eat at least 4 servings of fruit and vegetables a day, include whole grains    & fiber and avoid regularly eating high fat or \"junk\" foods?  No    Taking medications regularly:  Yes    Medication side effects:  None    Ability to successfully perform activities of daily living:  No assistance needed    Home Safety:  No safety concerns identified    Hearing Impairment:  Difficulty following a conversation in a noisy restaurant or crowded room, feel that people are mumbling or not speaking clearly, need to ask people to speak up or repeat themselves, find that men's voices are easier to understand than woman's and difficulty understanding soft or whispered speech    In the past 6 months, have you been bothered by leaking of urine?  No    In general, how would you rate your overall mental or emotional health?  Good      PHQ-2 Total Score: 0    Additional concerns today:  No    Notes a really severe dry mouth over the past year.  This is happening even during the day.    He also notes a lot of dry eyes.  Some occasionally \"milky\" vision too.    Had some flashes in the corner of his eye in the past year.  Was seen by an eye doctor and was told that it wasn't a detached retina, but his vitreous was .      No really dry skin.      He started Detrol about  2 months ago.      Notes more gravelly voice.  Not much SOB with his COPD.  Still using Symbicort regularly.  Wonders if it may be related to the increased smoke, but is has been present for a long time.      He has chronic neck pain that did improve with physical therapy.  Notes " [FreeTextEntry1] : Appears stable; Will give flu vaccine.  Labs No change in current medication  RTC  2 months  some bulging in his abdomen when he does these exercises.      Have you ever done Advance Care Planning? (For example, a Health Directive, POLST, or a discussion with a medical provider or your loved ones about your wishes): Yes, advance care planning is on file.       Fall risk  Fallen 2 or more times in the past year?: No  Any fall with injury in the past year?: No    Cognitive Screening   1) Repeat 3 items (Leader, Season, Table)    2) Clock draw: NORMAL  3) 3 item recall: Recalls 3 objects  Results: NORMAL clock, 1-2 items recalled: COGNITIVE IMPAIRMENT LESS LIKELY    Mini-CogTM Copyright S David. Licensed by the author for use in [facility]; reprinted with permission ([email or web address]). All rights reserved.      Do you have sleep apnea, excessive snoring or daytime drowsiness?: yes    Reviewed and updated as needed this visit by clinical staff   Tobacco  Allergies  Meds              Reviewed and updated as needed this visit by Provider                 Social History     Tobacco Use     Smoking status: Former     Packs/day: 1.00     Years: 15.00     Pack years: 15.00     Types: Cigarettes     Smokeless tobacco: Former     Types: Chew     Quit date: 1/20/1987     Tobacco comments:     quit 25 years ago.   Vaping Use     Vaping status: Never Used   Substance Use Topics     Alcohol use: Yes     Comment: 1 -2 monthly             6/19/2023     2:37 PM   Alcohol Use   Prescreen: >3 drinks/day or >7 drinks/week? No     Do you have a current opioid prescription? No  Do you use any other controlled substances or medications that are not prescribed by a provider? None            Current providers sharing in care for this patient include:   Patient Care Team:  Fabio Stovall MD as PCP - General (Family Practice)  Fabio Stovall MD as Assigned PCP  Cameron Marcus MD as Assigned Heart and Vascular Provider  Constantino [facility] as Pharmacist (Pharmacist Clinician- Clinical Pharmacy  Specialist)  Rasta Milan MD as MD (Urology)  Rasta Milan MD as Assigned Surgical Provider    The following health maintenance items are reviewed in Epic and correct as of today:  Health Maintenance   Topic Date Due     COVID-19 Vaccine (5 - Moderna series) 03/17/2023     ANNUAL REVIEW OF HM ORDERS  05/26/2023     MEDICARE ANNUAL WELLNESS VISIT  05/26/2023     COLORECTAL CANCER SCREENING  11/12/2023     PHQ-9  12/19/2023     CMP  06/19/2024     LIPID  06/19/2024     PSA  06/19/2024     FALL RISK ASSESSMENT  06/19/2024     ADVANCE CARE PLANNING  06/19/2028     DTAP/TDAP/TD IMMUNIZATION (3 - Td or Tdap) 06/18/2031     SPIROMETRY  Completed     HEPATITIS C SCREENING  Completed     COPD ACTION PLAN  Completed     DEPRESSION ACTION PLAN  Completed     INFLUENZA VACCINE  Completed     Pneumococcal Vaccine: 65+ Years  Completed     ZOSTER IMMUNIZATION  Completed     AORTIC ANEURYSM SCREENING (SYSTEM ASSIGNED)  Completed     IPV IMMUNIZATION  Aged Out     MENINGITIS IMMUNIZATION  Aged Out     LUNG CANCER SCREENING  Discontinued     BP Readings from Last 3 Encounters:   06/19/23 138/70   05/08/23 (!) 148/81   05/02/23 112/72    Wt Readings from Last 3 Encounters:   06/19/23 88.5 kg (195 lb)   05/02/23 92.1 kg (203 lb 1.6 oz)   04/05/23 90.3 kg (199 lb)                  Patient Active Problem List   Diagnosis     Hypertension goal BP (blood pressure) < 140/90     Trigger finger, acquired     Impotence of organic origin     Hyperlipidemia LDL goal <100     Arthritis of elbow, right     Cubital tunnel syndrome - right     Acute coronary syndrome (H)     CAD (coronary artery disease)     Benign prostatic hyperplasia with urinary obstruction     GERD (gastroesophageal reflux disease)     Moderate chronic obstructive pulmonary disease (H)     Piriformis syndrome of left side     Nonallopathic lesion of sacral region     Nonallopathic lesion of thoracic region     Nonallopathic lesion of cervical region      Cervicalgia     Left inguinal hernia     Congestion of paranasal sinus     PVC's (premature ventricular contractions)     Mild major depression (H)     Past Surgical History:   Procedure Laterality Date     COLONOSCOPY N/A 2015    Procedure: COMBINED COLONOSCOPY, SINGLE OR MULTIPLE BIOPSY/POLYPECTOMY BY BIOPSY;  Surgeon: Herman Rebollar MD;  Location: [facility]     COLONOSCOPY N/A 2018    Procedure: colonoscopy;  Surgeon: Thiago Robles MD;  Location: [facility]     H STENT PROCED  2012     HC REMOVE TONSILS/ADENOIDS,<11 Y/O      T & A <12y.o.     LAPAROSCOPIC HERNIORRHAPHY INGUINAL Left 8/3/2018    Procedure: LAPAROSCOPIC HERNIORRHAPHY INGUINAL;  Laparoscopic left inguinal hernia repair;  Surgeon: Huber Ireland MD;  Location: [facility]     REMOVAL OF SPERM DUCT(S)  88    Vasectomy       Social History     Tobacco Use     Smoking status: Former     Packs/day: 1.00     Years: 15.00     Pack years: 15.00     Types: Cigarettes     Smokeless tobacco: Former     Types: Chew     Quit date: 1987     Tobacco comments:     quit 25 years ago.   Vaping Use     Vaping status: Never Used   Substance Use Topics     Alcohol use: Yes     Comment: 1 -2 monthly     Family History   Problem Relation Age of Onset     Diabetes Mother         adult onset diabetes     Cancer Mother         kidney dx age 75     Cancer Father         prostate cancer/ at age 73     Hypertension Brother      Cancer Paternal Grandfather         prostat ca     Lung Cancer Brother          Current Outpatient Medications   Medication Sig Dispense Refill     albuterol (PROAIR HFA/PROVENTIL HFA/VENTOLIN HFA) 108 (90 Base) MCG/ACT inhaler INHALE 2 PUFFS INTO THE LUNGS EVERY 6 HOURS 54 g 1     aspirin 81 MG tablet Take 1 tablet by mouth daily. 90 tablet 3     atorvastatin (LIPITOR) 80 MG tablet TAKE 1 TABLET(80 MG) BY MOUTH DAILY 90 tablet 3     fluticasone (FLONASE) 50 MCG/ACT nasal spray SPRAY 1-2 SPRAYS IN EACH NOSTRIL DAILY 48 g  2     metoprolol succinate ER (TOPROL XL) 50 MG 24 hr tablet TAKE 1 TABLET(50 MG) BY MOUTH TWICE DAILY 180 tablet 3     omeprazole (PRILOSEC) 20 MG DR capsule TAKE 1 CAPSULE BY MOUTH EVERY DAY 90 capsule 1     SYMBICORT 160-4.5 MCG/ACT Inhaler INHALE 2 PUFFS INTO THE LUNGS TWICE DAILY 30.6 g 1     tamsulosin (FLOMAX) 0.4 MG capsule TAKE ONE CAPSULE BY MOUTH ONCE DAILY 90 capsule 3     tolterodine ER (DETROL LA) 4 MG 24 hr capsule Take 1 capsule (4 mg) by mouth daily 90 capsule 3     nitroGLYcerin (NITROSTAT) 0.4 MG sublingual tablet Place 1 tablet (0.4 mg) under the tongue every 5 minutes as needed for chest pain 25 tablet 0     Allergies   Allergen Reactions     Amoxicillin-Pot Clavulanate Diarrhea and GI Disturbance     Codeine Nausea     significant     Penicillins      GI upset and diarrhea     Recent Labs   Lab Test 06/19/23  0752 06/29/22  0823 09/23/21  0828 07/29/21  1110 07/29/21  1110 10/02/20  0807 07/29/20  1126 10/24/19  0828 04/05/19  0759   A1C 5.8* 5.8*  --   --   --   --   --   --   --    * 76 72  --   --  75  --    < > 73   HDL 39* 46 39*  --   --  41  --    < > 43   TRIG 219* 143 141  --   --  162*  --    < > 134   ALT 35 68  --   --  43  --  42  --  35   CR 0.93 0.94 0.95   < > 0.98  --  1.00  --  0.90   GFRESTIMATED 90 89 84   < > 81  --  79  --  90   GFRESTBLACK  --   --   --   --   --   --  >90  --  >90   POTASSIUM 4.2 4.4 3.9   < > 4.0  --  5.0  --  4.4   TSH  --   --   --   --   --  1.44  --   --   --     < > = values in this interval not displayed.              Review of Systems   Constitutional: Negative for fever.   HENT: Positive for congestion and hearing loss. Negative for ear pain and sore throat.    Eyes: Positive for visual disturbance. Negative for pain.   Respiratory: Positive for cough and shortness of breath.    Cardiovascular: Negative for chest pain, palpitations and peripheral edema.   Gastrointestinal: Negative for abdominal pain, constipation, diarrhea, heartburn,  "hematochezia and nausea.   Genitourinary: Positive for frequency, impotence and urgency. Negative for dysuria, genital sores, hematuria and penile discharge.   Musculoskeletal: Positive for arthralgias. Negative for joint swelling and myalgias.   Skin: Negative for rash.   Neurological: Positive for dizziness and headaches. Negative for weakness and paresthesias.   Psychiatric/Behavioral: Negative for mood changes. The patient is not nervous/anxious.          OBJECTIVE:   /70 (BP Location: Right arm, Patient Position: Sitting, Cuff Size: Adult Regular)   Pulse 86   Temp (!) 96.7  F (35.9  C) (Temporal)   Resp 16   Ht 1.77 m (5' 9.69\")   Wt 88.5 kg (195 lb)   SpO2 95%   BMI 28.23 kg/m   Estimated body mass index is 28.23 kg/m  as calculated from the following:    Height as of this encounter: 1.77 m (5' 9.69\").    Weight as of this encounter: 88.5 kg (195 lb).  Physical Exam  GENERAL: healthy, alert and no distress  NECK: no adenopathy, no asymmetry, masses, or scars and thyroid normal to palpation  RESP: lungs clear to auscultation - no rales, rhonchi or wheezes  CV: regular rate and rhythm, normal S1 S2, no S3 or S4, no murmur, click or rub, no peripheral edema and peripheral pulses strong  ABDOMEN: soft, nontender, no hepatosplenomegaly, no masses and bowel sounds normal  MS: no gross musculoskeletal defects noted, no edema  MS: left trapezius spasm, tenderness.       Diagnostic Test Results:  Labs reviewed in Epic  Results for orders placed or performed in visit on 06/19/23 (from the past 24 hour(s))   Hemoglobin A1c   Result Value Ref Range    Hemoglobin A1C 5.8 (H) 0.0 - 5.6 %   PSA, screen   Result Value Ref Range    Prostate Specific Antigen Screen 0.50 0.00 - 4.50 ng/mL    Narrative    This result is obtained using the Roche Elecsys total PSA method on the liyah e601 immunoassay analyzer. Results obtained with different assay methods or kits cannot be used interchangeably.   Lipid panel reflex to " direct LDL Fasting   Result Value Ref Range    Cholesterol 184 <200 mg/dL    Triglycerides 219 (H) <150 mg/dL    Direct Measure HDL 39 (L) >=40 mg/dL    LDL Cholesterol Calculated 101 (H) <=100 mg/dL    Non HDL Cholesterol 145 (H) <130 mg/dL    Narrative    Cholesterol  Desirable:  <200 mg/dL    Triglycerides  Normal:  Less than 150 mg/dL  Borderline High:  150-199 mg/dL  High:  200-499 mg/dL  Very High:  Greater than or equal to 500 mg/dL    Direct Measure HDL  Female:  Greater than or equal to 50 mg/dL   Male:  Greater than or equal to 40 mg/dL    LDL Cholesterol  Desirable:  <100mg/dL  Above Desirable:  100-129 mg/dL   Borderline High:  130-159 mg/dL   High:  160-189 mg/dL   Very High:  >= 190 mg/dL    Non HDL Cholesterol  Desirable:  130 mg/dL  Above Desirable:  130-159 mg/dL  Borderline High:  160-189 mg/dL  High:  190-219 mg/dL  Very High:  Greater than or equal to 220 mg/dL   Comprehensive metabolic panel (BMP + Alb, Alk Phos, ALT, AST, Total. Bili, TP)   Result Value Ref Range    Sodium 140 136 - 145 mmol/L    Potassium 4.2 3.4 - 5.3 mmol/L    Chloride 104 98 - 107 mmol/L    Carbon Dioxide (CO2) 24 22 - 29 mmol/L    Anion Gap 12 7 - 15 mmol/L    Urea Nitrogen 18.8 8.0 - 23.0 mg/dL    Creatinine 0.93 0.67 - 1.17 mg/dL    Calcium 9.4 8.8 - 10.2 mg/dL    Glucose 115 (H) 70 - 99 mg/dL    Alkaline Phosphatase 89 40 - 129 U/L    AST 26 0 - 45 U/L    ALT 35 0 - 70 U/L    Protein Total 6.9 6.4 - 8.3 g/dL    Albumin 4.4 3.5 - 5.2 g/dL    Bilirubin Total 0.7 <=1.2 mg/dL    GFR Estimate 90 >60 mL/min/1.73m2     Results for orders placed or performed in visit on 06/19/23   Hemoglobin A1c     Status: Abnormal   Result Value Ref Range    Hemoglobin A1C 5.8 (H) 0.0 - 5.6 %   PSA, screen     Status: Normal   Result Value Ref Range    Prostate Specific Antigen Screen 0.50 0.00 - 4.50 ng/mL    Narrative    This result is obtained using the Roche Elecsys total PSA method on the liyah e601 immunoassay analyzer. Results obtained  with different assay methods or kits cannot be used interchangeably.   Lipid panel reflex to direct LDL Fasting     Status: Abnormal   Result Value Ref Range    Cholesterol 184 <200 mg/dL    Triglycerides 219 (H) <150 mg/dL    Direct Measure HDL 39 (L) >=40 mg/dL    LDL Cholesterol Calculated 101 (H) <=100 mg/dL    Non HDL Cholesterol 145 (H) <130 mg/dL    Narrative    Cholesterol  Desirable:  <200 mg/dL    Triglycerides  Normal:  Less than 150 mg/dL  Borderline High:  150-199 mg/dL  High:  200-499 mg/dL  Very High:  Greater than or equal to 500 mg/dL    Direct Measure HDL  Female:  Greater than or equal to 50 mg/dL   Male:  Greater than or equal to 40 mg/dL    LDL Cholesterol  Desirable:  <100mg/dL  Above Desirable:  100-129 mg/dL   Borderline High:  130-159 mg/dL   High:  160-189 mg/dL   Very High:  >= 190 mg/dL    Non HDL Cholesterol  Desirable:  130 mg/dL  Above Desirable:  130-159 mg/dL  Borderline High:  160-189 mg/dL  High:  190-219 mg/dL  Very High:  Greater than or equal to 220 mg/dL   Comprehensive metabolic panel (BMP + Alb, Alk Phos, ALT, AST, Total. Bili, TP)     Status: Abnormal   Result Value Ref Range    Sodium 140 136 - 145 mmol/L    Potassium 4.2 3.4 - 5.3 mmol/L    Chloride 104 98 - 107 mmol/L    Carbon Dioxide (CO2) 24 22 - 29 mmol/L    Anion Gap 12 7 - 15 mmol/L    Urea Nitrogen 18.8 8.0 - 23.0 mg/dL    Creatinine 0.93 0.67 - 1.17 mg/dL    Calcium 9.4 8.8 - 10.2 mg/dL    Glucose 115 (H) 70 - 99 mg/dL    Alkaline Phosphatase 89 40 - 129 U/L    AST 26 0 - 45 U/L    ALT 35 0 - 70 U/L    Protein Total 6.9 6.4 - 8.3 g/dL    Albumin 4.4 3.5 - 5.2 g/dL    Bilirubin Total 0.7 <=1.2 mg/dL    GFR Estimate 90 >60 mL/min/1.73m2       ASSESSMENT / PLAN:       ICD-10-CM    1. Encounter for Medicare annual wellness exam  Z00.00 PRIMARY CARE FOLLOW-UP SCHEDULING      2. Dry mouth and eyes  R68.2     H04.123       3. Trapezius muscle spasm  M62.838 Physical Therapy Referral      4. Moderate chronic obstructive  pulmonary disease (H)  J44.9 albuterol (PROAIR HFA/PROVENTIL HFA/VENTOLIN HFA) 108 (90 Base) MCG/ACT inhaler      5. Hyperlipidemia LDL goal <100  E78.5 atorvastatin (LIPITOR) 80 MG tablet      6. Urgency incontinence  N39.41 solifenacin (VESICARE) 5 MG tablet      7. Coronary artery disease involving native heart without angina pectoris, unspecified vessel or lesion type  I25.10       8. Hypertension goal BP (blood pressure) < 140/90  I10 UA Macroscopic with reflex to Microscopic and Culture     UA Macroscopic with reflex to Microscopic and Culture     UA Microscopic with Reflex to Culture      9. Prediabetes  R73.03 UA Macroscopic with reflex to Microscopic and Culture     UA Macroscopic with reflex to Microscopic and Culture     UA Microscopic with Reflex to Culture      10. Tension headache  G44.209 Physical Therapy Referral      11. Arthritis of carpometacarpal (CMC) joint of left thumb  M18.12       12. Seasonal allergic rhinitis due to other allergic trigger  J30.89 fluticasone (FLONASE) 50 MCG/ACT nasal spray      13. Microscopic hematuria  R31.29 CK total      14. History of colonic polyps  Z86.010 Colonoscopy Screening  Referral      15. Special screening for malignant neoplasms, colon  Z12.11 Colonoscopy Screening  Referral          1.  Reviewed recommended screenings and ordered appropriate testing for pt's risks and per pt's request(s).   2.  Some concern for Sjogren's syndrome, but patient is also on Detrol which can increase dryness symptoms.  Discussed trial of alternative antispasmodic.  Patient wished to try this.  If not improving, we can consider testing for Sjogren's syndrome or any other potential causes.  3, 10.  Previously responsive to physical therapy, so we will try that again.  Discussed possible role for trigger point injections in the trapezius.  We can also consider muscle relaxants if needed.  4.  Currently under good control.  Continue regimen.  5.  Clinically well  "controlled.  Continue current regimen.  6.  Currently controlled, but potential side effects.  See #2 above.  We will try solifenacin.  Follow-up at next visit.  7.  Currently asymptomatic.  We will continue with risk factor reduction.  Check monitoring labs periodically.   8.  Currently Controlled.  Continue current regimen.  Check labs.  Call/return if any problems or questions arise.   9.  Continue lifestyle modifications to help with this.  Pt hasn't progressed at this point.    11.  Discussed nature of this, offered X-ray to confirm diagnosis.  Pt declined.  Can try glucosamine to help with this.  otc analgesics are also fine.  If not responding, will refer to ortho for possible injection.  12.  Currently Controlled. Continue current regimen.  Call/return if any problems or questions arise.   13.  Incidentally noted.  Unclear etiology.  Ordered CPK to further assess since no red cells were seen on microscopy.  14, 15.  Ordered colonoscopy to further evaluate.      Portions of this note were completed using Dragon dictation software.  Although reviewed, there may be typographical and other inadvertent errors that remain.         COUNSELING:  Reviewed preventive health counseling, as reflected in patient instructions       Regular exercise       Healthy diet/nutrition       Bladder control       Aspirin prophylaxis        Colon cancer screening       Prostate cancer screening       The 10-year ASCVD risk score (Arlene DK, et al., 2019) is: 20.7%    Values used to calculate the score:      Age: 67 years      Sex: Male      Is Non- : No      Diabetic: No      Tobacco smoker: No      Systolic Blood Pressure: 138 mmHg      Is BP treated: Yes      HDL Cholesterol: 39 mg/dL      Total Cholesterol: 184 mg/dL      BMI:   Estimated body mass index is 28.23 kg/m  as calculated from the following:    Height as of this encounter: 1.77 m (5' 9.69\").    Weight as of this encounter: 88.5 kg (195 lb). "         He reports that he has quit smoking. His smoking use included cigarettes. He has a 15.00 pack-year smoking history. He quit smokeless tobacco use about 36 years ago.  His smokeless tobacco use included chew.      Appropriate preventive services were discussed with this patient, including applicable screening as appropriate for cardiovascular disease, diabetes, osteopenia/osteoporosis, and glaucoma.  As appropriate for age/gender, discussed screening for colorectal cancer, prostate cancer, breast cancer, and cervical cancer. Checklist reviewing preventive services available has been given to the patient.    Reviewed patients plan of care and provided an AVS. The Intermediate Care Plan ( asthma action plan, low back pain action plan, and migraine action plan) for Keegan meets the Care Plan requirement. This Care Plan has been established and reviewed with the Patient.          Fabio Stovall MD, MD  [facility]    Identified Health Risks:    I have reviewed Opioid Use Disorder and Substance Use Disorder risk factors and made any needed referrals.     Answers for HPI/ROS submitted by the patient on 6/19/2023  If you checked off any problems, how difficult have these problems made it for you to do your work, take care of things at home, or get along with other people?: Not difficult at all  PHQ9 TOTAL SCORE: 0        He is at risk for lack of exercise and has been provided with information to increase physical activity for the benefit of his well-being.  The patient was counseled and encouraged to consider modifying their diet and eating habits. He was provided with information on recommended healthy diet options.  The patient was provided with written information regarding signs of hearing loss.

## 2023-09-21 ENCOUNTER — RX RENEWAL (OUTPATIENT)
Age: 88
End: 2023-09-21

## 2023-09-24 NOTE — PHYSICAL THERAPY INITIAL EVALUATION ADULT - MD/RN NOTIFIED
Problem: PAIN - ADULT  Goal: Verbalizes/displays adequate comfort level or baseline comfort level  Description: Interventions:  - Encourage patient to monitor pain and request assistance  - Assess pain using appropriate pain scale  - Administer analgesics based on type and severity of pain and evaluate response  - Implement non-pharmacological measures as appropriate and evaluate response  - Consider cultural and social influences on pain and pain management  - Notify physician/advanced practitioner if interventions unsuccessful or patient reports new pain  9/24/2023 0036 by David Salinas RN  Outcome: Progressing  9/24/2023 0036 by David Salinas RN  Outcome: Progressing     Problem: INFECTION - ADULT  Goal: Absence or prevention of progression during hospitalization  Description: INTERVENTIONS:  - Assess and monitor for signs and symptoms of infection  - Monitor lab/diagnostic results  - Monitor all insertion sites, i.e. indwelling lines, tubes, and drains  - Monitor endotracheal if appropriate and nasal secretions for changes in amount and color  - [place] appropriate cooling/warming therapies per order  - Administer medications as ordered  - Instruct and encourage patient and family to use good hand hygiene technique  - Identify and instruct in appropriate isolation precautions for identified infection/condition  9/24/2023 0036 by David Salinas RN  Outcome: Progressing  9/24/2023 0036 by David Salinas RN  Outcome: Progressing  Goal: Absence of fever/infection during neutropenic period  Description: INTERVENTIONS:  - Monitor WBC    9/24/2023 0036 by David Salinas RN  Outcome: Progressing  9/24/2023 0036 by David Salinas RN  Outcome: Progressing     Problem: SAFETY ADULT  Goal: Patient will remain free of falls  Description: INTERVENTIONS:  - Educate patient/family on patient safety including physical limitations  - Instruct patient to call for assistance with activity   - Consult OT/PT to assist with strengthening/mobility   - Keep Call bell within reach  - Keep bed low and locked with side rails adjusted as appropriate  - Keep care items and personal belongings within reach  - Initiate and maintain comfort rounds  - Make Fall Risk Sign visible to staff  - Apply yellow socks and bracelet for high fall risk patients  - Consider moving patient to room near nurses station  9/24/2023 0036 by Mayito Almendarez RN  Outcome: Progressing  9/24/2023 0036 by Mayito Almendarez RN  Outcome: Progressing  Goal: Maintain or return to baseline ADL function  Description: INTERVENTIONS:  -  Assess patient's ability to carry out ADLs; assess patient's baseline for ADL function and identify physical deficits which impact ability to perform ADLs (bathing, care of mouth/teeth, toileting, grooming, dressing, etc.)  - Assess/evaluate cause of self-care deficits   - Assess range of motion  - Assess patient's mobility; develop plan if impaired  - Assess patient's need for assistive devices and provide as appropriate  - Encourage maximum independence but intervene and supervise when necessary  - Involve family in performance of ADLs  - Assess for home care needs following discharge   - Consider OT consult to assist with ADL evaluation and planning for discharge  - Provide patient education as appropriate  9/24/2023 0036 by Mayito Almendarez RN  Outcome: Progressing  9/24/2023 0036 by Mayito Almendarez RN  Outcome: Progressing  Goal: Maintains/Returns to pre admission functional level  Description: INTERVENTIONS:  - Perform BMAT or MOVE assessment daily.   - Set and communicate daily mobility goal to care team and patient/family/caregiver.    - Collaborate with rehabilitation services on mobility goals if consulted  - Out of bed for toileting  - Record patient progress and toleration of activity level   9/24/2023 0036 by Mayito Almendarez RN  Outcome: Progressing  9/24/2023 0036 by Mayito Almendarez RN  Outcome: Progressing Problem: DISCHARGE PLANNING  Goal: Discharge to home or other facility with appropriate resources  Description: INTERVENTIONS:  - Identify barriers to discharge w/patient and caregiver  - Arrange for needed discharge resources and transportation as appropriate  - Identify discharge learning needs (meds, wound care, etc.)  - Arrange for interpretive services to assist at discharge as needed  - Refer to Case Management Department for coordinating discharge planning if the patient needs post-hospital services based on physician/advanced practitioner order or complex needs related to functional status, cognitive ability, or social support system  9/24/2023 0036 by Manoj Molina RN  Outcome: Progressing  9/24/2023 0036 by Manoj Molina, RN  Outcome: Progressing yes

## 2023-10-04 ENCOUNTER — APPOINTMENT (OUTPATIENT)
Dept: NEPHROLOGY | Facility: CLINIC | Age: 88
End: 2023-10-04

## 2023-10-10 ENCOUNTER — RX RENEWAL (OUTPATIENT)
Age: 88
End: 2023-10-10

## 2023-10-10 RX ORDER — MULTIVITAMIN
TABLET ORAL
Qty: 30 | Refills: 0 | Status: ACTIVE | COMMUNITY
Start: 2023-10-10 | End: 1900-01-01

## 2023-10-17 ENCOUNTER — APPOINTMENT (OUTPATIENT)
Dept: NEPHROLOGY | Facility: CLINIC | Age: 88
End: 2023-10-17
Payer: MEDICARE

## 2023-10-17 VITALS — HEART RATE: 73 BPM | DIASTOLIC BLOOD PRESSURE: 75 MMHG | SYSTOLIC BLOOD PRESSURE: 145 MMHG

## 2023-10-17 PROCEDURE — 99215 OFFICE O/P EST HI 40 MIN: CPT

## 2023-10-19 ENCOUNTER — NON-APPOINTMENT (OUTPATIENT)
Age: 88
End: 2023-10-19

## 2023-10-19 LAB
ALBUMIN SERPL ELPH-MCNC: 4.1 G/DL
ALP BLD-CCNC: 114 U/L
ALT SERPL-CCNC: 18 U/L
ANION GAP SERPL CALC-SCNC: 10 MMOL/L
AST SERPL-CCNC: 20 U/L
BILIRUB SERPL-MCNC: 0.6 MG/DL
BUN SERPL-MCNC: 16 MG/DL
CALCIUM SERPL-MCNC: 10.8 MG/DL
CHLORIDE SERPL-SCNC: 105 MMOL/L
CO2 SERPL-SCNC: 24 MMOL/L
CREAT SERPL-MCNC: 1.16 MG/DL
EGFR: 45 ML/MIN/1.73M2
POTASSIUM SERPL-SCNC: 5.9 MMOL/L
PROT SERPL-MCNC: 7.7 G/DL
SODIUM SERPL-SCNC: 139 MMOL/L

## 2023-10-19 RX ORDER — SODIUM ZIRCONIUM CYCLOSILICATE 10 G/10G
10 POWDER, FOR SUSPENSION ORAL 3 TIMES DAILY
Qty: 30 | Refills: 0 | Status: ACTIVE | COMMUNITY
Start: 2023-10-19 | End: 1900-01-01

## 2023-10-20 LAB
ALBUMIN SERPL ELPH-MCNC: 4.1 G/DL
ALP BLD-CCNC: 92 U/L
ALT SERPL-CCNC: 13 U/L
ANION GAP SERPL CALC-SCNC: 10 MMOL/L
AST SERPL-CCNC: 19 U/L
BILIRUB SERPL-MCNC: 0.6 MG/DL
BUN SERPL-MCNC: 15 MG/DL
CALCIUM SERPL-MCNC: 10.5 MG/DL
CHLORIDE SERPL-SCNC: 106 MMOL/L
CHOLEST SERPL-MCNC: 120 MG/DL
CO2 SERPL-SCNC: 22 MMOL/L
CREAT SERPL-MCNC: 1.1 MG/DL
EGFR: 48 ML/MIN/1.73M2
ESTIMATED AVERAGE GLUCOSE: 131 MG/DL
GLUCOSE SERPL-MCNC: 101 MG/DL
HBA1C MFR BLD HPLC: 6.2 %
HCT VFR BLD CALC: 48.3 %
HDLC SERPL-MCNC: 51 MG/DL
HGB BLD-MCNC: 15.4 G/DL
LDLC SERPL CALC-MCNC: 58 MG/DL
MCHC RBC-ENTMCNC: 31.9 GM/DL
MCHC RBC-ENTMCNC: 32.1 PG
MCV RBC AUTO: 100.6 FL
NONHDLC SERPL-MCNC: 69 MG/DL
NT-PROBNP SERPL-MCNC: 1969 PG/ML
PLATELET # BLD AUTO: 272 K/UL
POTASSIUM SERPL-SCNC: 5.8 MMOL/L
PROT SERPL-MCNC: 7.5 G/DL
RBC # BLD: 4.8 M/UL
RBC # FLD: 13.2 %
SODIUM SERPL-SCNC: 138 MMOL/L
TRIGL SERPL-MCNC: 42 MG/DL
WBC # FLD AUTO: 6.02 K/UL

## 2023-10-24 ENCOUNTER — APPOINTMENT (OUTPATIENT)
Dept: HEMATOLOGY ONCOLOGY | Facility: CLINIC | Age: 88
End: 2023-10-24
Payer: MEDICARE

## 2023-10-24 VITALS
RESPIRATION RATE: 18 BRPM | TEMPERATURE: 98.1 F | WEIGHT: 124 LBS | SYSTOLIC BLOOD PRESSURE: 136 MMHG | DIASTOLIC BLOOD PRESSURE: 77 MMHG | HEART RATE: 62 BPM | OXYGEN SATURATION: 98 % | HEIGHT: 67 IN | BODY MASS INDEX: 19.46 KG/M2

## 2023-10-24 DIAGNOSIS — C34.90 MALIGNANT NEOPLASM OF UNSPECIFIED PART OF UNSPECIFIED BRONCHUS OR LUNG: ICD-10-CM

## 2023-10-24 DIAGNOSIS — D86.0 SARCOIDOSIS OF LUNG: ICD-10-CM

## 2023-10-24 DIAGNOSIS — J18.9 PNEUMONIA, UNSPECIFIED ORGANISM: ICD-10-CM

## 2023-10-24 PROCEDURE — 99205 OFFICE O/P NEW HI 60 MIN: CPT

## 2023-10-25 PROBLEM — J18.9 PNA (PNEUMONIA): Status: ACTIVE | Noted: 2019-12-04

## 2023-10-25 PROBLEM — D86.0 SARCOIDOSIS, LUNG: Status: ACTIVE | Noted: 2018-07-25

## 2023-10-30 ENCOUNTER — APPOINTMENT (OUTPATIENT)
Dept: INTERNAL MEDICINE | Facility: CLINIC | Age: 88
End: 2023-10-30
Payer: MEDICAID

## 2023-10-30 VITALS
DIASTOLIC BLOOD PRESSURE: 71 MMHG | TEMPERATURE: 97.3 F | WEIGHT: 120 LBS | OXYGEN SATURATION: 98 % | HEART RATE: 75 BPM | BODY MASS INDEX: 21.26 KG/M2 | HEIGHT: 62.99 IN | SYSTOLIC BLOOD PRESSURE: 114 MMHG

## 2023-10-30 PROCEDURE — 99213 OFFICE O/P EST LOW 20 MIN: CPT | Mod: 25

## 2023-10-30 PROCEDURE — 36415 COLL VENOUS BLD VENIPUNCTURE: CPT

## 2023-10-31 LAB
ALBUMIN SERPL ELPH-MCNC: 3.8 G/DL
ALP BLD-CCNC: 106 U/L
ALT SERPL-CCNC: 13 U/L
ANION GAP SERPL CALC-SCNC: 10 MMOL/L
AST SERPL-CCNC: 17 U/L
BILIRUB SERPL-MCNC: 0.6 MG/DL
BUN SERPL-MCNC: 16 MG/DL
CALCIUM SERPL-MCNC: 9.8 MG/DL
CHLORIDE SERPL-SCNC: 106 MMOL/L
CO2 SERPL-SCNC: 23 MMOL/L
CREAT SERPL-MCNC: 1.05 MG/DL
EGFR: 51 ML/MIN/1.73M2
GLUCOSE SERPL-MCNC: 133 MG/DL
POTASSIUM SERPL-SCNC: 4.8 MMOL/L
PROT SERPL-MCNC: 7.1 G/DL
SODIUM SERPL-SCNC: 138 MMOL/L

## 2023-11-06 ENCOUNTER — RX RENEWAL (OUTPATIENT)
Age: 88
End: 2023-11-06

## 2023-11-08 ENCOUNTER — APPOINTMENT (OUTPATIENT)
Dept: INTERNAL MEDICINE | Facility: CLINIC | Age: 88
End: 2023-11-08

## 2023-11-12 ENCOUNTER — RX RENEWAL (OUTPATIENT)
Age: 88
End: 2023-11-12

## 2023-11-16 ENCOUNTER — APPOINTMENT (OUTPATIENT)
Dept: PULMONOLOGY | Facility: CLINIC | Age: 88
End: 2023-11-16

## 2023-12-01 RX ORDER — MEGESTROL ACETATE 40 MG/ML
40 SUSPENSION ORAL
Qty: 960 | Refills: 0 | Status: ACTIVE | COMMUNITY
Start: 2023-07-05 | End: 1900-01-01

## 2023-12-09 ENCOUNTER — RX RENEWAL (OUTPATIENT)
Age: 88
End: 2023-12-09

## 2023-12-15 ENCOUNTER — NON-APPOINTMENT (OUTPATIENT)
Age: 88
End: 2023-12-15

## 2023-12-15 ENCOUNTER — APPOINTMENT (OUTPATIENT)
Dept: OPHTHALMOLOGY | Facility: CLINIC | Age: 88
End: 2023-12-15
Payer: MEDICARE

## 2023-12-15 PROCEDURE — 92235 FLUORESCEIN ANGRPH MLTIFRAME: CPT

## 2023-12-15 PROCEDURE — 76512 OPH US DX B-SCAN: CPT | Mod: LT

## 2023-12-15 PROCEDURE — 92250 FUNDUS PHOTOGRAPHY W/I&R: CPT

## 2023-12-15 PROCEDURE — 92014 COMPRE OPH EXAM EST PT 1/>: CPT

## 2023-12-18 ENCOUNTER — RX RENEWAL (OUTPATIENT)
Age: 88
End: 2023-12-18

## 2023-12-19 ENCOUNTER — APPOINTMENT (OUTPATIENT)
Dept: PULMONOLOGY | Facility: CLINIC | Age: 88
End: 2023-12-19
Payer: MEDICARE

## 2023-12-19 ENCOUNTER — RX RENEWAL (OUTPATIENT)
Age: 88
End: 2023-12-19

## 2023-12-19 ENCOUNTER — OUTPATIENT (OUTPATIENT)
Dept: OUTPATIENT SERVICES | Facility: HOSPITAL | Age: 88
LOS: 1 days | End: 2023-12-19
Payer: MEDICARE

## 2023-12-19 VITALS
SYSTOLIC BLOOD PRESSURE: 127 MMHG | OXYGEN SATURATION: 98 % | DIASTOLIC BLOOD PRESSURE: 81 MMHG | HEART RATE: 73 BPM | TEMPERATURE: 97.2 F | HEIGHT: 62.99 IN | WEIGHT: 123 LBS | BODY MASS INDEX: 21.79 KG/M2 | RESPIRATION RATE: 12 BRPM

## 2023-12-19 DIAGNOSIS — Z98.890 OTHER SPECIFIED POSTPROCEDURAL STATES: Chronic | ICD-10-CM

## 2023-12-19 DIAGNOSIS — C34.90 MALIGNANT NEOPLASM OF UNSPECIFIED PART OF UNSPECIFIED BRONCHUS OR LUNG: Chronic | ICD-10-CM

## 2023-12-19 PROCEDURE — 99214 OFFICE O/P EST MOD 30 MIN: CPT | Mod: 25

## 2023-12-19 PROCEDURE — 71046 X-RAY EXAM CHEST 2 VIEWS: CPT | Mod: 26

## 2023-12-19 PROCEDURE — 71046 X-RAY EXAM CHEST 2 VIEWS: CPT

## 2023-12-19 PROCEDURE — 76604 US EXAM CHEST: CPT

## 2023-12-19 RX ORDER — DORZOLAMIDE HYDROCHLORIDE TIMOLOL MALEATE 20; 5 MG/ML; MG/ML
2-0.5 SOLUTION/ DROPS OPHTHALMIC TWICE DAILY
Qty: 20 | Refills: 0 | Status: ACTIVE | COMMUNITY
Start: 1900-01-01 | End: 1900-01-01

## 2023-12-20 ENCOUNTER — RX RENEWAL (OUTPATIENT)
Age: 88
End: 2023-12-20

## 2023-12-22 LAB
ANION GAP SERPL CALC-SCNC: 7 MMOL/L
BASOPHILS # BLD AUTO: 0.02 K/UL
BASOPHILS NFR BLD AUTO: 0.4 %
BUN SERPL-MCNC: 18 MG/DL
CALCIUM SERPL-MCNC: 10 MG/DL
CHLORIDE SERPL-SCNC: 106 MMOL/L
CO2 SERPL-SCNC: 23 MMOL/L
CREAT SERPL-MCNC: 1.07 MG/DL
EGFR: 50 ML/MIN/1.73M2
EOSINOPHIL # BLD AUTO: 0.02 K/UL
EOSINOPHIL NFR BLD AUTO: 0.4 %
GLUCOSE SERPL-MCNC: 93 MG/DL
HCT VFR BLD CALC: 47.8 %
HGB BLD-MCNC: 15.4 G/DL
IMM GRANULOCYTES NFR BLD AUTO: 0.2 %
LYMPHOCYTES # BLD AUTO: 1.47 K/UL
LYMPHOCYTES NFR BLD AUTO: 30.5 %
MAN DIFF?: NORMAL
MCHC RBC-ENTMCNC: 32.2 GM/DL
MCHC RBC-ENTMCNC: 33 PG
MCV RBC AUTO: 102.6 FL
MONOCYTES # BLD AUTO: 0.63 K/UL
MONOCYTES NFR BLD AUTO: 13.1 %
NEUTROPHILS # BLD AUTO: 2.67 K/UL
NEUTROPHILS NFR BLD AUTO: 55.4 %
PLATELET # BLD AUTO: 286 K/UL
POTASSIUM SERPL-SCNC: 4.8 MMOL/L
RBC # BLD: 4.66 M/UL
RBC # FLD: 14.6 %
SODIUM SERPL-SCNC: 136 MMOL/L
WBC # FLD AUTO: 4.82 K/UL

## 2023-12-29 ENCOUNTER — APPOINTMENT (OUTPATIENT)
Dept: CT IMAGING | Facility: HOSPITAL | Age: 88
End: 2023-12-29

## 2023-12-29 ENCOUNTER — OUTPATIENT (OUTPATIENT)
Dept: OUTPATIENT SERVICES | Facility: HOSPITAL | Age: 88
LOS: 1 days | End: 2023-12-29
Payer: MEDICARE

## 2023-12-29 DIAGNOSIS — H26.9 UNSPECIFIED CATARACT: Chronic | ICD-10-CM

## 2023-12-29 DIAGNOSIS — Z98.890 OTHER SPECIFIED POSTPROCEDURAL STATES: Chronic | ICD-10-CM

## 2023-12-29 DIAGNOSIS — C34.90 MALIGNANT NEOPLASM OF UNSPECIFIED PART OF UNSPECIFIED BRONCHUS OR LUNG: Chronic | ICD-10-CM

## 2023-12-29 DIAGNOSIS — Z95.0 PRESENCE OF CARDIAC PACEMAKER: Chronic | ICD-10-CM

## 2023-12-29 PROCEDURE — 71250 CT THORAX DX C-: CPT

## 2023-12-29 PROCEDURE — 71275 CT ANGIOGRAPHY CHEST: CPT

## 2023-12-29 PROCEDURE — 71275 CT ANGIOGRAPHY CHEST: CPT | Mod: 26

## 2023-12-29 PROCEDURE — 82565 ASSAY OF CREATININE: CPT

## 2023-12-29 PROCEDURE — 71250 CT THORAX DX C-: CPT | Mod: 26,59

## 2024-01-01 ENCOUNTER — EMERGENCY (EMERGENCY)
Facility: HOSPITAL | Age: 89
LOS: 1 days | Discharge: HOME | End: 2024-01-01
Attending: EMERGENCY MEDICINE | Admitting: EMERGENCY MEDICINE
Payer: MEDICARE

## 2024-01-01 VITALS
OXYGEN SATURATION: 97 % | DIASTOLIC BLOOD PRESSURE: 75 MMHG | RESPIRATION RATE: 18 BRPM | SYSTOLIC BLOOD PRESSURE: 120 MMHG | HEART RATE: 75 BPM

## 2024-01-01 VITALS
WEIGHT: 119.93 LBS | DIASTOLIC BLOOD PRESSURE: 69 MMHG | OXYGEN SATURATION: 97 % | HEIGHT: 67 IN | HEART RATE: 79 BPM | SYSTOLIC BLOOD PRESSURE: 116 MMHG | TEMPERATURE: 98 F | RESPIRATION RATE: 17 BRPM

## 2024-01-01 DIAGNOSIS — Z95.0 PRESENCE OF CARDIAC PACEMAKER: ICD-10-CM

## 2024-01-01 DIAGNOSIS — C34.90 MALIGNANT NEOPLASM OF UNSPECIFIED PART OF UNSPECIFIED BRONCHUS OR LUNG: Chronic | ICD-10-CM

## 2024-01-01 DIAGNOSIS — J47.9 BRONCHIECTASIS, UNCOMPLICATED: ICD-10-CM

## 2024-01-01 DIAGNOSIS — Z98.890 OTHER SPECIFIED POSTPROCEDURAL STATES: Chronic | ICD-10-CM

## 2024-01-01 DIAGNOSIS — Z86.73 PERSONAL HISTORY OF TRANSIENT ISCHEMIC ATTACK (TIA), AND CEREBRAL INFARCTION WITHOUT RESIDUAL DEFICITS: ICD-10-CM

## 2024-01-01 DIAGNOSIS — I50.30 UNSPECIFIED DIASTOLIC (CONGESTIVE) HEART FAILURE: ICD-10-CM

## 2024-01-01 DIAGNOSIS — Z95.0 PRESENCE OF CARDIAC PACEMAKER: Chronic | ICD-10-CM

## 2024-01-01 DIAGNOSIS — H26.9 UNSPECIFIED CATARACT: Chronic | ICD-10-CM

## 2024-01-01 DIAGNOSIS — Z20.822 CONTACT WITH AND (SUSPECTED) EXPOSURE TO COVID-19: ICD-10-CM

## 2024-01-01 DIAGNOSIS — Z87.09 PERSONAL HISTORY OF OTHER DISEASES OF THE RESPIRATORY SYSTEM: ICD-10-CM

## 2024-01-01 DIAGNOSIS — R05.3 CHRONIC COUGH: ICD-10-CM

## 2024-01-01 DIAGNOSIS — J18.9 PNEUMONIA, UNSPECIFIED ORGANISM: ICD-10-CM

## 2024-01-01 DIAGNOSIS — R94.31 ABNORMAL ELECTROCARDIOGRAM [ECG] [EKG]: ICD-10-CM

## 2024-01-01 DIAGNOSIS — Z90.2 ACQUIRED ABSENCE OF LUNG [PART OF]: ICD-10-CM

## 2024-01-01 LAB
ALBUMIN SERPL ELPH-MCNC: 3.6 G/DL — SIGNIFICANT CHANGE UP (ref 3.3–5)
ALBUMIN SERPL ELPH-MCNC: 3.6 G/DL — SIGNIFICANT CHANGE UP (ref 3.3–5)
ALP SERPL-CCNC: 90 U/L — SIGNIFICANT CHANGE UP (ref 40–120)
ALP SERPL-CCNC: 90 U/L — SIGNIFICANT CHANGE UP (ref 40–120)
ALT FLD-CCNC: 14 U/L — SIGNIFICANT CHANGE UP (ref 10–45)
ALT FLD-CCNC: 14 U/L — SIGNIFICANT CHANGE UP (ref 10–45)
ANION GAP SERPL CALC-SCNC: 8 MMOL/L — SIGNIFICANT CHANGE UP (ref 5–17)
ANION GAP SERPL CALC-SCNC: 8 MMOL/L — SIGNIFICANT CHANGE UP (ref 5–17)
APTT BLD: 26.6 SEC — SIGNIFICANT CHANGE UP (ref 24.5–35.6)
APTT BLD: 26.6 SEC — SIGNIFICANT CHANGE UP (ref 24.5–35.6)
AST SERPL-CCNC: 17 U/L — SIGNIFICANT CHANGE UP (ref 10–40)
AST SERPL-CCNC: 17 U/L — SIGNIFICANT CHANGE UP (ref 10–40)
BASOPHILS # BLD AUTO: 0.02 K/UL — SIGNIFICANT CHANGE UP (ref 0–0.2)
BASOPHILS # BLD AUTO: 0.02 K/UL — SIGNIFICANT CHANGE UP (ref 0–0.2)
BASOPHILS NFR BLD AUTO: 0.4 % — SIGNIFICANT CHANGE UP (ref 0–2)
BASOPHILS NFR BLD AUTO: 0.4 % — SIGNIFICANT CHANGE UP (ref 0–2)
BILIRUB SERPL-MCNC: 0.5 MG/DL — SIGNIFICANT CHANGE UP (ref 0.2–1.2)
BILIRUB SERPL-MCNC: 0.5 MG/DL — SIGNIFICANT CHANGE UP (ref 0.2–1.2)
BUN SERPL-MCNC: 18 MG/DL — SIGNIFICANT CHANGE UP (ref 7–23)
BUN SERPL-MCNC: 18 MG/DL — SIGNIFICANT CHANGE UP (ref 7–23)
CALCIUM SERPL-MCNC: 10.2 MG/DL — SIGNIFICANT CHANGE UP (ref 8.4–10.5)
CALCIUM SERPL-MCNC: 10.2 MG/DL — SIGNIFICANT CHANGE UP (ref 8.4–10.5)
CHLORIDE SERPL-SCNC: 106 MMOL/L — SIGNIFICANT CHANGE UP (ref 96–108)
CHLORIDE SERPL-SCNC: 106 MMOL/L — SIGNIFICANT CHANGE UP (ref 96–108)
CO2 SERPL-SCNC: 23 MMOL/L — SIGNIFICANT CHANGE UP (ref 22–31)
CO2 SERPL-SCNC: 23 MMOL/L — SIGNIFICANT CHANGE UP (ref 22–31)
CREAT SERPL-MCNC: 0.97 MG/DL — SIGNIFICANT CHANGE UP (ref 0.5–1.3)
CREAT SERPL-MCNC: 0.97 MG/DL — SIGNIFICANT CHANGE UP (ref 0.5–1.3)
EGFR: 56 ML/MIN/1.73M2 — LOW
EGFR: 56 ML/MIN/1.73M2 — LOW
EOSINOPHIL # BLD AUTO: 0.17 K/UL — SIGNIFICANT CHANGE UP (ref 0–0.5)
EOSINOPHIL # BLD AUTO: 0.17 K/UL — SIGNIFICANT CHANGE UP (ref 0–0.5)
EOSINOPHIL NFR BLD AUTO: 3.5 % — SIGNIFICANT CHANGE UP (ref 0–6)
EOSINOPHIL NFR BLD AUTO: 3.5 % — SIGNIFICANT CHANGE UP (ref 0–6)
GLUCOSE SERPL-MCNC: 104 MG/DL — HIGH (ref 70–99)
GLUCOSE SERPL-MCNC: 104 MG/DL — HIGH (ref 70–99)
HCOV PNL SPEC NAA+PROBE: DETECTED
HCOV PNL SPEC NAA+PROBE: DETECTED
HCT VFR BLD CALC: 42.5 % — SIGNIFICANT CHANGE UP (ref 34.5–45)
HCT VFR BLD CALC: 42.5 % — SIGNIFICANT CHANGE UP (ref 34.5–45)
HGB BLD-MCNC: 14 G/DL — SIGNIFICANT CHANGE UP (ref 11.5–15.5)
HGB BLD-MCNC: 14 G/DL — SIGNIFICANT CHANGE UP (ref 11.5–15.5)
IMM GRANULOCYTES NFR BLD AUTO: 0.4 % — SIGNIFICANT CHANGE UP (ref 0–0.9)
IMM GRANULOCYTES NFR BLD AUTO: 0.4 % — SIGNIFICANT CHANGE UP (ref 0–0.9)
INR BLD: 2.32 — HIGH (ref 0.85–1.18)
INR BLD: 2.32 — HIGH (ref 0.85–1.18)
LYMPHOCYTES # BLD AUTO: 1.38 K/UL — SIGNIFICANT CHANGE UP (ref 1–3.3)
LYMPHOCYTES # BLD AUTO: 1.38 K/UL — SIGNIFICANT CHANGE UP (ref 1–3.3)
LYMPHOCYTES # BLD AUTO: 28.5 % — SIGNIFICANT CHANGE UP (ref 13–44)
LYMPHOCYTES # BLD AUTO: 28.5 % — SIGNIFICANT CHANGE UP (ref 13–44)
MCHC RBC-ENTMCNC: 32.3 PG — SIGNIFICANT CHANGE UP (ref 27–34)
MCHC RBC-ENTMCNC: 32.3 PG — SIGNIFICANT CHANGE UP (ref 27–34)
MCHC RBC-ENTMCNC: 32.9 GM/DL — SIGNIFICANT CHANGE UP (ref 32–36)
MCHC RBC-ENTMCNC: 32.9 GM/DL — SIGNIFICANT CHANGE UP (ref 32–36)
MCV RBC AUTO: 98.2 FL — SIGNIFICANT CHANGE UP (ref 80–100)
MCV RBC AUTO: 98.2 FL — SIGNIFICANT CHANGE UP (ref 80–100)
MONOCYTES # BLD AUTO: 0.61 K/UL — SIGNIFICANT CHANGE UP (ref 0–0.9)
MONOCYTES # BLD AUTO: 0.61 K/UL — SIGNIFICANT CHANGE UP (ref 0–0.9)
MONOCYTES NFR BLD AUTO: 12.6 % — SIGNIFICANT CHANGE UP (ref 2–14)
MONOCYTES NFR BLD AUTO: 12.6 % — SIGNIFICANT CHANGE UP (ref 2–14)
NEUTROPHILS # BLD AUTO: 2.64 K/UL — SIGNIFICANT CHANGE UP (ref 1.8–7.4)
NEUTROPHILS # BLD AUTO: 2.64 K/UL — SIGNIFICANT CHANGE UP (ref 1.8–7.4)
NEUTROPHILS NFR BLD AUTO: 54.6 % — SIGNIFICANT CHANGE UP (ref 43–77)
NEUTROPHILS NFR BLD AUTO: 54.6 % — SIGNIFICANT CHANGE UP (ref 43–77)
NRBC # BLD: 0 /100 WBCS — SIGNIFICANT CHANGE UP (ref 0–0)
NRBC # BLD: 0 /100 WBCS — SIGNIFICANT CHANGE UP (ref 0–0)
PLATELET # BLD AUTO: 226 K/UL — SIGNIFICANT CHANGE UP (ref 150–400)
PLATELET # BLD AUTO: 226 K/UL — SIGNIFICANT CHANGE UP (ref 150–400)
POTASSIUM SERPL-MCNC: 4.2 MMOL/L — SIGNIFICANT CHANGE UP (ref 3.5–5.3)
POTASSIUM SERPL-MCNC: 4.2 MMOL/L — SIGNIFICANT CHANGE UP (ref 3.5–5.3)
POTASSIUM SERPL-SCNC: 4.2 MMOL/L — SIGNIFICANT CHANGE UP (ref 3.5–5.3)
POTASSIUM SERPL-SCNC: 4.2 MMOL/L — SIGNIFICANT CHANGE UP (ref 3.5–5.3)
PROT SERPL-MCNC: 7.6 G/DL — SIGNIFICANT CHANGE UP (ref 6–8.3)
PROT SERPL-MCNC: 7.6 G/DL — SIGNIFICANT CHANGE UP (ref 6–8.3)
PROTHROM AB SERPL-ACNC: 25.8 SEC — HIGH (ref 9.5–13)
PROTHROM AB SERPL-ACNC: 25.8 SEC — HIGH (ref 9.5–13)
RAPID RVP RESULT: DETECTED
RAPID RVP RESULT: DETECTED
RBC # BLD: 4.33 M/UL — SIGNIFICANT CHANGE UP (ref 3.8–5.2)
RBC # BLD: 4.33 M/UL — SIGNIFICANT CHANGE UP (ref 3.8–5.2)
RBC # FLD: 13.4 % — SIGNIFICANT CHANGE UP (ref 10.3–14.5)
RBC # FLD: 13.4 % — SIGNIFICANT CHANGE UP (ref 10.3–14.5)
SARS-COV-2 RNA SPEC QL NAA+PROBE: SIGNIFICANT CHANGE UP
SARS-COV-2 RNA SPEC QL NAA+PROBE: SIGNIFICANT CHANGE UP
SODIUM SERPL-SCNC: 137 MMOL/L — SIGNIFICANT CHANGE UP (ref 135–145)
SODIUM SERPL-SCNC: 137 MMOL/L — SIGNIFICANT CHANGE UP (ref 135–145)
WBC # BLD: 4.84 K/UL — SIGNIFICANT CHANGE UP (ref 3.8–10.5)
WBC # BLD: 4.84 K/UL — SIGNIFICANT CHANGE UP (ref 3.8–10.5)
WBC # FLD AUTO: 4.84 K/UL — SIGNIFICANT CHANGE UP (ref 3.8–10.5)
WBC # FLD AUTO: 4.84 K/UL — SIGNIFICANT CHANGE UP (ref 3.8–10.5)

## 2024-01-01 PROCEDURE — 85610 PROTHROMBIN TIME: CPT

## 2024-01-01 PROCEDURE — 93010 ELECTROCARDIOGRAM REPORT: CPT

## 2024-01-01 PROCEDURE — 0225U NFCT DS DNA&RNA 21 SARSCOV2: CPT

## 2024-01-01 PROCEDURE — 71045 X-RAY EXAM CHEST 1 VIEW: CPT

## 2024-01-01 PROCEDURE — 85730 THROMBOPLASTIN TIME PARTIAL: CPT

## 2024-01-01 PROCEDURE — 71045 X-RAY EXAM CHEST 1 VIEW: CPT | Mod: 26

## 2024-01-01 PROCEDURE — 80053 COMPREHEN METABOLIC PANEL: CPT

## 2024-01-01 PROCEDURE — 99285 EMERGENCY DEPT VISIT HI MDM: CPT | Mod: 25

## 2024-01-01 PROCEDURE — 96374 THER/PROPH/DIAG INJ IV PUSH: CPT

## 2024-01-01 PROCEDURE — 93005 ELECTROCARDIOGRAM TRACING: CPT

## 2024-01-01 PROCEDURE — 85025 COMPLETE CBC W/AUTO DIFF WBC: CPT

## 2024-01-01 PROCEDURE — 96375 TX/PRO/DX INJ NEW DRUG ADDON: CPT

## 2024-01-01 PROCEDURE — 36415 COLL VENOUS BLD VENIPUNCTURE: CPT

## 2024-01-01 PROCEDURE — 87040 BLOOD CULTURE FOR BACTERIA: CPT

## 2024-01-01 PROCEDURE — 99285 EMERGENCY DEPT VISIT HI MDM: CPT

## 2024-01-01 RX ORDER — DORZOLAMIDE HYDROCHLORIDE 20 MG/ML
1 SOLUTION/ DROPS OPHTHALMIC
Qty: 0 | Refills: 0 | DISCHARGE

## 2024-01-01 RX ORDER — AZITHROMYCIN 500 MG/1
500 TABLET, FILM COATED ORAL ONCE
Refills: 0 | Status: COMPLETED | OUTPATIENT
Start: 2024-01-01 | End: 2024-01-01

## 2024-01-01 RX ORDER — SODIUM CHLORIDE 9 MG/ML
1000 INJECTION INTRAMUSCULAR; INTRAVENOUS; SUBCUTANEOUS ONCE
Refills: 0 | Status: DISCONTINUED | OUTPATIENT
Start: 2024-01-01 | End: 2024-01-01

## 2024-01-01 RX ORDER — CEFTRIAXONE 500 MG/1
1000 INJECTION, POWDER, FOR SOLUTION INTRAMUSCULAR; INTRAVENOUS ONCE
Refills: 0 | Status: COMPLETED | OUTPATIENT
Start: 2024-01-01 | End: 2024-01-01

## 2024-01-01 RX ORDER — AMLODIPINE BESYLATE 2.5 MG/1
1 TABLET ORAL
Qty: 0 | Refills: 0 | DISCHARGE

## 2024-01-01 RX ORDER — CEFPODOXIME PROXETIL 100 MG
1 TABLET ORAL
Qty: 14 | Refills: 0
Start: 2024-01-01 | End: 2024-01-07

## 2024-01-01 RX ORDER — AZITHROMYCIN 500 MG/1
1 TABLET, FILM COATED ORAL
Qty: 4 | Refills: 0
Start: 2024-01-01 | End: 2024-01-04

## 2024-01-01 RX ORDER — SODIUM CHLORIDE 9 MG/ML
500 INJECTION INTRAMUSCULAR; INTRAVENOUS; SUBCUTANEOUS ONCE
Refills: 0 | Status: COMPLETED | OUTPATIENT
Start: 2024-01-01 | End: 2024-01-01

## 2024-01-01 RX ADMIN — AZITHROMYCIN 255 MILLIGRAM(S): 500 TABLET, FILM COATED ORAL at 14:19

## 2024-01-01 RX ADMIN — CEFTRIAXONE 100 MILLIGRAM(S): 500 INJECTION, POWDER, FOR SOLUTION INTRAMUSCULAR; INTRAVENOUS at 14:19

## 2024-01-01 RX ADMIN — SODIUM CHLORIDE 500 MILLILITER(S): 9 INJECTION INTRAMUSCULAR; INTRAVENOUS; SUBCUTANEOUS at 14:19

## 2024-01-01 NOTE — ED PROVIDER NOTE - NSFOLLOWUPINSTRUCTIONS_ED_ALL_ED_FT
Stop taking Augmentin and start taking Cefpodoxime and Zithromax as prescribed.  Continue taking eliquis as directed.  Follow up with Dr. Greenfield this week.  Return to ED with any worsening symptoms or other concerns.    Community-Acquired Pneumonia, Adult  Pneumonia is a lung infection that causes inflammation and the buildup of mucus and fluids in the lungs. This may cause coughing and difficulty breathing. Community-acquired pneumonia is pneumonia that develops in people who are not, and have not recently been, in a hospital or other health care facility.    Usually, pneumonia develops as a result of an illness that is caused by a virus, such as the common cold and the flu (influenza). It can also be caused by bacteria or fungi. While the common cold and influenza can pass from person to person (are contagious), pneumonia itself is not considered contagious.    What are the causes?  The human body, showing how a virus travels from the air to a person's lungs.   This condition may be caused by:  Viruses.  Bacteria.  Fungi.  What increases the risk?  The following factors may make you more likely to develop this condition:  Being over age 65 or having certain medical conditions, such as:  A long-term (chronic) disease, such as: chronic obstructive pulmonary disease (COPD), asthma, heart failure, diabetes, or kidney disease.  A condition that increases the risk of breathing in (aspirating) mucus and other fluids from your mouth and nose.  A weakened body defense system (immune system).  Having had your spleen removed (splenectomy). The spleen is the organ that helps fight germs and infections.  Not cleaning your teeth and gums well (poor dental hygiene).  Using tobacco products.  Traveling to places where germs that cause pneumonia are present or being near certain animals or animal habitats that could have germs that cause pneumonia.  What are the signs or symptoms?  Symptoms of this condition include:  A dry cough or a wet (productive) cough.  A fever, sweating, or chills.  Chest pain, especially when breathing deeply or coughing.  Fast breathing, difficulty breathing, or shortness of breath.  Tiredness (fatigue) and muscle aches.  How is this diagnosed?  An outline of a person's body and an image of an X-ray of the person's chest.   This condition may be diagnosed based on your medical history or a physical exam. You may also have tests, including:  Imaging, such as a chest X-ray or lung ultrasound.  Tests of:  The level of oxygen and other gases in your blood.  Mucus from your lungs (sputum).  Fluid around your lungs (pleural fluid).  Your urine.  How is this treated?  Treatment for this condition depends on many factors, such as the cause of your pneumonia, your medicines, and other medical conditions that you have.    For most adults, pneumonia may be treated at home. In some cases, treatment must happen in a hospital and may include:  Medicines that are given by mouth (orally) or through an IV, including:  Antibiotic medicines, if bacteria caused the pneumonia.  Medicines that kill viruses (antiviral medicines), if a virus caused the pneumonia.  Oxygen therapy.  Severe pneumonia, although rare, may require the following treatments:  Mechanical ventilation.This procedure uses a machine to help you breathe if you cannot breathe well on your own or maintain a safe level of blood oxygen.  Thoracentesis. This procedure removes any buildup of pleural fluid to help with breathing.  Follow these instructions at home:  A comparison of three sample cups showing dark yellow, yellow, and pale yellow urine.  Medicines    Take over-the-counter and prescription medicines only as told by your health care provider.  Take cough medicine only if you have trouble sleeping. Cough medicine can prevent your body from removing mucus from your lungs.  If you were prescribed antibiotics, take them as told by your health care provider. Do not stop taking the antibiotic even if you start to feel better.  Lifestyle    A sign showing that a person should not drink alcohol.  A sign showing that a person should not smoke.  Do not drink alcohol.  Do not use any products that contain nicotine or tobacco. These products include cigarettes, chewing tobacco, and vaping devices, such as e-cigarettes. If you need help quitting, ask your health care provider.  Eat a healthy diet. This includes plenty of vegetables, fruits, whole grains, low-fat dairy products, and lean protein.  General instructions    Rest a lot and get at least 8 hours of sleep each night.  Sleep in a partly upright position at night. Place a few pillows under your head or sleep in a reclining chair.  Return to your normal activities as told by your health care provider. Ask your health care provider what activities are safe for you.  Drink enough fluid to keep your urine pale yellow. This helps to thin the mucus in your lungs.  If your throat is sore, gargle with a mixture of salt and water 3–4 times a day or as needed. To make salt water, completely dissolve ½–1 tsp (3–6 g) of salt in 1 cup (237 mL) of warm water.  Keep all follow-up visits.  How is this prevented?  You can lower your risk of developing community-acquired pneumonia by:  Getting the pneumonia vaccine. There are different types and schedules of pneumonia vaccines. Ask your health care provider which option is best for you. Consider getting the pneumonia vaccine if:  You are older than 65 years of age.  You are 19–65 years of age and are receiving cancer treatment, have chronic lung disease, or have other medical conditions that affect your immune system. Ask your health care provider if this applies to you.  Getting your influenza vaccine every year. Ask your health care provider which type of vaccine is best for you.  Getting regular dental checkups.  Washing your hands often with soap and water for at least 20 seconds. If soap and water are not available, use hand .  Contact a health care provider if:  You have a fever.  You have trouble sleeping because you cannot control your cough with cough medicine.  Get help right away if:  Your shortness of breath becomes worse.  Your chest pain increases.  Your sickness becomes worse, especially if you are an older adult or have a weak immune system.  You cough up blood.  These symptoms may be an emergency. Get help right away. Call 911.  Do not wait to see if the symptoms will go away.  Do not drive yourself to the hospital.  Summary  Pneumonia is an infection of the lungs.  Community-acquired pneumonia develops in people who have not been in the hospital. It can be caused by bacteria, viruses, or fungi.  This condition may be treated with antibiotics or antiviral medicines.  Severe pneumonia may require a hospital stay and treatment to help with breathing.  This information is not intended to replace advice given to you by your health care provider. Make sure you discuss any questions you have with your health care provider.    Document Revised: 02/15/2023 Document Reviewed: 02/15/2023  [employer] Patient Education © 2023 [employer]  [employer] logo  Terms and Conditions  Privacy Policy  Editorial Policy  All content on this site: Copyright © 2024 Else[employer], its licensors, and contributors. All rights are reserved, including those for text and data mining, AI training, and similar technologies. For all open access content, the Creative Commons licensin Stop taking Augmentin and start taking Cefpodoxime and Zithromax as prescribed.  Continue taking eliquis as directed.  Follow up with Dr. Greenfield this week.  Return to ED with any worsening symptoms or other concerns.    Community-Acquired Pneumonia, Adult  Pneumonia is a lung infection that causes inflammation and the buildup of mucus and fluids in the lungs. This may cause coughing and difficulty breathing. Community-acquired pneumonia is pneumonia that develops in people who are not, and have not recently been, in a hospital or other health care facility.    Usually, pneumonia develops as a result of an illness that is caused by a virus, such as the common cold and the flu (influenza). It can also be caused by bacteria or fungi. While the common cold and influenza can pass from person to person (are contagious), pneumonia itself is not considered contagious.    What are the causes?  The human body, showing how a virus travels from the air to a person's lungs.   This condition may be caused by:  Viruses.  Bacteria.  Fungi.  What increases the risk?  The following factors may make you more likely to develop this condition:  Being over age 65 or having certain medical conditions, such as:  A long-term (chronic) disease, such as: chronic obstructive pulmonary disease (COPD), asthma, heart failure, diabetes, or kidney disease.  A condition that increases the risk of breathing in (aspirating) mucus and other fluids from your mouth and nose.  A weakened body defense system (immune system).  Having had your spleen removed (splenectomy). The spleen is the organ that helps fight germs and infections.  Not cleaning your teeth and gums well (poor dental hygiene).  Using tobacco products.  Traveling to places where germs that cause pneumonia are present or being near certain animals or animal habitats that could have germs that cause pneumonia.  What are the signs or symptoms?  Symptoms of this condition include:  A dry cough or a wet (productive) cough.  A fever, sweating, or chills.  Chest pain, especially when breathing deeply or coughing.  Fast breathing, difficulty breathing, or shortness of breath.  Tiredness (fatigue) and muscle aches.  How is this diagnosed?  An outline of a person's body and an image of an X-ray of the person's chest.   This condition may be diagnosed based on your medical history or a physical exam. You may also have tests, including:  Imaging, such as a chest X-ray or lung ultrasound.  Tests of:  The level of oxygen and other gases in your blood.  Mucus from your lungs (sputum).  Fluid around your lungs (pleural fluid).  Your urine.  How is this treated?  Treatment for this condition depends on many factors, such as the cause of your pneumonia, your medicines, and other medical conditions that you have.    For most adults, pneumonia may be treated at home. In some cases, treatment must happen in a hospital and may include:  Medicines that are given by mouth (orally) or through an IV, including:  Antibiotic medicines, if bacteria caused the pneumonia.  Medicines that kill viruses (antiviral medicines), if a virus caused the pneumonia.  Oxygen therapy.  Severe pneumonia, although rare, may require the following treatments:  Mechanical ventilation.This procedure uses a machine to help you breathe if you cannot breathe well on your own or maintain a safe level of blood oxygen.  Thoracentesis. This procedure removes any buildup of pleural fluid to help with breathing.  Follow these instructions at home:  A comparison of three sample cups showing dark yellow, yellow, and pale yellow urine.  Medicines    Take over-the-counter and prescription medicines only as told by your health care provider.  Take cough medicine only if you have trouble sleeping. Cough medicine can prevent your body from removing mucus from your lungs.  If you were prescribed antibiotics, take them as told by your health care provider. Do not stop taking the antibiotic even if you start to feel better.  Lifestyle    A sign showing that a person should not drink alcohol.  A sign showing that a person should not smoke.  Do not drink alcohol.  Do not use any products that contain nicotine or tobacco. These products include cigarettes, chewing tobacco, and vaping devices, such as e-cigarettes. If you need help quitting, ask your health care provider.  Eat a healthy diet. This includes plenty of vegetables, fruits, whole grains, low-fat dairy products, and lean protein.  General instructions    Rest a lot and get at least 8 hours of sleep each night.  Sleep in a partly upright position at night. Place a few pillows under your head or sleep in a reclining chair.  Return to your normal activities as told by your health care provider. Ask your health care provider what activities are safe for you.  Drink enough fluid to keep your urine pale yellow. This helps to thin the mucus in your lungs.  If your throat is sore, gargle with a mixture of salt and water 3–4 times a day or as needed. To make salt water, completely dissolve ½–1 tsp (3–6 g) of salt in 1 cup (237 mL) of warm water.  Keep all follow-up visits.  How is this prevented?  You can lower your risk of developing community-acquired pneumonia by:  Getting the pneumonia vaccine. There are different types and schedules of pneumonia vaccines. Ask your health care provider which option is best for you. Consider getting the pneumonia vaccine if:  You are older than 65 years of age.  You are 19–65 years of age and are receiving cancer treatment, have chronic lung disease, or have other medical conditions that affect your immune system. Ask your health care provider if this applies to you.  Getting your influenza vaccine every year. Ask your health care provider which type of vaccine is best for you.  Getting regular dental checkups.  Washing your hands often with soap and water for at least 20 seconds. If soap and water are not available, use hand .  Contact a health care provider if:  You have a fever.  You have trouble sleeping because you cannot control your cough with cough medicine.  Get help right away if:  Your shortness of breath becomes worse.  Your chest pain increases.  Your sickness becomes worse, especially if you are an older adult or have a weak immune system.  You cough up blood.  These symptoms may be an emergency. Get help right away. Call 911.  Do not wait to see if the symptoms will go away.  Do not drive yourself to the hospital.  Summary  Pneumonia is an infection of the lungs.  Community-acquired pneumonia develops in people who have not been in the hospital. It can be caused by bacteria, viruses, or fungi.  This condition may be treated with antibiotics or antiviral medicines.  Severe pneumonia may require a hospital stay and treatment to help with breathing.  This information is not intended to replace advice given to you by your health care provider. Make sure you discuss any questions you have with your health care provider.    Document Revised: 02/15/2023 Document Reviewed: 02/15/2023  [employer] Patient Education © 2023 [employer]  [employer] logo  Terms and Conditions  Privacy Policy  Editorial Policy  All content on this site: Copyright © 2024 Elsevier, its licensors, and contributors. All rights are reserved, including those for text and data mining, AI training, and similar technologies. For all open access content, the Creative Commons licensin

## 2024-01-01 NOTE — ED PROVIDER NOTE - OBJECTIVE STATEMENT
87 yo F h/o lung cancer s/p LLLobectomy, asthma vs COPD, bronchiectasis, CVA, dCHF, CKD, and segmental pulmonary embolus (2021), seen by Dr. Anderson (pulm) on 12/29 and was sent for CTA chest which showed  1.  Since September 26, 2021, increased burden of right-sided pulmonary   thromboemboli, extensive in right lower lobe branches, also seen in   descending interlobar branch and right middle lobe branches.  2.  Worsened bronchial occlusion throughout the branching airways in the   right lower lobe with volume loss and dense surrounding airspace   consolidation, suggesting postobstructive atelectasis and/or pneumonia.  3.  Resolved previously seen pneumonia in both upper lobes.  4.  Resolved previously seen inflammatory thoracic lymphadenopathy.    Pt returns to ED now with 87 yo F h/o lung cancer s/p LLLobectomy, asthma vs COPD, bronchiectasis, CVA, dCHF, CKD, and segmental pulmonary embolus (2021), seen by Dr. Anderson (pulm) on 12/29 and was sent for CTA chest which showed  1.  Since September 26, 2021, increased burden of right-sided pulmonary   thromboemboli, extensive in right lower lobe branches, also seen in   descending interlobar branch and right middle lobe branches.  2.  Worsened bronchial occlusion throughout the branching airways in the   right lower lobe with volume loss and dense surrounding airspace   consolidation, suggesting postobstructive atelectasis and/or pneumonia.  3.  Resolved previously seen pneumonia in both upper lobes.  4.  Resolved previously seen inflammatory thoracic lymphadenopathy.    Pt returns to ED now because Dr. Anderson told her to present to ED for evaluation.  Daughter was away for two days and just returned which is why she is presenting today 1/1/24.  Daughter states pt is asymptomatic - pt always has chronic cough with production (has been a bit more green).  Denies new shortness or breath or fever or chills.  Daughter states pt has been taking Augmentin as prescribed by Dr. Anderson after CT findings and would prefer for patient to not be admitted. 89 yo F h/o lung cancer s/p LLLobectomy, asthma vs COPD, bronchiectasis, CVA, dCHF, CKD, and segmental pulmonary embolus (2021), seen by Dr. Anderson (pulm) on 12/29 and was sent for CTA chest which showed  1.  Since September 26, 2021, increased burden of right-sided pulmonary   thromboemboli, extensive in right lower lobe branches, also seen in   descending interlobar branch and right middle lobe branches.  2.  Worsened bronchial occlusion throughout the branching airways in the   right lower lobe with volume loss and dense surrounding airspace   consolidation, suggesting postobstructive atelectasis and/or pneumonia.  3.  Resolved previously seen pneumonia in both upper lobes.  4.  Resolved previously seen inflammatory thoracic lymphadenopathy.    Pt returns to ED now because Dr. Anderson told her to present to ED for evaluation.  Daughter was away for two days and just returned which is why she is presenting today 1/1/24.  Daughter states pt is asymptomatic - pt always has chronic cough with production (has been a bit more green).  Denies new shortness or breath or fever or chills.  Daughter states pt has been taking Augmentin as prescribed by Dr. Anderson after CT findings and would prefer for patient to not be admitted.

## 2024-01-01 NOTE — ED ADULT TRIAGE NOTE - CHIEF COMPLAINT QUOTE
Patient PMH HTN, Afib, lung cancer (remission) and SSS to the ED by daughter as requested by PCP. Per daughter, patient had a routine visit with PCP on friday and was found to have PNA and PE. Daughter denies new symptoms in patient. Hx of dementia, patient poor historian. On eliquis, ROSALINO, NAD.

## 2024-01-01 NOTE — ED PROVIDER NOTE - CLINICAL SUMMARY MEDICAL DECISION MAKING FREE TEXT BOX
89 yo F h/o lung cancer s/p LLLobectomy, asthma vs COPD, bronchiectasis, CVA, dCHF, CKD, and segmental pulmonary embolus (2021), seen by Dr. Anderson (pulm) on 12/29 and was sent for CTA chest which showed  1.  Since September 26, 2021, increased burden of right-sided pulmonary   thromboemboli, extensive in right lower lobe branches, also seen in   descending interlobar branch and right middle lobe branches.  2.  Worsened bronchial occlusion throughout the branching airways in the   right lower lobe with volume loss and dense surrounding airspace   consolidation, suggesting postobstructive atelectasis and/or pneumonia.  3.  Resolved previously seen pneumonia in both upper lobes.  4.  Resolved previously seen inflammatory thoracic lymphadenopathy.    In ED, pt is well appearing  VSS  PE - mild crackles at bases  Labs show normal wbc count  INR 2.3  Xray with PPM and atelectasis -   Discussed with Dr. Greenfield and daughter and decision made to not admit pt and to continue pt on eliquis and will prescribe cefpodoxime and zithromax. 87 yo F h/o lung cancer s/p LLLobectomy, asthma vs COPD, bronchiectasis, CVA, dCHF, CKD, and segmental pulmonary embolus (2021), seen by Dr. Anderson (pulm) on 12/29 and was sent for CTA chest which showed  1.  Since September 26, 2021, increased burden of right-sided pulmonary   thromboemboli, extensive in right lower lobe branches, also seen in   descending interlobar branch and right middle lobe branches.  2.  Worsened bronchial occlusion throughout the branching airways in the   right lower lobe with volume loss and dense surrounding airspace   consolidation, suggesting postobstructive atelectasis and/or pneumonia.  3.  Resolved previously seen pneumonia in both upper lobes.  4.  Resolved previously seen inflammatory thoracic lymphadenopathy.    In ED, pt is well appearing  VSS  PE - mild crackles at bases  Labs show normal wbc count  INR 2.3  Xray with PPM and atelectasis -   Discussed with Dr. Greenfield and daughter and decision made to not admit pt and to continue pt on eliquis and will prescribe cefpodoxime and zithromax.

## 2024-01-01 NOTE — ED PROVIDER NOTE - PATIENT PORTAL LINK FT
You can access the FollowMyHealth Patient Portal offered by [facility] by registering at the following website: [email or web address]. By joining [employer]’s FollowMyHealth portal, you will also be able to view your health information using other applications (apps) compatible with our system. You can access the FollowMyHealth Patient Portal offered by Health system by registering at the following website: [email or web address]. By joining [employer]’s FollowMyHealth portal, you will also be able to view your health information using other applications (apps) compatible with our system.

## 2024-01-05 NOTE — ED PROVIDER NOTE - TOBACCO USE
(Key: [id number]) - [id number]  Scopolamine 1MG/3DAYS 72 hr patches  Status: PA Request  Created: January 3rd, 2024 [phone]  Sent: January 5th, 2024    Approved today  Your request has been approved. This approval authorizes your coverage from 01/01/2024 - 12/31/2024    
Unknown if ever smoked

## 2024-01-06 LAB
CULTURE RESULTS: SIGNIFICANT CHANGE UP
SPECIMEN SOURCE: SIGNIFICANT CHANGE UP

## 2024-01-08 NOTE — ED PROVIDER NOTE - NS ED MD DISPO DISCHARGE CCDA
Patient/Caregiver provided printed discharge information. Oriented - self; Oriented - place; Oriented - time

## 2024-01-09 ENCOUNTER — NON-APPOINTMENT (OUTPATIENT)
Age: 89
End: 2024-01-09

## 2024-01-09 ENCOUNTER — APPOINTMENT (OUTPATIENT)
Dept: OPHTHALMOLOGY | Facility: CLINIC | Age: 89
End: 2024-01-09
Payer: MEDICARE

## 2024-01-09 PROCEDURE — 92134 CPTRZ OPH DX IMG PST SGM RTA: CPT

## 2024-01-09 PROCEDURE — 92012 INTRM OPH EXAM EST PATIENT: CPT | Mod: 25

## 2024-01-09 PROCEDURE — 67028 INJECTION EYE DRUG: CPT | Mod: RT

## 2024-01-10 ENCOUNTER — RX RENEWAL (OUTPATIENT)
Age: 89
End: 2024-01-10

## 2024-01-12 NOTE — ADDENDUM
[FreeTextEntry1] : Addendum ([facility]; 12/22/23) I spoke to Ms. Kim's oldest daughter (Kiya Calloway, [phone]). She requested communications in the future. Her sister (Radha Mcconnell) is who attended the visit.  WBC 4.82, Hgb 15.4, HCT 47.8, .6, AEC 20 Na 136,K 4.8, Cl 106, HCO3 23, BUN/creat 18/1.07, glucose 93  CXR: Findings/ impression: Right pleural effusion. Stable position left ICD. Stable cardiomegaly, thoracic aortic calcification. Stable bony structures.  We re-iterated airway clearance and the need for a follow-up CT. On the CXR, I do not appreciate a R effusion. On the US, there was a small R costophrenic consolidation/thickening. I would like to obtain a chest CT to clarify, and we will perform a CTPE to evaluate if ongoing anticoagulation is needed.  She will call me with questions.   Addendum ([facility]; 12/29/23): I came to evaluate Ms. Sherry Kim after her CTPE:  IMPRESSION: 1. Since September 26, 2021, increased burden of right-sided pulmonary thromboemboli, extensive in right lower lobe branches, also seen in descending interlobar branch and right middle lobe branches. 2. Worsened bronchial occlusion throughout the branching airways in the right lower lobe with volume loss and dense surrounding airspace consolidation, suggesting postobstructive atelectasis and/or pneumonia. 3. Resolved previously seen pneumonia in both upper lobes. 4. Resolved previously seen inflammatory thoracic lymphadenopathy.  I reviewed the CT; spoke to Ms. Kim, Radha, and Melvin; and reviewed with the radiology team. Ms. Kim's RLL clot burden appears larger. Ms. Kim's daughters affirm that she is taking the Apixaban twice/day. She is not having any symptoms or bleeding, specifically denying chest pain, hemoptysis, fever, or chills. She is having daily sputum production via the airway clearance regimen that we discussed.  DDx includes enlarging thrombus related to DOAC failure or local inflammation vs acute illness (i.e., consolidation) causing the clot to appear more prominent. I recommended ER transfer and hospitalization to better characterize these 2 conditions. If the clot is indeed growing despite DOAC therapy, we should consider an alternative agent. Ms. Kim and Radha deferred to Kiya, whom I spoke to by phone. I told Kiya about the imaging change; Kiya understands and wants her mother to be taken home AMA. We discussed that insufficiently treated PE can be fatal, and Ms. Kim could worsen or die at home. Kiya understands and insists on her mother leaving AMA. Without Kiya presence, Kiya feels that her mother will worsen or not be able to stay in the hospital. We discussed that we could support her mother until her arrival, though she declined. Kiya is on the way back home now and will bring her mother back to hospital when she is able. She estimates this weekend. I re-iterated with Radha and Kiya the risk of going home and encouraged them to return to the ER with any clinical change. They understood and agreed. Given the RLL consolidation, Kiya and Radha inquired about a course of antibiotics, which I think is reasonable.  I am unclear if this imaging represents treatment "failure" of the DOAC. Other treatment options would include Rivaroxaban, Warfarin, or Enoxaparin. Given the uncertainty and Ms. Kim's stable clinical status, I will continue her current regimen for now.   Addendum ([facility]; 1/12/24): I tried calling Ms. Kim's family about her non-contrasted CT (obtained at the same time as the CTPE):  CT chest (12/29/23): Impression:  1. Since 5/29/2023, there has been resolution of the large consolidation in the right upper lobe. 2. There is again severe large airways disease, with volume loss, bronchiectasis, and consolidation right lower lobe, narrowing/impaction of right middle lobe bronchus, and additional foci of bronchial luminal impaction and tree-in-bud nodules in right middle and upper lobes. 3. Mild mediastinal lymphadenopathy. 4. Heavily calcified aortic valve, finding often associated with aortic stenosis.  I was unable to reach Kiya; I left a message with a callback number. I have an appointment with them this week.

## 2024-01-12 NOTE — PROCEDURE
[Thoracic Ultrasound] : Thoracic Ultrasound [A line] : A line: Yes [Normal] : Normal [FreeTextEntry2] : Bilateral A-line pattern in lung bases. No pleural effusion. Minimal consolidative change in R lung base.

## 2024-01-12 NOTE — PHYSICAL EXAM
[No Acute Distress] : no acute distress [Normal Appearance] : normal appearance [Normal Rate/Rhythm] : normal rate/rhythm [Normal S1, S2] : normal s1, s2 [No Murmurs] : no murmurs [No Resp Distress] : no resp distress [No Abnormalities] : no abnormalities [Benign] : benign [Normal Gait] : normal gait [No Clubbing] : no clubbing [No Cyanosis] : no cyanosis [No Edema] : no edema [FROM] : FROM [Normal Color/ Pigmentation] : normal color/ pigmentation [No Focal Deficits] : no focal deficits [Oriented x3] : oriented x3 [Normal Affect] : normal affect [TextBox_2] : Thin elderly female; NAD [TextBox_11] : NC/AT [TextBox_68] : Diffuse rhonchi that improved after coughing.

## 2024-01-12 NOTE — REVIEW OF SYSTEMS
[Negative] : Endocrine [TextBox_30] : Chronic daily cough with brown sputum production; no hemoptysis

## 2024-01-12 NOTE — ASSESSMENT
[FreeTextEntry1] : Labs: 10/2023 WBC 6.02, Hgb 15.4, Plts 272 Na 138, K 4.8, Cl 106, HCO3 23, BUN/creat 16/1.05, glucose 133 Tbili 0.6, 17/13, Alkphos 106, TP/Albumin 7.1/3.8 BNP: 1969  Chest CT (5/2021): IMPRESSION: 1. Right lower lobe segmental pulmonary embolism. No right heart strain. 2. Fluid is seen layering within the distal trachea and bronchial tree. Clinical correlation for aspiration is recommended. 3. Interval improvement in right nodular opacities and right lower lobe consolidation with residual groundglass opacities and peribronchial cuffing.  Chest CT (5/2023): IMPRESSION: Masslike consolidations in the upper lobes with adjacent groundglass opacity in the right upper lobe as above with mediastinal lymphadenopathy, not seen previously. This raises concern for neoplasm although infectious etiology may also be considered. Clinical correlation is necessary. If uncertain and there is clinical symptoms of pneumonia, short interval follow-up following treatment is recommended. Atelectasis in the right lower lobe is stable. Consolidation at the right lung base not seen previously may be due to atelectasis versus infection. Adjacent mild pleural fluid versus pleural thickening.  PFTs: None available.  TTE (2017): Moderate concentric LVH. EF 55-60%. LA wnl. RV normal in size and function.   A/P: 89 yo F h/o lung cancer s/p LLLobectomy, asthma vs COPD, bronchiectasis, CVA, dCHF, CKD, and segmental pulmonary embolus (2021) returns to clinic with abnormal imaging in 5/2023.  Ms. Kim's overall picture is suggestive of recurrent pneumonia, probable dementia, and layering tracheal secretions in 2021 suggests silent aspiration. On my exam today, her lung US does not show the consolidations identified in 5/2023. With a coughing for her lung exam, she produced abundant sputum.  I would like to obtain a CXR and labs now, then repeat a chest CT with and without contrast to evaluate her lung parenchyma as well as her pulmonary vasculature.  Overall, the most benefit to her health will include airway clearance. Her daughter and I discussed BID nebulized Albuterol and vibratory PEP. I would also appreciate a Speech consult.  For her cancer surveillance, I suspect she will need on-going q6-12 month imaging.  For her PE, I am unclear if her current DOAC is on-going due to her small segmental PE in 2021. I am similarly unclear on the planned duration.  Finally, in our limited interaction, I suspect Ms. Kim has significant memory loss/dementia. She is currently receiving 24-hour care from her daughters. I suspect they would benefit from more home support.  1. Abnormal chest CT (5/2023) 2. History of early-stage lung cancer 3. Recurrent pneumonia; suspect aspiration 4. Suspect memory loss/dementia 5. Segmental PE in 2021 6. h/o COPD, Bronchiectasis, CVA, dCHF, CKD  -Airway clearance: BID nebulized Albuterol, vibratory PEP, cough maneuvers -Speech consult -CXR today -BMP today; if eGFR tolerates, CT with and without contrast -continue DOAC -will request clarity of care from Ms. Kim's team -Vaccinations: not discussed today -follow-up with me in 3 months if her CXR shows significant improvement vs the 5/2023 chest CT

## 2024-01-12 NOTE — HISTORY OF PRESENT ILLNESS
[TextBox_4] : I saw Ms. Sherry Kim today in consultation for abnormal imaging and history of lung cancer. Ms. Kim was accompanied by her daughter, who provided much of her history. In review, Ms. Kim is an 89 yo F h/o lung cancer s/p LLLobectomy (?2016), asthma/COPD, Sarcoidosis (? unclear mechanism of diagnosis), CVA, dCHF, CKD, and segmental RLL PE (2021) who is cared for by Cam Greenfield and Yahir. Ms. Kim recently saw Dr. Valderrama.  Ms. Kim's last imaging was performed in 5/2023 from the ER. Multiple mass-like opacities were identified with plan for follow-up imaging in 8/2023 (after a visit with Dr. Sinclair in 6/2023). Ms. Kim saw Dr. Greenfield and Lavelle in 10/2023.  Today, our visit is limited by difficulty with communication and memory. Ms. Kim's daughter could not remember details of her mother's care, and Ms. Kim responses were limited. She was oriented to person, though not place, month, or year. She inconsistently responded to the  phone due to difficulty hearing or understanding the .  Today, Ms. Kim's daughter reports that her mother has been having recurrent pneumonias over the last 2-3 years. In the last 12 months, Ms. Kim has had fewer pneumonias, though she is unsure why. She has never been evaluated for aspiration. Ms. Kim reports feeling "fine." She denies dyspnea. She endorses daily cough with copious sputum that is yellow or brown (per her daughter). She is not having hemoptysis. She denies pain. She is eating well. She is not very active. She is able to walk between rooms at her home, though she sometimes uses a walker. Neither Ms. Kim nor her daughter are clear why she is taking a DOAC.  PMH: Lung cancer s/p LLLobectomy (? 2016; unclear surgeon) Asthma vs COPD Bronchiectasis CVA dCHF CKD Pulmonary embolus (RLL segmental 2021)  PSH: LLLobectomy

## 2024-01-17 ENCOUNTER — APPOINTMENT (OUTPATIENT)
Dept: PULMONOLOGY | Facility: CLINIC | Age: 89
End: 2024-01-17
Payer: MEDICARE

## 2024-01-17 ENCOUNTER — RX RENEWAL (OUTPATIENT)
Age: 89
End: 2024-01-17

## 2024-01-17 VITALS
TEMPERATURE: 98.1 F | SYSTOLIC BLOOD PRESSURE: 120 MMHG | OXYGEN SATURATION: 99 % | HEIGHT: 62.99 IN | HEART RATE: 77 BPM | BODY MASS INDEX: 21.44 KG/M2 | WEIGHT: 121 LBS | DIASTOLIC BLOOD PRESSURE: 75 MMHG

## 2024-01-17 PROCEDURE — G0009: CPT

## 2024-01-17 PROCEDURE — 90677 PCV20 VACCINE IM: CPT

## 2024-01-17 PROCEDURE — 94618 PULMONARY STRESS TESTING: CPT

## 2024-01-17 PROCEDURE — ZZZZZ: CPT

## 2024-01-17 PROCEDURE — 99213 OFFICE O/P EST LOW 20 MIN: CPT | Mod: 25

## 2024-01-17 NOTE — REASON FOR VISIT
[Initial] : an initial visit [Lung Cancer] : lung cancer [Pneumonia] : pneumonia [Pulmonary Embolism] : pulmonary embolism

## 2024-01-25 ENCOUNTER — APPOINTMENT (OUTPATIENT)
Dept: INTERNAL MEDICINE | Facility: CLINIC | Age: 89
End: 2024-01-25
Payer: MEDICARE

## 2024-01-25 VITALS
HEART RATE: 81 BPM | TEMPERATURE: 97.6 F | WEIGHT: 121 LBS | SYSTOLIC BLOOD PRESSURE: 114 MMHG | OXYGEN SATURATION: 98 % | DIASTOLIC BLOOD PRESSURE: 74 MMHG | BODY MASS INDEX: 21.44 KG/M2 | HEIGHT: 63 IN

## 2024-01-25 PROCEDURE — 36415 COLL VENOUS BLD VENIPUNCTURE: CPT

## 2024-01-25 PROCEDURE — 99213 OFFICE O/P EST LOW 20 MIN: CPT | Mod: 25

## 2024-01-25 RX ORDER — AMOXICILLIN AND CLAVULANATE POTASSIUM 500; 125 MG/1; MG/1
500-125 TABLET, FILM COATED ORAL
Qty: 10 | Refills: 0 | Status: DISCONTINUED | COMMUNITY
Start: 2023-12-29 | End: 2024-01-25

## 2024-01-25 RX ORDER — CEFDINIR 300 MG/1
300 CAPSULE ORAL
Qty: 14 | Refills: 0 | Status: DISCONTINUED | COMMUNITY
Start: 2021-03-04 | End: 2024-01-25

## 2024-01-25 RX ORDER — AZITHROMYCIN 250 MG/1
250 TABLET, FILM COATED ORAL
Qty: 6 | Refills: 1 | Status: DISCONTINUED | COMMUNITY
Start: 2022-12-29 | End: 2024-01-25

## 2024-01-25 RX ORDER — AMOXICILLIN AND CLAVULANATE POTASSIUM 500; 125 MG/1; MG/1
500-125 TABLET, FILM COATED ORAL
Qty: 20 | Refills: 0 | Status: DISCONTINUED | COMMUNITY
Start: 2023-07-19 | End: 2024-01-25

## 2024-01-27 LAB
ALBUMIN SERPL ELPH-MCNC: 3.5 G/DL
ALP BLD-CCNC: 106 U/L
ALT SERPL-CCNC: 13 U/L
ANION GAP SERPL CALC-SCNC: 9 MMOL/L
AST SERPL-CCNC: 16 U/L
BILIRUB SERPL-MCNC: 0.4 MG/DL
BUN SERPL-MCNC: 18 MG/DL
CALCIUM SERPL-MCNC: 10 MG/DL
CHLORIDE SERPL-SCNC: 108 MMOL/L
CO2 SERPL-SCNC: 23 MMOL/L
CREAT SERPL-MCNC: 1.06 MG/DL
EGFR: 51 ML/MIN/1.73M2
GLUCOSE SERPL-MCNC: 113 MG/DL
HCT VFR BLD CALC: 43 %
HGB BLD-MCNC: 14.1 G/DL
MCHC RBC-ENTMCNC: 32.8 GM/DL
MCHC RBC-ENTMCNC: 32.9 PG
MCV RBC AUTO: 100.2 FL
PLATELET # BLD AUTO: 244 K/UL
POTASSIUM SERPL-SCNC: 4.9 MMOL/L
PROT SERPL-MCNC: 7.1 G/DL
RBC # BLD: 4.29 M/UL
RBC # FLD: 14.5 %
SODIUM SERPL-SCNC: 140 MMOL/L
WBC # FLD AUTO: 5.38 K/UL

## 2024-01-29 ENCOUNTER — RX RENEWAL (OUTPATIENT)
Age: 89
End: 2024-01-29

## 2024-01-31 ENCOUNTER — RX RENEWAL (OUTPATIENT)
Age: 89
End: 2024-01-31

## 2024-01-31 RX ORDER — ALBUTEROL SULFATE 0.63 MG/3ML
0.63 SOLUTION RESPIRATORY (INHALATION) 4 TIMES DAILY
Qty: 150 | Refills: 0 | Status: ACTIVE | COMMUNITY
Start: 2020-11-30 | End: 1900-01-01

## 2024-02-02 ENCOUNTER — APPOINTMENT (OUTPATIENT)
Dept: RADIOLOGY | Facility: HOSPITAL | Age: 89
End: 2024-02-02
Payer: MEDICARE

## 2024-02-02 ENCOUNTER — OUTPATIENT (OUTPATIENT)
Dept: OUTPATIENT SERVICES | Facility: HOSPITAL | Age: 89
LOS: 1 days | End: 2024-02-02
Payer: MEDICARE

## 2024-02-02 DIAGNOSIS — C34.90 MALIGNANT NEOPLASM OF UNSPECIFIED PART OF UNSPECIFIED BRONCHUS OR LUNG: Chronic | ICD-10-CM

## 2024-02-02 DIAGNOSIS — Z98.890 OTHER SPECIFIED POSTPROCEDURAL STATES: Chronic | ICD-10-CM

## 2024-02-02 DIAGNOSIS — H26.9 UNSPECIFIED CATARACT: Chronic | ICD-10-CM

## 2024-02-02 DIAGNOSIS — Z95.0 PRESENCE OF CARDIAC PACEMAKER: Chronic | ICD-10-CM

## 2024-02-02 PROCEDURE — 74230 X-RAY XM SWLNG FUNCJ C+: CPT

## 2024-02-02 PROCEDURE — 74230 X-RAY XM SWLNG FUNCJ C+: CPT | Mod: 26

## 2024-02-02 PROCEDURE — 92611 MOTION FLUOROSCOPY/SWALLOW: CPT | Mod: GN

## 2024-02-07 NOTE — PHYSICAL EXAM
[No Acute Distress] : no acute distress [Normal Appearance] : normal appearance [Normal Rate/Rhythm] : normal rate/rhythm [Normal S1, S2] : normal s1, s2 [No Murmurs] : no murmurs [No Resp Distress] : no resp distress [No Abnormalities] : no abnormalities [Benign] : benign [Normal Gait] : normal gait [No Clubbing] : no clubbing [No Cyanosis] : no cyanosis [No Edema] : no edema [FROM] : FROM [Normal Color/ Pigmentation] : normal color/ pigmentation [No Focal Deficits] : no focal deficits [Oriented x3] : oriented x3 [Normal Affect] : normal affect [TextBox_2] : Thin elderly female; NAD [TextBox_11] : NC/AT [TextBox_68] : Equal and vesicular breath sounds b/l; R lower lung field inspiratory crackles; no wheezing or rhonchi.

## 2024-02-07 NOTE — PROCEDURE
[Normal] : Normal [FreeTextEntry1] : We confirmed no egg allergies or prior adverse reactions to vaccinations. We discussed PCV-20 vaccination. After cleansing the skin overlying Ms. Kim's L deltoid, I administered a PCV-20. A bandage was applied. There were no immediate complications.

## 2024-02-07 NOTE — ADDENDUM
[FreeTextEntry1] : Addendum (Justin; 2/7/24): Ms. Kim had her swallow study.   Cinesophagram (2/2/24): IMPRESSION:  As above. Please see speech pathology report in the patient's chart for complete details regarding swallow function.  I will inquire about the speech pathology report.

## 2024-02-07 NOTE — ASSESSMENT
[FreeTextEntry1] :  Labs: WBC 4.82, Hgb 15.4, HCT 47.8, .6, AEC 20 Na 136,K 4.8, Cl 106, HCO3 23, BUN/creat 18/1.07, glucose 93  BNP: 1969  Chest CT (5/2021): IMPRESSION: 1. Right lower lobe segmental pulmonary embolism. No right heart strain. 2. Fluid is seen layering within the distal trachea and bronchial tree. Clinical correlation for aspiration is recommended. 3. Interval improvement in right nodular opacities and right lower lobe consolidation with residual groundglass opacities and peribronchial cuffing.  Chest CT (5/2023): IMPRESSION: Masslike consolidations in the upper lobes with adjacent groundglass opacity in the right upper lobe as above with mediastinal lymphadenopathy, not seen previously. This raises concern for neoplasm although infectious etiology may also be considered. Clinical correlation is necessary. If uncertain and there is clinical symptoms of pneumonia, short interval follow-up following treatment is recommended. Atelectasis in the right lower lobe is stable. Consolidation at the right lung base not seen previously may be due to atelectasis versus infection. Adjacent mild pleural fluid versus pleural thickening.  CXR (12/2023) Findings/ impression: Right pleural effusion. Stable position left ICD. Stable cardiomegaly, thoracic aortic calcification. Stable bony structures.  CTPE (12/2023) IMPRESSION: 1. Since September 26, 2021, increased burden of right-sided pulmonary thromboemboli, extensive in right lower lobe branches, also seen in descending interlobar branch and right middle lobe branches. 2. Worsened bronchial occlusion throughout the branching airways in the right lower lobe with volume loss and dense surrounding airspace consolidation, suggesting postobstructive atelectasis and/or pneumonia. 3. Resolved previously seen pneumonia in both upper lobes. 4. Resolved previously seen inflammatory thoracic lymphadenopathy.  CT chest (12/29/23): Impression:  1. Since 5/29/2023, there has been resolution of the large consolidation in the right upper lobe. 2. There is again severe large airways disease, with volume loss, bronchiectasis, and consolidation right lower lobe, narrowing/impaction of right middle lobe bronchus, and additional foci of bronchial luminal impaction and tree-in-bud nodules in right middle and upper lobes. 3. Mild mediastinal lymphadenopathy. 4. Heavily calcified aortic valve, finding often associated with aortic stenosis.  PFTs: None available.  6MWT 1/17/24: 6MWT distance 118 meters (387 feet); brina SpO2 975 on room air  TTE (2017): Moderate concentric LVH. EF 55-60%. LA wnl. RV normal in size and function.   A/P: 87 yo F h/o lung cancer s/p LLLobectomy, dementia, asthma vs COPD, bronchiectasis, CVA, dCHF, CKD, and segmental pulmonary embolus (2021) returns to clinic.  Ms. Kim is feeling well today. She responded well to antibiotics and airway clearance. I suspect she has silent aspiration related to her dementia. She has an upcoming MBS with Speech evaluation. I think she would benefit from speech therapy and increased physical activity. Her daughter is anticipating home physical therapy. We discussed the importance of regular walking to maintain her physical function.  For her abnormal imaging, her improving clinical status suggests that the RLL thrombus may have been incidental or related to her pneumonia. I would like to repeat her CTPE in 3 months to ensure improvement vs resolution. We will continue her DOAC for now.  Her walk test today confirms no indication for supplemental oxygen.  1. Recurrent pneumonia (? silent aspiration) 2. History of early-stage lung cancer 3. Dementia 4. Segmental PE in 2021; ? enlargement in 12/2023 5. h/o COPD, Bronchiectasis, CVA, dCHF, CKD  -Airway clearance: BID nebulized Albuterol, vibratory PEP, cough maneuvers -Speech consult with MBS upcoming -follow-up with me in 2-3 months; at that time, we will repeat her BMP and obtain a CTPE -Apixaban 5 mg PO BID -Vaccinations: PCV-20 1/17/24, Influenza 8/2023 -follow-up with me in 3 months

## 2024-02-07 NOTE — HISTORY OF PRESENT ILLNESS
[TextBox_4] : Ms. Sherry Kim returned to clinic today in follow-up for recurrent pneumonia, dementia, and history of lung cancer and PE. Ms. Kim was accompanied by her daughter, who provided much of her history. In review, Ms. Kim is an 89 yo F h/o lung cancer s/p LLLobectomy (?2016), asthma/COPD, Sarcoidosis (? unclear mechanism of diagnosis), CVA, dCHF, CKD, and segmental RLL PE (2021) who is cared for by Cam Greenfield and Yahir. Ms. Kim recently saw Dr. Valderrama.  Ms. Estevezs underwent chest imaging in 5/2023 from the ER. Multiple mass-like opacities were identified with plan for follow-up imaging.   Ms. Kim's oldest daughter (Kiya Calloway, [phone]) is her power of . Kiya's sister (Radha Mcconnell) attends her visits.  I saw Ms. Kim in 12/2023 when she had abundant sputum production. We pursued a course of antibiotics and initiated airway clearance with PEP and nebulized Albuterol BID. We also obtained a CTPE to evaluate for worsened clot burden. On 12/29/23, Ms. Kim's CTPE suggested enlargement of the RLL thrombus, though there was surrounding airspace disease. We discussed hospitalization, though Ms. Kim's family declined since she was clinically unchanged. A re-evaluation in the ER 48 hours after her CTPE showed continued stability.  Today, Ms. Kim's daughter reports that her mother has felt much better over the last 3-4 weeks. With antibiotics and regular Albuterol, her sputum production has significantly improved. Her appetite and weight are stable. She is walking short distances around the house. They are not having side effects or bleeding from the DOAC.  PMH: Lung cancer s/p LLLobectomy (? 2016; unclear surgeon) Asthma vs COPD Bronchiectasis CVA dCHF CKD Pulmonary embolus (RLL segmental 2021)  PSH: LLLobectomy

## 2024-02-21 ENCOUNTER — NON-APPOINTMENT (OUTPATIENT)
Age: 89
End: 2024-02-21

## 2024-02-21 ENCOUNTER — APPOINTMENT (OUTPATIENT)
Dept: OPHTHALMOLOGY | Facility: CLINIC | Age: 89
End: 2024-02-21
Payer: MEDICARE

## 2024-02-21 PROCEDURE — 92012 INTRM OPH EXAM EST PATIENT: CPT

## 2024-02-21 PROCEDURE — 92134 CPTRZ OPH DX IMG PST SGM RTA: CPT

## 2024-02-22 NOTE — PATIENT PROFILE ADULT - FUNCTIONAL SCREEN CURRENT LEVEL: COMMUNICATION, MLM
Pt here with Afib RVR, s/p cardioversion, however became tachycardic again yesterday. A&O to self, pleasantly confused. Tachycardia on RA, BP stable. Tele Afib RVR. PRN metoprolol given x2 with little effect. Poor appetite on regular diet. Up to BR with Ax1+gb. Denies pain. Pt scoring green on the Aggression Stop Light Tool. Continue plan of care.       0 = understands/communicates without difficulty

## 2024-02-27 ENCOUNTER — RX RENEWAL (OUTPATIENT)
Age: 89
End: 2024-02-27

## 2024-02-28 ENCOUNTER — APPOINTMENT (OUTPATIENT)
Dept: INTERNAL MEDICINE | Facility: CLINIC | Age: 89
End: 2024-02-28
Payer: MEDICARE

## 2024-02-28 VITALS
SYSTOLIC BLOOD PRESSURE: 126 MMHG | BODY MASS INDEX: 37.56 KG/M2 | DIASTOLIC BLOOD PRESSURE: 78 MMHG | HEIGHT: 63 IN | WEIGHT: 212 LBS | OXYGEN SATURATION: 98 % | TEMPERATURE: 95.6 F | HEART RATE: 89 BPM

## 2024-02-28 DIAGNOSIS — J45.909 UNSPECIFIED ASTHMA, UNCOMPLICATED: ICD-10-CM

## 2024-02-28 DIAGNOSIS — I42.9 CARDIOMYOPATHY, UNSPECIFIED: ICD-10-CM

## 2024-02-28 DIAGNOSIS — N18.30 CHRONIC KIDNEY DISEASE, STAGE 3 UNSPECIFIED: ICD-10-CM

## 2024-02-28 PROCEDURE — 99213 OFFICE O/P EST LOW 20 MIN: CPT

## 2024-02-28 NOTE — PHYSICAL EXAM
[No Acute Distress] : no acute distress [Well Nourished] : well nourished [Well Developed] : well developed [Well-Appearing] : well-appearing [Normal Sclera/Conjunctiva] : normal sclera/conjunctiva [PERRL] : pupils equal round and reactive to light [EOMI] : extraocular movements intact [Normal Outer Ear/Nose] : the outer ears and nose were normal in appearance [Normal Oropharynx] : the oropharynx was normal [No Lymphadenopathy] : no lymphadenopathy [No JVD] : no jugular venous distention [Thyroid Normal, No Nodules] : the thyroid was normal and there were no nodules present [Supple] : supple [No Respiratory Distress] : no respiratory distress  [No Accessory Muscle Use] : no accessory muscle use [Clear to Auscultation] : lungs were clear to auscultation bilaterally [Normal Rate] : normal rate  [Regular Rhythm] : with a regular rhythm [Normal S1, S2] : normal S1 and S2 [No Murmur] : no murmur heard [No Abdominal Bruit] : a ~M bruit was not heard ~T in the abdomen [No Carotid Bruits] : no carotid bruits [No Varicosities] : no varicosities [Pedal Pulses Present] : the pedal pulses are present [No Edema] : there was no peripheral edema [No Palpable Aorta] : no palpable aorta [No Extremity Clubbing/Cyanosis] : no extremity clubbing/cyanosis [Soft] : abdomen soft [Non Tender] : non-tender [No Masses] : no abdominal mass palpated [Non-distended] : non-distended [No HSM] : no HSM [Normal Bowel Sounds] : normal bowel sounds [Normal Posterior Cervical Nodes] : no posterior cervical lymphadenopathy [Normal Anterior Cervical Nodes] : no anterior cervical lymphadenopathy [No CVA Tenderness] : no CVA  tenderness [No Spinal Tenderness] : no spinal tenderness [No Joint Swelling] : no joint swelling [No Rash] : no rash [Grossly Normal Strength/Tone] : grossly normal strength/tone [No Focal Deficits] : no focal deficits [Coordination Grossly Intact] : coordination grossly intact [Normal Gait] : normal gait [Deep Tendon Reflexes (DTR)] : deep tendon reflexes were 2+ and symmetric [Normal Affect] : the affect was normal [Normal Insight/Judgement] : insight and judgment were intact

## 2024-02-28 NOTE — PLAN
[FreeTextEntry1] : Stable examination except for productive cough  Will start ZPak Also Repeat labs; Scripts refilled.

## 2024-02-28 NOTE — HISTORY OF PRESENT ILLNESS
[FreeTextEntry1] : Interim reviewed. Pulmonary follow up noted. Also seeing cardiology: Seeing Dr Anderson  [facility] [de-identified] : Also had follow up with eye doctor Will need new glasses. Also Physical therapy

## 2024-02-29 LAB
ALBUMIN SERPL ELPH-MCNC: 3.8 G/DL
ALP BLD-CCNC: 122 U/L
ALT SERPL-CCNC: 13 U/L
ANION GAP SERPL CALC-SCNC: 11 MMOL/L
AST SERPL-CCNC: 20 U/L
BILIRUB SERPL-MCNC: 0.4 MG/DL
BUN SERPL-MCNC: 17 MG/DL
CALCIUM SERPL-MCNC: 10.1 MG/DL
CHLORIDE SERPL-SCNC: 108 MMOL/L
CO2 SERPL-SCNC: 22 MMOL/L
CREAT SERPL-MCNC: 1.24 MG/DL
EGFR: 42 ML/MIN/1.73M2
GLUCOSE SERPL-MCNC: 121 MG/DL
HCT VFR BLD CALC: 45.5 %
HGB BLD-MCNC: 14.5 G/DL
MCHC RBC-ENTMCNC: 31.9 GM/DL
MCHC RBC-ENTMCNC: 32.4 PG
MCV RBC AUTO: 101.8 FL
PLATELET # BLD AUTO: 273 K/UL
POTASSIUM SERPL-SCNC: 5.9 MMOL/L
PROT SERPL-MCNC: 7.2 G/DL
RBC # BLD: 4.47 M/UL
RBC # FLD: 14.3 %
SODIUM SERPL-SCNC: 141 MMOL/L
WBC # FLD AUTO: 5.54 K/UL

## 2024-03-13 ENCOUNTER — APPOINTMENT (OUTPATIENT)
Dept: OTOLARYNGOLOGY | Facility: CLINIC | Age: 89
End: 2024-03-13

## 2024-03-15 ENCOUNTER — APPOINTMENT (OUTPATIENT)
Dept: OPHTHALMOLOGY | Facility: CLINIC | Age: 89
End: 2024-03-15

## 2024-03-22 ENCOUNTER — APPOINTMENT (OUTPATIENT)
Dept: OPHTHALMOLOGY | Facility: CLINIC | Age: 89
End: 2024-03-22
Payer: MEDICARE

## 2024-03-22 ENCOUNTER — NON-APPOINTMENT (OUTPATIENT)
Age: 89
End: 2024-03-22

## 2024-03-22 PROCEDURE — 92012 INTRM OPH EXAM EST PATIENT: CPT

## 2024-03-22 PROCEDURE — 76512 OPH US DX B-SCAN: CPT | Mod: LT

## 2024-03-22 PROCEDURE — 92134 CPTRZ OPH DX IMG PST SGM RTA: CPT

## 2024-03-28 ENCOUNTER — RX RENEWAL (OUTPATIENT)
Age: 89
End: 2024-03-28

## 2024-04-11 ENCOUNTER — APPOINTMENT (OUTPATIENT)
Dept: ENDOCRINOLOGY | Facility: CLINIC | Age: 89
End: 2024-04-11
Payer: MEDICARE

## 2024-04-11 VITALS
WEIGHT: 128 LBS | HEART RATE: 86 BPM | SYSTOLIC BLOOD PRESSURE: 116 MMHG | DIASTOLIC BLOOD PRESSURE: 77 MMHG | BODY MASS INDEX: 22.67 KG/M2

## 2024-04-11 DIAGNOSIS — Z74.09 OTHER REDUCED MOBILITY: ICD-10-CM

## 2024-04-11 DIAGNOSIS — E21.0 PRIMARY HYPERPARATHYROIDISM: ICD-10-CM

## 2024-04-11 DIAGNOSIS — S22.069A UNSPECIFIED FRACTURE OF T7-T8 VERTEBRA, INITIAL ENCOUNTER FOR CLOSED FRACTURE: ICD-10-CM

## 2024-04-11 PROCEDURE — 99214 OFFICE O/P EST MOD 30 MIN: CPT

## 2024-04-11 PROCEDURE — G2211 COMPLEX E/M VISIT ADD ON: CPT

## 2024-04-16 ENCOUNTER — APPOINTMENT (OUTPATIENT)
Dept: PULMONOLOGY | Facility: CLINIC | Age: 89
End: 2024-04-16
Payer: MEDICARE

## 2024-04-16 VITALS
DIASTOLIC BLOOD PRESSURE: 82 MMHG | WEIGHT: 128 LBS | RESPIRATION RATE: 12 BRPM | SYSTOLIC BLOOD PRESSURE: 130 MMHG | BODY MASS INDEX: 22.68 KG/M2 | HEART RATE: 81 BPM | HEIGHT: 63 IN | TEMPERATURE: 96.8 F | OXYGEN SATURATION: 98 %

## 2024-04-16 PROCEDURE — 99213 OFFICE O/P EST LOW 20 MIN: CPT

## 2024-04-16 NOTE — ED ADULT NURSE NOTE - CHPI ED NUR SYMPTOMS NEG
No no shortness of breath/no headache/no chills/no diaphoresis/no wheezing/no hemoptysis/no chest pain/no body aches/no edema

## 2024-04-27 ENCOUNTER — RX RENEWAL (OUTPATIENT)
Age: 89
End: 2024-04-27

## 2024-04-29 ENCOUNTER — RESULT REVIEW (OUTPATIENT)
Age: 89
End: 2024-04-29

## 2024-04-29 ENCOUNTER — APPOINTMENT (OUTPATIENT)
Dept: RADIOLOGY | Facility: HOSPITAL | Age: 89
End: 2024-04-29
Payer: MEDICARE

## 2024-04-29 ENCOUNTER — OUTPATIENT (OUTPATIENT)
Dept: OUTPATIENT SERVICES | Facility: HOSPITAL | Age: 89
LOS: 1 days | End: 2024-04-29
Payer: MEDICARE

## 2024-04-29 ENCOUNTER — APPOINTMENT (OUTPATIENT)
Dept: CT IMAGING | Facility: HOSPITAL | Age: 89
End: 2024-04-29
Payer: MEDICARE

## 2024-04-29 DIAGNOSIS — Z98.890 OTHER SPECIFIED POSTPROCEDURAL STATES: Chronic | ICD-10-CM

## 2024-04-29 DIAGNOSIS — C34.90 MALIGNANT NEOPLASM OF UNSPECIFIED PART OF UNSPECIFIED BRONCHUS OR LUNG: Chronic | ICD-10-CM

## 2024-04-29 DIAGNOSIS — H26.9 UNSPECIFIED CATARACT: Chronic | ICD-10-CM

## 2024-04-29 DIAGNOSIS — Z95.0 PRESENCE OF CARDIAC PACEMAKER: Chronic | ICD-10-CM

## 2024-04-29 LAB — POCT ISTAT CREATININE: 1.1 MG/DL — SIGNIFICANT CHANGE UP (ref 0.5–1.3)

## 2024-04-29 PROCEDURE — 71275 CT ANGIOGRAPHY CHEST: CPT | Mod: 26

## 2024-04-29 PROCEDURE — 71275 CT ANGIOGRAPHY CHEST: CPT

## 2024-04-29 PROCEDURE — 77080 DXA BONE DENSITY AXIAL: CPT | Mod: 26

## 2024-04-29 PROCEDURE — 82565 ASSAY OF CREATININE: CPT

## 2024-04-29 PROCEDURE — 77080 DXA BONE DENSITY AXIAL: CPT

## 2024-05-02 ENCOUNTER — APPOINTMENT (OUTPATIENT)
Dept: OPHTHALMOLOGY | Facility: CLINIC | Age: 89
End: 2024-05-02
Payer: MEDICARE

## 2024-05-02 ENCOUNTER — NON-APPOINTMENT (OUTPATIENT)
Age: 89
End: 2024-05-02

## 2024-05-02 PROCEDURE — 92134 CPTRZ OPH DX IMG PST SGM RTA: CPT

## 2024-05-02 PROCEDURE — 92012 INTRM OPH EXAM EST PATIENT: CPT

## 2024-05-02 NOTE — ADDENDUM
[FreeTextEntry1] : Addendum (Justin; 5/2/24): I reviewed Ms. Kim's case with the tumor board. The RLL consolidation is persistent on this CT. In remote imaging review, there has been intermittent RLL consolidation with persistent airway changes to the RLL bronchus (thickening, bronchiectasis). In remote images, there have been waxing/waning nodules with necrosis. Inflammation is thought more likely, though malignancy is possible. The RLL thrombus is unchanged.  CTPE (4/2024): IMPRESSION: 1. Chronic pulmonary embolism/thrombosis of segmental and subsegmental branches in the basilar right lower lobe. Long-standing moderate consolidation at the posterior right lung base, possibly chronic aspiration pneumonia, although malignancy can have a similar appearance. Correlate clinically. 2. Fluid-filled airways in the basilar right lower lobe consistent with aspiration. The above findings were discussed with Dr. TONJA HANNA on 4/29/2024 4:50 PM by Dr. Carter with read back confirmation. 3. Calcification at the aortic valve suggestive of aortic stenosis. Consider confirmation with echocardiogram. 4. Mildly enlarged thyroid.  I spoke to her daughter (Kiya) and Dr. Greenfield yesterday. We discussed options, including a diagnostic bronchoscopy, escalating of her airway clearance (i.e., nebulized saline or vest trial), or no escalation of care (i.e., palliative approach). Kiya was favoring a palliative approach. I will review with Dr. Greenfield but would favor palliative care consultation. They have a visit with me on 5/10/24.

## 2024-05-02 NOTE — ASSESSMENT
[FreeTextEntry1] : Labs: WBC 4.82, Hgb 15.4, HCT 47.8, .6, AEC 20 Na 136,K 4.8, Cl 106, HCO3 23, BUN/creat 18/1.07, glucose 93  BNP: 1969  Chest CT (5/2021): IMPRESSION: 1. Right lower lobe segmental pulmonary embolism. No right heart strain. 2. Fluid is seen layering within the distal trachea and bronchial tree. Clinical correlation for aspiration is recommended. 3. Interval improvement in right nodular opacities and right lower lobe consolidation with residual groundglass opacities and peribronchial cuffing.  Chest CT (5/2023): IMPRESSION: Masslike consolidations in the upper lobes with adjacent groundglass opacity in the right upper lobe as above with mediastinal lymphadenopathy, not seen previously. This raises concern for neoplasm although infectious etiology may also be considered. Clinical correlation is necessary. If uncertain and there is clinical symptoms of pneumonia, short interval follow-up following treatment is recommended. Atelectasis in the right lower lobe is stable. Consolidation at the right lung base not seen previously may be due to atelectasis versus infection. Adjacent mild pleural fluid versus pleural thickening.  CXR (12/2023) Findings/ impression: Right pleural effusion. Stable position left ICD. Stable cardiomegaly, thoracic aortic calcification. Stable bony structures.  CTPE (12/2023) IMPRESSION: 1. Since September 26, 2021, increased burden of right-sided pulmonary thromboemboli, extensive in right lower lobe branches, also seen in descending interlobar branch and right middle lobe branches. 2. Worsened bronchial occlusion throughout the branching airways in the right lower lobe with volume loss and dense surrounding airspace consolidation, suggesting postobstructive atelectasis and/or pneumonia. 3. Resolved previously seen pneumonia in both upper lobes. 4. Resolved previously seen inflammatory thoracic lymphadenopathy.  CT chest (12/29/23): Impression:  1. Since 5/29/2023, there has been resolution of the large consolidation in the right upper lobe. 2. There is again severe large airways disease, with volume loss, bronchiectasis, and consolidation right lower lobe, narrowing/impaction of right middle lobe bronchus, and additional foci of bronchial luminal impaction and tree-in-bud nodules in right middle and upper lobes. 3. Mild mediastinal lymphadenopathy. 4. Heavily calcified aortic valve, finding often associated with aortic stenosis.  Cinesophagram (2/2024): FINDINGS: Thin Liquid: Penetration: Negative. Aspiration: Negative. Eliminated with posture/technique: Not applicable. Nectar Thick Liquid: Did not try Honey Thick Liquid: Did not try Puree: Penetration: Negative. Aspiration: Negative. Eliminated with posture/technique: Not applicable. Mechanical soft: Penetration: Negative. Aspiration: Negative. Eliminated with posture/technique: Not applicable. Regular consistency: Did not try  PFTs: None available.  6MWT 1/17/24: 6MWT distance 118 meters (387 feet); brina SpO2 97% on room air  TTE (2017): Moderate concentric LVH. EF 55-60%. LA wnl. RV normal in size and function.   A/P: 89 yo F h/o lung cancer s/p LLLobectomy, dementia, asthma vs COPD, bronchiectasis, CVA, dCHF, CKD, and segmental pulmonary embolus (2021) returns to clinic.  Ms. Kim is clinically doing well. Her swallow study was overtly negative; with her dementia and recurrent pneumonia, I still think her recurrent pneumonia is related to silent aspiration and difficulty mobilizing secretions. We discussed adding a PEP device to her ACT. She is amenable. If recurrent pneumonia, a chest vest may be worthwhile.  For her PE, her 12/2023 chest CT suggested persistent PE in the RLL, though the clot was adjacent to a pneumonia. I doubt the PE's clinical significance. She remains on Apixaban, which she is tolerating well. An option would be to reduce her dose to 2.5 mg PO BID, though I would like to clarify if there is persistent clot or not. We will repeat her CTPE now, then consider her Apixaban dosing.  1. Recurrent pneumonia (? silent aspiration) 2. History of early-stage lung cancer 3. Dementia 4. Segmental PE in 2021; ? enlargement in 12/2023 5. h/o COPD, Bronchiectasis, CVA, dCHF, CKD  -Airway clearance: BID nebulized Albuterol, cough maneuvers -re-requested vibratory PEP device in AM -repeat CTPE  -Apixaban 5 mg PO BID; consider reduction to 2.5 mg PO BID if no change to RLL thrombus (? chronic) -Vaccinations: PCV-20 1/17/24, Influenza 8/2023 -follow-up with me in 3 months

## 2024-05-02 NOTE — HISTORY OF PRESENT ILLNESS
[TextBox_4] : Ms. Sherry Kim returned to clinic today in follow-up for recurrent pneumonia, dementia, and history of lung cancer and PE. Ms. Kim was accompanied by her daughter, who provided much of her history. In review, Ms. Kim is an 89 yo F h/o lung cancer s/p LLLobectomy (?2016), asthma/COPD, Sarcoidosis (? unclear mechanism of diagnosis), CVA, dCHF, CKD, and segmental RLL PE (2021) who is cared for by Cam Greenfield. Ms. Kim recently saw Dr. Valderrama. She was previously seen by Dr. Sinclair.  Ms. Kim's oldest daughter (Kiya Calloway, [phone]) is her power of . Kiya's sister (Radha Mcconnell) attends her visits.  5/2023  Chest imaging from the ER revealed multiple mass-like opacities were identified with plan for follow-up imaging.   12/2023  Ms. Kim had abundant sputum production. We pursued a course of antibiotics and initiated airway clearance with PEP and nebulized Albuterol BID. We also obtained a CTPE to evaluate for worsened clot burden. On 12/29/23, Ms. Kim's CTPE suggested enlargement of the RLL thrombus, though there was surrounding airspace disease. We discussed hospitalization, though Ms. Kim's family declined since she was clinically unchanged. A re-evaluation in the ER 48 hours after her CTPE showed continued stability.  1/2024: Ms. Kim's daughter reports that her mother has felt much better over the last 3-4 weeks. With antibiotics and regular Albuterol, her sputum production has significantly improved. Her appetite and weight are stable. She is walking short distances around the house. They are not having side effects or bleeding from the DOAC.  4/2024: Since our last visit, Ms. Kim has had worsening vision related to central retinal vein occlusion. She has not had respiratory exacerbations. She had a worsened cough in February and received a course of Azithromycin with improvement. For her ACT, she has been utilizing Albuterol BID and producing sputum regularly. Her daughter does not remember obtaining a PEP device. Ms. Kim has been tolerating the Apixaban well. There have been no episodes of bleeding. Her breathing feels comfortable at baseline. She is eating well, and her weight is stable.  PMH: Lung cancer s/p LLLobectomy (? 2016; unclear surgeon) Asthma vs COPD Bronchiectasis CVA dCHF CKD Pulmonary embolus (RLL segmental 2021)  PSH: LLLobectomy

## 2024-05-03 DIAGNOSIS — J18.9 PNEUMONIA, UNSPECIFIED ORGANISM: ICD-10-CM

## 2024-05-03 DIAGNOSIS — I27.82 CHRONIC PULMONARY EMBOLISM: ICD-10-CM

## 2024-05-03 DIAGNOSIS — R05.9 COUGH, UNSPECIFIED: ICD-10-CM

## 2024-05-03 DIAGNOSIS — Z78.0 ASYMPTOMATIC MENOPAUSAL STATE: ICD-10-CM

## 2024-05-03 NOTE — HISTORY OF PRESENT ILLNESS
[FreeTextEntry1] : Ms. Kim is an 88 year-old woman with a history of multiple medical problems including lung cancer status post resection and in remission, ischemic cardiomyopathy, stage 3 chronic kidney disease presenting for follow-up of her bone health. I saw her for an initial visit in November 2020 and last in November 2022. She is accompanied by her daughter, Selma. The visit was conducted in Ukrainian.  Possible incipient primary hyperparathyroidism. Possible compression fracture. She has a history of borderline hypercalcemia or hypercalcemia within 1 mg/dL above the upper limit of normal in our system since June 2020. PTH has been inappropriately normal. Renal function has been stable within stage 3 chronic kidney disease. Vitamin D has been replete. No history of kidney stones. Renal imaging in October 2020 without nephrolithiasis. Mild compression of T7 fracture noted on vertebral fracture assessment in October 2020. No known precipitating trauma; fracture not noted on previous chest imaging. No other fracture history.  She received zoledronic acid 5 mg IV in November 2020.  She has up to half a glass of milk in her coffee and has an Ensure daily. She is taking a prescription multivitamin; unknown dose of calcium and vitamin D. She is not taking calcium or vitamin D supplements.  No known family history of calcium disorders. No parental history of hip fracture.  Interim History  She has seen multiple providers; notes reviewed.  Last laboratory results reviewed. Serum calcium at the upper limit of normal. eGFR trending down.  No recent bone density testing.  She feels well today. No acute issues. Medical and surgical history, medications, allergies, social and family history reviewed and updated as needed.

## 2024-05-03 NOTE — PHYSICAL EXAM
[Alert] : alert [Healthy Appearance] : healthy appearance [No Acute Distress] : no acute distress [Normal Sclera/Conjunctiva] : normal sclera/conjunctiva [Normal Hearing] : hearing was normal [No Respiratory Distress] : no respiratory distress [No Spinal Tenderness] : no spinal tenderness [No Stigmata of Cushings Syndrome] : no stigmata of Cushings Syndrome [Normal Insight/Judgement] : insight and judgment were intact [Kyphosis] : no kyphosis present [Scoliosis] : no scoliosis [Acanthosis Nigricans] : no acanthosis nigricans [de-identified] : no moon facies, no supraclavicular fat pads [de-identified] : narrow-based gait with cane and assistance from her daughter

## 2024-05-03 NOTE — ADDENDUM
[FreeTextEntry1] : Recent bone density as below; discussed with Ms. Kim's daughter per her preference. Bone density is within the normal range at the lumbar spine, femoral neck, total hip, and distal radius. Recent computed tomography of the chest did not show any vertebral fractures. We can obtain radiographs of the thoracic and lumbar spine, however, there is no indication for pharmacologic osteoporosis therapy at this time. 5/03/24

## 2024-05-03 NOTE — ASSESSMENT
[FreeTextEntry1] : Possible incipient primary hyperparathyroidism. Possible compression fracture. She has a history of borderline hypercalcemia or hypercalcemia within 1 mg/dL above the upper limit of normal in our system since June 2020. PTH has been inappropriately normal. Renal function has been stable within stage 3 chronic kidney disease. She has a history of lung cancer and possible sarcoidosis, however, biochemical evaluation for other causes of hypercalcemia unremarkable, including PTHrP and 1,25-dihydroxyvitamin D levels. Serum calcium most recently at the upper end of normal. We have discussed the complications of primary hyperparathyroidism, including but not limited to hypercalcemia, nephrolithiasis, and osteoporosis. She was noted to have mild compression of T7 without known precipitating trauma. While her bone density has been within range at all sites, an atraumatic compression fracture would be diagnostic of osteoporosis. We have discussed the potential benefits and risks of antiresorptive osteoporosis therapy, including but not limited to osteonecrosis of the jaw and atypical femoral fracture. She received zoledronic acid 5 mg IV in November 2020. We will consider further pharmacologic osteoporosis therapy pending interval bone density testing and radiographs. Interval bone density testing Thoracic and lumbar spine radiographs Calcium 3787-0332 mg daily from diet and supplements (to be taken in divided doses as no more than 500-600 mg can be absorbed at one time); advised dietary calcium Continue current vitamin D regimen Diet, exercise, and fall prevention reviewed Physical therapy for balance and bone health

## 2024-05-10 ENCOUNTER — APPOINTMENT (OUTPATIENT)
Dept: PULMONOLOGY | Facility: CLINIC | Age: 89
End: 2024-05-10
Payer: MEDICARE

## 2024-05-10 VITALS
SYSTOLIC BLOOD PRESSURE: 137 MMHG | TEMPERATURE: 97 F | HEIGHT: 63 IN | DIASTOLIC BLOOD PRESSURE: 80 MMHG | OXYGEN SATURATION: 97 % | RESPIRATION RATE: 12 BRPM | HEART RATE: 85 BPM | WEIGHT: 132 LBS | BODY MASS INDEX: 23.39 KG/M2

## 2024-05-10 PROCEDURE — 99213 OFFICE O/P EST LOW 20 MIN: CPT

## 2024-05-10 RX ORDER — SODIUM CHLORIDE FOR INHALATION 3 %
3 VIAL, NEBULIZER (ML) INHALATION
Qty: 30 | Refills: 3 | Status: ACTIVE | COMMUNITY
Start: 2024-05-10 | End: 1900-01-01

## 2024-05-11 NOTE — HISTORY OF PRESENT ILLNESS
[TextBox_4] : Ms. Sherry Kim returned to clinic today in follow-up for recurrent pneumonia, dementia, and history of lung cancer and PE. Ms. Kim was accompanied by her daughter, and I called her oldest daughter (Kiya), who provided much of her history. In review, Ms. Kim is an 89 yo F h/o lung cancer s/p LLLobectomy (?2016), asthma/COPD, Sarcoidosis (? unclear mechanism of diagnosis), CVA, dCHF, CKD, and segmental RLL PE (2021) who is cared for by Cam Greenfield. Ms. Kim recently saw Dr. Valderrama. She was previously seen by Dr. Sinclair.  Ms. Kim's oldest daughter (Kiya Calloway, [phone]) is her power of . Kiya's sister (Radha Mcconnell) attends her visits.  5/2023  Chest imaging from the ER revealed multiple mass-like opacities were identified with plan for follow-up imaging.   12/2023  Ms. Kim had abundant sputum production. We pursued a course of antibiotics and initiated airway clearance with PEP and nebulized Albuterol BID. We also obtained a CTPE to evaluate for worsened clot burden. On 12/29/23, Ms. Kim's CTPE suggested enlargement of the RLL thrombus, though there was surrounding airspace disease. We discussed hospitalization, though Ms. Kim's family declined since she was clinically unchanged. A re-evaluation in the ER 48 hours after her CTPE showed continued stability.  1/2024: Ms. Kim's daughter reports that her mother has felt much better over the last 3-4 weeks. With antibiotics and regular Albuterol, her sputum production has significantly improved. Her appetite and weight are stable. She is walking short distances around the house. They are not having side effects or bleeding from the DOAC.  4/2024: Since our last visit, Ms. Kim has had worsening vision related to central retinal vein occlusion. She has not had respiratory exacerbations. She had a worsened cough in February and received a course of Azithromycin with improvement. For her ACT, she has been utilizing Albuterol BID and producing sputum regularly. Her daughter does not remember obtaining a PEP device. Ms. Kim has been tolerating the Apixaban well. There have been no episodes of bleeding. Her breathing feels comfortable at baseline. She is eating well, and her weight is stable.  5/2024: Ms. Kim feels well today. She has not had respiratory exacerbations, ER visits, hospitalizations. She continues to have daily sputum production. She uses Albuterol 1-2 times/day. Her weight is stable.  PMH: Lung cancer s/p LLLobectomy (? 2016; unclear surgeon) Asthma vs COPD Bronchiectasis CVA dCHF CKD Pulmonary embolus (RLL segmental 2021)  PSH: LLLobectomy

## 2024-05-11 NOTE — ASSESSMENT
[FreeTextEntry1] : Labs: WBC 4.82, Hgb 15.4, HCT 47.8, .6, AEC 20 Na 136,K 4.8, Cl 106, HCO3 23, BUN/creat 18/1.07, glucose 93  BNP: 1969  Chest CT (5/2021): IMPRESSION: 1. Right lower lobe segmental pulmonary embolism. No right heart strain. 2. Fluid is seen layering within the distal trachea and bronchial tree. Clinical correlation for aspiration is recommended. 3. Interval improvement in right nodular opacities and right lower lobe consolidation with residual groundglass opacities and peribronchial cuffing.  Chest CT (5/2023): IMPRESSION: Masslike consolidations in the upper lobes with adjacent groundglass opacity in the right upper lobe as above with mediastinal lymphadenopathy, not seen previously. This raises concern for neoplasm although infectious etiology may also be considered. Clinical correlation is necessary. If uncertain and there is clinical symptoms of pneumonia, short interval follow-up following treatment is recommended. Atelectasis in the right lower lobe is stable. Consolidation at the right lung base not seen previously may be due to atelectasis versus infection. Adjacent mild pleural fluid versus pleural thickening.  CXR (12/2023) Findings/ impression: Right pleural effusion. Stable position left ICD. Stable cardiomegaly, thoracic aortic calcification. Stable bony structures.  CTPE (12/2023) IMPRESSION: 1. Since September 26, 2021, increased burden of right-sided pulmonary thromboemboli, extensive in right lower lobe branches, also seen in descending interlobar branch and right middle lobe branches. 2. Worsened bronchial occlusion throughout the branching airways in the right lower lobe with volume loss and dense surrounding airspace consolidation, suggesting postobstructive atelectasis and/or pneumonia. 3. Resolved previously seen pneumonia in both upper lobes. 4. Resolved previously seen inflammatory thoracic lymphadenopathy.  CT chest (12/29/23): Impression:  1. Since 5/29/2023, there has been resolution of the large consolidation in the right upper lobe. 2. There is again severe large airways disease, with volume loss, bronchiectasis, and consolidation right lower lobe, narrowing/impaction of right middle lobe bronchus, and additional foci of bronchial luminal impaction and tree-in-bud nodules in right middle and upper lobes. 3. Mild mediastinal lymphadenopathy. 4. Heavily calcified aortic valve, finding often associated with aortic stenosis.  Cinesophagram (2/2024): FINDINGS: Thin Liquid: Penetration: Negative. Aspiration: Negative. Eliminated with posture/technique: Not applicable. Nectar Thick Liquid: Did not try Honey Thick Liquid: Did not try Puree: Penetration: Negative. Aspiration: Negative. Eliminated with posture/technique: Not applicable. Mechanical soft: Penetration: Negative. Aspiration: Negative. Eliminated with posture/technique: Not applicable. Regular consistency: Did not try  CTPE (4/2024): IMPRESSION: 1. Chronic pulmonary embolism/thrombosis of segmental and subsegmental branches in the basilar right lower lobe. Long-standing moderate consolidation at the posterior right lung base, possibly chronic aspiration pneumonia, although malignancy can have a similar appearance. Correlate clinically. 2. Fluid-filled airways in the basilar right lower lobe consistent with aspiration. 3. Calcification at the aortic valve suggestive of aortic stenosis. Consider confirmation with echocardiogram. 4. Mildly enlarged thyroid.  PFTs: None available.  6MWT 1/17/24: 6MWT distance 118 meters (387 feet); brina SpO2 97% on room air  TTE (2017): Moderate concentric LVH. EF 55-60%. LA wnl. RV normal in size and function.   A/P: 89 yo F h/o lung cancer s/p LLLobectomy, dementia, asthma vs COPD, bronchiectasis, CVA, dCHF, CKD, and segmental pulmonary embolus (2021) returns to clinic.  Ms. Kim remains asymptomatic. She is producing sputum daily. I had a long discussion with Arturo. We reviewed the tumor board discussion for persistent RLL consolidation. She has had bronchial thickening, waxing/waning nodules with necrosis, and persistent airway secretions. DDx includes persistent RLL obstruction from secretions (and difficulty mobilizing secretions) vs malignancy. I think malignancy is less likely, though bronchoscopy would be needed to distinguish. We discussed the procedure.  Given their mother's age, dementia, and functional status, Radha and Kiya want to avoid invasive interventions in their mother. Though malignancy is possible, they would not want to pursue treatments that would compromise their mother's quality of life. We discussed non-invasive interventions, including escalation in her airway clearance regimen. We also discussed the incorporation of palliative care. Arturo would like to discuss palliative care with Dr. Greenfield.  For her PE, the CT in 4/2024 is unchanged, suggesting chronic clot in the area of her RLL. She remains on Apixaban, which she is tolerating well.   1. RLL consolidation: bronchiectasis from chronic aspiration and difficulty with secretion clearance with vs malignancy 2. RLL PE in 2021; persistent clot in [id number] 3. History of early-stage lung cancer s/p LLLobectomy 4. Dementia 5. h/o COPD, Bronchiectasis, CVA, dCHF, CKD  -discussed RLL changes due to bronchiectasis and difficulty with secretion mobilization vs malignancy; offered bronchoscopy; they declined additional procedures to maintain their mother's quality of life -recommended Palliative care, they will consider -Escalate ACT: BID nebulized Albuterol, nebulized 3% saline, Vest therapy -repeat CTPE in 6 months -Apixaban 5 mg PO BID -Vaccinations: PCV-20 1/17/24, Influenza 8/2023 -follow-up with me in 4 months

## 2024-05-16 ENCOUNTER — APPOINTMENT (OUTPATIENT)
Dept: INTERNAL MEDICINE | Facility: CLINIC | Age: 89
End: 2024-05-16

## 2024-05-19 NOTE — ED ADULT TRIAGE NOTE - CHIEF COMPLAINT QUOTE
Patient complaining of productive cough with yellow sputum, dizziness and CP for four days.  Patient denies any SOB, fevers, chills, N/V/D, abdominal pain or any other complaints at this time.  EKG in progress. Yes-Patient/Caregiver accepts free interpretation services...

## 2024-05-20 ENCOUNTER — APPOINTMENT (OUTPATIENT)
Dept: INTERNAL MEDICINE | Facility: CLINIC | Age: 89
End: 2024-05-20
Payer: MEDICARE

## 2024-05-20 VITALS
SYSTOLIC BLOOD PRESSURE: 135 MMHG | HEIGHT: 63 IN | DIASTOLIC BLOOD PRESSURE: 73 MMHG | OXYGEN SATURATION: 97 % | HEART RATE: 80 BPM | BODY MASS INDEX: 24.1 KG/M2 | WEIGHT: 136 LBS | TEMPERATURE: 98 F

## 2024-05-20 DIAGNOSIS — E87.5 HYPERKALEMIA: ICD-10-CM

## 2024-05-20 DIAGNOSIS — I25.10 ATHEROSCLEROTIC HEART DISEASE OF NATIVE CORONARY ARTERY W/OUT ANGINA PECTORIS: ICD-10-CM

## 2024-05-20 DIAGNOSIS — J47.9 BRONCHIECTASIS, UNCOMPLICATED: ICD-10-CM

## 2024-05-20 DIAGNOSIS — I10 ESSENTIAL (PRIMARY) HYPERTENSION: ICD-10-CM

## 2024-05-20 DIAGNOSIS — E78.5 HYPERLIPIDEMIA, UNSPECIFIED: ICD-10-CM

## 2024-05-20 DIAGNOSIS — I63.9 CEREBRAL INFARCTION, UNSPECIFIED: ICD-10-CM

## 2024-05-20 PROCEDURE — 36415 COLL VENOUS BLD VENIPUNCTURE: CPT

## 2024-05-20 PROCEDURE — 99214 OFFICE O/P EST MOD 30 MIN: CPT | Mod: 25

## 2024-05-20 NOTE — HISTORY OF PRESENT ILLNESS
[FreeTextEntry1] : follow up on htn, hld, and hyperkalemia  [de-identified] : Currently in PT for balance at home  Denies any sob  Walking with cane  Denies any falls   Recent follow up with pulm  Using nebulizer  Caretaker reports less coughing   Good appetite, urinating normally, and regular bowel movements.   Denies any concerns

## 2024-05-20 NOTE — PHYSICAL EXAM
[Normal Sclera/Conjunctiva] : normal sclera/conjunctiva [EOMI] : extraocular movements intact [Normal Outer Ear/Nose] : the outer ears and nose were normal in appearance [Supple] : supple [No Respiratory Distress] : no respiratory distress  [No Accessory Muscle Use] : no accessory muscle use [Regular Rhythm] : with a regular rhythm [Normal S1, S2] : normal S1 and S2 [No Edema] : there was no peripheral edema [Soft] : abdomen soft [Non Tender] : non-tender [Non-distended] : non-distended [Normal Bowel Sounds] : normal bowel sounds [Grossly Normal Strength/Tone] : grossly normal strength/tone [Coordination Grossly Intact] : coordination grossly intact [Normal Gait] : normal gait [Normal] : affect was normal and insight and judgment were intact

## 2024-05-23 LAB
ALBUMIN SERPL ELPH-MCNC: 3.9 G/DL
ALP BLD-CCNC: 116 U/L
ALT SERPL-CCNC: 9 U/L
ANION GAP SERPL CALC-SCNC: 11 MMOL/L
AST SERPL-CCNC: 14 U/L
BASOPHILS # BLD AUTO: 0.02 K/UL
BASOPHILS NFR BLD AUTO: 0.4 %
BILIRUB SERPL-MCNC: 0.4 MG/DL
BUN SERPL-MCNC: 21 MG/DL
CALCIUM SERPL-MCNC: 10.3 MG/DL
CHLORIDE SERPL-SCNC: 106 MMOL/L
CO2 SERPL-SCNC: 23 MMOL/L
CREAT SERPL-MCNC: 1.12 MG/DL
EGFR: 47 ML/MIN/1.73M2
EOSINOPHIL # BLD AUTO: 0.03 K/UL
EOSINOPHIL NFR BLD AUTO: 0.6 %
GLUCOSE SERPL-MCNC: 149 MG/DL
HCT VFR BLD CALC: 44.3 %
HGB BLD-MCNC: 14.3 G/DL
IMM GRANULOCYTES NFR BLD AUTO: 0.2 %
LYMPHOCYTES # BLD AUTO: 1.36 K/UL
LYMPHOCYTES NFR BLD AUTO: 28.2 %
MAN DIFF?: NORMAL
MCHC RBC-ENTMCNC: 32.3 GM/DL
MCHC RBC-ENTMCNC: 32.5 PG
MCV RBC AUTO: 100.7 FL
MONOCYTES # BLD AUTO: 0.52 K/UL
MONOCYTES NFR BLD AUTO: 10.8 %
NEUTROPHILS # BLD AUTO: 2.89 K/UL
NEUTROPHILS NFR BLD AUTO: 59.8 %
PLATELET # BLD AUTO: 252 K/UL
POTASSIUM SERPL-SCNC: 5.3 MMOL/L
PROT SERPL-MCNC: 7.2 G/DL
RBC # BLD: 4.4 M/UL
RBC # FLD: 14 %
SODIUM SERPL-SCNC: 140 MMOL/L
WBC # FLD AUTO: 4.83 K/UL

## 2024-05-27 ENCOUNTER — RX RENEWAL (OUTPATIENT)
Age: 89
End: 2024-05-27

## 2024-06-10 NOTE — ED ADULT NURSE NOTE - INTEGUMENTARY WDL
The patient called and had questions regarding her genetic test results.  I answered her questions and printed a copy of her results to send via mail.     Color consistent with ethnicity/race, warm, dry intact, resilient.

## 2024-06-13 RX ORDER — AZITHROMYCIN 250 MG/1
250 TABLET, FILM COATED ORAL
Qty: 6 | Refills: 1 | Status: ACTIVE | COMMUNITY
Start: 2024-02-28 | End: 1900-01-01

## 2024-06-26 ENCOUNTER — RX RENEWAL (OUTPATIENT)
Age: 89
End: 2024-06-26

## 2024-06-26 RX ORDER — APIXABAN 5 MG/1
5 TABLET, FILM COATED ORAL
Qty: 60 | Refills: 0 | Status: ACTIVE | COMMUNITY
Start: 2023-04-17 | End: 1900-01-01

## 2024-07-09 ENCOUNTER — NON-APPOINTMENT (OUTPATIENT)
Age: 89
End: 2024-07-09

## 2024-07-09 ENCOUNTER — APPOINTMENT (OUTPATIENT)
Dept: OPHTHALMOLOGY | Facility: CLINIC | Age: 89
End: 2024-07-09
Payer: MEDICARE

## 2024-07-09 PROCEDURE — 92133 CPTRZD OPH DX IMG PST SGM ON: CPT

## 2024-07-09 PROCEDURE — 92014 COMPRE OPH EXAM EST PT 1/>: CPT

## 2024-07-16 ENCOUNTER — APPOINTMENT (OUTPATIENT)
Dept: PULMONOLOGY | Facility: CLINIC | Age: 89
End: 2024-07-16

## 2024-07-26 ENCOUNTER — RX RENEWAL (OUTPATIENT)
Age: 89
End: 2024-07-26

## 2024-08-02 ENCOUNTER — NON-APPOINTMENT (OUTPATIENT)
Age: 89
End: 2024-08-02

## 2024-08-02 ENCOUNTER — APPOINTMENT (OUTPATIENT)
Dept: OPHTHALMOLOGY | Facility: CLINIC | Age: 89
End: 2024-08-02
Payer: MEDICARE

## 2024-08-02 PROCEDURE — 67028 INJECTION EYE DRUG: CPT | Mod: RT

## 2024-08-02 PROCEDURE — 92134 CPTRZ OPH DX IMG PST SGM RTA: CPT

## 2024-08-02 PROCEDURE — 92014 COMPRE OPH EXAM EST PT 1/>: CPT | Mod: 25

## 2024-08-07 ENCOUNTER — RX RENEWAL (OUTPATIENT)
Age: 89
End: 2024-08-07

## 2024-08-21 ENCOUNTER — APPOINTMENT (OUTPATIENT)
Dept: PULMONOLOGY | Facility: CLINIC | Age: 89
End: 2024-08-21
Payer: MEDICARE

## 2024-08-21 VITALS
SYSTOLIC BLOOD PRESSURE: 122 MMHG | OXYGEN SATURATION: 97 % | BODY MASS INDEX: 24.1 KG/M2 | WEIGHT: 136 LBS | HEIGHT: 63 IN | RESPIRATION RATE: 12 BRPM | HEART RATE: 86 BPM | TEMPERATURE: 97.3 F | DIASTOLIC BLOOD PRESSURE: 73 MMHG

## 2024-08-21 PROCEDURE — 99213 OFFICE O/P EST LOW 20 MIN: CPT

## 2024-08-21 NOTE — ASSESSMENT
[FreeTextEntry1] : Labs: WBC 4.82, Hgb 15.4, HCT 47.8, .6, AEC 20 Na 136,K 4.8, Cl 106, HCO3 23, BUN/creat 18/1.07, glucose 93  BNP: 1969  Chest CT (5/2021): IMPRESSION: 1. Right lower lobe segmental pulmonary embolism. No right heart strain. 2. Fluid is seen layering within the distal trachea and bronchial tree. Clinical correlation for aspiration is recommended. 3. Interval improvement in right nodular opacities and right lower lobe consolidation with residual groundglass opacities and peribronchial cuffing.  Chest CT (5/2023): IMPRESSION: Masslike consolidations in the upper lobes with adjacent groundglass opacity in the right upper lobe as above with mediastinal lymphadenopathy, not seen previously. This raises concern for neoplasm although infectious etiology may also be considered. Clinical correlation is necessary. If uncertain and there is clinical symptoms of pneumonia, short interval follow-up following treatment is recommended. Atelectasis in the right lower lobe is stable. Consolidation at the right lung base not seen previously may be due to atelectasis versus infection. Adjacent mild pleural fluid versus pleural thickening.  CXR (12/2023) Findings/ impression: Right pleural effusion. Stable position left ICD. Stable cardiomegaly, thoracic aortic calcification. Stable bony structures.  CTPE (12/2023) IMPRESSION: 1. Since September 26, 2021, increased burden of right-sided pulmonary thromboemboli, extensive in right lower lobe branches, also seen in descending interlobar branch and right middle lobe branches. 2. Worsened bronchial occlusion throughout the branching airways in the right lower lobe with volume loss and dense surrounding airspace consolidation, suggesting postobstructive atelectasis and/or pneumonia. 3. Resolved previously seen pneumonia in both upper lobes. 4. Resolved previously seen inflammatory thoracic lymphadenopathy.  CT chest (12/29/23): Impression:  1. Since 5/29/2023, there has been resolution of the large consolidation in the right upper lobe. 2. There is again severe large airways disease, with volume loss, bronchiectasis, and consolidation right lower lobe, narrowing/impaction of right middle lobe bronchus, and additional foci of bronchial luminal impaction and tree-in-bud nodules in right middle and upper lobes. 3. Mild mediastinal lymphadenopathy. 4. Heavily calcified aortic valve, finding often associated with aortic stenosis.  Cinesophagram (2/2024): FINDINGS: Thin Liquid: Penetration: Negative. Aspiration: Negative. Eliminated with posture/technique: Not applicable. Nectar Thick Liquid: Did not try Honey Thick Liquid: Did not try Puree: Penetration: Negative. Aspiration: Negative. Eliminated with posture/technique: Not applicable. Mechanical soft: Penetration: Negative. Aspiration: Negative. Eliminated with posture/technique: Not applicable. Regular consistency: Did not try  CTPE (4/2024): IMPRESSION: 1. Chronic pulmonary embolism/thrombosis of segmental and subsegmental branches in the basilar right lower lobe. Long-standing moderate consolidation at the posterior right lung base, possibly chronic aspiration pneumonia, although malignancy can have a similar appearance. Correlate clinically. 2. Fluid-filled airways in the basilar right lower lobe consistent with aspiration. 3. Calcification at the aortic valve suggestive of aortic stenosis. Consider confirmation with echocardiogram. 4. Mildly enlarged thyroid.  PFTs: None available.  6MWT 1/17/24: 6MWT distance 118 meters (387 feet); brina SpO2 97% on room air  TTE (2017): Moderate concentric LVH. EF 55-60%. LA wnl. RV normal in size and function.   A/P: 88 yo F h/o lung cancer s/p LLLobectomy, dementia, asthma vs COPD, bronchiectasis, CVA, dCHF, CKD, and segmental pulmonary embolus (2021) returns to clinic.  Ms. Kim did not tolerate ACT with the vest. I would like her to try nebulizer therapy BID, to include Albuterol and 3% saline followed by an incentive spirometer.   In 5/2024, we reviewed the tumor board discussion for persistent RLL consolidation. She has had bronchial thickening, waxing/waning nodules with necrosis, and persistent airway secretions. DDx includes persistent RLL obstruction from secretions (and difficulty mobilizing secretions) vs malignancy. I think malignancy is less likely, though bronchoscopy would be needed to distinguish. We discussed the procedure. Given their mother's age, dementia, and functional status, Mayank want to avoid invasive interventions in their mother. Though malignancy is possible, they would not want to pursue treatments that would compromise their mother's quality of life. We discussed non-invasive interventions, including escalation in her airway clearance regimen. We also discussed the incorporation of palliative care. Kiya and Radha would like to discuss palliative care with Dr. Greenfield.  For her PE, the CT in 4/2024 is unchanged, suggesting chronic clot in the area of her RLL. She remains on Apixaban, which she is tolerating well.   1. RLL consolidation: bronchiectasis from chronic aspiration and difficulty with secretion clearance with vs malignancy 2. RLL PE in 2021; persistent clot in [id number] 3. History of early-stage lung cancer s/p LLLobectomy 4. Dementia 5. h/o COPD, Bronchiectasis, CVA, dCHF, CKD  -ACT: nebulized Albuterol, nebulized 3% saline, and incentive spirometry BID -will request vest therapy is removed -Apixaban 5 mg PO BID -Vaccinations: PCV-20 1/17/24, Influenza 8/2023 -chest CT 10/2024 -follow-up with me in 4 months

## 2024-08-21 NOTE — HISTORY OF PRESENT ILLNESS
[TextBox_4] : Ms. Sherry Kim returned to clinic today in follow-up for recurrent pneumonia, dementia, and history of lung cancer and PE. Ms. Kim was accompanied by her daughter (Radha). In review, Ms. Kim is an 89 yo F h/o lung cancer s/p LLLobectomy (?2016), asthma/COPD, Sarcoidosis (? unclear mechanism of diagnosis), CVA, dCHF, CKD, and segmental RLL PE (2021) who is cared for by Cam Greenfield. Ms. Kim recently saw Dr. Valderrama. She was previously seen by Dr. Sinclair.  Ms. Kim's oldest daughter (Kiya Calloway, [phone]) is her power of . Kiya's sister (Radha Mcconnell) attends her visits.  5/2023  Chest imaging from the ER revealed multiple mass-like opacities were identified with plan for follow-up imaging.   12/2023  Ms. Kim had abundant sputum production. We pursued a course of antibiotics and initiated airway clearance with PEP and nebulized Albuterol BID. We also obtained a CTPE to evaluate for worsened clot burden. On 12/29/23, Ms. Kim's CTPE suggested enlargement of the RLL thrombus, though there was surrounding airspace disease. We discussed hospitalization, though Ms. Kim's family declined since she was clinically unchanged. A re-evaluation in the ER 48 hours after her CTPE showed continued stability.  1/2024: Ms. Kim's daughter reports that her mother has felt much better over the last 3-4 weeks. With antibiotics and regular Albuterol, her sputum production has significantly improved. Her appetite and weight are stable. She is walking short distances around the house. They are not having side effects or bleeding from the DOAC.  4/2024: Since our last visit, Ms. Kim has had worsening vision related to central retinal vein occlusion. She has not had respiratory exacerbations. She had a worsened cough in February and received a course of Azithromycin with improvement. For her ACT, she has been utilizing Albuterol BID and producing sputum regularly. Her daughter does not remember obtaining a PEP device. Ms. Kim has been tolerating the Apixaban well. There have been no episodes of bleeding. Her breathing feels comfortable at baseline. She is eating well, and her weight is stable.  5/2024: Ms. Kim feels well today. She has not had respiratory exacerbations, ER visits, hospitalizations. She continues to have daily sputum production. She uses Albuterol 1-2 times/day. Her weight is stable.  8/2024: Since our last visit, Ms. Kim had a pacemaker placed at [facility]. Ms. Kim tried using the vest for airway clearance, though she did not tolerate it. She tried 3 sessions, but she felt uncomfortable using it and has not tried it since. She is currently using nebulized Albuterol in the evenings. She does not remember obtaining nebulized 3% saline. She has not had respiratory exacerbations since our last visit. Her weight is stable.  PMH: Lung cancer s/p LLLobectomy (? 2016; unclear surgeon) Asthma vs COPD Bronchiectasis CVA dCHF CKD Pulmonary embolus (RLL segmental 2021)  PSH: LLLobectomy

## 2024-08-26 ENCOUNTER — APPOINTMENT (OUTPATIENT)
Dept: INTERNAL MEDICINE | Facility: CLINIC | Age: 89
End: 2024-08-26
Payer: MEDICARE

## 2024-08-26 VITALS
TEMPERATURE: 97.3 F | SYSTOLIC BLOOD PRESSURE: 117 MMHG | OXYGEN SATURATION: 97 % | HEIGHT: 63 IN | DIASTOLIC BLOOD PRESSURE: 75 MMHG | HEART RATE: 88 BPM | BODY MASS INDEX: 21.44 KG/M2 | WEIGHT: 121 LBS

## 2024-08-26 DIAGNOSIS — E78.5 HYPERLIPIDEMIA, UNSPECIFIED: ICD-10-CM

## 2024-08-26 DIAGNOSIS — E87.5 HYPERKALEMIA: ICD-10-CM

## 2024-08-26 DIAGNOSIS — I10 ESSENTIAL (PRIMARY) HYPERTENSION: ICD-10-CM

## 2024-08-26 DIAGNOSIS — R63.4 ABNORMAL WEIGHT LOSS: ICD-10-CM

## 2024-08-26 PROCEDURE — 99214 OFFICE O/P EST MOD 30 MIN: CPT | Mod: 25

## 2024-08-26 PROCEDURE — 36415 COLL VENOUS BLD VENIPUNCTURE: CPT

## 2024-08-26 NOTE — PHYSICAL EXAM
[Normal Sclera/Conjunctiva] : normal sclera/conjunctiva [EOMI] : extraocular movements intact [Supple] : supple [Normal Rate] : normal rate  [Regular Rhythm] : with a regular rhythm [Normal S1, S2] : normal S1 and S2 [No Edema] : there was no peripheral edema [Soft] : abdomen soft [Non Tender] : non-tender [Non-distended] : non-distended [Grossly Normal Strength/Tone] : grossly normal strength/tone [Coordination Grossly Intact] : coordination grossly intact [Normal Gait] : normal gait [Normal] : affect was normal and insight and judgment were intact

## 2024-08-26 NOTE — PLAN
[FreeTextEntry1] : Labs drawn in office   Follow up with pulm, cardio   Continue with PT  Fall precautions encouraged   BP stable   Forms to be filled out

## 2024-08-26 NOTE — HISTORY OF PRESENT ILLNESS
[Family Member] : family member [FreeTextEntry1] : follow up on labs & medication  [de-identified] : Daughter with patient.  States she is doing well  Reports appetite normal Respiratory status stable  Weight is stable  Regular Bms Denies any falls, cp, or sob   Had pacer battery change in July at [facility] Cardiologist: William Perez   Recent follow up with Junior   Completed PT about 2 weeks ago - Will restart PT in September  Twice a week PT  Walking with a rolling walker   [facility] forms to be filled out

## 2024-08-30 ENCOUNTER — NON-APPOINTMENT (OUTPATIENT)
Age: 89
End: 2024-08-30

## 2024-08-30 LAB
ALBUMIN SERPL ELPH-MCNC: 3.8 G/DL
ALP BLD-CCNC: 117 U/L
ALT SERPL-CCNC: 7 U/L
ANION GAP SERPL CALC-SCNC: 10 MMOL/L
AST SERPL-CCNC: 16 U/L
BASOPHILS # BLD AUTO: 0.02 K/UL
BASOPHILS NFR BLD AUTO: 0.5 %
BILIRUB SERPL-MCNC: 0.6 MG/DL
BUN SERPL-MCNC: 11 MG/DL
CALCIUM SERPL-MCNC: 9.9 MG/DL
CHLORIDE SERPL-SCNC: 108 MMOL/L
CO2 SERPL-SCNC: 22 MMOL/L
CREAT SERPL-MCNC: 1 MG/DL
EGFR: 54 ML/MIN/1.73M2
EOSINOPHIL # BLD AUTO: 0.03 K/UL
EOSINOPHIL NFR BLD AUTO: 0.8 %
FOLATE SERPL-MCNC: 17 NG/ML
GLUCOSE SERPL-MCNC: 115 MG/DL
HCT VFR BLD CALC: 43.7 %
HGB BLD-MCNC: 14 G/DL
IMM GRANULOCYTES NFR BLD AUTO: 0.3 %
LYMPHOCYTES # BLD AUTO: 1.22 K/UL
LYMPHOCYTES NFR BLD AUTO: 32.8 %
MAN DIFF?: NORMAL
MCHC RBC-ENTMCNC: 32 GM/DL
MCHC RBC-ENTMCNC: 32.2 PG
MCV RBC AUTO: 100.5 FL
MONOCYTES # BLD AUTO: 0.48 K/UL
MONOCYTES NFR BLD AUTO: 12.9 %
NEUTROPHILS # BLD AUTO: 1.96 K/UL
NEUTROPHILS NFR BLD AUTO: 52.7 %
PLATELET # BLD AUTO: 242 K/UL
POTASSIUM SERPL-SCNC: 4.7 MMOL/L
PROT SERPL-MCNC: 6.9 G/DL
RBC # BLD: 4.35 M/UL
RBC # FLD: 13.1 %
SODIUM SERPL-SCNC: 140 MMOL/L
T4 FREE SERPL-MCNC: 1.8 NG/DL
TSH SERPL-ACNC: 1.53 UIU/ML
VIT B12 SERPL-MCNC: 749 PG/ML
WBC # FLD AUTO: 3.72 K/UL

## 2024-09-04 NOTE — PATIENT PROFILE ADULT - NSPROGENPREVTRANSF_GEN_A_NUR
Epidural    Patient location during procedure: OB   Reason for block: primary anesthetic   Reason for block: labor analgesia requested by patient and obstetrician  Diagnosis: pregnancy   Start time: 9/4/2024 3:18 AM  Timeout: 9/4/2024 3:17 AM  End time: 9/4/2024 3:23 AM    Staffing  Performing Provider: Edward Castillo CRNA  Authorizing Provider: Edward Castillo CRNA    Staffing  Performed by: Edward Castillo CRNA  Authorized by: Edward Castillo CRNA        Preanesthetic Checklist  Completed: patient identified, IV checked, site marked, risks and benefits discussed, surgical consent, monitors and equipment checked, pre-op evaluation, timeout performed, anesthesia consent given, hand hygiene performed and patient being monitored  Preparation  Patient position: sitting  Prep: ChloraPrep  Patient monitoring: ECG, Pulse Ox, continuous capnometry and Blood Pressure  Reason for block: primary anesthetic   Epidural  Skin Anesthetic: lidocaine 1%  Skin Wheal: 5 mL  Administration type: continuous  Approach: midline  Interspace: L3-4    Injection technique: LULA saline  Needle and Epidural Catheter  Needle type: Tuohy   Needle gauge: 18  Needle length: 3.5 inches  Needle insertion depth: 8 cm  Catheter type: end hole  Catheter size: 18 G  Catheter at skin depth: 15 cm  Insertion Attempts: 1  Test dose: 5 mL of lidocaine 1.5% with Epi 1-to-200,000  Additional Documentation: incremental injection, negative aspiration for heme and CSF, no paresthesia on injection, no signs/symptoms of IV or SA injection, no significant pain on injection and no significant complaints from patient  Needle localization: anatomical landmarks  Assessment  Upper dermatomal levels - Left: T4  Right: T4   Dermatomal levels determined by alcohol wipe  Ease of block: easy  Patient's tolerance of the procedure: comfortable throughout block No inadvertent dural puncture with Tuohy.  Dural puncture performed with spinal needle.                 no

## 2024-09-06 ENCOUNTER — APPOINTMENT (OUTPATIENT)
Dept: OPHTHALMOLOGY | Facility: CLINIC | Age: 89
End: 2024-09-06
Payer: MEDICARE

## 2024-09-06 ENCOUNTER — NON-APPOINTMENT (OUTPATIENT)
Age: 89
End: 2024-09-06

## 2024-09-06 PROCEDURE — 92134 CPTRZ OPH DX IMG PST SGM RTA: CPT

## 2024-09-06 PROCEDURE — 92014 COMPRE OPH EXAM EST PT 1/>: CPT

## 2024-09-24 ENCOUNTER — RX RENEWAL (OUTPATIENT)
Age: 89
End: 2024-09-24

## 2024-10-10 ENCOUNTER — APPOINTMENT (OUTPATIENT)
Dept: CT IMAGING | Facility: HOSPITAL | Age: 89
End: 2024-10-10
Payer: MEDICARE

## 2024-10-10 ENCOUNTER — OUTPATIENT (OUTPATIENT)
Dept: OUTPATIENT SERVICES | Facility: HOSPITAL | Age: 89
LOS: 1 days | End: 2024-10-10

## 2024-10-10 DIAGNOSIS — C34.90 MALIGNANT NEOPLASM OF UNSPECIFIED PART OF UNSPECIFIED BRONCHUS OR LUNG: Chronic | ICD-10-CM

## 2024-10-10 DIAGNOSIS — Z95.0 PRESENCE OF CARDIAC PACEMAKER: Chronic | ICD-10-CM

## 2024-10-10 DIAGNOSIS — Z98.890 OTHER SPECIFIED POSTPROCEDURAL STATES: Chronic | ICD-10-CM

## 2024-10-10 DIAGNOSIS — H26.9 UNSPECIFIED CATARACT: Chronic | ICD-10-CM

## 2024-10-10 PROCEDURE — 71250 CT THORAX DX C-: CPT

## 2024-10-10 PROCEDURE — 71250 CT THORAX DX C-: CPT | Mod: 26

## 2024-10-26 ENCOUNTER — RX RENEWAL (OUTPATIENT)
Age: 89
End: 2024-10-26

## 2024-10-30 ENCOUNTER — APPOINTMENT (OUTPATIENT)
Dept: PULMONOLOGY | Facility: CLINIC | Age: 89
End: 2024-10-30
Payer: MEDICARE

## 2024-10-30 VITALS
DIASTOLIC BLOOD PRESSURE: 77 MMHG | BODY MASS INDEX: 22.32 KG/M2 | TEMPERATURE: 97.2 F | HEART RATE: 76 BPM | OXYGEN SATURATION: 97 % | HEIGHT: 63 IN | RESPIRATION RATE: 12 BRPM | WEIGHT: 126 LBS | SYSTOLIC BLOOD PRESSURE: 115 MMHG

## 2024-10-30 PROCEDURE — 99213 OFFICE O/P EST LOW 20 MIN: CPT

## 2024-10-30 PROCEDURE — ZZZZZ: CPT

## 2024-11-01 ENCOUNTER — APPOINTMENT (OUTPATIENT)
Dept: OPHTHALMOLOGY | Facility: CLINIC | Age: 89
End: 2024-11-01
Payer: MEDICARE

## 2024-11-01 ENCOUNTER — NON-APPOINTMENT (OUTPATIENT)
Age: 89
End: 2024-11-01

## 2024-11-01 PROCEDURE — 67028 INJECTION EYE DRUG: CPT | Mod: RT

## 2024-11-01 PROCEDURE — 92014 COMPRE OPH EXAM EST PT 1/>: CPT | Mod: 25

## 2024-11-01 PROCEDURE — 92134 CPTRZ OPH DX IMG PST SGM RTA: CPT

## 2024-11-06 ENCOUNTER — NON-APPOINTMENT (OUTPATIENT)
Age: 89
End: 2024-11-06

## 2024-11-06 ENCOUNTER — APPOINTMENT (OUTPATIENT)
Dept: INTERNAL MEDICINE | Facility: CLINIC | Age: 89
End: 2024-11-06
Payer: MEDICARE

## 2024-11-06 VITALS
TEMPERATURE: 97.6 F | HEIGHT: 63 IN | DIASTOLIC BLOOD PRESSURE: 72 MMHG | OXYGEN SATURATION: 97 % | HEART RATE: 79 BPM | BODY MASS INDEX: 22.32 KG/M2 | WEIGHT: 126 LBS | SYSTOLIC BLOOD PRESSURE: 111 MMHG

## 2024-11-06 DIAGNOSIS — R93.89 ABNORMAL FINDINGS ON DIAGNOSTIC IMAGING OF OTHER SPECIFIED BODY STRUCTURES: ICD-10-CM

## 2024-11-06 DIAGNOSIS — C34.90 MALIGNANT NEOPLASM OF UNSPECIFIED PART OF UNSPECIFIED BRONCHUS OR LUNG: ICD-10-CM

## 2024-11-06 DIAGNOSIS — J45.909 UNSPECIFIED ASTHMA, UNCOMPLICATED: ICD-10-CM

## 2024-11-06 DIAGNOSIS — D86.0 SARCOIDOSIS OF LUNG: ICD-10-CM

## 2024-11-06 DIAGNOSIS — Z95.0 PRESENCE OF CARDIAC PACEMAKER: ICD-10-CM

## 2024-11-06 DIAGNOSIS — I25.10 ATHEROSCLEROTIC HEART DISEASE OF NATIVE CORONARY ARTERY W/OUT ANGINA PECTORIS: ICD-10-CM

## 2024-11-06 DIAGNOSIS — J47.9 BRONCHIECTASIS, UNCOMPLICATED: ICD-10-CM

## 2024-11-06 DIAGNOSIS — I10 ESSENTIAL (PRIMARY) HYPERTENSION: ICD-10-CM

## 2024-11-06 DIAGNOSIS — E78.5 HYPERLIPIDEMIA, UNSPECIFIED: ICD-10-CM

## 2024-11-06 DIAGNOSIS — I42.9 CARDIOMYOPATHY, UNSPECIFIED: ICD-10-CM

## 2024-11-06 PROCEDURE — 36415 COLL VENOUS BLD VENIPUNCTURE: CPT

## 2024-11-06 PROCEDURE — 99213 OFFICE O/P EST LOW 20 MIN: CPT | Mod: 25

## 2024-11-11 LAB
ALBUMIN SERPL ELPH-MCNC: 3.5 G/DL
ALP BLD-CCNC: 105 U/L
ALT SERPL-CCNC: 6 U/L
ANION GAP SERPL CALC-SCNC: 11 MMOL/L
AST SERPL-CCNC: 12 U/L
BILIRUB SERPL-MCNC: 0.5 MG/DL
BUN SERPL-MCNC: 13 MG/DL
CALCIUM SERPL-MCNC: 10 MG/DL
CHLORIDE SERPL-SCNC: 106 MMOL/L
CO2 SERPL-SCNC: 22 MMOL/L
CREAT SERPL-MCNC: 1.04 MG/DL
EGFR: 51 ML/MIN/1.73M2
ESTIMATED AVERAGE GLUCOSE: 117 MG/DL
GLUCOSE SERPL-MCNC: 135 MG/DL
HBA1C MFR BLD HPLC: 5.7 %
HCT VFR BLD CALC: 43.4 %
HGB BLD-MCNC: 13.9 G/DL
MCHC RBC-ENTMCNC: 31.4 PG
MCHC RBC-ENTMCNC: 32 G/DL
MCV RBC AUTO: 98 FL
PLATELET # BLD AUTO: 250 K/UL
POTASSIUM SERPL-SCNC: 5.3 MMOL/L
PROT SERPL-MCNC: 7 G/DL
RBC # BLD: 4.43 M/UL
RBC # FLD: 14.4 %
SODIUM SERPL-SCNC: 139 MMOL/L
T4 FREE SERPL-MCNC: 2.1 NG/DL
TSH SERPL-ACNC: 1.89 UIU/ML
WBC # FLD AUTO: 4.06 K/UL

## 2024-11-25 ENCOUNTER — RX RENEWAL (OUTPATIENT)
Age: 89
End: 2024-11-25

## 2024-12-09 NOTE — PATIENT PROFILE ADULT - PRIMARY ROLES/RESPONSIBILITIES
Called  and message given. Pt will schedule  Allyson Roy RN on 12/9/2024 at 4:10 PM   
Called and left message for call back to clinic  Allyson Roy RN on 12/9/2024 at 11:45 AM   
Can you please call Fatou and see if her breast is feeling better. She had a localized very tender spot so we are trying antibiotics. Thanks. Will need to get imaging of not feeling better.   
Patient reports that the tender spot has not improved at all with the antibiotics.   Please advise on imaging.  Lorena Patel RN on 12/9/2024 at 2:00 PM    
Please let her know that I put an order in for an ultrasound and mammogram. Speak with scheduling about doing the ultrasound first due to pain.   
retired/none

## 2024-12-23 ENCOUNTER — RX RENEWAL (OUTPATIENT)
Age: 88
End: 2024-12-23

## 2024-12-26 NOTE — ED PROVIDER NOTE - OBJECTIVE STATEMENT
Patient with PMH of glaucoma, asthma , former smoker, asthma, Left lower lobe cancer , hx of s bradycardia s/p PPM, Hx of cad ? cva?, CKD stage 3, follows dr Burns, hx of syphilis in past, recent admission for asthma exacerbation discharged on steroids and bronchodilators presenting with complaints of abdominal pain, nausea vomiting and loose stools since last Wednesday. Pt denies fever chills rigors, sick contacts, denies recent abx use. Pt says she developed vague abdominal pain starting last Wednesday. It is worsened on eating and drinking. Associated with nausea and vomiting up to 5 times a day primarily consisting of food contents. Pt also says her stool is watery since last Wednesday, passes stool 2 times a day . Denies all other ros  Vitals on admission: 162/84, HR 83, afebrile, satting well RA
c/o N/V weight loss and hair-loss onset 1 month ago, reports was evaluated multiple times for the same complaint with negative findings, pt request to be screened for cancer, phx of Anemia, Chron's disease.

## 2024-12-27 ENCOUNTER — APPOINTMENT (OUTPATIENT)
Dept: OPHTHALMOLOGY | Facility: CLINIC | Age: 88
End: 2024-12-27
Payer: MEDICARE

## 2024-12-27 ENCOUNTER — NON-APPOINTMENT (OUTPATIENT)
Age: 88
End: 2024-12-27

## 2024-12-27 PROCEDURE — 67028 INJECTION EYE DRUG: CPT | Mod: RT

## 2024-12-27 PROCEDURE — 92012 INTRM OPH EXAM EST PATIENT: CPT | Mod: 25

## 2024-12-27 PROCEDURE — 92134 CPTRZ OPH DX IMG PST SGM RTA: CPT

## 2025-01-01 ENCOUNTER — APPOINTMENT (OUTPATIENT)
Dept: OPHTHALMOLOGY | Facility: CLINIC | Age: 89
End: 2025-01-01

## 2025-01-01 ENCOUNTER — INPATIENT (INPATIENT)
Facility: HOSPITAL | Age: 89
LOS: 7 days | Discharge: HOSPICE | End: 2025-08-21
Attending: INTERNAL MEDICINE | Admitting: HOSPITALIST
Payer: MEDICARE

## 2025-01-01 ENCOUNTER — TRANSCRIPTION ENCOUNTER (OUTPATIENT)
Age: 89
End: 2025-01-01

## 2025-01-01 ENCOUNTER — INPATIENT (INPATIENT)
Facility: HOSPITAL | Age: 89
LOS: 1 days | DRG: 951 | End: 2025-08-23
Attending: STUDENT IN AN ORGANIZED HEALTH CARE EDUCATION/TRAINING PROGRAM | Admitting: INTERNAL MEDICINE
Payer: OTHER MISCELLANEOUS

## 2025-01-01 VITALS
OXYGEN SATURATION: 96 % | RESPIRATION RATE: 17 BRPM | SYSTOLIC BLOOD PRESSURE: 136 MMHG | DIASTOLIC BLOOD PRESSURE: 89 MMHG | HEART RATE: 85 BPM | TEMPERATURE: 98 F

## 2025-01-01 VITALS
RESPIRATION RATE: 20 BRPM | HEART RATE: 92 BPM | DIASTOLIC BLOOD PRESSURE: 66 MMHG | OXYGEN SATURATION: 96 % | TEMPERATURE: 98 F | SYSTOLIC BLOOD PRESSURE: 115 MMHG

## 2025-01-01 VITALS — HEIGHT: 62.99 IN | WEIGHT: 130.07 LBS

## 2025-01-01 VITALS
RESPIRATION RATE: 48 BRPM | WEIGHT: 130.07 LBS | HEIGHT: 63 IN | OXYGEN SATURATION: 92 % | DIASTOLIC BLOOD PRESSURE: 82 MMHG | HEART RATE: 101 BPM | SYSTOLIC BLOOD PRESSURE: 114 MMHG

## 2025-01-01 DIAGNOSIS — Z79.01 LONG TERM (CURRENT) USE OF ANTICOAGULANTS: ICD-10-CM

## 2025-01-01 DIAGNOSIS — Z98.890 OTHER SPECIFIED POSTPROCEDURAL STATES: Chronic | ICD-10-CM

## 2025-01-01 DIAGNOSIS — J18.9 PNEUMONIA, UNSPECIFIED ORGANISM: ICD-10-CM

## 2025-01-01 DIAGNOSIS — J44.89 OTHER SPECIFIED CHRONIC OBSTRUCTIVE PULMONARY DISEASE: ICD-10-CM

## 2025-01-01 DIAGNOSIS — I48.91 UNSPECIFIED ATRIAL FIBRILLATION: ICD-10-CM

## 2025-01-01 DIAGNOSIS — K21.9 GASTRO-ESOPHAGEAL REFLUX DISEASE WITHOUT ESOPHAGITIS: ICD-10-CM

## 2025-01-01 DIAGNOSIS — Z90.710 ACQUIRED ABSENCE OF BOTH CERVIX AND UTERUS: ICD-10-CM

## 2025-01-01 DIAGNOSIS — Z71.89 OTHER SPECIFIED COUNSELING: ICD-10-CM

## 2025-01-01 DIAGNOSIS — H26.9 UNSPECIFIED CATARACT: Chronic | ICD-10-CM

## 2025-01-01 DIAGNOSIS — G93.6 CEREBRAL EDEMA: ICD-10-CM

## 2025-01-01 DIAGNOSIS — Z51.5 ENCOUNTER FOR PALLIATIVE CARE: ICD-10-CM

## 2025-01-01 DIAGNOSIS — Z90.2 ACQUIRED ABSENCE OF LUNG [PART OF]: ICD-10-CM

## 2025-01-01 DIAGNOSIS — J96.01 ACUTE RESPIRATORY FAILURE WITH HYPOXIA: ICD-10-CM

## 2025-01-01 DIAGNOSIS — R06.00 DYSPNEA, UNSPECIFIED: ICD-10-CM

## 2025-01-01 DIAGNOSIS — I26.99 OTHER PULMONARY EMBOLISM WITHOUT ACUTE COR PULMONALE: ICD-10-CM

## 2025-01-01 DIAGNOSIS — A41.9 SEPSIS, UNSPECIFIED ORGANISM: ICD-10-CM

## 2025-01-01 DIAGNOSIS — Z98.41 CATARACT EXTRACTION STATUS, RIGHT EYE: ICD-10-CM

## 2025-01-01 DIAGNOSIS — R53.2 FUNCTIONAL QUADRIPLEGIA: ICD-10-CM

## 2025-01-01 DIAGNOSIS — F03.90 UNSPECIFIED DEMENTIA, UNSPECIFIED SEVERITY, WITHOUT BEHAVIORAL DISTURBANCE, PSYCHOTIC DISTURBANCE, MOOD DISTURBANCE, AND ANXIETY: ICD-10-CM

## 2025-01-01 DIAGNOSIS — R65.20 SEVERE SEPSIS WITHOUT SEPTIC SHOCK: ICD-10-CM

## 2025-01-01 DIAGNOSIS — I50.22 CHRONIC SYSTOLIC (CONGESTIVE) HEART FAILURE: ICD-10-CM

## 2025-01-01 DIAGNOSIS — C79.31 SECONDARY MALIGNANT NEOPLASM OF BRAIN: ICD-10-CM

## 2025-01-01 DIAGNOSIS — Z95.0 PRESENCE OF CARDIAC PACEMAKER: Chronic | ICD-10-CM

## 2025-01-01 DIAGNOSIS — C34.90 MALIGNANT NEOPLASM OF UNSPECIFIED PART OF UNSPECIFIED BRONCHUS OR LUNG: Chronic | ICD-10-CM

## 2025-01-01 DIAGNOSIS — I10 ESSENTIAL (PRIMARY) HYPERTENSION: ICD-10-CM

## 2025-01-01 DIAGNOSIS — I25.10 ATHEROSCLEROTIC HEART DISEASE OF NATIVE CORONARY ARTERY WITHOUT ANGINA PECTORIS: ICD-10-CM

## 2025-01-01 DIAGNOSIS — G93.41 METABOLIC ENCEPHALOPATHY: ICD-10-CM

## 2025-01-01 DIAGNOSIS — R64 CACHEXIA: ICD-10-CM

## 2025-01-01 DIAGNOSIS — H40.9 UNSPECIFIED GLAUCOMA: ICD-10-CM

## 2025-01-01 DIAGNOSIS — J44.0 CHRONIC OBSTRUCTIVE PULMONARY DISEASE WITH (ACUTE) LOWER RESPIRATORY INFECTION: ICD-10-CM

## 2025-01-01 DIAGNOSIS — D86.9 SARCOIDOSIS, UNSPECIFIED: ICD-10-CM

## 2025-01-01 DIAGNOSIS — R53.81 OTHER MALAISE: ICD-10-CM

## 2025-01-01 DIAGNOSIS — H54.3 UNQUALIFIED VISUAL LOSS, BOTH EYES: ICD-10-CM

## 2025-01-01 DIAGNOSIS — E43 UNSPECIFIED SEVERE PROTEIN-CALORIE MALNUTRITION: ICD-10-CM

## 2025-01-01 DIAGNOSIS — C34.90 MALIGNANT NEOPLASM OF UNSPECIFIED PART OF UNSPECIFIED BRONCHUS OR LUNG: ICD-10-CM

## 2025-01-01 DIAGNOSIS — E78.5 HYPERLIPIDEMIA, UNSPECIFIED: ICD-10-CM

## 2025-01-01 DIAGNOSIS — J45.909 UNSPECIFIED ASTHMA, UNCOMPLICATED: ICD-10-CM

## 2025-01-01 DIAGNOSIS — Z95.0 PRESENCE OF CARDIAC PACEMAKER: ICD-10-CM

## 2025-01-01 DIAGNOSIS — I49.5 SICK SINUS SYNDROME: ICD-10-CM

## 2025-01-01 DIAGNOSIS — R52 PAIN, UNSPECIFIED: ICD-10-CM

## 2025-01-01 DIAGNOSIS — E46 UNSPECIFIED PROTEIN-CALORIE MALNUTRITION: ICD-10-CM

## 2025-01-01 DIAGNOSIS — J90 PLEURAL EFFUSION, NOT ELSEWHERE CLASSIFIED: ICD-10-CM

## 2025-01-01 DIAGNOSIS — Z79.2 LONG TERM (CURRENT) USE OF ANTIBIOTICS: ICD-10-CM

## 2025-01-01 DIAGNOSIS — E88.09 OTHER DISORDERS OF PLASMA-PROTEIN METABOLISM, NOT ELSEWHERE CLASSIFIED: ICD-10-CM

## 2025-01-01 DIAGNOSIS — G93.89 OTHER SPECIFIED DISORDERS OF BRAIN: ICD-10-CM

## 2025-01-01 DIAGNOSIS — Z86.73 PERSONAL HISTORY OF TRANSIENT ISCHEMIC ATTACK (TIA), AND CEREBRAL INFARCTION WITHOUT RESIDUAL DEFICITS: ICD-10-CM

## 2025-01-01 DIAGNOSIS — G89.3 NEOPLASM RELATED PAIN (ACUTE) (CHRONIC): ICD-10-CM

## 2025-01-01 DIAGNOSIS — Z66 DO NOT RESUSCITATE: ICD-10-CM

## 2025-01-01 LAB
ALBUMIN SERPL ELPH-MCNC: 2.3 G/DL — LOW (ref 3.3–5)
ALP SERPL-CCNC: 97 U/L — SIGNIFICANT CHANGE UP (ref 40–120)
ALT FLD-CCNC: 8 U/L — LOW (ref 10–45)
ANION GAP SERPL CALC-SCNC: 11 MMOL/L — SIGNIFICANT CHANGE UP (ref 5–17)
ANION GAP SERPL CALC-SCNC: 12 MMOL/L — SIGNIFICANT CHANGE UP (ref 5–17)
APTT BLD: 35.9 SEC — SIGNIFICANT CHANGE UP (ref 26.1–36.8)
AST SERPL-CCNC: SIGNIFICANT CHANGE UP U/L (ref 10–40)
B-OH-BUTYR SERPL-SCNC: 0.4 MMOL/L — SIGNIFICANT CHANGE UP
BASOPHILS # BLD AUTO: 0.05 K/UL — SIGNIFICANT CHANGE UP (ref 0–0.2)
BASOPHILS NFR BLD AUTO: 0.4 % — SIGNIFICANT CHANGE UP (ref 0–2)
BILIRUB SERPL-MCNC: 1 MG/DL — SIGNIFICANT CHANGE UP (ref 0.2–1.2)
BUN SERPL-MCNC: 28 MG/DL — HIGH (ref 7–23)
BUN SERPL-MCNC: 32 MG/DL — HIGH (ref 7–23)
CALCIUM SERPL-MCNC: 8.6 MG/DL — SIGNIFICANT CHANGE UP (ref 8.4–10.5)
CALCIUM SERPL-MCNC: 9.3 MG/DL — SIGNIFICANT CHANGE UP (ref 8.4–10.5)
CHLORIDE SERPL-SCNC: 105 MMOL/L — SIGNIFICANT CHANGE UP (ref 96–108)
CHLORIDE SERPL-SCNC: 109 MMOL/L — HIGH (ref 96–108)
CK MB CFR SERPL CALC: <1 NG/ML — SIGNIFICANT CHANGE UP (ref 0–6.7)
CK SERPL-CCNC: 74 U/L — SIGNIFICANT CHANGE UP (ref 25–170)
CO2 SERPL-SCNC: 16 MMOL/L — LOW (ref 22–31)
CO2 SERPL-SCNC: 22 MMOL/L — SIGNIFICANT CHANGE UP (ref 22–31)
CREAT SERPL-MCNC: 0.8 MG/DL — SIGNIFICANT CHANGE UP (ref 0.5–1.3)
CREAT SERPL-MCNC: 0.91 MG/DL — SIGNIFICANT CHANGE UP (ref 0.5–1.3)
CULTURE RESULTS: SIGNIFICANT CHANGE UP
CULTURE RESULTS: SIGNIFICANT CHANGE UP
D DIMER BLD IA.RAPID-MCNC: 788 NG/ML DDU — HIGH
EGFR: 60 ML/MIN/1.73M2 — SIGNIFICANT CHANGE UP
EGFR: 60 ML/MIN/1.73M2 — SIGNIFICANT CHANGE UP
EGFR: 70 ML/MIN/1.73M2 — SIGNIFICANT CHANGE UP
EGFR: 70 ML/MIN/1.73M2 — SIGNIFICANT CHANGE UP
EOSINOPHIL # BLD AUTO: 0 K/UL — SIGNIFICANT CHANGE UP (ref 0–0.5)
EOSINOPHIL NFR BLD AUTO: 0 % — SIGNIFICANT CHANGE UP (ref 0–6)
GAS PNL BLDV: SIGNIFICANT CHANGE UP
GAS PNL BLDV: SIGNIFICANT CHANGE UP
GLUCOSE SERPL-MCNC: 105 MG/DL — HIGH (ref 70–99)
GLUCOSE SERPL-MCNC: 146 MG/DL — HIGH (ref 70–99)
HCT VFR BLD CALC: 40.1 % — SIGNIFICANT CHANGE UP (ref 34.5–45)
HCT VFR BLD CALC: 42.2 % — SIGNIFICANT CHANGE UP (ref 34.5–45)
HGB BLD-MCNC: 12.2 G/DL — SIGNIFICANT CHANGE UP (ref 11.5–15.5)
HGB BLD-MCNC: 13.7 G/DL — SIGNIFICANT CHANGE UP (ref 11.5–15.5)
IMM GRANULOCYTES # BLD AUTO: 0.11 K/UL — HIGH (ref 0–0.07)
IMM GRANULOCYTES NFR BLD AUTO: 0.9 % — SIGNIFICANT CHANGE UP (ref 0–0.9)
INR BLD: 4.03 — HIGH (ref 0.85–1.16)
LACTATE SERPL-SCNC: 2.2 MMOL/L — HIGH (ref 0.5–2)
LACTATE SERPL-SCNC: 3.8 MMOL/L — HIGH (ref 0.5–2)
LYMPHOCYTES # BLD AUTO: 1.39 K/UL — SIGNIFICANT CHANGE UP (ref 1–3.3)
LYMPHOCYTES NFR BLD AUTO: 11.2 % — LOW (ref 13–44)
MAGNESIUM SERPL-MCNC: 2 MG/DL — SIGNIFICANT CHANGE UP (ref 1.6–2.6)
MAGNESIUM SERPL-MCNC: 2 MG/DL — SIGNIFICANT CHANGE UP (ref 1.6–2.6)
MCHC RBC-ENTMCNC: 29.8 PG — SIGNIFICANT CHANGE UP (ref 27–34)
MCHC RBC-ENTMCNC: 30.3 PG — SIGNIFICANT CHANGE UP (ref 27–34)
MCHC RBC-ENTMCNC: 30.4 G/DL — LOW (ref 32–36)
MCHC RBC-ENTMCNC: 32.5 G/DL — SIGNIFICANT CHANGE UP (ref 32–36)
MCV RBC AUTO: 91.7 FL — SIGNIFICANT CHANGE UP (ref 80–100)
MCV RBC AUTO: 99.8 FL — SIGNIFICANT CHANGE UP (ref 80–100)
MONOCYTES # BLD AUTO: 0.9 K/UL — SIGNIFICANT CHANGE UP (ref 0–0.9)
MONOCYTES NFR BLD AUTO: 7.3 % — SIGNIFICANT CHANGE UP (ref 2–14)
NEUTROPHILS # BLD AUTO: 9.95 K/UL — HIGH (ref 1.8–7.4)
NEUTROPHILS NFR BLD AUTO: 80.2 % — HIGH (ref 43–77)
NRBC # BLD AUTO: 0 K/UL — SIGNIFICANT CHANGE UP (ref 0–0)
NRBC # BLD AUTO: 0 K/UL — SIGNIFICANT CHANGE UP (ref 0–0)
NRBC # FLD: 0 K/UL — SIGNIFICANT CHANGE UP (ref 0–0)
NRBC # FLD: 0 K/UL — SIGNIFICANT CHANGE UP (ref 0–0)
NRBC BLD AUTO-RTO: 0 /100 WBCS — SIGNIFICANT CHANGE UP (ref 0–0)
NRBC BLD AUTO-RTO: 0 /100 WBCS — SIGNIFICANT CHANGE UP (ref 0–0)
NT-PROBNP SERPL-SCNC: 9504 PG/ML — HIGH (ref 0–300)
PHOSPHATE SERPL-MCNC: 3 MG/DL — SIGNIFICANT CHANGE UP (ref 2.5–4.5)
PHOSPHATE SERPL-MCNC: 3.9 MG/DL — SIGNIFICANT CHANGE UP (ref 2.5–4.5)
PLATELET # BLD AUTO: 156 K/UL — SIGNIFICANT CHANGE UP (ref 150–400)
PLATELET # BLD AUTO: 291 K/UL — SIGNIFICANT CHANGE UP (ref 150–400)
PMV BLD: 10.2 FL — SIGNIFICANT CHANGE UP (ref 7–13)
PMV BLD: 11.5 FL — SIGNIFICANT CHANGE UP (ref 7–13)
POTASSIUM SERPL-MCNC: 4.9 MMOL/L — SIGNIFICANT CHANGE UP (ref 3.5–5.3)
POTASSIUM SERPL-MCNC: 5 MMOL/L — SIGNIFICANT CHANGE UP (ref 3.5–5.3)
POTASSIUM SERPL-SCNC: 4.9 MMOL/L — SIGNIFICANT CHANGE UP (ref 3.5–5.3)
POTASSIUM SERPL-SCNC: 5 MMOL/L — SIGNIFICANT CHANGE UP (ref 3.5–5.3)
PROT SERPL-MCNC: 7.4 G/DL — SIGNIFICANT CHANGE UP (ref 6–8.3)
PROTHROM AB SERPL-ACNC: 46.5 SEC — HIGH (ref 9.9–13.4)
RAPID RVP RESULT: SIGNIFICANT CHANGE UP
RBC # BLD: 4.02 M/UL — SIGNIFICANT CHANGE UP (ref 3.8–5.2)
RBC # BLD: 4.6 M/UL — SIGNIFICANT CHANGE UP (ref 3.8–5.2)
RBC # FLD: 13.4 % — SIGNIFICANT CHANGE UP (ref 10.3–14.5)
RBC # FLD: 13.7 % — SIGNIFICANT CHANGE UP (ref 10.3–14.5)
SARS-COV-2 RNA SPEC QL NAA+PROBE: SIGNIFICANT CHANGE UP
SODIUM SERPL-SCNC: 136 MMOL/L — SIGNIFICANT CHANGE UP (ref 135–145)
SODIUM SERPL-SCNC: 139 MMOL/L — SIGNIFICANT CHANGE UP (ref 135–145)
SPECIMEN SOURCE: SIGNIFICANT CHANGE UP
SPECIMEN SOURCE: SIGNIFICANT CHANGE UP
TROPONIN T, HIGH SENSITIVITY RESULT: 28 NG/L — SIGNIFICANT CHANGE UP (ref 0–51)
WBC # BLD: 10.72 K/UL — HIGH (ref 3.8–10.5)
WBC # BLD: 12.4 K/UL — HIGH (ref 3.8–10.5)
WBC # FLD AUTO: 10.72 K/UL — HIGH (ref 3.8–10.5)
WBC # FLD AUTO: 12.4 K/UL — HIGH (ref 3.8–10.5)

## 2025-01-01 PROCEDURE — 85025 COMPLETE CBC W/AUTO DIFF WBC: CPT

## 2025-01-01 PROCEDURE — 82010 KETONE BODYS QUAN: CPT

## 2025-01-01 PROCEDURE — ZZZZZ: CPT

## 2025-01-01 PROCEDURE — 71275 CT ANGIOGRAPHY CHEST: CPT

## 2025-01-01 PROCEDURE — 83605 ASSAY OF LACTIC ACID: CPT

## 2025-01-01 PROCEDURE — 85379 FIBRIN DEGRADATION QUANT: CPT

## 2025-01-01 PROCEDURE — 84295 ASSAY OF SERUM SODIUM: CPT

## 2025-01-01 PROCEDURE — 0225U NFCT DS DNA&RNA 21 SARSCOV2: CPT

## 2025-01-01 PROCEDURE — 84100 ASSAY OF PHOSPHORUS: CPT

## 2025-01-01 PROCEDURE — 99497 ADVNCD CARE PLAN 30 MIN: CPT | Mod: 25

## 2025-01-01 PROCEDURE — 83735 ASSAY OF MAGNESIUM: CPT

## 2025-01-01 PROCEDURE — 71045 X-RAY EXAM CHEST 1 VIEW: CPT

## 2025-01-01 PROCEDURE — 84132 ASSAY OF SERUM POTASSIUM: CPT

## 2025-01-01 PROCEDURE — 85610 PROTHROMBIN TIME: CPT

## 2025-01-01 PROCEDURE — 84484 ASSAY OF TROPONIN QUANT: CPT

## 2025-01-01 PROCEDURE — 94640 AIRWAY INHALATION TREATMENT: CPT

## 2025-01-01 PROCEDURE — 70498 CT ANGIOGRAPHY NECK: CPT

## 2025-01-01 PROCEDURE — 82553 CREATINE MB FRACTION: CPT

## 2025-01-01 PROCEDURE — 83880 ASSAY OF NATRIURETIC PEPTIDE: CPT

## 2025-01-01 PROCEDURE — 96375 TX/PRO/DX INJ NEW DRUG ADDON: CPT

## 2025-01-01 PROCEDURE — 36415 COLL VENOUS BLD VENIPUNCTURE: CPT

## 2025-01-01 PROCEDURE — 85027 COMPLETE CBC AUTOMATED: CPT

## 2025-01-01 PROCEDURE — 82962 GLUCOSE BLOOD TEST: CPT

## 2025-01-01 PROCEDURE — 82803 BLOOD GASES ANY COMBINATION: CPT

## 2025-01-01 PROCEDURE — 71045 X-RAY EXAM CHEST 1 VIEW: CPT | Mod: 26

## 2025-01-01 PROCEDURE — 99232 SBSQ HOSP IP/OBS MODERATE 35: CPT | Mod: GC

## 2025-01-01 PROCEDURE — 87040 BLOOD CULTURE FOR BACTERIA: CPT

## 2025-01-01 PROCEDURE — 70450 CT HEAD/BRAIN W/O DYE: CPT

## 2025-01-01 PROCEDURE — 80053 COMPREHEN METABOLIC PANEL: CPT

## 2025-01-01 PROCEDURE — 99233 SBSQ HOSP IP/OBS HIGH 50: CPT

## 2025-01-01 PROCEDURE — 82330 ASSAY OF CALCIUM: CPT

## 2025-01-01 PROCEDURE — 85730 THROMBOPLASTIN TIME PARTIAL: CPT

## 2025-01-01 PROCEDURE — 99231 SBSQ HOSP IP/OBS SF/LOW 25: CPT

## 2025-01-01 PROCEDURE — 70496 CT ANGIOGRAPHY HEAD: CPT

## 2025-01-01 PROCEDURE — 80048 BASIC METABOLIC PNL TOTAL CA: CPT

## 2025-01-01 PROCEDURE — 82550 ASSAY OF CK (CPK): CPT

## 2025-01-01 PROCEDURE — 93010 ELECTROCARDIOGRAM REPORT: CPT

## 2025-01-01 PROCEDURE — 99233 SBSQ HOSP IP/OBS HIGH 50: CPT | Mod: GC

## 2025-01-01 PROCEDURE — 93005 ELECTROCARDIOGRAM TRACING: CPT

## 2025-01-01 PROCEDURE — 99223 1ST HOSP IP/OBS HIGH 75: CPT

## 2025-01-01 PROCEDURE — 71275 CT ANGIOGRAPHY CHEST: CPT | Mod: 26

## 2025-01-01 PROCEDURE — 70498 CT ANGIOGRAPHY NECK: CPT | Mod: 26

## 2025-01-01 PROCEDURE — 70496 CT ANGIOGRAPHY HEAD: CPT | Mod: 26

## 2025-01-01 PROCEDURE — 70450 CT HEAD/BRAIN W/O DYE: CPT | Mod: 26,59

## 2025-01-01 PROCEDURE — 99285 EMERGENCY DEPT VISIT HI MDM: CPT

## 2025-01-01 PROCEDURE — 96374 THER/PROPH/DIAG INJ IV PUSH: CPT

## 2025-01-01 RX ORDER — BRIMONIDINE TARTRATE 1.5 MG/ML
1 SOLUTION/ DROPS OPHTHALMIC
Refills: 0 | Status: DISCONTINUED | OUTPATIENT
Start: 2025-01-01 | End: 2025-01-01

## 2025-01-01 RX ORDER — LORAZEPAM 4 MG/ML
0.5 VIAL (ML) INJECTION EVERY 4 HOURS
Refills: 0 | Status: DISCONTINUED | OUTPATIENT
Start: 2025-01-01 | End: 2025-01-01

## 2025-01-01 RX ORDER — ACETAMINOPHEN 500 MG/5ML
1000 LIQUID (ML) ORAL ONCE
Refills: 0 | Status: COMPLETED | OUTPATIENT
Start: 2025-01-01 | End: 2025-01-01

## 2025-01-01 RX ORDER — PIPERACILLIN-TAZO-DEXTROSE,ISO 3.375G/5
3.38 IV SOLUTION, PIGGYBACK PREMIX FROZEN(ML) INTRAVENOUS ONCE
Refills: 0 | Status: COMPLETED | OUTPATIENT
Start: 2025-01-01 | End: 2025-01-01

## 2025-01-01 RX ORDER — HYDROMORPHONE/SOD CHLOR,ISO/PF 2 MG/10 ML
0.5 SYRINGE (ML) INJECTION EVERY 4 HOURS
Refills: 0 | Status: DISCONTINUED | OUTPATIENT
Start: 2025-01-01 | End: 2025-01-01

## 2025-01-01 RX ORDER — HYDROMORPHONE/SOD CHLOR,ISO/PF 2 MG/10 ML
0.5 SYRINGE (ML) INJECTION
Refills: 0 | Status: DISCONTINUED | OUTPATIENT
Start: 2025-01-01 | End: 2025-01-01

## 2025-01-01 RX ORDER — SODIUM CHLORIDE 9 G/1000ML
1000 INJECTION, SOLUTION INTRAVENOUS
Refills: 0 | Status: DISCONTINUED | OUTPATIENT
Start: 2025-01-01 | End: 2025-01-01

## 2025-01-01 RX ORDER — DEXAMETHASONE 0.5 MG/1
10 TABLET ORAL ONCE
Refills: 0 | Status: DISCONTINUED | OUTPATIENT
Start: 2025-01-01 | End: 2025-01-01

## 2025-01-01 RX ORDER — DORZOLAMIDE 20 MG/ML
1 SOLUTION/ DROPS OPHTHALMIC ONCE
Refills: 0 | Status: COMPLETED | OUTPATIENT
Start: 2025-01-01 | End: 2025-01-01

## 2025-01-01 RX ORDER — SCOPOLAMINE 1 MG/3D
1 PATCH, EXTENDED RELEASE TRANSDERMAL ONCE
Refills: 0 | Status: COMPLETED | OUTPATIENT
Start: 2025-01-01 | End: 2025-01-01

## 2025-01-01 RX ORDER — POLYETHYLENE GLYCOL 3350 17 G/17G
17 POWDER, FOR SOLUTION ORAL DAILY
Refills: 0 | Status: DISCONTINUED | OUTPATIENT
Start: 2025-01-01 | End: 2025-01-01

## 2025-01-01 RX ORDER — HYDROMORPHONE/SOD CHLOR,ISO/PF 2 MG/10 ML
0.2 SYRINGE (ML) INJECTION
Qty: 10 | Refills: 0 | Status: DISCONTINUED | OUTPATIENT
Start: 2025-01-01 | End: 2025-01-01

## 2025-01-01 RX ORDER — FUROSEMIDE 10 MG/ML
20 INJECTION INTRAMUSCULAR; INTRAVENOUS ONCE
Refills: 0 | Status: COMPLETED | OUTPATIENT
Start: 2025-01-01 | End: 2025-01-01

## 2025-01-01 RX ORDER — VANCOMYCIN HCL IN 5 % DEXTROSE 1.5G/250ML
1000 PLASTIC BAG, INJECTION (ML) INTRAVENOUS ONCE
Refills: 0 | Status: COMPLETED | OUTPATIENT
Start: 2025-01-01 | End: 2025-01-01

## 2025-01-01 RX ORDER — VANCOMYCIN HCL IN 5 % DEXTROSE 1.5G/250ML
1000 PLASTIC BAG, INJECTION (ML) INTRAVENOUS EVERY 24 HOURS
Refills: 0 | Status: COMPLETED | OUTPATIENT
Start: 2025-01-01 | End: 2025-01-01

## 2025-01-01 RX ORDER — IPRATROPIUM BROMIDE AND ALBUTEROL SULFATE .5; 2.5 MG/3ML; MG/3ML
3 SOLUTION RESPIRATORY (INHALATION) ONCE
Refills: 0 | Status: COMPLETED | OUTPATIENT
Start: 2025-01-01 | End: 2025-01-01

## 2025-01-01 RX ORDER — LORAZEPAM 4 MG/ML
0.5 VIAL (ML) INJECTION ONCE
Refills: 0 | Status: COMPLETED | OUTPATIENT
Start: 2025-01-01 | End: 2025-01-01

## 2025-01-01 RX ORDER — METHYLPREDNISOLONE ACETATE 80 MG/ML
40 INJECTION, SUSPENSION INTRA-ARTICULAR; INTRALESIONAL; INTRAMUSCULAR; SOFT TISSUE ONCE
Refills: 0 | Status: COMPLETED | OUTPATIENT
Start: 2025-01-01 | End: 2025-01-01

## 2025-01-01 RX ORDER — HYDROMORPHONE/SOD CHLOR,ISO/PF 2 MG/10 ML
0.2 SYRINGE (ML) INJECTION ONCE
Refills: 0 | Status: DISCONTINUED | OUTPATIENT
Start: 2025-01-01 | End: 2025-01-01

## 2025-01-01 RX ORDER — DEXAMETHASONE 0.5 MG/1
4 TABLET ORAL EVERY 6 HOURS
Refills: 0 | Status: DISCONTINUED | OUTPATIENT
Start: 2025-01-01 | End: 2025-01-01

## 2025-01-01 RX ORDER — GLYCOPYRROLATE 0.2 MG/ML
0.4 INJECTION INTRAMUSCULAR; INTRAVENOUS EVERY 4 HOURS
Refills: 0 | Status: DISCONTINUED | OUTPATIENT
Start: 2025-01-01 | End: 2025-01-01

## 2025-01-01 RX ORDER — DEXAMETHASONE 0.5 MG/1
10 TABLET ORAL ONCE
Refills: 0 | Status: COMPLETED | OUTPATIENT
Start: 2025-01-01 | End: 2025-01-01

## 2025-01-01 RX ORDER — BISACODYL 5 MG
10 TABLET, DELAYED RELEASE (ENTERIC COATED) ORAL DAILY
Refills: 0 | Status: DISCONTINUED | OUTPATIENT
Start: 2025-01-01 | End: 2025-01-01

## 2025-01-01 RX ORDER — HYDROMORPHONE/SOD CHLOR,ISO/PF 2 MG/10 ML
0.2 SYRINGE (ML) INJECTION
Refills: 0 | Status: DISCONTINUED | OUTPATIENT
Start: 2025-01-01 | End: 2025-01-01

## 2025-01-01 RX ORDER — ALBUTEROL SULFATE 2.5 MG/3ML
2 VIAL, NEBULIZER (ML) INHALATION EVERY 6 HOURS
Refills: 0 | Status: DISCONTINUED | OUTPATIENT
Start: 2025-01-01 | End: 2025-01-01

## 2025-01-01 RX ORDER — SENNA 187 MG
1 TABLET ORAL AT BEDTIME
Refills: 0 | Status: DISCONTINUED | OUTPATIENT
Start: 2025-01-01 | End: 2025-01-01

## 2025-01-01 RX ORDER — PIPERACILLIN-TAZO-DEXTROSE,ISO 3.375G/5
3.38 IV SOLUTION, PIGGYBACK PREMIX FROZEN(ML) INTRAVENOUS ONCE
Refills: 0 | Status: DISCONTINUED | OUTPATIENT
Start: 2025-01-01 | End: 2025-01-01

## 2025-01-01 RX ORDER — PIPERACILLIN-TAZO-DEXTROSE,ISO 3.375G/5
4.5 IV SOLUTION, PIGGYBACK PREMIX FROZEN(ML) INTRAVENOUS ONCE
Refills: 0 | Status: DISCONTINUED | OUTPATIENT
Start: 2025-01-01 | End: 2025-01-01

## 2025-01-01 RX ORDER — GLYCOPYRROLATE 0.2 MG/ML
0.4 INJECTION INTRAMUSCULAR; INTRAVENOUS
Refills: 0 | Status: DISCONTINUED | OUTPATIENT
Start: 2025-01-01 | End: 2025-01-01

## 2025-01-01 RX ORDER — PIPERACILLIN-TAZO-DEXTROSE,ISO 3.375G/5
4.5 IV SOLUTION, PIGGYBACK PREMIX FROZEN(ML) INTRAVENOUS ONCE
Refills: 0 | Status: COMPLETED | OUTPATIENT
Start: 2025-01-01 | End: 2025-01-01

## 2025-01-01 RX ORDER — ATORVASTATIN CALCIUM 80 MG/1
20 TABLET, FILM COATED ORAL AT BEDTIME
Refills: 0 | Status: DISCONTINUED | OUTPATIENT
Start: 2025-01-01 | End: 2025-01-01

## 2025-01-01 RX ORDER — ENOXAPARIN SODIUM 100 MG/ML
60 INJECTION SUBCUTANEOUS EVERY 12 HOURS
Refills: 0 | Status: DISCONTINUED | OUTPATIENT
Start: 2025-01-01 | End: 2025-01-01

## 2025-01-01 RX ORDER — HYDROCORTISONE 20 MG
50 TABLET ORAL EVERY 6 HOURS
Refills: 0 | Status: DISCONTINUED | OUTPATIENT
Start: 2025-01-01 | End: 2025-01-01

## 2025-01-01 RX ORDER — PIPERACILLIN-TAZO-DEXTROSE,ISO 3.375G/5
4.5 IV SOLUTION, PIGGYBACK PREMIX FROZEN(ML) INTRAVENOUS EVERY 8 HOURS
Refills: 0 | Status: COMPLETED | OUTPATIENT
Start: 2025-01-01 | End: 2025-01-01

## 2025-01-01 RX ORDER — HYDROMORPHONE/SOD CHLOR,ISO/PF 2 MG/10 ML
0.5 SYRINGE (ML) INJECTION EVERY 6 HOURS
Refills: 0 | Status: DISCONTINUED | OUTPATIENT
Start: 2025-01-01 | End: 2025-01-01

## 2025-01-01 RX ORDER — IPRATROPIUM BROMIDE AND ALBUTEROL SULFATE .5; 2.5 MG/3ML; MG/3ML
3 SOLUTION RESPIRATORY (INHALATION) EVERY 6 HOURS
Refills: 0 | Status: DISCONTINUED | OUTPATIENT
Start: 2025-01-01 | End: 2025-01-01

## 2025-01-01 RX ORDER — LISINOPRIL 30 MG/1
1 TABLET ORAL
Refills: 0 | DISCHARGE

## 2025-01-01 RX ADMIN — IPRATROPIUM BROMIDE AND ALBUTEROL SULFATE 3 MILLILITER(S): .5; 2.5 SOLUTION RESPIRATORY (INHALATION) at 17:45

## 2025-01-01 RX ADMIN — Medication 25 GRAM(S): at 22:48

## 2025-01-01 RX ADMIN — Medication 50 MILLIGRAM(S): at 06:58

## 2025-01-01 RX ADMIN — Medication 0.5 MILLIGRAM(S): at 11:02

## 2025-01-01 RX ADMIN — ENOXAPARIN SODIUM 60 MILLIGRAM(S): 100 INJECTION SUBCUTANEOUS at 23:42

## 2025-01-01 RX ADMIN — IPRATROPIUM BROMIDE AND ALBUTEROL SULFATE 3 MILLILITER(S): .5; 2.5 SOLUTION RESPIRATORY (INHALATION) at 05:50

## 2025-01-01 RX ADMIN — Medication 0.2 MILLIGRAM(S): at 20:53

## 2025-01-01 RX ADMIN — FUROSEMIDE 20 MILLIGRAM(S): 10 INJECTION INTRAMUSCULAR; INTRAVENOUS at 06:58

## 2025-01-01 RX ADMIN — BRIMONIDINE TARTRATE 1 DROP(S): 1.5 SOLUTION/ DROPS OPHTHALMIC at 18:05

## 2025-01-01 RX ADMIN — Medication 0.5 MILLIGRAM(S): at 07:03

## 2025-01-01 RX ADMIN — BRIMONIDINE TARTRATE 1 DROP(S): 1.5 SOLUTION/ DROPS OPHTHALMIC at 05:51

## 2025-01-01 RX ADMIN — IPRATROPIUM BROMIDE AND ALBUTEROL SULFATE 3 MILLILITER(S): .5; 2.5 SOLUTION RESPIRATORY (INHALATION) at 17:54

## 2025-01-01 RX ADMIN — SODIUM CHLORIDE 80 MILLILITER(S): 9 INJECTION, SOLUTION INTRAVENOUS at 12:40

## 2025-01-01 RX ADMIN — Medication 250 MILLIGRAM(S): at 16:00

## 2025-01-01 RX ADMIN — BRIMONIDINE TARTRATE 1 DROP(S): 1.5 SOLUTION/ DROPS OPHTHALMIC at 06:56

## 2025-01-01 RX ADMIN — Medication 0.2 MILLIGRAM(S): at 14:56

## 2025-01-01 RX ADMIN — Medication 0.5 MILLIGRAM(S): at 17:08

## 2025-01-01 RX ADMIN — Medication 500 MICROGRAM(S): at 22:06

## 2025-01-01 RX ADMIN — Medication 0.5 MILLIGRAM(S): at 00:54

## 2025-01-01 RX ADMIN — Medication 500 MICROGRAM(S): at 10:18

## 2025-01-01 RX ADMIN — DEXAMETHASONE 4 MILLIGRAM(S): 0.5 TABLET ORAL at 23:36

## 2025-01-01 RX ADMIN — DORZOLAMIDE 1 DROP(S): 20 SOLUTION/ DROPS OPHTHALMIC at 23:42

## 2025-01-01 RX ADMIN — Medication 500 MICROGRAM(S): at 23:52

## 2025-01-01 RX ADMIN — Medication 0.5 MILLIGRAM(S): at 07:06

## 2025-01-01 RX ADMIN — BRIMONIDINE TARTRATE 1 DROP(S): 1.5 SOLUTION/ DROPS OPHTHALMIC at 17:11

## 2025-01-01 RX ADMIN — Medication 0.2 MILLIGRAM(S): at 06:39

## 2025-01-01 RX ADMIN — Medication 200 GRAM(S): at 14:59

## 2025-01-01 RX ADMIN — Medication 500 MICROGRAM(S): at 10:17

## 2025-01-01 RX ADMIN — Medication 0.5 MILLIGRAM(S): at 13:51

## 2025-01-01 RX ADMIN — Medication 0.5 MILLIGRAM(S): at 17:25

## 2025-01-01 RX ADMIN — Medication 0.5 MILLIGRAM(S): at 11:21

## 2025-01-01 RX ADMIN — Medication 0.2 MILLIGRAM(S): at 12:26

## 2025-01-01 RX ADMIN — Medication 0.5 MILLIGRAM(S): at 05:45

## 2025-01-01 RX ADMIN — BRIMONIDINE TARTRATE 1 DROP(S): 1.5 SOLUTION/ DROPS OPHTHALMIC at 17:49

## 2025-01-01 RX ADMIN — DEXAMETHASONE 4 MILLIGRAM(S): 0.5 TABLET ORAL at 00:22

## 2025-01-01 RX ADMIN — Medication 25 GRAM(S): at 06:26

## 2025-01-01 RX ADMIN — Medication 200 GRAM(S): at 20:32

## 2025-01-01 RX ADMIN — Medication 0.5 MILLIGRAM(S): at 09:39

## 2025-01-01 RX ADMIN — DEXAMETHASONE 4 MILLIGRAM(S): 0.5 TABLET ORAL at 05:50

## 2025-01-01 RX ADMIN — Medication 25 GRAM(S): at 12:55

## 2025-01-01 RX ADMIN — Medication 0.2 MG/HR: at 13:05

## 2025-01-01 RX ADMIN — DEXAMETHASONE 4 MILLIGRAM(S): 0.5 TABLET ORAL at 00:45

## 2025-01-01 RX ADMIN — Medication 25 GRAM(S): at 22:06

## 2025-01-01 RX ADMIN — Medication 500 MICROGRAM(S): at 12:30

## 2025-01-01 RX ADMIN — METHYLPREDNISOLONE ACETATE 40 MILLIGRAM(S): 80 INJECTION, SUSPENSION INTRA-ARTICULAR; INTRALESIONAL; INTRAMUSCULAR; SOFT TISSUE at 14:35

## 2025-01-01 RX ADMIN — Medication 25 GRAM(S): at 14:02

## 2025-01-01 RX ADMIN — Medication 500 MICROGRAM(S): at 23:36

## 2025-01-01 RX ADMIN — Medication 0.5 MILLIGRAM(S): at 17:50

## 2025-01-01 RX ADMIN — BRIMONIDINE TARTRATE 1 DROP(S): 1.5 SOLUTION/ DROPS OPHTHALMIC at 18:01

## 2025-01-01 RX ADMIN — Medication 500 MICROGRAM(S): at 16:02

## 2025-01-01 RX ADMIN — ENOXAPARIN SODIUM 60 MILLIGRAM(S): 100 INJECTION SUBCUTANEOUS at 06:46

## 2025-01-01 RX ADMIN — Medication 25 GRAM(S): at 21:17

## 2025-01-01 RX ADMIN — GLYCOPYRROLATE 0.4 MILLIGRAM(S): 0.2 INJECTION INTRAMUSCULAR; INTRAVENOUS at 09:47

## 2025-01-01 RX ADMIN — Medication 500 MICROGRAM(S): at 03:17

## 2025-01-01 RX ADMIN — Medication 25 GRAM(S): at 14:53

## 2025-01-01 RX ADMIN — Medication 0.2 MILLIGRAM(S): at 11:56

## 2025-01-01 RX ADMIN — Medication 0.2 MG/HR: at 13:11

## 2025-01-01 RX ADMIN — Medication 0.5 MILLIGRAM(S): at 14:10

## 2025-01-01 RX ADMIN — Medication 0.5 MILLIGRAM(S): at 18:02

## 2025-01-01 RX ADMIN — Medication 0.2 MILLIGRAM(S): at 03:15

## 2025-01-01 RX ADMIN — Medication 400 MILLIGRAM(S): at 12:41

## 2025-01-01 RX ADMIN — ENOXAPARIN SODIUM 60 MILLIGRAM(S): 100 INJECTION SUBCUTANEOUS at 17:50

## 2025-01-01 RX ADMIN — Medication 0.5 MILLIGRAM(S): at 02:39

## 2025-01-01 RX ADMIN — SODIUM CHLORIDE 80 MILLILITER(S): 9 INJECTION, SOLUTION INTRAVENOUS at 09:07

## 2025-01-01 RX ADMIN — Medication 25 GRAM(S): at 14:09

## 2025-01-01 RX ADMIN — IPRATROPIUM BROMIDE AND ALBUTEROL SULFATE 3 MILLILITER(S): .5; 2.5 SOLUTION RESPIRATORY (INHALATION) at 22:06

## 2025-01-01 RX ADMIN — Medication 250 MILLIGRAM(S): at 15:52

## 2025-01-01 RX ADMIN — ENOXAPARIN SODIUM 60 MILLIGRAM(S): 100 INJECTION SUBCUTANEOUS at 06:26

## 2025-01-01 RX ADMIN — Medication 0.5 MILLIGRAM(S): at 00:22

## 2025-01-01 RX ADMIN — Medication 500 MICROGRAM(S): at 12:00

## 2025-01-01 RX ADMIN — BRIMONIDINE TARTRATE 1 DROP(S): 1.5 SOLUTION/ DROPS OPHTHALMIC at 17:54

## 2025-01-01 RX ADMIN — ENOXAPARIN SODIUM 60 MILLIGRAM(S): 100 INJECTION SUBCUTANEOUS at 17:11

## 2025-01-01 RX ADMIN — Medication 0.2 MILLIGRAM(S): at 14:52

## 2025-01-01 RX ADMIN — GLYCOPYRROLATE 0.4 MILLIGRAM(S): 0.2 INJECTION INTRAMUSCULAR; INTRAVENOUS at 05:05

## 2025-01-01 RX ADMIN — Medication 0.5 MILLIGRAM(S): at 07:45

## 2025-01-01 RX ADMIN — IPRATROPIUM BROMIDE AND ALBUTEROL SULFATE 3 MILLILITER(S): .5; 2.5 SOLUTION RESPIRATORY (INHALATION) at 13:35

## 2025-01-01 RX ADMIN — Medication 0.5 MILLIGRAM(S): at 23:23

## 2025-01-01 RX ADMIN — DEXAMETHASONE 4 MILLIGRAM(S): 0.5 TABLET ORAL at 11:24

## 2025-01-01 RX ADMIN — DEXAMETHASONE 4 MILLIGRAM(S): 0.5 TABLET ORAL at 17:54

## 2025-01-01 RX ADMIN — Medication 25 GRAM(S): at 06:38

## 2025-01-01 RX ADMIN — Medication 500 MICROGRAM(S): at 18:20

## 2025-01-01 RX ADMIN — Medication 0.5 MILLIGRAM(S): at 10:58

## 2025-01-01 RX ADMIN — Medication 0.5 MILLIGRAM(S): at 12:02

## 2025-01-01 RX ADMIN — Medication 0.5 MILLIGRAM(S): at 05:10

## 2025-01-01 RX ADMIN — Medication 0.5 MILLIGRAM(S): at 06:48

## 2025-01-01 RX ADMIN — IPRATROPIUM BROMIDE AND ALBUTEROL SULFATE 3 MILLILITER(S): .5; 2.5 SOLUTION RESPIRATORY (INHALATION) at 16:01

## 2025-01-01 RX ADMIN — ENOXAPARIN SODIUM 60 MILLIGRAM(S): 100 INJECTION SUBCUTANEOUS at 06:55

## 2025-01-01 RX ADMIN — DEXAMETHASONE 4 MILLIGRAM(S): 0.5 TABLET ORAL at 11:09

## 2025-01-01 RX ADMIN — Medication 0.2 MILLIGRAM(S): at 15:52

## 2025-01-01 RX ADMIN — Medication 0.5 MILLIGRAM(S): at 09:23

## 2025-01-01 RX ADMIN — ENOXAPARIN SODIUM 60 MILLIGRAM(S): 100 INJECTION SUBCUTANEOUS at 05:51

## 2025-01-01 RX ADMIN — IPRATROPIUM BROMIDE AND ALBUTEROL SULFATE 3 MILLILITER(S): .5; 2.5 SOLUTION RESPIRATORY (INHALATION) at 17:10

## 2025-01-01 RX ADMIN — Medication 500 MICROGRAM(S): at 05:50

## 2025-01-01 RX ADMIN — Medication 1 MG/HR: at 12:56

## 2025-01-01 RX ADMIN — Medication 500 MICROGRAM(S): at 06:56

## 2025-01-01 RX ADMIN — Medication 0.5 MILLIGRAM(S): at 01:00

## 2025-01-01 RX ADMIN — DEXAMETHASONE 4 MILLIGRAM(S): 0.5 TABLET ORAL at 17:46

## 2025-01-01 RX ADMIN — BRIMONIDINE TARTRATE 1 DROP(S): 1.5 SOLUTION/ DROPS OPHTHALMIC at 05:11

## 2025-01-01 RX ADMIN — GLYCOPYRROLATE 0.4 MILLIGRAM(S): 0.2 INJECTION INTRAMUSCULAR; INTRAVENOUS at 23:07

## 2025-01-01 RX ADMIN — Medication 25 GRAM(S): at 22:51

## 2025-01-01 RX ADMIN — IPRATROPIUM BROMIDE AND ALBUTEROL SULFATE 3 MILLILITER(S): .5; 2.5 SOLUTION RESPIRATORY (INHALATION) at 10:18

## 2025-01-01 RX ADMIN — IPRATROPIUM BROMIDE AND ALBUTEROL SULFATE 3 MILLILITER(S): .5; 2.5 SOLUTION RESPIRATORY (INHALATION) at 23:36

## 2025-01-01 RX ADMIN — Medication 25 GRAM(S): at 05:34

## 2025-01-01 RX ADMIN — Medication 500 MILLILITER(S): at 14:35

## 2025-01-01 RX ADMIN — GLYCOPYRROLATE 0.4 MILLIGRAM(S): 0.2 INJECTION INTRAMUSCULAR; INTRAVENOUS at 21:34

## 2025-01-01 RX ADMIN — GLYCOPYRROLATE 0.4 MILLIGRAM(S): 0.2 INJECTION INTRAMUSCULAR; INTRAVENOUS at 13:00

## 2025-01-01 RX ADMIN — Medication 0.5 MILLIGRAM(S): at 18:17

## 2025-01-01 RX ADMIN — DEXAMETHASONE 4 MILLIGRAM(S): 0.5 TABLET ORAL at 06:46

## 2025-01-01 RX ADMIN — SODIUM CHLORIDE 80 MILLILITER(S): 9 INJECTION, SOLUTION INTRAVENOUS at 09:30

## 2025-01-01 RX ADMIN — BRIMONIDINE TARTRATE 1 DROP(S): 1.5 SOLUTION/ DROPS OPHTHALMIC at 18:29

## 2025-01-01 RX ADMIN — ENOXAPARIN SODIUM 60 MILLIGRAM(S): 100 INJECTION SUBCUTANEOUS at 10:18

## 2025-01-01 RX ADMIN — Medication 0.2 MILLIGRAM(S): at 07:15

## 2025-01-01 RX ADMIN — BRIMONIDINE TARTRATE 1 DROP(S): 1.5 SOLUTION/ DROPS OPHTHALMIC at 17:50

## 2025-01-01 RX ADMIN — Medication 0.5 MILLIGRAM(S): at 09:41

## 2025-01-01 RX ADMIN — IPRATROPIUM BROMIDE AND ALBUTEROL SULFATE 3 MILLILITER(S): .5; 2.5 SOLUTION RESPIRATORY (INHALATION) at 23:52

## 2025-01-01 RX ADMIN — DEXAMETHASONE 4 MILLIGRAM(S): 0.5 TABLET ORAL at 17:51

## 2025-01-01 RX ADMIN — ENOXAPARIN SODIUM 60 MILLIGRAM(S): 100 INJECTION SUBCUTANEOUS at 17:46

## 2025-01-01 RX ADMIN — Medication 0.5 MILLIGRAM(S): at 00:04

## 2025-01-01 RX ADMIN — DEXAMETHASONE 4 MILLIGRAM(S): 0.5 TABLET ORAL at 23:22

## 2025-01-01 RX ADMIN — IPRATROPIUM BROMIDE AND ALBUTEROL SULFATE 3 MILLILITER(S): .5; 2.5 SOLUTION RESPIRATORY (INHALATION) at 10:17

## 2025-01-01 RX ADMIN — Medication 0.2 MILLIGRAM(S): at 14:26

## 2025-01-01 RX ADMIN — Medication 25 GRAM(S): at 23:06

## 2025-01-01 RX ADMIN — Medication 25 GRAM(S): at 13:05

## 2025-01-01 RX ADMIN — Medication 0.2 MILLIGRAM(S): at 21:45

## 2025-01-01 RX ADMIN — Medication 25 GRAM(S): at 06:05

## 2025-01-01 RX ADMIN — Medication 500 MICROGRAM(S): at 06:46

## 2025-01-01 RX ADMIN — Medication 0.5 MILLIGRAM(S): at 12:18

## 2025-01-01 RX ADMIN — IPRATROPIUM BROMIDE AND ALBUTEROL SULFATE 3 MILLILITER(S): .5; 2.5 SOLUTION RESPIRATORY (INHALATION) at 12:31

## 2025-01-01 RX ADMIN — BRIMONIDINE TARTRATE 1 DROP(S): 1.5 SOLUTION/ DROPS OPHTHALMIC at 05:05

## 2025-01-01 RX ADMIN — Medication 0.5 MILLIGRAM(S): at 11:36

## 2025-01-01 RX ADMIN — Medication 1 MG/HR: at 12:50

## 2025-01-01 RX ADMIN — Medication 500 MICROGRAM(S): at 11:24

## 2025-01-01 RX ADMIN — GLYCOPYRROLATE 0.4 MILLIGRAM(S): 0.2 INJECTION INTRAMUSCULAR; INTRAVENOUS at 09:39

## 2025-01-01 RX ADMIN — SCOPOLAMINE 1 PATCH: 1 PATCH, EXTENDED RELEASE TRANSDERMAL at 16:11

## 2025-01-01 RX ADMIN — POLYETHYLENE GLYCOL 3350 17 GRAM(S): 17 POWDER, FOR SOLUTION ORAL at 12:01

## 2025-01-01 RX ADMIN — Medication 25 GRAM(S): at 21:54

## 2025-01-01 RX ADMIN — DEXAMETHASONE 4 MILLIGRAM(S): 0.5 TABLET ORAL at 06:55

## 2025-01-01 RX ADMIN — Medication 0.5 MILLIGRAM(S): at 05:05

## 2025-01-01 RX ADMIN — BRIMONIDINE TARTRATE 1 DROP(S): 1.5 SOLUTION/ DROPS OPHTHALMIC at 06:39

## 2025-01-01 RX ADMIN — IPRATROPIUM BROMIDE AND ALBUTEROL SULFATE 3 MILLILITER(S): .5; 2.5 SOLUTION RESPIRATORY (INHALATION) at 06:55

## 2025-01-01 RX ADMIN — Medication 0.5 MILLIGRAM(S): at 22:54

## 2025-01-01 RX ADMIN — IPRATROPIUM BROMIDE AND ALBUTEROL SULFATE 3 MILLILITER(S): .5; 2.5 SOLUTION RESPIRATORY (INHALATION) at 03:14

## 2025-01-01 RX ADMIN — SODIUM CHLORIDE 60 MILLILITER(S): 9 INJECTION, SOLUTION INTRAVENOUS at 04:11

## 2025-01-01 RX ADMIN — IPRATROPIUM BROMIDE AND ALBUTEROL SULFATE 3 MILLILITER(S): .5; 2.5 SOLUTION RESPIRATORY (INHALATION) at 11:24

## 2025-01-01 RX ADMIN — Medication 500 MICROGRAM(S): at 00:22

## 2025-01-01 RX ADMIN — IPRATROPIUM BROMIDE AND ALBUTEROL SULFATE 3 MILLILITER(S): .5; 2.5 SOLUTION RESPIRATORY (INHALATION) at 00:22

## 2025-01-01 RX ADMIN — DEXAMETHASONE 4 MILLIGRAM(S): 0.5 TABLET ORAL at 12:01

## 2025-01-01 RX ADMIN — DEXAMETHASONE 4 MILLIGRAM(S): 0.5 TABLET ORAL at 12:30

## 2025-01-01 RX ADMIN — BRIMONIDINE TARTRATE 1 DROP(S): 1.5 SOLUTION/ DROPS OPHTHALMIC at 06:45

## 2025-01-01 RX ADMIN — BRIMONIDINE TARTRATE 1 DROP(S): 1.5 SOLUTION/ DROPS OPHTHALMIC at 17:08

## 2025-01-01 RX ADMIN — Medication 0.5 MILLIGRAM(S): at 06:07

## 2025-01-01 RX ADMIN — Medication 25 GRAM(S): at 14:29

## 2025-01-01 RX ADMIN — ENOXAPARIN SODIUM 60 MILLIGRAM(S): 100 INJECTION SUBCUTANEOUS at 17:54

## 2025-01-01 RX ADMIN — BRIMONIDINE TARTRATE 1 DROP(S): 1.5 SOLUTION/ DROPS OPHTHALMIC at 05:34

## 2025-01-01 RX ADMIN — IPRATROPIUM BROMIDE AND ALBUTEROL SULFATE 3 MILLILITER(S): .5; 2.5 SOLUTION RESPIRATORY (INHALATION) at 12:00

## 2025-01-01 RX ADMIN — Medication 25 GRAM(S): at 14:25

## 2025-01-01 RX ADMIN — Medication 0.5 MILLIGRAM(S): at 01:08

## 2025-01-01 RX ADMIN — BRIMONIDINE TARTRATE 1 DROP(S): 1.5 SOLUTION/ DROPS OPHTHALMIC at 06:33

## 2025-01-01 RX ADMIN — Medication 25 GRAM(S): at 06:46

## 2025-01-01 RX ADMIN — Medication 0.5 MILLIGRAM(S): at 11:13

## 2025-01-01 RX ADMIN — GLYCOPYRROLATE 0.4 MILLIGRAM(S): 0.2 INJECTION INTRAMUSCULAR; INTRAVENOUS at 18:02

## 2025-01-01 RX ADMIN — Medication 0.2 MILLIGRAM(S): at 03:16

## 2025-01-01 RX ADMIN — DEXAMETHASONE 102 MILLIGRAM(S): 0.5 TABLET ORAL at 11:59

## 2025-01-01 RX ADMIN — Medication 500 MICROGRAM(S): at 17:45

## 2025-01-01 RX ADMIN — BRIMONIDINE TARTRATE 1 DROP(S): 1.5 SOLUTION/ DROPS OPHTHALMIC at 06:26

## 2025-01-01 RX ADMIN — Medication 25 GRAM(S): at 06:55

## 2025-01-01 RX ADMIN — Medication 25 GRAM(S): at 22:21

## 2025-01-01 RX ADMIN — BRIMONIDINE TARTRATE 1 DROP(S): 1.5 SOLUTION/ DROPS OPHTHALMIC at 17:44

## 2025-01-01 RX ADMIN — DEXAMETHASONE 4 MILLIGRAM(S): 0.5 TABLET ORAL at 17:10

## 2025-01-01 RX ADMIN — IPRATROPIUM BROMIDE AND ALBUTEROL SULFATE 3 MILLILITER(S): .5; 2.5 SOLUTION RESPIRATORY (INHALATION) at 06:46

## 2025-01-01 RX ADMIN — Medication 500 MICROGRAM(S): at 17:10

## 2025-01-01 RX ADMIN — BRIMONIDINE TARTRATE 1 DROP(S): 1.5 SOLUTION/ DROPS OPHTHALMIC at 06:08

## 2025-01-01 RX ADMIN — Medication 400 MILLIGRAM(S): at 07:24

## 2025-01-01 RX ADMIN — Medication 0.5 MILLIGRAM(S): at 21:34

## 2025-01-01 RX ADMIN — DEXAMETHASONE 4 MILLIGRAM(S): 0.5 TABLET ORAL at 06:26

## 2025-01-01 RX ADMIN — Medication 25 GRAM(S): at 06:32

## 2025-01-01 RX ADMIN — Medication 0.5 MILLIGRAM(S): at 18:05

## 2025-01-17 ENCOUNTER — APPOINTMENT (OUTPATIENT)
Dept: INTERNAL MEDICINE | Facility: CLINIC | Age: 89
End: 2025-01-17

## 2025-01-17 ENCOUNTER — RX RENEWAL (OUTPATIENT)
Age: 89
End: 2025-01-17

## 2025-01-17 VITALS
TEMPERATURE: 97.6 F | OXYGEN SATURATION: 99 % | HEART RATE: 87 BPM | DIASTOLIC BLOOD PRESSURE: 62 MMHG | SYSTOLIC BLOOD PRESSURE: 105 MMHG

## 2025-01-17 DIAGNOSIS — I10 ESSENTIAL (PRIMARY) HYPERTENSION: ICD-10-CM

## 2025-01-17 DIAGNOSIS — J45.909 UNSPECIFIED ASTHMA, UNCOMPLICATED: ICD-10-CM

## 2025-01-17 DIAGNOSIS — I25.10 ATHEROSCLEROTIC HEART DISEASE OF NATIVE CORONARY ARTERY W/OUT ANGINA PECTORIS: ICD-10-CM

## 2025-01-17 DIAGNOSIS — E78.5 HYPERLIPIDEMIA, UNSPECIFIED: ICD-10-CM

## 2025-01-17 DIAGNOSIS — I42.9 CARDIOMYOPATHY, UNSPECIFIED: ICD-10-CM

## 2025-01-17 PROCEDURE — 99213 OFFICE O/P EST LOW 20 MIN: CPT | Mod: 25

## 2025-01-17 PROCEDURE — 36415 COLL VENOUS BLD VENIPUNCTURE: CPT

## 2025-01-17 RX ORDER — AZITHROMYCIN 250 MG/1
250 TABLET, FILM COATED ORAL
Qty: 6 | Refills: 1 | Status: ACTIVE | COMMUNITY
Start: 2025-01-17 | End: 1900-01-01

## 2025-01-18 LAB
ALBUMIN SERPL ELPH-MCNC: 3.3 G/DL
ALP BLD-CCNC: 109 U/L
ALT SERPL-CCNC: 11 U/L
ANION GAP SERPL CALC-SCNC: 10 MMOL/L
AST SERPL-CCNC: 12 U/L
BILIRUB SERPL-MCNC: 0.4 MG/DL
BUN SERPL-MCNC: 15 MG/DL
CALCIUM SERPL-MCNC: 10 MG/DL
CHLORIDE SERPL-SCNC: 104 MMOL/L
CO2 SERPL-SCNC: 22 MMOL/L
CREAT SERPL-MCNC: 0.99 MG/DL
EGFR: 54 ML/MIN/1.73M2
GLUCOSE SERPL-MCNC: 109 MG/DL
HCT VFR BLD CALC: 41.7 %
HGB BLD-MCNC: 13.3 G/DL
MCHC RBC-ENTMCNC: 30.5 PG
MCHC RBC-ENTMCNC: 31.9 G/DL
MCV RBC AUTO: 95.6 FL
PLATELET # BLD AUTO: 364 K/UL
POTASSIUM SERPL-SCNC: 5.3 MMOL/L
PROT SERPL-MCNC: 7.1 G/DL
RBC # BLD: 4.36 M/UL
RBC # FLD: 13.8 %
SODIUM SERPL-SCNC: 136 MMOL/L
WBC # FLD AUTO: 4.77 K/UL

## 2025-01-28 ENCOUNTER — APPOINTMENT (OUTPATIENT)
Dept: OPHTHALMOLOGY | Facility: CLINIC | Age: 89
End: 2025-01-28
Payer: MEDICARE

## 2025-01-28 ENCOUNTER — NON-APPOINTMENT (OUTPATIENT)
Age: 89
End: 2025-01-28

## 2025-01-28 PROCEDURE — 92014 COMPRE OPH EXAM EST PT 1/>: CPT

## 2025-01-28 PROCEDURE — 92250 FUNDUS PHOTOGRAPHY W/I&R: CPT

## 2025-02-17 ENCOUNTER — RX RENEWAL (OUTPATIENT)
Age: 89
End: 2025-02-17

## 2025-02-25 ENCOUNTER — RX RENEWAL (OUTPATIENT)
Age: 89
End: 2025-02-25

## 2025-02-26 ENCOUNTER — NON-APPOINTMENT (OUTPATIENT)
Age: 89
End: 2025-02-26

## 2025-02-26 ENCOUNTER — APPOINTMENT (OUTPATIENT)
Dept: PULMONOLOGY | Facility: CLINIC | Age: 89
End: 2025-02-26
Payer: MEDICARE

## 2025-02-26 VITALS
HEIGHT: 63 IN | DIASTOLIC BLOOD PRESSURE: 62 MMHG | TEMPERATURE: 98 F | OXYGEN SATURATION: 97 % | RESPIRATION RATE: 12 BRPM | HEART RATE: 83 BPM | SYSTOLIC BLOOD PRESSURE: 93 MMHG

## 2025-02-26 PROCEDURE — 99213 OFFICE O/P EST LOW 20 MIN: CPT

## 2025-03-04 ENCOUNTER — APPOINTMENT (OUTPATIENT)
Dept: OPHTHALMOLOGY | Facility: CLINIC | Age: 89
End: 2025-03-04
Payer: MEDICARE

## 2025-03-04 ENCOUNTER — NON-APPOINTMENT (OUTPATIENT)
Age: 89
End: 2025-03-04

## 2025-03-04 PROCEDURE — 92014 COMPRE OPH EXAM EST PT 1/>: CPT

## 2025-03-04 PROCEDURE — 92134 CPTRZ OPH DX IMG PST SGM RTA: CPT

## 2025-03-05 NOTE — PATIENT PROFILE ADULT - NSPROEXTENSIONSOFSELF_GEN_A_NUR
Airway    Performed by: Aneesh Calvillo CRNA  Authorized by: Raphael Ventura MD    Final Airway Type:  Endotracheal airway  Final Endotracheal Airway*:  ETT  ETT Size (mm)*:  7.5  Cuff*:  Regular  Technique Used for Successful ETT Placement:  Video laryngoscopy  Devices/Methods Used in Placement*:  Mask, Stylet, Oral ETT and Oral Airway  Intubation Procedure*:  ETCO2, Preoxygenation, Atraumatic, Dentition Unchanged and Pharynx Clear  Insertion Site:  Oral  Blade Type*:  Video Laryngoscope  Measured from*:  Lips  Secured at (cm)*:  23  Placement Verified by: auscultation, capnometry and palpation of cuff    Glottic View*:  2 - partial view of glottis  Attempts*:  1  Location:  OR  Urgency:  Elective  Difficult Airway: No    Indications for Airway Management:  Anesthesia  Spontaneous Ventilation: absent    Sedation Level:  Deep  Mask Difficulty Assessment:  1 - vent by mask  Start Time: 3/5/2025 11:49 AM       none

## 2025-03-16 ENCOUNTER — EMERGENCY (EMERGENCY)
Facility: HOSPITAL | Age: 89
LOS: 1 days | Discharge: HOME | End: 2025-03-16
Attending: STUDENT IN AN ORGANIZED HEALTH CARE EDUCATION/TRAINING PROGRAM | Admitting: STUDENT IN AN ORGANIZED HEALTH CARE EDUCATION/TRAINING PROGRAM
Payer: MEDICARE

## 2025-03-16 VITALS
DIASTOLIC BLOOD PRESSURE: 84 MMHG | OXYGEN SATURATION: 100 % | SYSTOLIC BLOOD PRESSURE: 134 MMHG | RESPIRATION RATE: 18 BRPM | HEART RATE: 80 BPM | TEMPERATURE: 99 F

## 2025-03-16 VITALS
WEIGHT: 119.93 LBS | HEART RATE: 91 BPM | RESPIRATION RATE: 18 BRPM | OXYGEN SATURATION: 99 % | DIASTOLIC BLOOD PRESSURE: 89 MMHG | TEMPERATURE: 99 F | SYSTOLIC BLOOD PRESSURE: 126 MMHG

## 2025-03-16 DIAGNOSIS — Z95.0 PRESENCE OF CARDIAC PACEMAKER: Chronic | ICD-10-CM

## 2025-03-16 DIAGNOSIS — Z98.890 OTHER SPECIFIED POSTPROCEDURAL STATES: Chronic | ICD-10-CM

## 2025-03-16 DIAGNOSIS — C34.90 MALIGNANT NEOPLASM OF UNSPECIFIED PART OF UNSPECIFIED BRONCHUS OR LUNG: Chronic | ICD-10-CM

## 2025-03-16 DIAGNOSIS — H26.9 UNSPECIFIED CATARACT: Chronic | ICD-10-CM

## 2025-03-16 PROCEDURE — 72125 CT NECK SPINE W/O DYE: CPT | Mod: 26

## 2025-03-16 PROCEDURE — 93005 ELECTROCARDIOGRAM TRACING: CPT

## 2025-03-16 PROCEDURE — 99284 EMERGENCY DEPT VISIT MOD MDM: CPT

## 2025-03-16 PROCEDURE — 70450 CT HEAD/BRAIN W/O DYE: CPT | Mod: 26

## 2025-03-16 PROCEDURE — 93010 ELECTROCARDIOGRAM REPORT: CPT

## 2025-03-16 PROCEDURE — 72125 CT NECK SPINE W/O DYE: CPT | Mod: MC

## 2025-03-16 PROCEDURE — 99284 EMERGENCY DEPT VISIT MOD MDM: CPT | Mod: 25

## 2025-03-16 PROCEDURE — 70450 CT HEAD/BRAIN W/O DYE: CPT | Mod: MC

## 2025-03-16 RX ORDER — ACETAMINOPHEN 500 MG/5ML
650 LIQUID (ML) ORAL ONCE
Refills: 0 | Status: COMPLETED | OUTPATIENT
Start: 2025-03-16 | End: 2025-03-16

## 2025-03-16 RX ADMIN — Medication 650 MILLIGRAM(S): at 21:01

## 2025-03-16 NOTE — ED ADULT NURSE NOTE - NSFALLHARMRISKINTERV_ED_ALL_ED

## 2025-03-16 NOTE — ED PROVIDER NOTE - PHYSICAL EXAMINATION
CONSTITUTIONAL: elderly fem in NAD   SKIN: Normal color and turgor.    HEAD:  tender posterior scalp hematoma; no depressed fx, no Prado sign, no periorbital ecchymosis.  EYES: Conjunctiva clear. Anicteric sclera.  PERRL.  left sided cataract; EOMI, no nystagmus  ENT: Airway clear. Normal voice. MMM.  RESPIRATORY:  Normal respiratory rate and effort. Lungs CTA  CARDIOVASCULAR:  RRR S1S2, 3/6 syst murmur loudest RSB  GI:  Abdomen soft, nontender.    MSK: Neck supple.  No LE edema or calf tenderness. No joint swelling or ROM limitation.  NEURO: Alert, clear mental status.  Speech clear. No focal deficits. Gait steady.

## 2025-03-16 NOTE — ED PROVIDER NOTE - CLINICAL SUMMARY MEDICAL DECISION MAKING FREE TEXT BOX
Fall out of bed, posterior head strike. No LOC. Baseline mental status, nonfocal neuro exam. On Eliquis for CM. Has posterior scalp hematoma, no depressed skull fx or evid of basilal skull fx.  CT head and c-spine neg.  On azithromycin for cough, afebrile, lungs CTA and normal work of breathing.

## 2025-03-16 NOTE — ED PROVIDER NOTE - NSFOLLOWUPINSTRUCTIONS_ED_ALL_ED_FT
Please follow up with your doctor in 1-3 days.  Return to the Emergency Department if you have any new or worsening symptoms, or if you have any concerns.  ====================  Head Injury, Adult  Three rear views of the head showing how quick, sudden head movements injure the brain.  There are many types of head injuries. Head injuries can be as minor as a small bump, or they can be a serious medical issue. More severe head injuries include:  A jarring injury to the brain (concussion).  A bruise (contusion) of the brain. This means there is bleeding in the brain that can cause swelling.  A cracked skull (skull fracture).  Bleeding in the brain that collects, clots, and forms a bump (hematoma).  After a head injury, most problems occur within the first 24 hours, but side effects may occur up to 7–10 days after the injury. It is important to watch your condition for any changes. You may need to be observed in the emergency department or urgent care, or you may have to stay in the hospital.    What are the causes?  There are many causes of a head injury. Serious head injuries may be caused by car crashes, bicycle or motorcycle crashes, sports injuries, falls, or being struck by an object.    What are the symptoms?  Symptoms of a head injury include a contusion, bump, or bleeding at the site of the injury. Other physical symptoms may include:  Headache.  Nausea or vomiting.  Dizziness.  Blurred or double vision.  Sensitivity to bright lights or loud noises.  Feeling tired.  Trouble waking up.  Severe symptoms such as:  Weakness or numbness on one side of the body.  Slurred speech or swallowing problems.  Loss of consciousness.  Seizures.  Mental symptoms may include:  Irritability.  Confusion and memory problems.  Poor attention and concentration.  Changes in eating or sleeping habits.  Anxiety or depression.  How is this diagnosed?  This condition is diagnosed based on your symptoms and a physical exam. You may also have imaging tests done, such as a CT scan or an MRI.    How is this treated?  Treatment for this condition depends on the severity and type of injury you have. The main goal of treatment is to prevent complications and allow the brain time to heal.    Mild head injury    If you have a mild head injury, you may be sent home, and treatment may include:  Observation. A responsible adult should stay with you for 24 hours after your injury and check on you often.  Physical rest.  Brain rest.  Pain medicines.  Severe head injury    If you have a severe head injury, treatment may include:  Close observation. You may have to stay in the hospital and have:  Frequent physical exams.  Frequent checks of how your brain and nervous system are working.  Your blood pressure and oxygen levels checked.  Medicines to relieve pain, prevent seizures, and decrease brain swelling.  Airway protection and breathing support. This may include using a ventilator.  Monitoring and managing swelling inside the brain.  Brain surgery. Surgery may include:  Removing a collection of blood or blood clots.  Stopping the bleeding.  Removing a part of the skull to make room for the brain to swell.  Follow these instructions at home:  Activity    Rest. Avoid activities that are hard or tiring.  Make sure you get enough sleep.  Let your brain rest by limiting activities that take a lot of thought or attention, such as:  Watching TV.  Playing memory games and doing puzzles.  Job-related work or homework.  Working on the computer, using social media, and texting.  Avoid activities that could cause another head injury, such as playing sports, until your health care provider approves.  Ask your provider when it is safe for you to return to your regular activities, such as work or school.  Ask your provider when you can drive, ride a bicycle, or use machinery. Your ability to react may be slower after a brain injury. Do not do these activities if you are dizzy.  Lifestyle    A sign telling the reader not to drink beer, wine, or hard liquor.  Do not drink alcohol until your provider approves. Do not use drugs. Alcohol and certain drugs may slow your recovery and can put you at risk of further injury.  If it is hard to remember things, write them down.  If you are easily distracted, try to do one thing at a time.  Talk with family members or close friends when making important decisions.  Tell your friends, family, a trusted colleague, and  about your injury, symptoms, and restrictions. Ask them to watch for any problems that are new or get worse.  General instructions    Take over-the-counter and prescription medicines only as told by your provider.  Have a responsible adult stay with you for 24 hours after your head injury. They should watch you for any changes in your symptoms and be ready to get help right away.  Keep all follow-up visits to make sure your needs are being met and catch any new problems early.  How is this prevented?  Avoiding another brain injury is very important. In rare cases, another injury can lead to permanent brain damage, brain swelling, or death. The risk of this is greatest during the first 7–10 days after a head injury. To avoid injuries:  Improve your balance and strength to avoid falls.  Wear a seat belt when you are in a moving vehicle.  Wear a helmet when riding a bicycle, skiing, or doing any other sport that has a risk of injury.  Take safety measures in your home to prevent falls, such as:  Removing clutter and tripping hazards.  Using grab bars in bathrooms and handrails by stairs.  Placing non-slip mats on floors and in bathtubs.  Improving lighting in dim areas.  Where to find more information  Brain Injury Association: biausa.org  Contact a health care provider if:  You have headaches that do not go away.  You have dizziness that does not go away.  You have double vision or vision changes that do not go away.  You have difficulty sleeping.  You have changes in your mood.  You have new symptoms.  Get help right away if:  You have sudden:  Severe headache.  Severe vomiting.  Unequal pupil size. One is bigger than the other.  Vision problems.  Confusion or irritability.  You have a seizure.  Your symptoms get worse.  You have clear or bloody fluid coming from your nose or ears.  These symptoms may be an emergency. Get help right away. Call 911.  Do not wait to see if the symptoms will go away.  Do not drive yourself to the hospital.  This information is not intended to replace advice given to you by your health care provider. Make sure you discuss any questions you have with your health care provider.

## 2025-03-16 NOTE — ED PROVIDER NOTE - OBJECTIVE STATEMENT
- Chief Complaint (CC) : Fall at home and injury to head causing pain  - History of Present Illness (HPI) : Earlier today, Sherry fell at home when her left foot slipped. She apparently hit her head forming a sizable bump. She was alert and conscious throughout the ordeal and was brought to the hospital by her daughter, noticeable signs of pain were evident around the head area. It was not a fainting spell as she stayed conscience during the entire incident.  - Past Medical History : Sherry takes Eloquist, a blood thinner, for atrial fibrillation.  - Past Surgical History : Not mentioned in the conversation.  - Family History : Not mentioned in the conversation.  - Social History : Sherry lives with her daughter who is an immediate caregiver and witnessed the fall.  - Review of Systems : Sherry does not report any difficulties in breathing, chest pain, neck pain, stomach pain, shortness of breath, or changes in vision. However, she reported a mild headache and has been coughing for an unknown duration.  - Medications : Sherry regularly takes Eloquis, an anticoagulant, for her irregular heartbeat.  - Allergies : No allergies were mentioned.

## 2025-03-16 NOTE — ED PROVIDER NOTE - CARE PROVIDER_API CALL
Michaela Greenfield J  Critical Care Medicine  [address] [address]  Phone: [phone]  Fax: [phone]  Follow Up Time:

## 2025-03-16 NOTE — ED PROVIDER NOTE - PATIENT PORTAL LINK FT
You can access the FollowMyHealth Patient Portal offered by [facility] by registering at the following website: [email or web address]. By joining [employer]’s FollowMyHealth portal, you will also be able to view your health information using other applications (apps) compatible with our system.

## 2025-03-16 NOTE — ED ADULT NURSE NOTE - OBJECTIVE STATEMENT
Received patient accompanied by  via wheelchair with chief complaint of unwitnessed fall.  On PE, AOX3, speaking full sentences without difficulty. Patient not in active cardiac or respiratory distress, no noted neurologic deficits. Noted bump/swelling over her parieto-occipital area. Patient oriented to ED area. All needs attended. POC reviewed. Fall risk precautions maintained. Purposeful proactive hourly rounding in progress.

## 2025-03-16 NOTE — ED PROVIDER NOTE - ATTENDING APP SHARED VISIT CONTRIBUTION OF CARE
i discussed the care of the pt directly with the ACP while the pt was in the ED. i have reviewed the ACP note and agree w/ the history, exam and plan of care other than as noted above.    non-syncopal fall w/ head strike no LOC, on eliquis  +posterior hematoma  CT head/C-spine negative
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
## 2025-03-19 DIAGNOSIS — Z79.01 LONG TERM (CURRENT) USE OF ANTICOAGULANTS: ICD-10-CM

## 2025-03-19 DIAGNOSIS — I42.9 CARDIOMYOPATHY, UNSPECIFIED: ICD-10-CM
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
## 2025-03-19 DIAGNOSIS — S00.03XA CONTUSION OF SCALP, INITIAL ENCOUNTER: ICD-10-CM

## 2025-03-19 DIAGNOSIS — S09.90XA UNSPECIFIED INJURY OF HEAD, INITIAL ENCOUNTER: ICD-10-CM

## 2025-03-20 ENCOUNTER — APPOINTMENT (OUTPATIENT)
Dept: INTERNAL MEDICINE | Facility: CLINIC | Age: 89
End: 2025-03-20
Payer: MEDICARE

## 2025-03-20 VITALS
DIASTOLIC BLOOD PRESSURE: 63 MMHG | SYSTOLIC BLOOD PRESSURE: 104 MMHG | HEIGHT: 63 IN | TEMPERATURE: 97.2 F | WEIGHT: 126 LBS | BODY MASS INDEX: 22.32 KG/M2 | OXYGEN SATURATION: 98 % | HEART RATE: 73 BPM

## 2025-03-20 DIAGNOSIS — R05.9 COUGH, UNSPECIFIED: ICD-10-CM

## 2025-03-20 DIAGNOSIS — J47.9 BRONCHIECTASIS, UNCOMPLICATED: ICD-10-CM

## 2025-03-20 DIAGNOSIS — N18.30 CHRONIC KIDNEY DISEASE, STAGE 3 UNSPECIFIED: ICD-10-CM

## 2025-03-20 DIAGNOSIS — J45.909 UNSPECIFIED ASTHMA, UNCOMPLICATED: ICD-10-CM

## 2025-03-20 PROCEDURE — 99213 OFFICE O/P EST LOW 20 MIN: CPT

## 2025-03-24 ENCOUNTER — RX RENEWAL (OUTPATIENT)
Age: 89
End: 2025-03-24

## 2025-03-27 NOTE — ED ADULT NURSE NOTE - NSFALLRSKUNASSIST_ED_ALL_ED
Plan: Recommend minimum of SPF 30+ daily to the sun exposed areas. Reapply every 2 hours. Detail Level: Zone no

## 2025-04-07 RX ORDER — CLOTRIMAZOLE 10 MG/G
1 CREAM TOPICAL
Qty: 1 | Refills: 2 | Status: ACTIVE | COMMUNITY
Start: 2025-04-07 | End: 1900-01-01

## 2025-05-10 NOTE — PHYSICAL EXAM
-Follow up. Return in about 12 weeks (around 8/2/2025). ??       9:00 AM            -Labwork downstairs. ?? ???  -Check in behind the  on 1st floor. ???????????      [No Acute Distress] : no acute distress [Well Developed] : well developed [Well Nourished] : well nourished [Well-Appearing] : well-appearing [Normal Sclera/Conjunctiva] : normal sclera/conjunctiva [PERRL] : pupils equal round and reactive to light [EOMI] : extraocular movements intact [Normal Outer Ear/Nose] : the outer ears and nose were normal in appearance [Normal Oropharynx] : the oropharynx was normal [No JVD] : no jugular venous distention [No Lymphadenopathy] : no lymphadenopathy [Thyroid Normal, No Nodules] : the thyroid was normal and there were no nodules present [Supple] : supple [No Respiratory Distress] : no respiratory distress  [No Accessory Muscle Use] : no accessory muscle use [Normal Rate] : normal rate  [Regular Rhythm] : with a regular rhythm [Normal S1, S2] : normal S1 and S2 [No Murmur] : no murmur heard [No Carotid Bruits] : no carotid bruits [No Abdominal Bruit] : a ~M bruit was not heard ~T in the abdomen [Pedal Pulses Present] : the pedal pulses are present [No Varicosities] : no varicosities [No Edema] : there was no peripheral edema [No Palpable Aorta] : no palpable aorta [No Extremity Clubbing/Cyanosis] : no extremity clubbing/cyanosis [Soft] : abdomen soft [Non-distended] : non-distended [Non Tender] : non-tender [No Masses] : no abdominal mass palpated [No HSM] : no HSM [Normal Bowel Sounds] : normal bowel sounds [Normal Anterior Cervical Nodes] : no anterior cervical lymphadenopathy [Normal Posterior Cervical Nodes] : no posterior cervical lymphadenopathy [No CVA Tenderness] : no CVA  tenderness [No Spinal Tenderness] : no spinal tenderness [No Joint Swelling] : no joint swelling [Grossly Normal Strength/Tone] : grossly normal strength/tone [No Rash] : no rash [Coordination Grossly Intact] : coordination grossly intact [No Focal Deficits] : no focal deficits [Normal Gait] : normal gait [Normal Affect] : the affect was normal [Normal Insight/Judgement] : insight and judgment were intact [de-identified] : decreased breath sounds

## 2025-05-13 ENCOUNTER — APPOINTMENT (OUTPATIENT)
Dept: INTERNAL MEDICINE | Facility: CLINIC | Age: 89
End: 2025-05-13
Payer: MEDICARE

## 2025-05-13 VITALS
OXYGEN SATURATION: 98 % | TEMPERATURE: 97.2 F | DIASTOLIC BLOOD PRESSURE: 78 MMHG | SYSTOLIC BLOOD PRESSURE: 116 MMHG | HEART RATE: 77 BPM

## 2025-05-13 DIAGNOSIS — I26.99 OTHER PULMONARY EMBOLISM W/OUT ACUTE COR PULMONALE: ICD-10-CM

## 2025-05-13 DIAGNOSIS — J47.9 BRONCHIECTASIS, UNCOMPLICATED: ICD-10-CM

## 2025-05-13 DIAGNOSIS — C34.90 MALIGNANT NEOPLASM OF UNSPECIFIED PART OF UNSPECIFIED BRONCHUS OR LUNG: ICD-10-CM

## 2025-05-13 DIAGNOSIS — I10 ESSENTIAL (PRIMARY) HYPERTENSION: ICD-10-CM

## 2025-05-13 DIAGNOSIS — N18.30 CHRONIC KIDNEY DISEASE, STAGE 3 UNSPECIFIED: ICD-10-CM

## 2025-05-13 PROCEDURE — 36415 COLL VENOUS BLD VENIPUNCTURE: CPT

## 2025-05-13 PROCEDURE — 99213 OFFICE O/P EST LOW 20 MIN: CPT | Mod: 25

## 2025-05-15 ENCOUNTER — RX RENEWAL (OUTPATIENT)
Age: 89
End: 2025-05-15

## 2025-05-15 LAB
ALBUMIN SERPL ELPH-MCNC: 3.7 G/DL
ALP BLD-CCNC: 112 U/L
ALT SERPL-CCNC: 13 U/L
ANION GAP SERPL CALC-SCNC: 12 MMOL/L
AST SERPL-CCNC: 19 U/L
BILIRUB SERPL-MCNC: 0.4 MG/DL
BUN SERPL-MCNC: 15 MG/DL
CALCIUM SERPL-MCNC: 10.1 MG/DL
CHLORIDE SERPL-SCNC: 106 MMOL/L
CHOLEST SERPL-MCNC: 114 MG/DL
CO2 SERPL-SCNC: 19 MMOL/L
CREAT SERPL-MCNC: 1 MG/DL
EGFRCR SERPLBLD CKD-EPI 2021: 54 ML/MIN/1.73M2
GLUCOSE SERPL-MCNC: 65 MG/DL
HCT VFR BLD CALC: 44 %
HDLC SERPL-MCNC: 51 MG/DL
HGB BLD-MCNC: 13.9 G/DL
LDLC SERPL-MCNC: 52 MG/DL
MCHC RBC-ENTMCNC: 30.8 PG
MCHC RBC-ENTMCNC: 31.6 G/DL
MCV RBC AUTO: 97.3 FL
NONHDLC SERPL-MCNC: 62 MG/DL
PLATELET # BLD AUTO: 241 K/UL
POTASSIUM SERPL-SCNC: 4.5 MMOL/L
PROT SERPL-MCNC: 7.2 G/DL
RBC # BLD: 4.52 M/UL
RBC # FLD: 13.9 %
SODIUM SERPL-SCNC: 137 MMOL/L
T4 FREE SERPL-MCNC: 1.9 NG/DL
TRIGL SERPL-MCNC: 39 MG/DL
TSH SERPL-ACNC: 3.1 UIU/ML
WBC # FLD AUTO: 5.01 K/UL

## 2025-06-10 ENCOUNTER — APPOINTMENT (OUTPATIENT)
Dept: OPHTHALMOLOGY | Facility: CLINIC | Age: 89
End: 2025-06-10

## 2025-07-10 PROBLEM — G47.00 INSOMNIA: Status: ACTIVE | Noted: 2025-07-10

## 2025-07-10 RX ORDER — TRAZODONE HYDROCHLORIDE 50 MG/1
50 TABLET ORAL
Qty: 30 | Refills: 5 | Status: ACTIVE | COMMUNITY
Start: 2025-07-10 | End: 1900-01-01

## 2025-07-11 ENCOUNTER — APPOINTMENT (OUTPATIENT)
Dept: OPHTHALMOLOGY | Facility: CLINIC | Age: 89
End: 2025-07-11

## 2025-07-16 ENCOUNTER — APPOINTMENT (OUTPATIENT)
Dept: INTERNAL MEDICINE | Facility: CLINIC | Age: 89
End: 2025-07-16

## 2025-07-23 ENCOUNTER — APPOINTMENT (OUTPATIENT)
Dept: INTERNAL MEDICINE | Facility: CLINIC | Age: 89
End: 2025-07-23

## 2025-08-04 ENCOUNTER — APPOINTMENT (OUTPATIENT)
Dept: OPHTHALMOLOGY | Facility: CLINIC | Age: 89
End: 2025-08-04

## 2025-08-21 PROBLEM — Z51.5 ENCOUNTER FOR PALLIATIVE CARE: Chronic | Status: ACTIVE | Noted: 2025-01-01

## 2025-08-27 DIAGNOSIS — N18.9 CHRONIC KIDNEY DISEASE, UNSPECIFIED: ICD-10-CM

## 2025-08-27 DIAGNOSIS — C34.90 MALIGNANT NEOPLASM OF UNSPECIFIED PART OF UNSPECIFIED BRONCHUS OR LUNG: ICD-10-CM

## 2025-08-27 DIAGNOSIS — I49.5 SICK SINUS SYNDROME: ICD-10-CM

## 2025-08-27 DIAGNOSIS — Z86.73 PERSONAL HISTORY OF TRANSIENT ISCHEMIC ATTACK (TIA), AND CEREBRAL INFARCTION WITHOUT RESIDUAL DEFICITS: ICD-10-CM

## 2025-08-27 DIAGNOSIS — G93.40 ENCEPHALOPATHY, UNSPECIFIED: ICD-10-CM

## 2025-08-27 DIAGNOSIS — K21.9 GASTRO-ESOPHAGEAL REFLUX DISEASE WITHOUT ESOPHAGITIS: ICD-10-CM

## 2025-08-27 DIAGNOSIS — J96.01 ACUTE RESPIRATORY FAILURE WITH HYPOXIA: ICD-10-CM

## 2025-08-27 DIAGNOSIS — H40.9 UNSPECIFIED GLAUCOMA: ICD-10-CM

## 2025-08-27 DIAGNOSIS — Z90.710 ACQUIRED ABSENCE OF BOTH CERVIX AND UTERUS: ICD-10-CM

## 2025-08-27 DIAGNOSIS — I25.10 ATHEROSCLEROTIC HEART DISEASE OF NATIVE CORONARY ARTERY WITHOUT ANGINA PECTORIS: ICD-10-CM

## 2025-08-27 DIAGNOSIS — I50.9 HEART FAILURE, UNSPECIFIED: ICD-10-CM

## 2025-08-27 DIAGNOSIS — Z66 DO NOT RESUSCITATE: ICD-10-CM

## 2025-08-27 DIAGNOSIS — F05 DELIRIUM DUE TO KNOWN PHYSIOLOGICAL CONDITION: ICD-10-CM

## 2025-08-27 DIAGNOSIS — G89.3 NEOPLASM RELATED PAIN (ACUTE) (CHRONIC): ICD-10-CM

## 2025-08-27 DIAGNOSIS — Z51.5 ENCOUNTER FOR PALLIATIVE CARE: ICD-10-CM

## 2025-08-27 DIAGNOSIS — J45.909 UNSPECIFIED ASTHMA, UNCOMPLICATED: ICD-10-CM

## 2025-08-27 DIAGNOSIS — D86.9 SARCOIDOSIS, UNSPECIFIED: ICD-10-CM

## 2025-08-27 DIAGNOSIS — I48.91 UNSPECIFIED ATRIAL FIBRILLATION: ICD-10-CM

## 2025-08-27 DIAGNOSIS — R53.2 FUNCTIONAL QUADRIPLEGIA: ICD-10-CM

## 2025-08-27 DIAGNOSIS — Z86.711 PERSONAL HISTORY OF PULMONARY EMBOLISM: ICD-10-CM

## 2025-08-27 DIAGNOSIS — J18.9 PNEUMONIA, UNSPECIFIED ORGANISM: ICD-10-CM

## 2025-08-27 DIAGNOSIS — J44.0 CHRONIC OBSTRUCTIVE PULMONARY DISEASE WITH (ACUTE) LOWER RESPIRATORY INFECTION: ICD-10-CM

## 2025-08-27 DIAGNOSIS — Z95.0 PRESENCE OF CARDIAC PACEMAKER: ICD-10-CM

## 2025-08-27 DIAGNOSIS — Z79.01 LONG TERM (CURRENT) USE OF ANTICOAGULANTS: ICD-10-CM

## 2025-08-27 DIAGNOSIS — R53.83 OTHER FATIGUE: ICD-10-CM

## 2025-08-27 DIAGNOSIS — C79.31 SECONDARY MALIGNANT NEOPLASM OF BRAIN: ICD-10-CM

## 2025-08-29 ENCOUNTER — APPOINTMENT (OUTPATIENT)
Dept: PULMONOLOGY | Facility: CLINIC | Age: 89
End: 2025-08-29